# Patient Record
Sex: FEMALE | Race: WHITE | NOT HISPANIC OR LATINO | Employment: FULL TIME | URBAN - NONMETROPOLITAN AREA
[De-identification: names, ages, dates, MRNs, and addresses within clinical notes are randomized per-mention and may not be internally consistent; named-entity substitution may affect disease eponyms.]

---

## 2017-03-01 ENCOUNTER — AMBULATORY - GICH (OUTPATIENT)
Dept: SCHEDULING | Facility: OTHER | Age: 37
End: 2017-03-01

## 2017-03-29 ENCOUNTER — HISTORY (OUTPATIENT)
Dept: FAMILY MEDICINE | Facility: OTHER | Age: 37
End: 2017-03-29

## 2017-03-29 ENCOUNTER — OFFICE VISIT - GICH (OUTPATIENT)
Dept: FAMILY MEDICINE | Facility: OTHER | Age: 37
End: 2017-03-29

## 2017-03-29 DIAGNOSIS — R53.83 OTHER FATIGUE: ICD-10-CM

## 2017-03-29 DIAGNOSIS — M54.50 LOW BACK PAIN: ICD-10-CM

## 2017-03-29 DIAGNOSIS — K59.09 OTHER CONSTIPATION: ICD-10-CM

## 2017-03-29 DIAGNOSIS — G89.29 OTHER CHRONIC PAIN: ICD-10-CM

## 2017-03-29 DIAGNOSIS — R00.2 PALPITATIONS: ICD-10-CM

## 2017-04-06 ENCOUNTER — AMBULATORY - GICH (OUTPATIENT)
Dept: SCHEDULING | Facility: OTHER | Age: 37
End: 2017-04-06

## 2017-04-17 ENCOUNTER — HISTORY (OUTPATIENT)
Dept: INTERNAL MEDICINE | Facility: OTHER | Age: 37
End: 2017-04-17

## 2017-04-17 ENCOUNTER — OFFICE VISIT - GICH (OUTPATIENT)
Dept: INTERNAL MEDICINE | Facility: OTHER | Age: 37
End: 2017-04-17

## 2017-04-17 DIAGNOSIS — G89.29 OTHER CHRONIC PAIN: ICD-10-CM

## 2017-04-17 DIAGNOSIS — D50.9 IRON DEFICIENCY ANEMIA: ICD-10-CM

## 2017-04-17 DIAGNOSIS — H57.89 OTHER SPECIFIED DISORDERS OF EYE AND ADNEXA: ICD-10-CM

## 2017-04-17 DIAGNOSIS — K59.09 OTHER CONSTIPATION: ICD-10-CM

## 2017-04-17 DIAGNOSIS — M54.50 LOW BACK PAIN: ICD-10-CM

## 2017-04-17 DIAGNOSIS — R10.13 EPIGASTRIC PAIN: ICD-10-CM

## 2017-04-17 DIAGNOSIS — R00.2 PALPITATIONS: ICD-10-CM

## 2017-04-17 DIAGNOSIS — R53.83 OTHER FATIGUE: ICD-10-CM

## 2017-06-13 ENCOUNTER — AMBULATORY - GICH (OUTPATIENT)
Dept: RADIOLOGY | Facility: OTHER | Age: 37
End: 2017-06-13

## 2017-06-13 DIAGNOSIS — R52 PAIN: ICD-10-CM

## 2017-06-20 ENCOUNTER — HOSPITAL ENCOUNTER (OUTPATIENT)
Dept: RADIOLOGY | Facility: OTHER | Age: 37
End: 2017-06-20

## 2017-06-20 ENCOUNTER — AMBULATORY - GICH (OUTPATIENT)
Dept: RADIOLOGY | Facility: OTHER | Age: 37
End: 2017-06-20

## 2017-06-20 DIAGNOSIS — R52 PAIN: ICD-10-CM

## 2017-10-17 ENCOUNTER — OFFICE VISIT - GICH (OUTPATIENT)
Dept: FAMILY MEDICINE | Facility: OTHER | Age: 37
End: 2017-10-17

## 2017-10-17 ENCOUNTER — HISTORY (OUTPATIENT)
Dept: FAMILY MEDICINE | Facility: OTHER | Age: 37
End: 2017-10-17

## 2017-10-17 DIAGNOSIS — J30.81 ALLERGIC RHINITIS DUE TO ANIMAL HAIR AND DANDER: ICD-10-CM

## 2017-10-17 DIAGNOSIS — M54.50 LOW BACK PAIN: ICD-10-CM

## 2017-10-17 DIAGNOSIS — E03.9 HYPOTHYROIDISM: ICD-10-CM

## 2017-10-17 LAB
T3,FREE - HISTORICAL: 3.49 PG/ML (ref 2.5–3.9)
T4 FREE SERPL-MCNC: 0.8 NG/DL (ref 0.58–1.64)
TSH - HISTORICAL: 1.42 UIU/ML (ref 0.34–5.6)

## 2017-10-18 ENCOUNTER — AMBULATORY - GICH (OUTPATIENT)
Dept: SCHEDULING | Facility: OTHER | Age: 37
End: 2017-10-18

## 2017-10-20 LAB — T3,REVERSE - HISTORICAL: 9.9 NG/DL

## 2017-12-19 ENCOUNTER — COMMUNICATION - GICH (OUTPATIENT)
Dept: FAMILY MEDICINE | Facility: OTHER | Age: 37
End: 2017-12-19

## 2017-12-19 DIAGNOSIS — E03.9 HYPOTHYROIDISM: ICD-10-CM

## 2017-12-27 NOTE — PROGRESS NOTES
Patient Information     Patient Name MRN Sex Gisela Balderrama [id number] Female 1980      Progress Notes by Ana Tierney at 2017 10:47 AM     Author:  Ana Tierney Service:  (none) Author Type:  (none)     Filed:  2017 10:47 AM Date of Service:  2017 10:47 AM Status:  Signed     :  Ana Tierney            Falls Risk Criteria:    Age 65 and older or under age 4        Sensory deficits    Poor vision    Use of ambulatory aides    Impaired judgment    Unable to walk independently    Meets High Risk criteria for falls:  no

## 2017-12-27 NOTE — PROGRESS NOTES
"Patient Information     Patient Name MRN Sex Gisela Balderrama [id number] Female 1980      Progress Notes by Ronda Paniagua DO at 10/17/2017  8:30 AM     Author:  Ronda Paniagua DO Service:  (none) Author Type:  PHYS- Osteopathic     Filed:  10/19/2017  4:19 AM Encounter Date:  10/17/2017 Status:  Signed     :  Ronda Paniagua DO (PHYS- Osteopathic)            SUBJECTIVE:  Gisela Richards is a 36 y.o. female who presents for multiple concerns.    HPI  Gisela was here seeing different providers since our last visit with concerns ranging from weight, thyroid problem, back pain, etc.  She has also seen Kaylyn Gray NP from [facility] (who has now moved to [facility]).  They discussed, and eventually started on Nature-thyroid compound.  Her TSH levels have been fairly normal, however symptomatically she has benefited from the addition.  She has not seen Endocrinology in all of this.  Last thyroid level was in July - and would like it rechecked at this time.  She does bring in a ~3 page document with her symptoms and progression of things over the last few months to years.  It will be scanned into her chart.    The symptoms she has received the most relief from have been: the mind fog/blanking out, random weakness, heart palpitations and anxiety/panic attacks.  She continues to deal with constipation, decreased sex drive and infertility, joint pains, digestive issues, temperature sensitivity (cold when all others are hot or overheats easily), weight issues, severe menstrual pain, puffy eyes.    She also continues to deal with daily allergy symptoms, but has known allergy to dog dander and proceeded to get a dog.  Now he is part of the family.  Thought he was \"hypoallergenic\" but learned there was no such thing when she met with an allergist.      Allergies      Allergen   Reactions     Cats (Fur, Dander, Saliva)  Shortness Of Breath     Latex  Rash     Tramadol  Other - Describe In " Comment Field     High doses caused yellow eyes, tremors in her mouth, couldn't feel some body parts.    No issues with low doses      Vicodin [Hydrocodone-Acetaminophen]  Rash   ,   Current Outpatient Prescriptions on File Prior to Visit       Medication  Sig Dispense Refill     Magnesium Citrate powd by Does not apply route. Nagnesium Calm by Natural Vitality takes 2 to 3 tsp per day       No current facility-administered medications on file prior to visit.     and   Past Medical History:     Diagnosis  Date     Constipation     Since young.      History of hemangioma excision 2014    Slight recurrence within scar - removed in 9th grade and came back right away      History of pregnancy      2, para 2.          REVIEW OF SYSTEMS:  ROS - see scanned paper and HPI.  All other systems reviewed and negative.    OBJECTIVE:  /82  Pulse 64  Wt 76.1 kg (167 lb 12.8 oz)  BMI 25.51 kg/m2    EXAM:   Physical Exam   Constitutional: She is well-developed, well-nourished, and in no distress.   HENT:   Head: Normocephalic and atraumatic.   Right Ear: External ear normal.   Left Ear: External ear normal.   Nose: Nose normal.   Mouth/Throat: Oropharynx is clear and moist. No oropharyngeal exudate.   Eyes: EOM are normal. Pupils are equal, round, and reactive to light.   Neck: Normal range of motion. Neck supple.   Cardiovascular: Normal rate and normal heart sounds.    Pulmonary/Chest: Effort normal and breath sounds normal. No respiratory distress. She has no wheezes. She has no rales. She exhibits no tenderness.   Abdominal: Soft. Bowel sounds are normal. She exhibits no distension. There is no tenderness. There is no rebound and no guarding.   Skin: Skin is warm and dry. No rash noted. No pallor.   Psychiatric: Mood and affect normal.   Nursing note and vitals reviewed.    Results for orders placed or performed in visit on 10/17/17      TSH      Result  Value Ref Range    TSH 1.42 0.34 - 5.60 uIU/mL  "  T4,FREE      Result  Value Ref Range    T4,FREE 0.80 0.58 - 1.64 ng/dL   T3,FREE      Result  Value Ref Range    T3,FREE 3.49 2.50 - 3.90 pg/mL         ASSESSMENT/PLAN:    ICD-10-CM    1. Hypothyroidism, unspecified type E03.9 TSH      T4,FREE      T3,FREE      T3,REVERSE      TSH      T4,FREE      T3,FREE      T3,REVERSE   2. Allergic rhinitis due to dogs J30.81 mometasone (NASONEX) (50 mcg each actuation) nasal spray   3. Lumbar facet joint pain M54.5         Plan:  1. Hypothyroidism, unspecified and being treated with nature-thyroid.  She will have her TSH, free T4 and free T3 levels checked today.  I will compare these levels to what she has had last done at [facility] in July and make further recommendations.  Advised Gisela that there are naturopathic providers that do this more commonly than I do, and because Nature-thyroid is not an FDA approved treatment my recommendations are not based on sound research-guided findings.  She is aware of this.  I also related that many of her symptoms are vague and could have a multifactorial reasoning behind them - including environmental exposures, diet, aging, weight gain, etc.  2. Allergic rhinitis, chronic: with constant trigger of dog in the home.  Recommended a daily steroid nasal spray to help remove allergens directly from mucosal surfaces BID.  Continue with zyrtec/claritin daily.  3. Back pain, chronic, waxing and waning: likely overuse from [employer], and now [employer] days.  Discussed taking a 2- week break from her current activity levels - but instead walk or use water-therapy, lower impact classes to help with movement/stretching.  Also recommended scheduled NSAID during that time, but Gisela tells me she \"hates taking medicine.\"  We also discussed trial of glucosamine-chondroitin and increased vitamin D3 supplements to help improve her current symptoms.    Follow up in 1-2 months; sooner prn.          "

## 2017-12-28 NOTE — PROGRESS NOTES
Patient Information     Patient Name MRN Gisela Anne [id number] Female 1980      Progress Notes by Ana Tierney at 2017 10:47 AM     Author:  Ana Tierney Service:  (none) Author Type:  (none)     Filed:  2017 10:47 AM Date of Service:  2017 10:47 AM Status:  Signed     :  Ana Tierney            RECOVERY TIME  You may experience numbness and/or relief of your pain for up to 4-6 hours after the injection.  Your usual symptoms may return the night of the procedure and may possible be more severe than usual a day or two following.  Please keep track of your pain over the next several days and report how long the relief lasts to the doctor who referred you for this procedure.    The beneficial effects of the steroids usually require 2 to 3 days to take effect, buy may take as long as 5 to 7 days.  If there is no change in the pain, then investigation can be focused on other possible sources of your pain.  In either case, the information is useful to the doctor who referred you for this procedure.    POSSIBLE SIDE EFFECTS  Facial flushing (redness), occasional low grade fevers of 99.5F or less, hiccups, insomnia, headaches, increased heart rate, abdominal cramping, and/or a bloating feeling are side effects of the steroid medications and will go away 3 to 4 days after the injection.    Diabetic Patients  The steroids you have received may significantly increase your blood sugar levels.  Monitor your blood sugar level closely (4-6 times per day) for a period of 4 days or until your blood sugar level normalizes.  If your blood sugar level elevates significantly or you experience confusion, dizziness, sweating, please notify our primary physician and make him/her aware that you have received steroids.

## 2017-12-28 NOTE — PATIENT INSTRUCTIONS
Patient Information     Patient Name MRN Gisela Anne [id number] Female 1980      Patient Instructions by Ronda Paniagua DO at 10/17/2017  8:30 AM     Author:  Ronda Paniagua DO Service:  (none) Author Type:  PHYS- Osteopathic     Filed:  10/17/2017  8:58 AM Encounter Date:  10/17/2017 Status:  Signed     :  Ronda Paniagua DO (PHYS- Osteopathic)            Supplements to possibly improve back pain:  Glucosamine-Chondroitin   Vitamin D3 (up to 5000 IU daily)

## 2017-12-28 NOTE — PROGRESS NOTES
Patient Information     Patient Name MRN Sex Gisela Balderrama [id number] Female 1980      Progress Notes by Ana Tierney at 2017 10:47 AM     Author:  Ana Tierney Service:  (none) Author Type:  (none)     Filed:  2017 10:47 AM Date of Service:  2017 10:47 AM Status:  Signed     :  Ana Tierney            [facility] Protocol    A. Pre-procedure verification complete yes  1-relevant information / documentation available, reviewed and properly matched to the patient; 2-consent accurate and complete, 3-equipment and supplies available    B. Site marking complete Yes  Site marked if not in continuous attendance with patient    C. TIME OUT completed yes  Time Out was conducted just prior to starting procedure to verify the eight required elements: 1-patient identity, 2-consent accurate and complete, 3-position, 4-correct side/site marked (if applicable), 5-procedure, 6-relevant images / results properly labeled and displayed (if applicable), 7-antibiotics / irrigation fluids (if applicable), 8-safety precautions.

## 2018-01-02 ENCOUNTER — AMBULATORY - GICH (OUTPATIENT)
Dept: FAMILY MEDICINE | Facility: OTHER | Age: 38
End: 2018-01-02

## 2018-01-04 NOTE — PROGRESS NOTES
Patient Information     Patient Name MRN Gisela Anne [id number] Female 1980      Progress Notes by Kelsi Brown PA-C at 3/29/2017 10:45 AM     Author:  Kelsi Brown PA-C Service:  (none) Author Type:  PHYS- Physician Assistant     Filed:  3/29/2017 12:32 PM Encounter Date:  3/29/2017 Status:  Signed     :  Kelsi Brown PA-C (PHYS- Physician Assistant)            Nursing Notes:   Le Neves  3/29/2017 11:03 AM  Signed  Gisela Richards is a 36 y.o. female presenting for follow up on her back pain.  Le Neves LPN 3/29/2017 10:52 AM     HPI:    Gisela Richards is a 36 y.o. female who presents for follow up of her back pain.   Hx chronic constipation. Heart has been pounding the last 3 weeks.  No chest pain.  Little pressure.  Lightheaded. Mild dizzy. More gas and cramps lately.  No HAs, fevers, chills, vomiting, diarrhea, blood in stool or urine.     Patient presents today with a list showing a variety of concerns. CT scan in this.  Patient states she deals with fatigue even though she gets a good amount of sleep.  Patient has dealt with chronic constipation in the past. She states she currently uses magnesium when she has to have a bowel movement. She states that diet, fiber and water does not seem to help her. Patient is frustrated with her chronic constipation because she does not want to be on laxatives for the rest of her life. She has had several studies completed by gastroenterology in the past.  Patient has been seen a chiropractor and physical therapy for her low back pain and joint pain. Patient has had lumbar x-rays and lumbar MRI completed in the past which showed mild arthritis in the lumbar spine. Physical therapy has instructed her that she needs to back off on her rigorous exercising activities in order to alleviate some of her low back pain. She states she may be at the point where she is agreeable to this. She was not agreeable to this in the  past.    Patient has had more gas recently. She states she always feels cold. She states she has developed Raynauds in the last few years.    Patient states she has had heart palpitations in the last week. This feels anywhere from a flutter to a pounding in the chest. Mild chest pressure. Sometimes makes her dizzy. Worse when she is tired or has caffeine. Patient states she had previous cardiac testing several years ago however she is unsure of the exact results. At times she states that she has noticed a low heart rate.  Patient states that she has been infertile in the past. She has history of low iron in the past however with her chronic constipation she states she has been unable to take iron. She recently had autoimmune disorder testing completed which was unremarkable. She is wondering about further testing.  Patient is curious about a hypermobility syndrome disorder. She would like to discuss her symptoms further with the specialist.    Past Medical History      Diagnosis   Date     Constipation       History of pregnancy        2, para 2.          Past Surgical History       Procedure   Laterality Date     Colonoscopy        was normal.  Next due at age 51.       Hemangioma excision  Left ~     recurring within scar         Social History     Substance Use Topics       Smoking status: Never Smoker     Smokeless tobacco: Never Used     Alcohol use No       Current Outpatient Prescriptions       Medication  Sig Dispense Refill     Magnesium Citrate powd by Does not apply route. Nagnesium Calm by Natural Vitality takes 2 to 3 tsp per day       No current facility-administered medications for this visit.      Medications have been reviewed by me and are current to the best of my knowledge and ability.      Allergies      Allergen   Reactions     Cats (Fur, Dander, Saliva)  Shortness Of Breath     Gluten  Other - Describe In Comment Field     Celiac      Latex  Rash     Other  [Unlisted Allergen  (Include Detail In Comments)]  Shortness Of Breath     Tramadol  Other - Describe In Comment Field     High doses caused yellow eyes, tremors in her mouth, couldn't feel some body parts.    No issues with low doses      Vicodin [Hydrocodone-Acetaminophen]  Rash       REVIEW OF SYSTEMS:  Refer to HPI.    EXAM:   Vitals:    /80  Pulse 68  Wt 71.7 kg (158 lb)  Breastfeeding? No  BMI 24.02 kg/m2    General Appearance: Pleasant, alert, appropriate appearance for age. No acute distress  Skin: no rash or abnormalities  Neurologic Exam: Nonfocal, normal gross motor, tone coordination and no tremor.  Psychiatric Exam: Alert and oriented - appropriate affect.      ASSESSMENT AND PLAN:      ICD-10-CM    1. CONSTIPATION, CHRONIC K59.09 AMB CONSULT TO INTERNAL MEDICINE   2. Heart palpitations R00.2 AMB CONSULT TO INTERNAL MEDICINE   3. Fatigue, unspecified type R53.83 AMB CONSULT TO INTERNAL MEDICINE   4. Chronic bilateral low back pain without sciatica M54.5 AMB CONSULT TO INTERNAL MEDICINE     G89.29        Referred to internal medicine for a consult due to history of multiple concerns and symptoms.     Referred to orthopedics for a consult.   Back pain:   Encouraged to take ibuprofen for relief up to 4 times per day.  Encouraged rest and elevation.  Encouraged to use ice or heat 15 minutes at a time several times per day to decrease pain. Return to clinic with any change or worsening of symptoms.    Patient Instructions   Referred to internal medicine for a consult.     Referred to orthopedics for a consult.   Back pain:   Encouraged to take ibuprofen for relief up to 4 times per day.  Encouraged rest and elevation.  Encouraged to use ice or heat 15 minutes at a time several times per day to decrease pain. Return to clinic in 1-2 weeks as necessary for persistent pain. Return to clinic with any change or worsening of symptoms.        Kelsi Brown PA-C..................3/29/2017 11:02 AM

## 2018-01-04 NOTE — NURSING NOTE
Patient Information     Patient Name MRN Gisela Anne [id number] Female 1980      Nursing Note by Le Neves at 3/29/2017 10:45 AM     Author:  Le Neves Service:  (none) Author Type:  (none)     Filed:  3/29/2017 11:03 AM Encounter Date:  3/29/2017 Status:  Signed     :  Le Neves ИВАН Richards is a 36 y.o. female presenting for follow up on her back pain.  Le Neves LPN 3/29/2017 10:52 AM

## 2018-01-04 NOTE — H&P
Patient Information     Patient Name MRGisela Tong [id number] Female 1980      H&P by Trina Talavera DO at 2017  4:10 PM     Author:  Trina Talavera DO Service:  (none) Author Type:  PHYS- Osteopathic     Filed:  2017  7:35 AM Encounter Date:  2017 Status:  Signed     :  Trina Talavera DO (PHYS- Osteopathic)            ----------------- IM Consultation ------------------  2017    Chief Complaint     Patient presents with       Consult      Internal Medicine heart palpitations, fatigue, constipation       HPI: Ms. Richards is a 36 y.o. female who presents today for consultation requested by Kelsi Brown for several issues.    Patient presents today with several concerns that have been going on for years.  She does bring a 2 page list of her problems to discuss today including 15 bullet points.  We discussed as many as we could in the duration of her appointment.    She does report that she has been having issues with constipation.  She has changed her diet multiple times with minimal effect.  She has a history of colonoscopy 7 years ago that showed some swelling of the villa however she does not have these records and they are not available.  She was told in the past that she does not have celiac disease however she does feel that overall she does better when she stays off of gluten.  She has not really been using anything for constipation other than over-the-counter magnesium when needed.  She does feel that she has increased the water intake and this has not helped.  She did go and have multiple testing done with gastroenterology that was reviewed through [employer] system including defecogram, anorectal manometry and celiac testing which was all negative.  She does not want to be on a laxative although we did discuss today that some people may just need laxatives to help with bowel movements.  She currently is having 2-4 bowel movements weekly.  They are painful  and cause abdominal cramping along with pain around the anus when she defecates.  They can vary from hard to loose.    She has had some heart palpitations in the last several weeks.  She does report that they are worse when she is fatigued or with caffeine.  She did have a prior episode several years ago.  Records for this are not available.  She does have a low resting heart rate which is between 40 and 50.  She does occasionally get some dizziness with her palpitations.  She does have some generalized fatigue.    She has a history of iron deficiency anemia.  Most recently this was checked in November 2016.  She did try going on iron however causes constipation and so stopped this.    She does report having some itchy eyes at this time.  She has been trying some drops and is unsure if these have been helping at all.  She does seem to have a lot of allergy sensitivities including to foods.  She saw kidneys etiology, felt she had allergies to corn, gluten and Stevia.  She does try to avoid these for the most part.  She is concerned about autoimmune disorders or connective tissue disease including Erler Danlos syndrome.  She does have a history of difficulty getting pregnant after she had 2 children ages 23 and 24.  Did have a D&C in between her 2 pregnancies.    She has been having some epigastric pain.  She does feel slightly nauseous after eating.  She has not changed much of her diet however is having increased gas and pain.  Sometimes she gets dizzy from her pain.  She has not lost any weight despite watching her calories and training 5 days a week.  She has been increasing her calorie counts to help with energy levels and overall health.    She also has concern about chronic low back pain.  She has seen a chiropractor for this.  She has been told that she has hypermobile joints along with a weak core.  She does have some degenerative changes seen on MRI of her lumbar spine.  She has done physical therapy in the  "past to help with the pain.  She did have a lot of problems with joint pain until she went gluten-free at which time she had improvement.    She  reports that she has never smoked. She has never used smokeless tobacco.    History is discussed with patient and reviewed in previous available records by myself.  It is current to the best of my knowledge as below.    Past Medical History:     Diagnosis  Date     Constipation     Since young.      History of hemangioma excision 2014    Slight recurrence within scar - removed in 9th grade and came back right away      History of pregnancy      2, para 2.           Past Surgical History:      Procedure  Laterality Date     COLONOSCOPY      \"swelling of the lining\" and flattening of villa.       DILATION AND CURETTAGE  2004    retained placenta       hemangioma excision Left ~    recurring within scar       KNEE ARTHROSCOPY W/ MENISCAL REPAIR  2016         Current Outpatient Prescriptions     Medication  Sig     Magnesium Citrate powd by Does not apply route. Nagnesium Calm by Natural Vitality takes 2 to 3 tsp per day     No current facility-administered medications for this visit.      Medications have been reviewed by me and are current to the best of my knowledge and ability.          Allergies      Allergen   Reactions     Cats (Fur, Dander, Saliva)  Shortness Of Breath     Gluten  Other - Describe In Comment Field     Latex  Rash     Tramadol  Other - Describe In Comment Field     High doses caused yellow eyes, tremors in her mouth, couldn't feel some body parts.    No issues with low doses      Vicodin [Hydrocodone-Acetaminophen]  Rash        Family History       Problem   Relation Age of Onset     Good Health  Father      does not see doctor regularly       Thyroid Disease  Mother      Hypothyroid       Other  Mother      Smoker, prediabetes       Thyroid Disease  Sister      Hypothyroid       Arthritis  Sister      Scoliosis  Half-Sister  " "    No Known Problems  Half-Sister        Family Status     Relation  Status     Father Alive     Mother Alive     Sister Alive     Half-Sister Alive     Half-Sister Alive        Social History     Social History        Marital status:       Spouse name: N/A     Number of children:  N/A     Years of education:  N/A     Social History Main Topics          Smoking status:   Never Smoker      Smokeless tobacco:   Never Used      Alcohol use   0.0 oz/week     0 Standard drinks or equivalent per week        Comment: once monthly       Drug use:   No      Sexual activity:   Yes      Partners:  Male      Birth control/ protection:  None       Comment: monogamous       Other Topics  Concern     Not on file      Social History Narrative     She is , [place].  She is doing photography, owns her own business. She has two children, Melanie (2004) and Antonio. (2005)       ROS  GEN: -fevers/-chills/-night sweats/-wt change  NEURO: -headaches/-vision changes  EARS: -hearing/-tinnitus  NOSE: -drainage/-congestion  MOUTH/THROAT: - sore throat/-dysphagia/-sores  LUNGS: -sob/-cough  CARDIOVASCULAR: -cp/+palpitations  GI: +pain/-diarrhea/+constipation/-bloody stools  : -dysuria/-hematuria  HEMATOLOGIC/LYMPHATIC: -swollen nodes/-easy bruising  SKIN: -rashes/+lesions - left medial foot.    MSK/RHEUM: + Occasional joint pain/-swelling  NEURO: -weakness/-parasthesias  PSYCH: -anhedonia/-depression/+anxiety       EXAM:   /68  Pulse 60  Temp 97.6  F (36.4  C) (Tympanic)  Resp 16  Ht 1.727 m (5' 8\")  Wt 73.3 kg (161 lb 8 oz)  Breastfeeding? No  BMI 24.56 kg/m2  Estimated body mass index is 24.56 kg/(m^2) as calculated from the following:    Height as of this encounter: 1.727 m (5' 8\").    Weight as of this encounter: 73.3 kg (161 lb 8 oz).    GEN: Vitals reviewed.  Healthy appearing. Patient is in no acute distress. Cooperative with exam.  HEENT: Normocephalic atraumatic. Pupils equally round. No scleral icterus, no " conjunctival erythema. Oropharynx with no erythema or exudates. Dentition adequate.     NECK: Supple; no thyromegaly.  No neck, cervical or supraclavicular LAD  CV: Heart regular in rate and rhythm with no murmur.  Radial and posterior tibial pulses palpable.  LUNGS: Lungs clear to auscultation bilaterally.  Chest rise equal bilaterally.  No accessory muscle use.  ABD:  Soft, mildly tender in the epigastrium, and nondistended.  No rebound. Bowel sounds positive.  SKIN: Warm and dry to touch.  No rash on face, arms and legs.  EXT: No clubbing or cyanosis.  No peripheral edema.  NEURO: Alert and oriented to person, place, and time.  Cranial nerves II-XII grossly intact with no focal or lateralizing deficits.  Muscle tone normal. Gait normal. Sensation intact to light touch.  MSK: ROM of upper and lower ext symmetric and full.  PSYCH: Affect appropriate. Speech fluent. Answers questions appropriately and thought process normal.    LABS: 4/17/2017 - Personally reviewed  Results for orders placed or performed in visit on 11/21/16      TSH      Result  Value Ref Range    TSH 1.96 0.34 - 5.60 uIU/mL   SEDIMENTATION RATE      Result  Value Ref Range    SEDIMENTATION RATE        7 <21 mm/hr   VITAMIN D 25 (DEFICIENCY)      Result  Value Ref Range    VITAMIN D TOTAL AFL 35.2   ng/mL   HEMOGLOBIN      Result  Value Ref Range    HEMOGLOBIN                14.1 12.0 - 16.0 g/dL    MCV                       89 80 - 100 fL   FERRITIN      Result  Value Ref Range    FERRITIN 9.9 (L) 23.9 - 336.2 ng/mL   T4,FREE      Result  Value Ref Range    T4,FREE 0.82 0.58 - 1.64 ng/dL   VITAMIN B12      Result  Value Ref Range    VITAMIN B12 639 180 - 914 pg/mL   ALLA TEST      Result  Value Ref Range    ANTINUCLEAR ANTIBODY (ALLA),SCREEN Negative Negative   Urine pregnancy test (HCG), qualitative      Result  Value Ref Range    PREGNANCY,URINE           Negative Negative           ASSESSMENT AND PLAN:    1. CONSTIPATION, CHRONIC  - we did  discuss various options for her constipation.  She was encouraged to be sure she is getting enough fiber and try taking fiber supplement daily.  We also discussed use of water especially given her increased activity.  We did discuss that she may benefit from having a laxative and that most laxatives are very safe.  She was given the names of several different ones.  She is to call if she has continued problems.  - AMB CONSULT TO INTERNAL MEDICINE    2. Heart palpitations  - she was encouraged to try to reduce anxiety in her life.  Additionally she was encouraged to cut down on caffeine and any exposure to alcohol or nicotine.  If these do continue we may need to consider a Holter monitor to evaluate.  - AMB CONSULT TO INTERNAL MEDICINE    3. Fatigue, unspecified type  - likely multifactorial.  Difficult to know what specifically this is from.  May be from iron deficiency.  If she does continue to have issues we would consider additional labs.  - AMB CONSULT TO INTERNAL MEDICINE    4. Iron deficiency anemia, unspecified iron deficiency anemia type  - etiology may be secondary to menstrual periods versus other.  We did discuss that underlining celiac can lead to iron deficiency.  May need to consider additional testing for this.  In the meantime she was encouraged to try an iron supplement if her constipation improves.  We may need to consider IV iron if it remains low and she is unable to tolerate oral supplementation.    5. Itchy eyes  - continue with eyedrops.  She may benefit from being seen by an allergist for further medications.  She could try a antihistamine in the form of Claritin or Allegra on a daily basis.  She is to call if she has further issues.    6. Epigastric pain  - etiology is unknown however I suspect this is related to anxiety.  She may benefit from an antacid on an ongoing basis.   - Encouraged to avoid caffeine, NSAIDS, chocolate, alcohol, spicy food, red sauce; consider raising the head of bed  10-15 degrees; do not eat within 2-3 hours of bedtime  - Return/call as needed for follow-up should any new symptoms develop, for worsening of current symptoms or if symptoms do not resolve with above plan.    7. Chronic bilateral low back pain without sciatica  - we had limited time to review this today.  I will touch base with her and offer additional therapies.  She may benefit from a physical medicine and rehabilitation evaluation given her concerns for underlining hypermobility syndrome.  - AMB CONSULT TO INTERNAL MEDICINE      Return in about 1 month (around 5/17/2017) for Recheck/Follow Up.    A total of 45 minutes spent with in face-to-face consultation of this patient with greater than 50% spent in counseling and care coordination of above listed medical problems including diagnosis, treatment options with emphasis on risks and benefits of each, prognosis and importance of compliance for each.     Patient Instructions   For your seasonal allergies, please try one of the following:  - Loratadine (Claritin) 10mg daily for 3-4 weeks and then as needed (least sedating, least effective)  - Fexofenadine (Allegra) 12 hour 60mg twice daily for 3-4 weeks and then as needed   - Cetirizine (Zyrtec) 10mg daily for 3-4 weeks and then as needed (most sedating,most effective)    Please note that these can take up to 3-4 weeks to see a difference.    --------------------------------------------------------------------------------------------------------------------------------    Try Benefiber daily and increase water.      Docusate 100mg over the counter 1-2 pills once or twice daily - softens stool    Senna 8.6mg over the counter 1-2 pills once or twice daily - for motility    **Above can be in combination    or    Polyethylene glycol 17gm mixed with 8+ ounces of water or juice, once or twice daily - softens and moves stools; Can take a couple days to start working    Choose one of the above and start daily and increase  or decrease as needed.    Call if constipation does not resolve and a prescription for suppositories can be sent to pharmacy.             Index [employer] languages   Constipation   ________________________________________________________________________  KEY POINTS    Constipation means that you have bowel movements less often than normal for you, or that are very hard to pass.    Constipation can often be treated at home by drinking enough liquidto keep your urine light yellow, eating fiber and exercising regularly. Your provider may recommend a stool softener or laxative to help.    If constipation lasts a long time, happens often or you also have other symptoms, you should contact your healthcare provider.  ________________________________________________________________________  What is constipation?  Constipation is a very common condition that happens to almost everyone. It means that you have bowel movements less often than normal for you, such as fewer than 3 times a week. Bowel movements can be very hard and sometimes like small gio.  Normal bowel movements vary from person to person. For some people, having a bowel movement 3 times a day is normal. For others, 3 times a week may be normal.   Constipation that bothers you for 12 weeks or more out of the year, even if it s off and on, is called chronic constipation.     What is the cause?  You may have constipation because:    You wait too long to go to the bathroom after you feel the need to go.    You don t eat enough fiber.    You don t drink enough liquids.    You don't get enough exercise.    You overuse some types of laxatives.    You are taking a medicine that has a side effect of constipation, such as iron pills, antidepressants, or narcotic pain medicine.  Other possible causes are:    Pregnancy    Depression or stress    Some medical conditions and diseases that can cause a partial blockage in your bowels    What are the symptoms?  Symptoms  may include:    Small, hard, or dry bowel movements    Uncomfortable or painful bowel movements that are hard to pass    A longer time than usual between bowel movements    Feeling full and heavy, like you have a full belly    How is it treated?  Most of the time you don t need to see your healthcare provider for treatment. Here are some things you can do to relieve constipation:    Add more fiber to your diet by eating whole-grain bread and cereal, beans, bran muffins, brown rice, and fresh fruit and vegetables.    Exercise regularly. For example, if you are able, walk for at least 30 minutes every day. Remember to start slow (5 to 10 minutes at first, then increase your time each day or two) and check with your healthcare provider before you add any new exercise.    Drink enough liquids each day to keep your urine light yellow in color.    Go to the bathroom whenever you feel like you need to go. Don t wait.  If taking care of yourself at home does not relieve your constipation, your healthcare provider may be able to help. Your provider may recommend a stool softener or laxative to help you have more bowel movements.    Stool softeners are medicines that make your bowel movements easier to pass.    Bulk laxatives, such as fiber supplements, pull water into the bowels. Extra water in the bowel makes the stool larger, softer, and easier to pass.    Lubricant laxatives, such as mineral oil, keep water in the bowels, which makes the stool softer and easier to pass.    Stimulant laxatives, such as milk of magnesia and some other laxatives, help the bowel muscles push the stool through the bowel.  Laxatives may be used for a short time. Try not to use them for more than 1 week. Many people find fiber supplements, like Metamucil, Citrucel, or other psyllium products, to be helpful, but sometimes these products can make constipation worse.  Enemas are another way to help you have a bowel movement. Your healthcare  provider will tell you how to give yourself an enema if he or she recommends it.  Tell your healthcare provider if:    You have both bouts of constipation and bouts of diarrhea.    You have pain during bowel movements or for some time afterward.    Your bowel movements are dark or tar-colored or have blood in them.    You are losing weight without trying.  Tell your healthcare provider about all of the medicines and supplements that you take. Ask if any of the products you are using may be causing constipation.  If you have developed constipation recently and it s lasted 3 or more weeks, see your health care provider to make sure you don t have a medical problem causing the constipation.    How can I prevent constipation?  You may be able to prevent constipation by drinking plenty of water; eating fresh fruits, vegetables and whole grains; and exercising regularly.     Developed by [employer].  Adult Advisor 2016.3 published by [employer].  Last modified: 2015-02-12  Last reviewed: 2014-12-05  This content is reviewed periodically and is subject to change as new health information becomes available. The information is intended to inform and educate and is not a replacement for medical evaluation, advice, diagnosis or treatment by a healthcare professional.  References   Adult Advisor 2016.3 Index    Copyright   2016 [employer], a division of [employer] All rights reserved.               SATINDER RUTLEDGE DO   4/17/2017 6:03 PM    This document was prepared using voice generated softwear. While every attempt was made for accuracy, grammatical errors may exist.

## 2018-01-04 NOTE — NURSING NOTE
Patient Information     Patient Name MRN Gisela Anne [id number] Female 1980      Nursing Note by Negra Gallegos at 2017  4:10 PM     Author:  Negra Gallegos Service:  (none) Author Type:  (none)     Filed:  2017  4:21 PM Encounter Date:  2017 Status:  Signed     :  Nerga Gallegos            Patient presents to clinic for Internal Medicine Consult after experiencing heart palpitations, fatigue and constipation.    Negra Gallegos LPN..................2017  4:18 PM

## 2018-01-04 NOTE — PATIENT INSTRUCTIONS
Patient Information     Patient Name MRGisela Tong [id number] Female 1980      Patient Instructions by Trina Talavera DO at 2017  4:10 PM     Author:  Trina Talavera DO Service:  (none) Author Type:  PHYS- Osteopathic     Filed:  2017  5:14 PM Encounter Date:  2017 Status:  Signed     :  Trina Talavera DO (PHYS- Osteopathic)            For your seasonal allergies, please try one of the following:  - Loratadine (Claritin) 10mg daily for 3-4 weeks and then as needed (least sedating, least effective)  - Fexofenadine (Allegra) 12 hour 60mg twice daily for 3-4 weeks and then as needed   - Cetirizine (Zyrtec) 10mg daily for 3-4 weeks and then as needed (most sedating,most effective)    Please note that these can take up to 3-4 weeks to see a difference.    --------------------------------------------------------------------------------------------------------------------------------    Try Benefiber daily and increase water.      Docusate 100mg over the counter 1-2 pills once or twice daily - softens stool    Senna 8.6mg over the counter 1-2 pills once or twice daily - for motility    **Above can be in combination    or    Polyethylene glycol 17gm mixed with 8+ ounces of water or juice, once or twice daily - softens and moves stools; Can take a couple days to start working    Choose one of the above and start daily and increase or decrease as needed.    Call if constipation does not resolve and a prescription for suppositories can be sent to pharmacy.             [employer] All languages   Constipation   ________________________________________________________________________  KEY POINTS    Constipation means that you have bowel movements less often than normal for you, or that are very hard to pass.    Constipation can often be treated at home by drinking enough liquid to keep your urine light yellow, eating fiber and exercising regularly. Your provider may recommend a  stool softener or laxative to help.    If constipation lasts a long time, happens often or you also have other symptoms, you should contact your healthcare provider.  ________________________________________________________________________  What is constipation?  Constipation is a very common condition that happens to almost everyone. It means that you have bowel movements less often than normal for you, such as fewer than 3 times a week. Bowel movements can be very hard and sometimes like small gio.  Normal bowel movements vary from person to person. For some people, having a bowel movement 3 times a day is normal. For others, 3 times a week may be normal.   Constipation that bothers you for 12 weeks or more out of the year, even if it s off and on, is called chronic constipation.     What is the cause?  You may have constipation because:    You wait too long to go to the bathroom after you feel the need to go.    You don t eat enough fiber.    You don t drink enough liquids.    You don't get enough exercise.    You overuse some types of laxatives.    You are taking a medicine that has a side effect of constipation, such as iron pills, antidepressants, or narcotic pain medicine.  Other possible causes are:    Pregnancy    Depression or stress    Some medical conditions and diseases that can cause a partial blockage in your bowels    What are the symptoms?  Symptoms may include:    Small, hard, or dry bowel movements    Uncomfortable or painful bowel movements that are hard to pass    A longer time than usual between bowel movements    Feeling full and heavy, like you have a full belly    How is it treated?  Most of the time you don t need to see your healthcare provider for treatment. Here are some things you can do to relieve constipation:    Add more fiber to your diet by eating whole-grain bread and cereal, beans, bran muffins, brown rice, and fresh fruit and vegetables.    Exercise regularly. For example,  if you are able, walk for at least 30 minutes every day. Remember to start slow (5 to 10 minutes at first, then increase your time each day or two) and check with your healthcare provider before you add any new exercise.    Drink enough liquids each day to keep your urine light yellow in color.    Go to the bathroom whenever you feel like you need to go. Don t wait.  If taking care of yourself at home does not relieve your constipation, your healthcare provider may be able to help. Your provider may recommend a stool softener or laxative to help you have more bowel movements.    Stool softeners are medicines that make your bowel movements easier to pass.    Bulk laxatives, such as fiber supplements, pull water into the bowels. Extra water in the bowel makes the stool larger, softer, and easier to pass.    Lubricant laxatives, such as mineral oil, keep water in the bowels, which makes the stool softer and easier to pass.    Stimulant laxatives, such as milk of magnesia and some other laxatives, help the bowel muscles push the stool through the bowel.  Laxatives may be used for a short time. Try not to use them for more than 1 week. Many people find fiber supplements, like Metamucil, Citrucel, or other psyllium products, to be helpful, but sometimes these products can make constipation worse.  Enemas are another way to help you have a bowel movement. Your healthcare provider will tell you how to give yourself an enema if he or she recommends it.  Tell your healthcare provider if:    You have both bouts of constipation and bouts of diarrhea.    You have pain during bowel movements or for some time afterward.    Your bowel movements are dark or tar-colored or have blood in them.    You are losing weight without trying.  Tell your healthcare provider about all of the medicines and supplements that you take. Ask if any of the products you are using may be causing constipation.  If you have developed constipation recently  and it s lasted 3 or more weeks, see your health care provider to make sure you don t have a medical problem causing the constipation.    How can I prevent constipation?  You may be able to prevent constipation by drinking plenty of water; eating fresh fruits, vegetables and whole grains; and exercising regularly.     Developed by [employer].  Adult Advisor 2016.3 published by [employer].  Last modified: 2015-02-12  Last reviewed: 2014-12-05  This content is reviewed periodically and is subject to change as new health information becomes available. The information is intended to inform and educate and is not a replacement for medical evaluation, advice, diagnosis or treatment by a healthcare professional.  References   Adult Advisor 2016.3 Index    Copyright   2016 [employer], a division of [employer] All rights reserved.

## 2018-01-04 NOTE — PROGRESS NOTES
Patient Information     Patient Name MRN Gisela Anne [id number] Female 1980      Progress Notes by Trina Talavera DO at 2017  4:10 PM     Author:  Trina Talavera DO Service:  (none) Author Type:  PHYS- Osteopathic     Filed:  2017  7:35 AM Encounter Date:  2017 Status:  Signed     :  Trina Talavera DO (PHYS- Osteopathic)            Please see H&P from same date.

## 2018-01-11 ENCOUNTER — AMBULATORY - GICH (OUTPATIENT)
Dept: FAMILY MEDICINE | Facility: OTHER | Age: 38
End: 2018-01-11

## 2018-01-11 DIAGNOSIS — E03.9 HYPOTHYROIDISM: ICD-10-CM

## 2018-01-27 VITALS
SYSTOLIC BLOOD PRESSURE: 112 MMHG | HEART RATE: 68 BPM | WEIGHT: 158 LBS | DIASTOLIC BLOOD PRESSURE: 80 MMHG | BODY MASS INDEX: 24.02 KG/M2

## 2018-01-27 VITALS
WEIGHT: 161.5 LBS | TEMPERATURE: 97.6 F | RESPIRATION RATE: 16 BRPM | SYSTOLIC BLOOD PRESSURE: 116 MMHG | BODY MASS INDEX: 24.48 KG/M2 | HEIGHT: 68 IN | DIASTOLIC BLOOD PRESSURE: 68 MMHG | HEART RATE: 60 BPM

## 2018-01-27 VITALS
BODY MASS INDEX: 25.52 KG/M2 | SYSTOLIC BLOOD PRESSURE: 108 MMHG | HEART RATE: 64 BPM | WEIGHT: 167.8 LBS | DIASTOLIC BLOOD PRESSURE: 82 MMHG

## 2018-02-02 PROBLEM — J30.81 ALLERGIC RHINITIS DUE TO DOGS: Status: ACTIVE | Noted: 2017-10-17

## 2018-02-02 PROBLEM — E03.9 HYPOTHYROID: Status: ACTIVE | Noted: 2017-10-17

## 2018-02-02 PROBLEM — M54.59 LUMBAR FACET JOINT PAIN: Status: ACTIVE | Noted: 2017-10-17

## 2018-02-12 NOTE — TELEPHONE ENCOUNTER
Patient Information     Patient Name MRN Sex Gisela Balderrama [id number] Female 1980      Telephone Encounter by Ronda Ching DO at 2017  5:08 PM     Author:  Ronda Ching DO Service:  (none) Author Type:  PHYS- Osteopathic     Filed:  2017  5:11 PM Encounter Date:  2017 Status:  Signed     :  Ronda Ching DO (PHYS- Osteopathic)            Start: levothyroxine 25mcg daily, and may need to increase.  Will plan to check labs in ~6 weeks and discuss change needed.  Please assist in scheduling lab appointment and appointment with me.    RONDA CHING DO

## 2018-02-12 NOTE — TELEPHONE ENCOUNTER
Patient Information     Patient Name MRN Gisela Anne [id number] Female 1980      Telephone Encounter by Ronda Ching DO at 2017  2:46 PM     Author:  Ronda Ching DO Service:  (none) Author Type:  PHYS- Osteopathic     Filed:  2017  2:47 PM Encounter Date:  2017 Status:  Signed     :  Ronda Ching DO (PHYS- Osteopathic)            Would check with other pharmacies; other option would be the levothyroxine.  RONDA CHING DO

## 2018-02-12 NOTE — TELEPHONE ENCOUNTER
Patient Information     Patient Name MRN Gisela Anne [id number] Female 1980      Telephone Encounter by Sharonda Hoang at 2017  4:39 PM     Author:  Sharonda Hoang Service:  (none) Author Type:  (none)     Filed:  2017  4:40 PM Encounter Date:  2017 Status:  Signed     :  Sharonda Hoang, DO prescribe levothyroxine? It is a nation wide shortage.  Sharonda Hoang LPN............................... 2017 4:40 PM

## 2018-02-12 NOTE — TELEPHONE ENCOUNTER
"Patient Information     Patient Name MRN Gisela Anne [id number] Female 1980      Telephone Encounter by Sil Perez at 2017  1:29 PM     Author:  Sil Perez Service:  (none) Author Type:  (none)     Filed:  2017  1:33 PM Encounter Date:  2017 Status:  Signed     :  Sil Perez            Patient is unable to get her nature-throid medication. Patient stated,\" pharmacy told her there was a shortage\". What would be another option? She has 2 pills left. Please advise.    Sil Perez LPN..............2017 1:33 PM          "

## 2018-02-12 NOTE — TELEPHONE ENCOUNTER
Patient Information     Patient Name Gisela Rodriguez [id number] Female 1980      Telephone Encounter by Sharonda Hoang at 2017  9:12 AM     Author:  Sharonda Hoang Service:  (none) Author Type:  (none)     Filed:  2017  9:12 AM Encounter Date:  2017 Status:  Signed     :  Sharonda Hoang            Informed Gisela of response. She will makes appointments.  Sharonda Hoang LPN............................... 2017 9:12 AM

## 2018-02-19 DIAGNOSIS — E03.9 HYPOTHYROIDISM: Primary | ICD-10-CM

## 2018-02-27 DIAGNOSIS — E03.9 HYPOTHYROIDISM: ICD-10-CM

## 2018-02-27 LAB
T4 FREE SERPL-MCNC: 0.88 NG/DL (ref 0.6–1.6)
TSH SERPL DL<=0.05 MIU/L-ACNC: 1.36 IU/ML (ref 0.34–5.6)

## 2018-02-27 PROCEDURE — 84439 ASSAY OF FREE THYROXINE: CPT | Performed by: FAMILY MEDICINE

## 2018-02-27 PROCEDURE — 36415 COLL VENOUS BLD VENIPUNCTURE: CPT | Performed by: FAMILY MEDICINE

## 2018-02-27 PROCEDURE — 84443 ASSAY THYROID STIM HORMONE: CPT | Performed by: FAMILY MEDICINE

## 2018-03-02 ENCOUNTER — TELEPHONE (OUTPATIENT)
Dept: FAMILY MEDICINE | Facility: OTHER | Age: 38
End: 2018-03-02

## 2018-03-02 NOTE — TELEPHONE ENCOUNTER
TSH: 1.36 (last 10/17/2017: 1.42)  T4: 0.88 (last 10/17/2017: 0.80)    These are minimally changed from previous in October, but all in the normal ranges.  Ronda Paniagua

## 2018-03-02 NOTE — TELEPHONE ENCOUNTER
Spoke with patient after verifying last name and birth date, informed her of results below. Also gave patient the code for her mychart.  Allyn Scott LPN 3/2/2018 3:25 PM

## 2018-03-02 NOTE — TELEPHONE ENCOUNTER
Spoke with patient she is wanting her lab results from 2/27. Please advise. Kalpana Mesa LPN......................3/2/2018 2:07 PM

## 2018-03-05 ENCOUNTER — HEALTH MAINTENANCE LETTER (OUTPATIENT)
Age: 38
End: 2018-03-05

## 2018-04-04 ENCOUNTER — MYC MEDICAL ADVICE (OUTPATIENT)
Dept: FAMILY MEDICINE | Facility: OTHER | Age: 38
End: 2018-04-04

## 2018-04-04 DIAGNOSIS — E06.3 HYPOTHYROIDISM DUE TO HASHIMOTO'S THYROIDITIS: Primary | ICD-10-CM

## 2018-04-04 RX ORDER — LEVOTHYROXINE SODIUM 75 UG/1
37.5 TABLET ORAL
Qty: 45 TABLET | Refills: 4 | Status: SHIPPED | OUTPATIENT
Start: 2018-04-04 | End: 2018-05-24

## 2018-04-04 NOTE — TELEPHONE ENCOUNTER
Rx printed; as no pharmacy was listed, and couldn't easily see one on the med list.  Will need to call and clarify.  Labs ordered for 4-6 weeks - please make lab only appointment.  Ronda Paniagua

## 2018-04-05 NOTE — TELEPHONE ENCOUNTER
After patients name and date of birth verified, message below given.  Will make a lab only apt  Kaley Weston LPN.......4/5/2018 9:55 AM

## 2018-05-04 ENCOUNTER — TRANSFERRED RECORDS (OUTPATIENT)
Dept: HEALTH INFORMATION MANAGEMENT | Facility: OTHER | Age: 38
End: 2018-05-04

## 2018-05-24 ENCOUNTER — TELEPHONE (OUTPATIENT)
Dept: FAMILY MEDICINE | Facility: OTHER | Age: 38
End: 2018-05-24

## 2018-05-24 DIAGNOSIS — E06.3 HYPOTHYROIDISM DUE TO HASHIMOTO'S THYROIDITIS: ICD-10-CM

## 2018-05-24 RX ORDER — LEVOTHYROXINE SODIUM 75 UG/1
75 TABLET ORAL
Qty: 90 TABLET | Refills: 4 | Status: SHIPPED | OUTPATIENT
Start: 2018-05-24

## 2018-05-24 NOTE — TELEPHONE ENCOUNTER
Received a fax from [facility] stating patient has already been taking 1 tab of 75 mcg and not 1/2 tab like bottle label says. I called patient and she states she is taking 1 full tab of levothyroxine and her symptoms seem to be pretty much gone. She needs a refill of levothyroxine 75 mcg.   Sharonda Azevedo............................... 5/24/2018 4:03 PM

## 2018-05-25 RX ORDER — LEVOTHYROXINE SODIUM 75 UG/1
75 TABLET ORAL
Qty: 90 TABLET | Refills: 4 | OUTPATIENT
Start: 2018-05-25

## 2018-06-25 ENCOUNTER — TRANSFERRED RECORDS (OUTPATIENT)
Dept: HEALTH INFORMATION MANAGEMENT | Facility: OTHER | Age: 38
End: 2018-06-25

## 2018-06-25 DIAGNOSIS — M25.462 EFFUSION, LEFT KNEE: Primary | ICD-10-CM

## 2018-06-25 PROCEDURE — 36415 COLL VENOUS BLD VENIPUNCTURE: CPT

## 2018-06-25 PROCEDURE — 86618 LYME DISEASE ANTIBODY: CPT

## 2018-06-27 LAB — B BURGDOR IGG+IGM SER QL: 0.02 (ref 0–0.89)

## 2018-07-02 ENCOUNTER — HOSPITAL ENCOUNTER (OUTPATIENT)
Dept: MRI IMAGING | Facility: OTHER | Age: 38
Discharge: HOME | End: 2018-07-02
Attending: NURSE PRACTITIONER | Admitting: NURSE PRACTITIONER
Payer: COMMERCIAL

## 2018-07-02 DIAGNOSIS — M25.469 EFFUSION OF KNEE: ICD-10-CM

## 2018-07-02 DIAGNOSIS — M25.562 LEFT KNEE PAIN: ICD-10-CM

## 2018-07-02 PROCEDURE — 73721 MRI JNT OF LWR EXTRE W/O DYE: CPT | Mod: LT

## 2018-11-08 ENCOUNTER — NURSE TRIAGE (OUTPATIENT)
Dept: FAMILY MEDICINE | Facility: OTHER | Age: 38
End: 2018-11-08

## 2018-11-08 ENCOUNTER — TELEPHONE (OUTPATIENT)
Dept: FAMILY MEDICINE | Facility: OTHER | Age: 38
End: 2018-11-08

## 2018-11-08 NOTE — TELEPHONE ENCOUNTER
"Protocol indicates patient should be seen within 24 hours. Per patient scheduling staff state no openings in clinic with anyone until Wednesday 11/14/2018. Recommended patient go to the [facility] today for an evaluation. Patient verbalized understanding and intent to comply.   Sharona Garcia RN on 11/8/2018 at 8:43 AM    Reason for Disposition    [1] MODERATE dizziness (e.g., interferes with normal activities) AND [2] has NOT been evaluated by physician for this  (Exception: dizziness caused by heat exposure, sudden standing, or poor fluid intake)    Additional Information    Negative: Severe difficulty breathing (e.g., struggling for each breath, speaks in single words)    Negative: [1] Difficulty breathing or swallowing AND [2] started suddenly after medicine, an allergic food or bee sting    Negative: Shock suspected (e.g., cold/pale/clammy skin, too weak to stand, low BP, rapid pulse)    Negative: Difficult to awaken or acting confused  (e.g., disoriented, slurred speech)    Negative: [1] Weakness (i.e., paralysis, loss of muscle strength) of the face, arm or leg on one side of the body AND [2] sudden onset AND [3] present now    Negative: [1] Numbness (i.e., loss of sensation) of the face, arm or leg on one side of the body AND [2] sudden onset AND [3] present now    Negative: [1] Loss of speech or garbled speech AND [2] sudden onset AND [3] present now    Negative: Overdose (accidental or intentional) of medications    Negative: [1] Fainted > 15 minutes ago AND [2] still feels too weak or dizzy to stand    Negative: Heart beating < 50 beats per minute OR > 140 beats per minute    Negative: Sounds like a life-threatening emergency to the triager    Negative: Chest pain    Negative: Rectal bleeding, bloody stool, or tarry-black stool    Negative: [1] Vomiting AND [2] contains red blood or black (\"coffee ground\") material    Negative: Vomiting is main symptom    Negative: Diarrhea is main symptom    Negative: " "Headache is main symptom    Negative: Patient states that he/she is having an anxiety/panic attack    Negative: Dizziness from low blood sugar (i.e., < 60 mg/dl or 3.5 mmol/l)    Negative: Dizziness is described as a spinning sensation (i.e., vertigo)    Negative: Heat exhaustion suspected    Negative: Difficulty breathing    Negative: SEVERE dizziness (e.g., unable to stand, requires support to walk, feels like passing out now)    Negative: Extra heart beats OR irregular heart beating  (i.e., \"palpitations\")    Negative: [1] Drinking very little AND [2] dehydration suspected (e.g., no urine > 12 hours, very dry mouth, very lightheaded)    Negative: Patient sounds very sick or weak to the triager    Negative: [1] Dizziness caused by heat exposure, sudden standing, or poor fluid intake AND [2] no improvement after 2 hours of rest and fluids    Negative: [1] Fever > 103 F (39.4 C) AND [2] not able to get the fever down using Fever Care Advice    Negative: [1] Fever > 101 F (38.3 C) AND [2] age > 60    Negative: [1] Fever > 101 F (38.3 C) AND [2] bedridden (e.g., nursing home patient, CVA, chronic illness, recovering from surgery)    Negative: [1] Fever > 100.5 F (38.1 C) AND [2] diabetes mellitus or weak immune system (e.g., HIV positive, cancer chemo, splenectomy, organ transplant, chronic steroids)    Answer Assessment - Initial Assessment Questions  1. DESCRIPTION: \"Describe your dizziness.\"     Dizzy   2. LIGHTHEADED: \"Do you feel lightheaded?\" (e.g., somewhat faint, woozy, weak upon standing)      It is really weird. It started about 2 weeks ago. Brain fog. Hard time concentrating. Switched time of taking synthroid. Went back to taking in the morning. I was watching people do jumping jacks and the room started spinning.   3. VERTIGO: \"Do you feel like either you or the room is spinning or tilting?\" (i.e. vertigo)      No   4. SEVERITY: \"How bad is it?\"  \"Do you feel like you are going to faint?\" \"Can you stand " "and walk?\"    - MILD - walking normally    - MODERATE - interferes with normal activities (e.g., work, school)     - SEVERE - unable to stand, requires support to walk, feels like passing out now.       Sometimes I feel like I might feel faint. Not now   5. ONSET:  \"When did the dizziness begin?\"      2 weeks ago on and off.   6. AGGRAVATING FACTORS: \"Does anything make it worse?\" (e.g., standing, change in head position)      If things are moving. Like driving down the road and watching the cars go by. Watching people do jumping jacks. Sometimes when I stand  7. HEART RATE: \"Can you tell me your heart rate?\" \"How many beats in 15 seconds?\"  (Note: not all patients can do this)        Usually 48-58 bpm  8. CAUSE: \"What do you think is causing the dizziness?\"      Medications? sinus infection?   9. RECURRENT SYMPTOM: \"Have you had dizziness before?\" If so, ask: \"When was the last time?\" \"What happened that time?\"      Probably like a year and half ago. Before I start taking thyroid   10. OTHER SYMPTOMS: \"Do you have any other symptoms?\" (e.g., fever, chest pain, vomiting, diarrhea, bleeding)        I don't know if I have a fever. Chest tightness. I should be checked for asthma. I don't feel short of breath. I have checked my O2 stats and they range from 86-96%.   11. PREGNANCY: \"Is there any chance you are pregnant?\" \"When was your last menstrual period?\"        Maybe. I am due in like 2 days.    Protocols used: DIZZINESS - LIGHTHEADEDNESS-ADULT-AH      "

## 2019-07-29 ENCOUNTER — MYC MEDICAL ADVICE (OUTPATIENT)
Dept: FAMILY MEDICINE | Facility: OTHER | Age: 39
End: 2019-07-29

## 2019-07-29 NOTE — TELEPHONE ENCOUNTER
Last OV 10/17/17. In message 12/19/17 patient was to have lab appt and appt with SMS.   Olya Angeles LPN .............7/29/2019     8:44 AM

## 2019-12-12 ENCOUNTER — TRANSCRIBE ORDERS (OUTPATIENT)
Dept: OTHER | Age: 39
End: 2019-12-12

## 2019-12-12 DIAGNOSIS — L50.3 DERMOGRAPHISM: ICD-10-CM

## 2019-12-12 DIAGNOSIS — J30.81 ALLERGIC RHINITIS DUE TO ANIMALS: Primary | ICD-10-CM

## 2019-12-16 NOTE — TELEPHONE ENCOUNTER
FUTURE VISIT INFORMATION      FUTURE VISIT INFORMATION:    Date: 12.27.19    Time: 9:00    Location:  Allergy  REFERRAL INFORMATION:    Referring provider:  Dr. Ulysses Mtz    Referring providers clinic:  [facility]    Reason for visit/diagnosis  Allergic Rhinitis-referred by Dr. Mtz at [facility]- appt per pt    RECORDS REQUESTED FROM:       Clinic name Comments Records Status Imaging Status   [facility] 11.18.19 Dr. Mtz Epic/CE N/A    Multiple visits with multiple providers in the [facility] Epic/CE N/A

## 2019-12-18 ENCOUNTER — TRANSFERRED RECORDS (OUTPATIENT)
Dept: HEALTH INFORMATION MANAGEMENT | Facility: OTHER | Age: 39
End: 2019-12-18

## 2019-12-20 ENCOUNTER — HOSPITAL ENCOUNTER (OUTPATIENT)
Dept: MRI IMAGING | Facility: OTHER | Age: 39
Discharge: HOME | End: 2019-12-20
Attending: NURSE PRACTITIONER | Admitting: FAMILY MEDICINE
Payer: COMMERCIAL

## 2019-12-20 DIAGNOSIS — S46.811A: ICD-10-CM

## 2019-12-20 PROCEDURE — 73221 MRI JOINT UPR EXTREM W/O DYE: CPT | Mod: RT

## 2019-12-27 ENCOUNTER — OFFICE VISIT (OUTPATIENT)
Dept: ALLERGY | Facility: CLINIC | Age: 39
End: 2019-12-27
Payer: COMMERCIAL

## 2019-12-27 ENCOUNTER — PRE VISIT (OUTPATIENT)
Dept: ALLERGY | Facility: CLINIC | Age: 39
End: 2019-12-27

## 2019-12-27 DIAGNOSIS — L23.89 ALLERGIC CONTACT DERMATITIS DUE TO OTHER AGENTS: Primary | ICD-10-CM

## 2019-12-27 ASSESSMENT — PAIN SCALES - GENERAL: PAINLEVEL: NO PAIN (0)

## 2019-12-27 NOTE — PATIENT INSTRUCTIONS
Patch Testing Information  What are allergen patch tests?    The test is done to look for skin allergies that may be causing rashes and irritation.    A patch test is a way of identifying whether a substance has caused a delayed reaction with skin inflammation, such as contact eczema or delayed (after days) reactions to drugs.     We will use various types of test compounds, which may include common allergens you may come in contact with in daily life such as preservatives, fragrances or even drugs.     Most of the time we will use standardized, prefabricated test solutions. The choice of the substances and series tested will depend on your history of reactions. Sometimes we will test your own products as well.     In order to avoid severe toxic reactions we need detailed information or safety sheets for each of the test compounds.    The test panels are set up with a small amount of common substances that cause skin allergies. They are taped to your skin with a clear hypoallergenic bandage and reinforced hypoallergenic tape.    The substances are numbered, so it is easy to tell what is causing a skin reaction.  What do I need to do in preparation for the test?    Stop all systemic steroids 1 month prior to the testing.     Stop applying topical steroids to the test area one week prior to the test. It is to use them elsewhere throughout the testing process. If this is not possible then discuss options with the Allergy specialist.    Do not go sunbathing or tanning for one week prior to testing    It is okay to take antihistamines as normal.     Please wear dark colors the week of the test since we will write on you with a dark marker that may transfer and stain clothing or bedding.     Some medications can affect the reactions to allergens during the tests. Therefore reveal all the medications you take to the allergy team. The doctor will discuss the medications with you before the tests.     Eat how you normally  would.    Avoid the following:    You cannot get the test area wet during the entire test period. This means no bathing or swimming the entire test period.    No strenuous exercise that may cause sweating.    Do not scratch the test area, this can cause the allergen to spread and give us false positives.     Avoid exposure to UV irradiation. This means no tanning or UV treatment during the testing period.   What can I expect?    Patch testing is done over three appointments.   o The usual schedule is: Monday (Allergen patches are placed), Wednesday (Patches are removed and skin is examined by the MD), and Friday (Skin is examined by the MD)      If you are allergic, there will be an area of irritation where the test was placed.    Itching or burning at the test site might happen if you are allergic to the allergen.  Do not rub or scratch the test site since this may spread the allergen and possibly cause false positives. If itching or burning is not tolerable please contact the clinic.    The marker we draw on your back with ma take up to 5 weeks to wash off completely. Rubbing alcohol can help speed up this process.     Reactions can occur 1 to 2 weeks after the tests are applied. If this happens please take photos of the area and contact the clinic.  What should I do after the tests are placed?    Keep the area dry. No showering or getting the test area wet from the time we see you at your first visit until after your third appointment. If you get the test area wet you are washing off the test and we could get false negative reactions.    If you notice any of the test patches coming loose put on more tape to re-secure the test.    If the marker that is applied fades you can use a dark pen to irene around the panel sites.    Cover the test area when you are outside to avoid any sun exposure while the test is in place.     You can remove the tests only if there is a severe reaction (itch, pain, blistering). Please  report this to your doctor immediately. If you have to remove the tests please irene the locations of each test field with a grid so we can identify the allergen.  WHAT ARE THE POSSIBLE RISKS OF PATCH TESTS     If you are allergic to a compound tested you will develop a localized skin reaction similar to your previous reaction, this may take days to develop. These reactions include a formation of red, itchy skin lesions that could be about a centimeter with small vesicles or possibly even blisters. The lesions will fade over time, this may take weeks. The test might leave some skin pigmentation for a few months.     In rare cases a localized reaction to patch testing can become generalized.     The tests with your own products might have some risks because they are not standardized and unanticipated reactions could occur. We need as much information as possible to evaluate if your own products are safe to test and under what conditions. This has to be evaluated for each individual product.   Useful Contact Information    To contact your doctor you can either send a [employer] message or call [phone]    Address  o [address] [place] [address], [address]    If you develop any serious or adverse allergic reaction after office hours please seek immediate medical assistance at the nearest clinic, urgent care, or emergency room.

## 2019-12-27 NOTE — PROGRESS NOTES
[facility] Allergy Note    Allergy Clinic  [facility]  [address] [place] [id number]    Encounter Date: Dec 27, 2019    CC:  Chief Complaint   Patient presents with     Allergy Consult     Gisela is here today for a consult regarding to an Allergy Shot causing a reaction.       History of Present Illness:  Ms. Gisela Richards is a 39 year old female who presents for consultation. She was referred by Dr. Ulysses Mtz of [facility]. She saw Dr. Mtz 11/18/19 for an allergy evaluation after large local reaction with immunotherapy.     Today she reports that all skin manifestation has resolved. Patient has come from [place], [place]. Patient's spouse is present during evaluation. She reports her first allergy shots took place on November 9th, 2 injections on posterior aspect of right arm and 1 to the posterior aspect of left arm.   - Per chart review immunotherapy: trees/grass/weeds, mites/molds and animals  Within the day of the injection patient noted a diffuse pruritic rash on the posterior aspect of her right arm. She denies using any topicals or oral medications beside Zyrtec to aid with on going rash. She additionally iced the area and skin manifestation had resolved.     The following week she completed her 2nd round of immunotherapy at the same dosage. Again the rash occurred on the posterior aspect of her right arm along with outstanding edema of her right arm and shoulder. This rash was also pruritic, but again denied any pain. Her provider was concerned about reaction to the preservative. This rash took 1.5 week to self resolve. The edema persisted for 2-3 weeks and when resolved, a notable dent was appreciated on the right distal aspect of the deltoid. Imaging completed showed atrophy of the deltoid. She denied any recent injections to that area including routine vaccines or steroid injections. Also now having to  "areas of hypopigmentation overlying the deltoid.  Reports dry skin after resolution of rashes. Denies history of asthma. Extensive history of food sensitives and autoimmune workup that has been unremarkable.       Past Medical History:   Patient Active Problem List   Diagnosis     Allergic rhinitis due to dogs     Hypothyroid     Lumbar facet joint pain     Past Medical History:   Diagnosis Date     Constipation     Since young.     Other specified postprocedural states     2014,Slight recurrence within scar - removed in 9th grade and came back right away     Personal history of other medical treatment (CODE)      2, para 2.     Past Surgical History:   Procedure Laterality Date     COLONOSCOPY      ,\"swelling of the lining\" and flattening of villa.     OTHER SURGICAL HISTORY      ~,[id number],OTHER,Left,recurring within scar     OTHER SURGICAL HISTORY      2016,[id number],KNEE ARTHROSCOPY W/ MENISCAL REPAIR     OTHER SURGICAL HISTORY      2004,[id number],DILATION AND CURETTAGE,retained placenta       Social History:  Patient reports that she has never smoked. She has never used smokeless tobacco. She reports current alcohol use.    Family History:  Family History   Problem Relation Age of Onset     Family History Negative Father         Good Health,does not see doctor regularly     Thyroid Disease Mother         Thyroid Disease,Hypothyroid     Other - See Comments Mother         Smoker, prediabetes     Thyroid Disease Sister         Thyroid Disease,Hypothyroid     Arthritis Sister         Arthritis     Scoliosis Maternal Half-Sister         Scoliosis     Other - See Comments Maternal Half-Sister         No Known Problems       Medications:  Current Outpatient Medications   Medication Sig Dispense Refill     fluorometholone (FML FORTE) 0.25 % ophthalmic susp        levothyroxine (SYNTHROID/LEVOTHROID) 75 MCG tablet Take 1 tablet (75 mcg) by mouth every morning (before breakfast) 90 tablet 4     " mometasone (NASONEX) 50 MCG/ACT spray 1 spray 2 times daily       Thyroid (NATURE-THROID) 48.75 MG TABS        Magnesium Citrate POWD by Does not apply route. Nagnesium Calm by Natural Vitality takes 2 to 3 tsp per day         Allergies   Allergen Reactions     Cats Shortness Of Breath     Tramadol      Other reaction(s): Other - Describe In Comment Field  High doses caused yellow eyes, tremors in her mouth, couldn't feel some body parts.    No issues with low doses     Hydrocodone-Acetaminophen Rash     Latex Rash       Review of Systems:  -As per HPI  -Constitutional: Otherwise feeling well today, in usual state of health.  -Skin: As above in HPI. No additional skin concerns.    Physical exam:  Vitals: There were no vitals taken for this visit.  GEN: This is a well developed, well-nourished female in no acute distress, in a pleasant mood.   SKIN: Focused examination of the bilateral upper extremities, face and upper chest was performed.   - There was atrophy of distal aspect of R deltoid with 2 overlying hypopigment macules overlying atrophy. No urtica or eczema appreciated.   -No other lesions of concern on areas examined.     Order for PATCH TESTS    [x] Outpatient  [] Inpatient: Head..../ Bed ....      Skin Atopy (atopic dermatitis) [x] Yes   [] No  Rhinitis/Sinusitis:   [x] Yes   [] No  Allergic Asthma:   [] Yes   [x] No  Food Allergy:   [x] Yes   [] No  Leg ulcers:   [] Yes   [x] No  Hand eczema:   [] Yes   [x] No   Leading hand:   [x] R   [] L       [] Ambidextrous                      Reason for tests (suspected allergy): Assessment for potential reaction to disinfections before IT (less likely preservatives in IT, because the reaction not directly over injection site)  Known previous allergies: Trees/grass, mites, dogs/cats  Standardized panels  [x] Standard panel (40 tests)  [x] Preservatives & Antimicrobials (31 tests)  [x] Emulsifiers & Additives (25 tests)   [] Perfumes/Flavours & Plants (25 tests)  []  Hairdresser panel (12 tests)  [x] Rubber Chemicals (22 tests)  [x] Plastics (26 tests)  [] Colorants/Dyes/Food additives (20 tests)  [] Metals (implants/dental) (24 tests)  [] Local anaesthetics/NSAIDs (13 tests)  [x] Antibiotics & Antimycotics (14 tests)   [] Corticosteroids (15 tests)   [] Photopatch test (62 tests)   [] others: ...      [] Patient's own products: ...    DO NOT test if chemical or biological identity is unknown!     always ask from patient the product information and safety sheets (MSDS)   [] Atopy screen prick test (Atopic predisposition): ...    [] Patient needs consultation with Allergy team (changes of tests may apply)  [] Tests discussed with Allergy team (can have direct appointment for patch tests)      Impression/Plan:  >>  Localized Eczematous dermatitis on arm DDx preservatives in disinfection solutions    Plan for patch testing     >> localized lipatrophy and pigmentary changes on upper arm    DDx reactivation of an allergic reaction to preservative of TT vaccination about 2y ago??    CC Ulysses Mtz MD  [facility]  [address] [place] [address] on close of this encounter.    Follow-up for patch testing.       Staff Involved:  Scribe/Staff    Yudi Flores MD  [facility] Resident PGY-3  [phone]    Staff Physician Comments:  I saw and evaluated the patient with the resident and I agree with the assessment and plan as documented in the resident's note.    Reginaldo Horne MD  Professor  Head of Dermato-Allergy Division  Department of Dermatology  [facility]    I spent a total of 22 minutes face to face with Gisela Richards during today s office visit. Over 50% of this time was spent counseling the patient and/or coordinating care. Please see Assessment and Plan for details.

## 2019-12-27 NOTE — NURSING NOTE
Chief Complaint   Patient presents with     Allergy Consult     Gisela is here today for a consult regarding to an Allergy Shot causing dermographism.     JOHAN RAMOS on 12/27/2019 at 9:03 AM

## 2019-12-27 NOTE — LETTER
12/27/2019         RE: Gisela Richards  [address]  [facility] [address]        Dear Colleague,    Thank you for referring your patient, Gisela Richards, to the [facility] ALLERGY. Please see a copy of my visit note below.    [facility] Allergy Note    Allergy Clinic  [facility]  [address] [place] [id number]    Encounter Date: Dec 27, 2019    CC:  Chief Complaint   Patient presents with     Allergy Consult     Gisela is here today for a consult regarding to an Allergy Shot causing a reaction.       History of Present Illness:  Ms. Gisela Richards is a 39 year old female who presents for consultation. She was referred by Dr. Ulysses Mtz of [facility]. She saw Dr. Mtz 11/18/19 for an allergy evaluation after large local reaction with immunotherapy.     Today she reports that all skin manifestation has resolved. Patient has come from [place], [place]. Patient's spouse is present during evaluation. She reports her first allergy shots took place on November 9th, 2 injections on posterior aspect of right arm and 1 to the posterior aspect of left arm.   - Per chart review immunotherapy: trees/grass/weeds, mites/molds and animals  Within the day of the injection patient noted a diffuse pruritic rash on the posterior aspect of her right arm. She denies using any topicals or oral medications beside Zyrtec to aid with on going rash. She additionally iced the area and skin manifestation had resolved.     The following week she completed her 2nd round of immunotherapy at the same dosage. Again the rash occurred on the posterior aspect of her right arm along with outstanding edema of her right arm and shoulder. This rash was also pruritic, but again denied any pain. Her provider was concerned about reaction to the preservative. This rash took 1.5 week to self resolve. The edema persisted for 2-3 weeks and when resolved,  "a notable dent was appreciated on the right distal aspect of the deltoid. Imaging completed showed atrophy of the deltoid. She denied any recent injections to that area including routine vaccines or steroid injections. Also now having to areas of hypopigmentation overlying the deltoid.  Reports dry skin after resolution of rashes. Denies history of asthma. Extensive history of food sensitives and autoimmune workup that has been unremarkable.       Past Medical History:   Patient Active Problem List   Diagnosis     Allergic rhinitis due to dogs     Hypothyroid     Lumbar facet joint pain     Past Medical History:   Diagnosis Date     Constipation     Since young.     Other specified postprocedural states     2014,Slight recurrence within scar - removed in 9th grade and came back right away     Personal history of other medical treatment (CODE)      2, para 2.     Past Surgical History:   Procedure Laterality Date     COLONOSCOPY      ,\"swelling of the lining\" and flattening of villa.     OTHER SURGICAL HISTORY      ~,[id number],OTHER,Left,recurring within scar     OTHER SURGICAL HISTORY      2016,[id number],KNEE ARTHROSCOPY W/ MENISCAL REPAIR     OTHER SURGICAL HISTORY      2004,[id number],DILATION AND CURETTAGE,retained placenta       Social History:  Patient reports that she has never smoked. She has never used smokeless tobacco. She reports current alcohol use.    Family History:  Family History   Problem Relation Age of Onset     Family History Negative Father         Good Health,does not see doctor regularly     Thyroid Disease Mother         Thyroid Disease,Hypothyroid     Other - See Comments Mother         Smoker, prediabetes     Thyroid Disease Sister         Thyroid Disease,Hypothyroid     Arthritis Sister         Arthritis     Scoliosis Maternal Half-Sister         Scoliosis     Other - See Comments Maternal Half-Sister         No Known Problems       Medications:  Current Outpatient " Medications   Medication Sig Dispense Refill     fluorometholone (FML FORTE) 0.25 % ophthalmic susp        levothyroxine (SYNTHROID/LEVOTHROID) 75 MCG tablet Take 1 tablet (75 mcg) by mouth every morning (before breakfast) 90 tablet 4     mometasone (NASONEX) 50 MCG/ACT spray 1 spray 2 times daily       Thyroid (NATURE-THROID) 48.75 MG TABS        Magnesium Citrate POWD by Does not apply route. Nagnesium Calm by Natural Vitality takes 2 to 3 tsp per day         Allergies   Allergen Reactions     Cats Shortness Of Breath     Tramadol      Other reaction(s): Other - Describe In Comment Field  High doses caused yellow eyes, tremors in her mouth, couldn't feel some body parts.    No issues with low doses     Hydrocodone-Acetaminophen Rash     Latex Rash       Review of Systems:  -As per HPI  -Constitutional: Otherwise feeling well today, in usual state of health.  -Skin: As above in HPI. No additional skin concerns.    Physical exam:  Vitals: There were no vitals taken for this visit.  GEN: This is a well developed, well-nourished female in no acute distress, in a pleasant mood.   SKIN: Focused examination of the bilateral upper extremities, face and upper chest was performed.   - There was atrophy of distal aspect of R deltoid with 2 overlying hypopigment macules overlying atrophy. No urtica or eczema appreciated.   -No other lesions of concern on areas examined.     Order for PATCH TESTS    [x] Outpatient  [] Inpatient: Head..../ Bed ....      Skin Atopy (atopic dermatitis) [x] Yes   [] No  Rhinitis/Sinusitis:   [x] Yes   [] No  Allergic Asthma:   [] Yes   [x] No  Food Allergy:   [x] Yes   [] No  Leg ulcers:   [] Yes   [x] No  Hand eczema:   [] Yes   [x] No   Leading hand:   [x] R   [] L       [] Ambidextrous                      Reason for tests (suspected allergy): Assessment for potential reaction to disinfections before IT (less likely preservatives in IT, because the reaction not directly over injection  site)  Known previous allergies: Trees/grass, mites, dogs/cats  Standardized panels  [x] Standard panel (40 tests)  [x] Preservatives & Antimicrobials (31 tests)  [x] Emulsifiers & Additives (25 tests)   [] Perfumes/Flavours & Plants (25 tests)  [] Hairdresser panel (12 tests)  [x] Rubber Chemicals (22 tests)  [x] Plastics (26 tests)  [] Colorants/Dyes/Food additives (20 tests)  [] Metals (implants/dental) (24 tests)  [] Local anaesthetics/NSAIDs (13 tests)  [x] Antibiotics & Antimycotics (14 tests)   [] Corticosteroids (15 tests)   [] Photopatch test (62 tests)   [] others: ...      [] Patient's own products: ...    DO NOT test if chemical or biological identity is unknown!     always ask from patient the product information and safety sheets (MSDS)   [] Atopy screen prick test (Atopic predisposition): ...    [] Patient needs consultation with Allergy team (changes of tests may apply)  [] Tests discussed with Allergy team (can have direct appointment for patch tests)      Impression/Plan:  >>  Localized Eczematous dermatitis on arm DDx preservatives in disinfection solutions    Plan for patch testing     >> localized lipatrophy and pigmentary changes on upper arm    DDx reactivation of an allergic reaction to preservative of TT vaccination about 2y ago??    CC Ulysses Mtz MD  [facility]  [address] [place] [phone] on close of this encounter.    Follow-up for patch testing.       Staff Involved:  Scribe/Staff    Yudi Flores MD  [facility] Resident PGY-3  [phone]    Staff Physician Comments:  I saw and evaluated the patient with the resident and I agree with the assessment and plan as documented in the resident's note.    Reginaldo Horne MD  Professor  Head of Dermato-Allergy Division  Department of Dermatology  [facility] [place]    I spent a total of 22 minutes face to face with Gisela Richards during today s office visit. Over 50% of this time was spent counseling the patient  and/or coordinating care. Please see Assessment and Plan for details.      Again, thank you for allowing me to participate in the care of your patient.        Sincerely,        Reginaldo Horne MD

## 2020-01-08 ENCOUNTER — PRE VISIT (OUTPATIENT)
Dept: ALLERGY | Facility: CLINIC | Age: 40
End: 2020-01-08

## 2020-01-08 NOTE — LETTER
Dear Insurance Carrier,      One of your enrolled members, Gisela Richards , has been referred by Dr.Paul Horne, who is a dermatoallergist, for Comprehensive Patch Testing. Comprehensive patch testing is medically   necessary for this patient.    COMPREHENSIVE PATCH TESTING IS INDICATED FOR PATIENTS WHO HAVE  CHRONIC DERMATITIS THAT HAS NOT IMPROVED AFTER RECEIVING  AGGRESSIVE TREATMENT BY DERMATOLOGISTS AND/OR ALLERGISTS  AND WHICH SEVERELY IMPACTS THE INDIVIDUAL S HEALTH AND  QUALITY OF LIFE.    This patient meets the above criteria and thus requires comprehensive patch testing. This  is likely to entail more than 80 units of CPT code 21289. In addition, Dr. Horne will spend  a substantial amount of time working to determine the cause of the dermatitis and then is  able to more precisely develop a personalized treatment plan.    This Comprehensive Patch Testing cannot be performed by most allergists or  dermatologists, who are only able to perform limited patch testing, which is inadequate or  inappropriate for the diagnosis of possible contact allergy.    Please see the following document for additional information and many peer reviewed  references on the medical necessity of Comprehensive Patch Testing.

## 2020-01-08 NOTE — TELEPHONE ENCOUNTER
Order documented by: IVAN HANCOCK LPN  Order reviewed by: Preeti Copeland LPN   Physician:   Date/time of application:1-  Localization of application: back     STANDARD Series         No Substance 2 days 4 days remarks   1 Francesco Mix [C] - -    2 Colophony - -    3  2-Mercaptobenzothiazole  NA NA     4 Methylisothiazolinone - -    5 Carba Mix - -    6 Thiuram Mix [A] NA NA    7 Bisphenol A Epoxy Resin - -    8 P-Ffbp-Tuvzavwzqri-Formaldehyde Resin - -    9 Mercapto Mix [A] - -    10 Black Rubber Mix- PPD [B] NA NA    11 Potassium Dichromate  -  -    12 Balsam of [place] (Myroxylon Pereirae Resin) - -    13 Nickel Sulphate Hexahydrate - -    14 Mixed Dialkyl Thiourea - -    15 Paraben Mix [B] - -    16 Methyldibromo Glutaronitrile NA NA    17 Fragrance Mix - -    18 2-Bromo-2-Nitropropane-1,3-Diol (Bronopol) - -    19 Lyral - -    20 Tixocortol-21- Pivalate NA NA    21 Diazolidiyl Urea (Germall II) - -    22 Methyl Methacrylate NA NA    23 Cobalt (II) Chloride Hexahydrate - -    24 Fragrance Mix II  - -    25 Compositae Mix - -    26 Benzoyl Peroxide NA NA    27 Bacitracin - -    28 Formaldehyde - -    29 Methylchloroisothiazolinone / Methylisothiazolinone - -    30 Corticosteroid Mix - -    31 Sodium Lauryl Sulfate - -    32 Lanolin Alcohol - -    33 Turpentine - -    34 Cetylstearylalcohol - -    35 Chlorhexidine Dicluconate - -    36 Budenoside - -    37 Imidazolidinyl Urea  - -    38 Ethyl-2 Cyanoacrylate NA NA    39 Quaternium 15 (Dowicil 200) - -    40 Decyl Glucoside - -      EMULSIFIERS & ADDITIVES        No Substance 2 days 4 days remarks   41 Polyethylene Glycol-400 NA NA    42 Cocamidopropyl Betaine - -    43 Amerchol L101 NA NA    44 Propylene Glycol - -    45 Triethanolamine - -    46 Sorbitane Sesquiolate - -    47 Isopropylmyristate - -    48 Polysorbate 80  - -    49 Amidoamine   (Stearamidopropyl Dimethylamine) - -    50 Oleamidopropyl Dimethylamine - -    51 Lauryl Glucoside NA NA     52 Coconut Diethanolamide  - -    53 2-Hydroxy-4-Methoxy Benzophenone (Oxybenzone) - -    54 Benzophenone-4 (Sulisobenzon) - -    55 Propolis - -    56 Dexpanthenol - -    57 Carboxymethyl Cellulose Sodium - -    58 Abitol - -    59 Tert-Butylhydroquinone - -    60 Benzyl Salicylate - -     Antioxidant      61 Dodecyl Gallate - -    62 Butylhydroxyanisole (BHA) - -    63 Butylhydroxytoluene (BHT) - -    64 Di-Alpha-Tocopherol (Vit E) - -    65 Propyl Gallate - -      RUBBER CHEMICALS         No Substance 2 days 4 days remarks    Carbamate      66 Zinc Bis ( Diethyldithio carbamate ) (ZDEC) - -    67 Zinc Bis (Dibutyldithiocarbamate) - -    68 1,3-Diphenylguanidine (DPG) - -     Thiourea      69 Dibutylthiourea - -    70 Diphenyltiourea - -    71 Thiourea - -     Mercapto chemicals      72 Morpholinyl Mercaptobenzothiazole - -    73 F-Gmwefejtgb-7-Benzothiazyl-Sulfenamide - -    74 Dibenzothiazyl Disulfide - -     Thiuram chemicals      75 Dipentamethylenethiuram Disulfide - -    76 Tetraethylthiuram Disulfide (Disulfiram) - -    77 Tetramethylthiuram Disulfide - -    78 Tetramethyl Thiuram Monosulfide (TMTM) - -     4-Phenylenediamine derivatives      79 N-Isopropyl-N'-Phenyl-P-Phenylenediamine (IPPD) - -    80 Kmmewutk-L-Hlplqxsqiyqeprfp (DPPD) - -     Various Rubber Additives      81 Hydroquinone Monobenzylether  - -    82 Hexamethylenetetramine - -    83 4,4'-Dihydroxybiphenyl - -    84 Cyclohexylthiophtalimide - -    85 Ethylenediamine Dihydrochloride - -    86 N-Phenyl-B-Naphthylamine - -    87 Dodecyl Mercaptan - -        PLASTICS         No Substance 2 days 4 days remarks    Acrylates - -    88 2-Hydroxyethyl Methacrylate (HEMA) - -    89 1,4-Butandioldimethacrylate (BUDMA) - -    90  2-Ethylhexyl Acrylate - -    91 Bisphenol-A-Dimethacrylate  - -    92 Diurethane-Dimethacrylate - -    93 Ethyleneglycoldimethacrylate (EGDMA) - -    94 Pentaerythritoltriacrylate (CARLOS) - -    95 Triethylene Glycol  Dimethacrylate (TEGDMA) - -     Synthetic material/additives       96 F-Ujvv-Vegfocftucv - -    97 Tricresyl Phosphate - -    98 0-Ytiu-Ihmjismgrjdgf - -    99 Bis (2-Ethylhexyl) Phthalate - -    100 Dibutylphthalate - -    101 Dimethylphthalate - -    102 Toluene-2,4-Diisocyanate - -    103 Diphenylmethane-4,4''-Diisocyanate - -     EPOXY RESIN SYSTEMS       Reactive Solvents - -    104 Cresyl Glycidyl Ether - -    105 Butyl Glycidyl Ether - -    106 Phenyl Glycidyl Ether - -    107 1,4-Butanediol Diglycidyl Ether - -    108 1,6-Hexanediole Diglycidyl Ether - -     Hardener / Accelerator - -    109 Ethylenediamine Dihydrochloride - -    110 Triethylenetetramine - -    111 Diethylenetriamine - -    112 Isophorone Diamine (IPD) - -    113 N,N-Dimethyl-P-Toluidine - -        PRESERVATIVES & ANTIMICROBIALS         No Substance 2 days 4 days remarks   114  1,2-Benzisothiazoline-3-One, Sodium Salt - -    115  1,3,5-Anival (2-Hydroxyethyl) - Hexahydrotriazine (Grotan BK) NA NA    116 3-Lqvdjmmlconxg-6-Nitro-1, 3-Propanediol - -    117  3, 4, 4' - Triclocarban - -    118 4 - Chloro - 3 - Cresol - -    119 4 - Chloro - 4 - Xylenol (PCMX) NA NA    120 7-Ethylbicyclooxazolidine (Bioban HW1161) - -    121 Benzalkonium Chloride - -    122 Benzyl Alcohol - -    123 Cetalkonium Chloride NA NA    124 Cetylpyrimidine Chloride  - -    125 Chloroacetamide - -    126 DMDM Hydantoin NA NA    127 Glutaraldehyde NA NA    128 Triclosan - -    129 Glyoxal Trimeric Dihydrate - -    130 Iodopropynyl Butylcarbamate - -    131 Octylisothiazoline NA NA    132 Iodoform - -    133 (Nitrobutyl) Morpholine/(Ethylnitro-Trimethylene) Dimorpholine (Bioban P 1487) - -    134 Phenoxyethanol NA NA    135 Phenyl Salicylate - -    136 Povidone Iodine - -    137 Sodium Benzoate - -    138 Sodium Disulfite NA NA    139 Sorbic Acid - -    140 Thimerosal - -     Parabens      141 Butyl-P-Hydroxybenzoate - -    142 Ethyl-P-Hydroxybenzoate - -    143  Methyl-P-Hydroxybenzoate NA NA    144 Propyl-P-Hydroxybenzoate - -         ANTIBIOTICS & ANTIMYCOTICS         No Substance 2 days 4 days remarks   145 Erythromycine - -    146 Framycetine Sulphate - -    147 Fusidic Acid Sodium Salt - -    148 Gentamicin Sulphate - -    149 Neomycine Sulphate - -    150 Oxytetracycline  - -    151 Polymyxin B Sulphate - -    152 Tetracycline-HCL - -    153 Sulfanilamide - -    154 Metronidazole - -    155 Oxyquinoline Mix - -    156 Nitrofurazone - -    157 Nystatin - -    158 Clotrimazole - -        Results of patch tests:                         Interpretation:    - Negative                    A    = Allergic      (+) Erythema    TI   = Toxic/irritant   + E + Infiltration    RaP = Relevance at Present     ++ E/I + Papulovesicle   Rpr  = Relevance Previously     +++ E/I/P + Blister     nR   = No Relevance

## 2020-01-13 ENCOUNTER — OFFICE VISIT (OUTPATIENT)
Dept: ALLERGY | Facility: CLINIC | Age: 40
End: 2020-01-13
Payer: COMMERCIAL

## 2020-01-13 DIAGNOSIS — Z88.9 ATOPY: ICD-10-CM

## 2020-01-13 DIAGNOSIS — L23.89 ALLERGIC CONTACT DERMATITIS DUE TO OTHER AGENTS: Primary | ICD-10-CM

## 2020-01-13 ASSESSMENT — PAIN SCALES - GENERAL: PAINLEVEL: NO PAIN (0)

## 2020-01-13 NOTE — NURSING NOTE
Chief Complaint   Patient presents with     Allergy Testing Followup     Patch testing day #1      Calista Stephenson LPN

## 2020-01-13 NOTE — PROGRESS NOTES
[facility] Allergy Note    Allergy Clinic  [facility]  [address] [place] [id number]    Encounter Date: Jan 13, 2020    CC:  Chief Complaint   Patient presents with     Allergy Testing Followup     Patch testing day #1        History of Present Illness:  Ms. Gisela Richards is a 39 year old female who presents for consultation. She was referred by Dr. Ulysses Mtz of [facility]. She saw Dr. Mtz 11/18/19 for an allergy evaluation after large local reaction with immunotherapy.     Today she reports that all skin manifestation has resolved. Patient has come from [place], [place]. Patient's spouse is present during evaluation. She reports her first allergy shots took place on November 9th, 2 injections on posterior aspect of right arm and 1 to the posterior aspect of left arm.   - Per chart review immunotherapy: trees/grass/weeds, mites/molds and animals  Within the day of the injection patient noted a diffuse pruritic rash on the posterior aspect of her right arm. She denies using any topicals or oral medications beside Zyrtec to aid with on going rash. She additionally iced the area and skin manifestation had resolved.     The following week she completed her 2nd round of immunotherapy at the same dosage. Again the rash occurred on the posterior aspect of her right arm along with outstanding edema of her right arm and shoulder. This rash was also pruritic, but again denied any pain. Her provider was concerned about reaction to the preservative. This rash took 1.5 week to self resolve. The edema persisted for 2-3 weeks and when resolved, a notable dent was appreciated on the right distal aspect of the deltoid. Imaging completed showed atrophy of the deltoid. She denied any recent injections to that area including routine vaccines or steroid injections. Also now having to areas of hypopigmentation overlying the deltoid.  Reports  "dry skin after resolution of rashes. Denies history of asthma. Extensive history of food sensitives and autoimmune workup that has been unremarkable.     Hx since 19:  The patient comes in today for patch testing day 1. She reports that there is a bruise on the R shoulder that has worsened in the interim. She is unsure if it may be necrosis or sudden muscle atrophy. She reports it appeared on , the day after allergy shot. Also states that in the same area she developed a new \"bump\" that appeared about 2 days ago.   Reports that the IT on the R upper extremity was against trees, weeds, molds, mites, and dog. On the L upper extremity, she had IT injections for cats and dogs.   She is otherwise feeling well overall today. No other skin or allergy concerns at this time.       Past Medical History:   Patient Active Problem List   Diagnosis     Allergic rhinitis due to dogs     Hypothyroid     Lumbar facet joint pain     Past Medical History:   Diagnosis Date     Constipation     Since young.     Other specified postprocedural states     2014,Slight recurrence within scar - removed in 9th grade and came back right away     Personal history of other medical treatment (CODE)      2, para 2.     Past Surgical History:   Procedure Laterality Date     COLONOSCOPY      ,\"swelling of the lining\" and flattening of villa.     OTHER SURGICAL HISTORY      ~,[id number],OTHER,Left,recurring within scar     OTHER SURGICAL HISTORY      2016,[id number],KNEE ARTHROSCOPY W/ MENISCAL REPAIR     OTHER SURGICAL HISTORY      2004,[id number],DILATION AND CURETTAGE,retained placenta       Social History:  Patient reports that she has never smoked. She has never used smokeless tobacco. She reports current alcohol use.    Family History:  Family History   Problem Relation Age of Onset     Family History Negative Father         Good Health,does not see doctor regularly     Thyroid Disease Mother         Thyroid " Disease,Hypothyroid     Other - See Comments Mother         Smoker, prediabetes     Thyroid Disease Sister         Thyroid Disease,Hypothyroid     Arthritis Sister         Arthritis     Scoliosis Maternal Half-Sister         Scoliosis     Other - See Comments Maternal Half-Sister         No Known Problems       Medications:  Current Outpatient Medications   Medication Sig Dispense Refill     fluorometholone (FML FORTE) 0.25 % ophthalmic susp        levothyroxine (SYNTHROID/LEVOTHROID) 75 MCG tablet Take 1 tablet (75 mcg) by mouth every morning (before breakfast) 90 tablet 4     Magnesium Citrate POWD by Does not apply route. Nagnesium Calm by Natural Vitality takes 2 to 3 tsp per day       mometasone (NASONEX) 50 MCG/ACT spray 1 spray 2 times daily       Thyroid (NATURE-THROID) 48.75 MG TABS          Allergies   Allergen Reactions     Cats Shortness Of Breath     Tramadol      Other reaction(s): Other - Describe In Comment Field  High doses caused yellow eyes, tremors in her mouth, couldn't feel some body parts.    No issues with low doses     Hydrocodone-Acetaminophen Rash     Latex Rash       Review of Systems:  -As per HPI  -Constitutional: Otherwise feeling well today, in usual state of health.  -Skin: As above in HPI. No additional skin concerns.    Physical exam:  Vitals: There were no vitals taken for this visit.  GEN: This is a well developed, well-nourished female in no acute distress, in a pleasant mood.   SKIN: Focused examination of the bilateral upper extremities, face and upper chest was performed.   - There was atrophy of distal aspect of R deltoid with 2 overlying hypopigment macules overlying atrophy. No urtica or eczema appreciated.   -No other lesions of concern on areas examined.     Order for PATCH TESTS    [x] Outpatient  [] Inpatient: Head..../ Bed ....      Skin Atopy (atopic dermatitis) [x] Yes   [] No  Rhinitis/Sinusitis:   [x] Yes   [] No  Allergic Asthma:   [] Yes   [x] No  Food  Allergy:   [x] Yes   [] No  Leg ulcers:   [] Yes   [x] No  Hand eczema:   [] Yes   [x] No   Leading hand:   [x] R   [] L       [] Ambidextrous                      Reason for tests (suspected allergy): Assessment for potential reaction to disinfections before IT (less likely preservatives in IT, because the reaction not directly over injection site)  Known previous allergies: Trees/grass, mites, dogs/cats  Standardized panels  [x] Standard panel (40 tests)  [x] Preservatives & Antimicrobials (31 tests)  [x] Emulsifiers & Additives (25 tests)   [] Perfumes/Flavours & Plants (25 tests)  [] Hairdresser panel (12 tests)  [x] Rubber Chemicals (22 tests)  [x] Plastics (26 tests)  [] Colorants/Dyes/Food additives (20 tests)  [] Metals (implants/dental) (24 tests)  [] Local anaesthetics/NSAIDs (13 tests)  [x] Antibiotics & Antimycotics (14 tests)   [] Corticosteroids (15 tests)   [] Photopatch test (62 tests)   [] others: ...      [] Patient's own products: ...    DO NOT test if chemical or biological identity is unknown!     always ask from patient the product information and safety sheets (MSDS)   [] Atopy screen prick test (Atopic predisposition): ...    [] Patient needs consultation with Allergy team (changes of tests may apply)  [] Tests discussed with Allergy team (can have direct appointment for patch tests)    Atopy Screen (Placed 01/13/20 )    No Substance Readings (15 min) Evaluation   POS Histamine 1mg/ml ++    NEG NaCl 0.9% - Slight dermographism     No Substance Readings (15 min) Evaluation   1 Alternaria alternata (tenuis)  -    2 Cladosporium herbarum -    3 Aspergillus fumigatus -    4 Penicillium notatum -    5 Dermatophagoides pteronyssinus ++    6 Dermatophagoides farinae ++      Conclusion: Observe if any delayed reaction on the prick test on Wednesday.    RESULTS & EVALUATION of PATCH TESTS    Patch test readings after     [x] 2 days, [] 3 days [x] 4 days, [] 5 days,    Applied patch tests with results  (import here the list of patch tests):  Order documented by: IVAN HANCOCK LPN  Order reviewed by: Preeti Copeland LPN   Physician:   Date/time of application:1-  Localization of application: back >> Clear    STANDARD Series         No Substance 2 days 4 days remarks   1 Francesco Mix [C] - -    2 Colophony - -    3  2-Mercaptobenzothiazole  NA NA     4 Methylisothiazolinone - -    5 Carba Mix - -    6 Thiuram Mix [A] NA NA    7 Bisphenol A Epoxy Resin - -    8 T-Srlq-Tedlnnastoc-Formaldehyde Resin - -    9 Mercapto Mix [A] - -    10 Black Rubber Mix- PPD [B] NA NA    11 Potassium Dichromate  -  -    12 Balsam of [place] (Myroxylon Pereirae Resin) - -    13 Nickel Sulphate Hexahydrate - -    14 Mixed Dialkyl Thiourea - -    15 Paraben Mix [B] - -    16 Methyldibromo Glutaronitrile NA NA    17 Fragrance Mix - -    18 2-Bromo-2-Nitropropane-1,3-Diol (Bronopol) - -    19 Lyral - -    20 Tixocortol-21- Pivalate NA NA    21 Diazolidiyl Urea (Germall II) - -    22 Methyl Methacrylate NA NA    23 Cobalt (II) Chloride Hexahydrate - -    24 Fragrance Mix II  - -    25 Compositae Mix - -    26 Benzoyl Peroxide NA NA    27 Bacitracin - -    28 Formaldehyde - -    29 Methylchloroisothiazolinone / Methylisothiazolinone - -    30 Corticosteroid Mix - -    31 Sodium Lauryl Sulfate - -    32 Lanolin Alcohol - -    33 Turpentine - -    34 Cetylstearylalcohol - -    35 Chlorhexidine Dicluconate - -    36 Budenoside - -    37 Imidazolidinyl Urea  - -    38 Ethyl-2 Cyanoacrylate NA NA    39 Quaternium 15 (Dowicil 200) - -    40 Decyl Glucoside - -      EMULSIFIERS & ADDITIVES        No Substance 2 days 4 days remarks   41 Polyethylene Glycol-400 NA NA    42 Cocamidopropyl Betaine - -    43 Amerchol L101 NA NA    44 Propylene Glycol - -    45 Triethanolamine - -    46 Sorbitane Sesquiolate - -    47 Isopropylmyristate - -    48 Polysorbate 80  - -    49 Amidoamine   (Stearamidopropyl Dimethylamine) - -    50 Oleamidopropyl  Dimethylamine - -    51 Lauryl Glucoside NA NA    52 Coconut Diethanolamide  - -    53 2-Hydroxy-4-Methoxy Benzophenone (Oxybenzone) - -    54 Benzophenone-4 (Sulisobenzon) - -    55 Propolis - -    56 Dexpanthenol - -    57 Carboxymethyl Cellulose Sodium - -    58 Abitol - -    59 Tert-Butylhydroquinone - -    60 Benzyl Salicylate - -     Antioxidant      61 Dodecyl Gallate - -    62 Butylhydroxyanisole (BHA) - -    63 Butylhydroxytoluene (BHT) - -    64 Di-Alpha-Tocopherol (Vit E) - -    65 Propyl Gallate - -      RUBBER CHEMICALS         No Substance 2 days 4 days remarks    Carbamate      66 Zinc Bis ( Diethyldithio carbamate ) (ZDEC) - -    67 Zinc Bis (Dibutyldithiocarbamate) - -    68 1,3-Diphenylguanidine (DPG) - -     Thiourea      69 Dibutylthiourea - -    70 Diphenyltiourea - -    71 Thiourea - -     Mercapto chemicals      72 Morpholinyl Mercaptobenzothiazole - -    73 G-Zzyjnbiciy-7-Benzothiazyl-Sulfenamide - -    74 Dibenzothiazyl Disulfide - -     Thiuram chemicals      75 Dipentamethylenethiuram Disulfide - -    76 Tetraethylthiuram Disulfide (Disulfiram) - -    77 Tetramethylthiuram Disulfide - -    78 Tetramethyl Thiuram Monosulfide (TMTM) - -     4-Phenylenediamine derivatives      79 N-Isopropyl-N'-Phenyl-P-Phenylenediamine (IPPD) - -    80 Npezasgr-L-Qfpaxqydcektzskc (DPPD) - -     Various Rubber Additives      81 Hydroquinone Monobenzylether  - -    82 Hexamethylenetetramine - -    83 4,4'-Dihydroxybiphenyl - -    84 Cyclohexylthiophtalimide - -    85 Ethylenediamine Dihydrochloride - -    86 N-Phenyl-B-Naphthylamine - -    87 Dodecyl Mercaptan - -        PLASTICS         No Substance 2 days 4 days remarks    Acrylates - -    88 2-Hydroxyethyl Methacrylate (HEMA) - -    89 1,4-Butandioldimethacrylate (BUDMA) - -    90  2-Ethylhexyl Acrylate - -    91 Bisphenol-A-Dimethacrylate  - -    92 Diurethane-Dimethacrylate - -    93 Ethyleneglycoldimethacrylate (EGDMA) - -    94  Pentaerythritoltriacrylate (CARLOS) - -    95 Triethylene Glycol Dimethacrylate (TEGDMA) - -     Synthetic material/additives       96 P-Ujip-Cnjtfbyjdvg - -    97 Tricresyl Phosphate - -    98 0-Vkug-Gmosdufcqwjyl - -    99 Bis (2-Ethylhexyl) Phthalate - -    100 Dibutylphthalate - -    101 Dimethylphthalate - -    102 Toluene-2,4-Diisocyanate - -    103 Diphenylmethane-4,4''-Diisocyanate - -     EPOXY RESIN SYSTEMS       Reactive Solvents - -    104 Cresyl Glycidyl Ether - -    105 Butyl Glycidyl Ether - -    106 Phenyl Glycidyl Ether - -    107 1,4-Butanediol Diglycidyl Ether - -    108 1,6-Hexanediole Diglycidyl Ether - -     Hardener / Accelerator - -    109 Ethylenediamine Dihydrochloride - -    110 Triethylenetetramine - -    111 Diethylenetriamine - -    112 Isophorone Diamine (IPD) - -    113 N,N-Dimethyl-P-Toluidine - -        PRESERVATIVES & ANTIMICROBIALS         No Substance 2 days 4 days remarks   114  1,2-Benzisothiazoline-3-One, Sodium Salt - -    115  1,3,5-Anival (2-Hydroxyethyl) - Hexahydrotriazine (Grotan BK) NA NA    116 2-Rsdgkfcggcgtd-3-Nitro-1, 3-Propanediol - -    117  3, 4, 4' - Triclocarban - -    118 4 - Chloro - 3 - Cresol - -    119 4 - Chloro - 4 - Xylenol (PCMX) NA NA    120 7-Ethylbicyclooxazolidine (Vayyaran [id number]) - -    121 Benzalkonium Chloride - -    122 Benzyl Alcohol - -    123 Cetalkonium Chloride NA NA    124 Cetylpyrimidine Chloride  - -    125 Chloroacetamide - -    126 DMDM Hydantoin NA NA    127 Glutaraldehyde NA NA    128 Triclosan - -    129 Glyoxal Trimeric Dihydrate - -    130 Iodopropynyl Butylcarbamate - -    131 Octylisothiazoline NA NA    132 Iodoform - -    133 (Nitrobutyl) Morpholine/(Ethylnitro-Trimethylene) Dimorpholine (Bioban P 1487) - -    134 Phenoxyethanol NA NA    135 Phenyl Salicylate - -    136 Povidone Iodine - -    137 Sodium Benzoate - -    138 Sodium Disulfite NA NA    139 Sorbic Acid - -    140 Thimerosal - -     Parabens      141  Butyl-P-Hydroxybenzoate - -    142 Ethyl-P-Hydroxybenzoate - -    143 Methyl-P-Hydroxybenzoate NA NA    144 Propyl-P-Hydroxybenzoate - -         ANTIBIOTICS & ANTIMYCOTICS         No Substance 2 days 4 days remarks   145 Erythromycine - -    146 Framycetine Sulphate - -    147 Fusidic Acid Sodium Salt - -    148 Gentamicin Sulphate - -    149 Neomycine Sulphate - -    150 Oxytetracycline  - -    151 Polymyxin B Sulphate - -    152 Tetracycline-HCL - -    153 Sulfanilamide - -    154 Metronidazole - -    155 Oxyquinoline Mix - -    156 Nitrofurazone - -    157 Nystatin - -    158 Clotrimazole - -      PATIENTS OWN PRODUCTS         No Substance Conc  % solv 2 days 4 days remarks   159 Aspergillus   - -    160 Penicillium   - -    161 D. Farinae   - -    162 D. pteronyssinus   - -    163 Dog   - -    164 Cat   - -        Results of patch tests:                         Interpretation:    - Negative                    A    = Allergic      (+) Erythema    TI   = Toxic/irritant   + E + Infiltration    RaP = Relevance at Present     ++ E/I + Papulovesicle   Rpr  = Relevance Previously     +++ E/I/P + Blister     nR   = No Relevance  [] No relevant allergic reaction observed    [] Allergic reaction diagnosed against: see later      Interpretation/ remarks:   See later    [] Patient information given   [] ACDS CAMP information's (# ....) to following compounds: .....   [] General information's to following compounds: ......    ==> final Diagnosis:    >>  Localized Eczematous dermatitis on arm DDx preservatives in disinfection solutions    >> localized lipatrophy and pigmentary changes on upper arm    DDx reactivation of an allergic reaction to preservative of TT vaccination about 2y ago??    ==> Treatment prescribed/Plan:  See later      These conclusions are made at the best of one's knowledge and belief based on the provided evidence such as patient's history and allergy test results and they can change over time or can be  incomplete because of missing information's.    CC Ulysses Mtz MD  [facility]  [address] [place] [address] on close of this encounter.    Follow-up for ongoing patch testing on Wednesday.      Staff Involved:    Scribe Disclosure  I, Yanira Pike, am serving as a scribe to document services personally performed by Dr. Reginaldo Horne, based on data collection and the provider's statements to me.     I spent a total of 15 minutes face to face with Gisela Richards during today s office visit. Over 50% of this time was spent discussing all the individual test results, correlating them to the clinical relevance, counseling the patient and/or coordinating care. Please see Assessment and Plan for details.  This excludes any time spent performing prick and patch tests

## 2020-01-13 NOTE — LETTER
1/13/2020         RE: Gisela Richards  [address]  [facility] [address]        Dear Colleague,    Thank you for referring your patient, Gisela Richards, to the [facility] ALLERGY. Please see a copy of my visit note below.    [facility] Allergy Note    Allergy Clinic  [facility]  [address] [place] [id number]    Encounter Date: Jan 13, 2020    CC:  Chief Complaint   Patient presents with     Allergy Testing Followup     Patch testing day #1        History of Present Illness:  Ms. Gisela Richards is a 39 year old female who presents for consultation. She was referred by Dr. Ulysses Mtz of [facility]. She saw Dr. Mtz 11/18/19 for an allergy evaluation after large local reaction with immunotherapy.     Today she reports that all skin manifestation has resolved. Patient has come from [place], [place]. Patient's spouse is present during evaluation. She reports her first allergy shots took place on November 9th, 2 injections on posterior aspect of right arm and 1 to the posterior aspect of left arm.   - Per chart review immunotherapy: trees/grass/weeds, mites/molds and animals  Within the day of the injection patient noted a diffuse pruritic rash on the posterior aspect of her right arm. She denies using any topicals or oral medications beside Zyrtec to aid with on going rash. She additionally iced the area and skin manifestation had resolved.     The following week she completed her 2nd round of immunotherapy at the same dosage. Again the rash occurred on the posterior aspect of her right arm along with outstanding edema of her right arm and shoulder. This rash was also pruritic, but again denied any pain. Her provider was concerned about reaction to the preservative. This rash took 1.5 week to self resolve. The edema persisted for 2-3 weeks and when resolved, a notable dent was appreciated on the right distal  "aspect of the deltoid. Imaging completed showed atrophy of the deltoid. She denied any recent injections to that area including routine vaccines or steroid injections. Also now having to areas of hypopigmentation overlying the deltoid.  Reports dry skin after resolution of rashes. Denies history of asthma. Extensive history of food sensitives and autoimmune workup that has been unremarkable.     Hx since 19:  The patient comes in today for patch testing day 1. She reports that there is a bruise on the R shoulder that has worsened in the interim. She is unsure if it may be necrosis or sudden muscle atrophy. She reports it appeared on , the day after allergy shot. Also states that in the same area she developed a new \"bump\" that appeared about 2 days ago.   Reports that the IT on the R upper extremity was against trees, weeds, molds, mites, and dog. On the L upper extremity, she had IT injections for cats and dogs.   She is otherwise feeling well overall today. No other skin or allergy concerns at this time.       Past Medical History:   Patient Active Problem List   Diagnosis     Allergic rhinitis due to dogs     Hypothyroid     Lumbar facet joint pain     Past Medical History:   Diagnosis Date     Constipation     Since young.     Other specified postprocedural states     2014,Slight recurrence within scar - removed in 9th grade and came back right away     Personal history of other medical treatment (CODE)      2, para 2.     Past Surgical History:   Procedure Laterality Date     COLONOSCOPY      ,\"swelling of the lining\" and flattening of villa.     OTHER SURGICAL HISTORY      ~,[id number],OTHER,Left,recurring within scar     OTHER SURGICAL HISTORY      2016,[id number],KNEE ARTHROSCOPY W/ MENISCAL REPAIR     OTHER SURGICAL HISTORY      2004,120054,DILATION AND CURETTAGE,retained placenta       Social History:  Patient reports that she has never smoked. She has never used smokeless " tobacco. She reports current alcohol use.    Family History:  Family History   Problem Relation Age of Onset     Family History Negative Father         Good Health,does not see doctor regularly     Thyroid Disease Mother         Thyroid Disease,Hypothyroid     Other - See Comments Mother         Smoker, prediabetes     Thyroid Disease Sister         Thyroid Disease,Hypothyroid     Arthritis Sister         Arthritis     Scoliosis Maternal Half-Sister         Scoliosis     Other - See Comments Maternal Half-Sister         No Known Problems       Medications:  Current Outpatient Medications   Medication Sig Dispense Refill     fluorometholone (FML FORTE) 0.25 % ophthalmic susp        levothyroxine (SYNTHROID/LEVOTHROID) 75 MCG tablet Take 1 tablet (75 mcg) by mouth every morning (before breakfast) 90 tablet 4     Magnesium Citrate POWD by Does not apply route. Nagnesium Calm by Natural Vitality takes 2 to 3 tsp per day       mometasone (NASONEX) 50 MCG/ACT spray 1 spray 2 times daily       Thyroid (NATURE-THROID) 48.75 MG TABS          Allergies   Allergen Reactions     Cats Shortness Of Breath     Tramadol      Other reaction(s): Other - Describe In Comment Field  High doses caused yellow eyes, tremors in her mouth, couldn't feel some body parts.    No issues with low doses     Hydrocodone-Acetaminophen Rash     Latex Rash       Review of Systems:  -As per HPI  -Constitutional: Otherwise feeling well today, in usual state of health.  -Skin: As above in HPI. No additional skin concerns.    Physical exam:  Vitals: There were no vitals taken for this visit.  GEN: This is a well developed, well-nourished female in no acute distress, in a pleasant mood.   SKIN: Focused examination of the bilateral upper extremities, face and upper chest was performed.   - There was atrophy of distal aspect of R deltoid with 2 overlying hypopigment macules overlying atrophy. No urtica or eczema appreciated.   -No other lesions of concern on  areas examined.     Order for PATCH TESTS    [x] Outpatient  [] Inpatient: Head..../ Bed ....      Skin Atopy (atopic dermatitis) [x] Yes   [] No  Rhinitis/Sinusitis:   [x] Yes   [] No  Allergic Asthma:   [] Yes   [x] No  Food Allergy:   [x] Yes   [] No  Leg ulcers:   [] Yes   [x] No  Hand eczema:   [] Yes   [x] No   Leading hand:   [x] R   [] L       [] Ambidextrous                      Reason for tests (suspected allergy): Assessment for potential reaction to disinfections before IT (less likely preservatives in IT, because the reaction not directly over injection site)  Known previous allergies: Trees/grass, mites, dogs/cats  Standardized panels  [x] Standard panel (40 tests)  [x] Preservatives & Antimicrobials (31 tests)  [x] Emulsifiers & Additives (25 tests)   [] Perfumes/Flavours & Plants (25 tests)  [] Hairdresser panel (12 tests)  [x] Rubber Chemicals (22 tests)  [x] Plastics (26 tests)  [] Colorants/Dyes/Food additives (20 tests)  [] Metals (implants/dental) (24 tests)  [] Local anaesthetics/NSAIDs (13 tests)  [x] Antibiotics & Antimycotics (14 tests)   [] Corticosteroids (15 tests)   [] Photopatch test (62 tests)   [] others: ...      [] Patient's own products: ...  DO NOT test if chemical or biological identity is unknown!   always ask from patient the product information and safety sheets (MSDS)   [] Atopy screen prick test (Atopic predisposition): ...    [] Patient needs consultation with Allergy team (changes of tests may apply)  [] Tests discussed with Allergy team (can have direct appointment for patch tests)    Atopy Screen (Placed 01/13/20 )    No Substance Readings (15 min) Evaluation   POS Histamine 1mg/ml ++    NEG NaCl 0.9% - Slight dermographism     No Substance Readings (15 min) Evaluation   1 Alternaria alternata (tenuis)  -    2 Cladosporium herbarum -    3 Aspergillus fumigatus -    4 Penicillium notatum -    5 Dermatophagoides pteronyssinus ++    6 Dermatophagoides farinae ++       Conclusion: Observe if any delayed reaction on the prick test on Wednesday.    RESULTS & EVALUATION of PATCH TESTS    Patch test readings after     [x] 2 days, [] 3 days [x] 4 days, [] 5 days,    Applied patch tests with results (import here the list of patch tests):  Order documented by: IVAN HANCOCK LPN  Order reviewed by: Preeti Copeland LPN   Physician:   Date/time of application:1-  Localization of application: back >> Clear    STANDARD Series         No Substance 2 days 4 days remarks   1 Francesco Mix [C] - -    2 Colophony - -    3  2-Mercaptobenzothiazole  NA NA     4 Methylisothiazolinone - -    5 Carba Mix - -    6 Thiuram Mix [A] NA NA    7 Bisphenol A Epoxy Resin - -    8 O-Aoed-Ldfvtegtbhw-Formaldehyde Resin - -    9 Mercapto Mix [A] - -    10 Black Rubber Mix- PPD [B] NA NA    11 Potassium Dichromate  -  -    12 Balsam of [place] (Myroxylon Pereirae Resin) - -    13 Nickel Sulphate Hexahydrate - -    14 Mixed Dialkyl Thiourea - -    15 Paraben Mix [B] - -    16 Methyldibromo Glutaronitrile NA NA    17 Fragrance Mix - -    18 2-Bromo-2-Nitropropane-1,3-Diol (Bronopol) - -    19 Lyral - -    20 Tixocortol-21- Pivalate NA NA    21 Diazolidiyl Urea (Germall II) - -    22 Methyl Methacrylate NA NA    23 Cobalt (II) Chloride Hexahydrate - -    24 Fragrance Mix II  - -    25 Compositae Mix - -    26 Benzoyl Peroxide NA NA    27 Bacitracin - -    28 Formaldehyde - -    29 Methylchloroisothiazolinone / Methylisothiazolinone - -    30 Corticosteroid Mix - -    31 Sodium Lauryl Sulfate - -    32 Lanolin Alcohol - -    33 Turpentine - -    34 Cetylstearylalcohol - -    35 Chlorhexidine Dicluconate - -    36 Budenoside - -    37 Imidazolidinyl Urea  - -    38 Ethyl-2 Cyanoacrylate NA NA    39 Quaternium 15 (Dowicil 200) - -    40 Decyl Glucoside - -      EMULSIFIERS & ADDITIVES        No Substance 2 days 4 days remarks   41 Polyethylene Glycol-400 NA NA    42 Cocamidopropyl Betaine - -    43  Amerchol L101 NA NA    44 Propylene Glycol - -    45 Triethanolamine - -    46 Sorbitane Sesquiolate - -    47 Isopropylmyristate - -    48 Polysorbate 80  - -    49 Amidoamine   (Stearamidopropyl Dimethylamine) - -    50 Oleamidopropyl Dimethylamine - -    51 Lauryl Glucoside NA NA    52 Coconut Diethanolamide  - -    53 2-Hydroxy-4-Methoxy Benzophenone (Oxybenzone) - -    54 Benzophenone-4 (Sulisobenzon) - -    55 Propolis - -    56 Dexpanthenol - -    57 Carboxymethyl Cellulose Sodium - -    58 Abitol - -    59 Tert-Butylhydroquinone - -    60 Benzyl Salicylate - -     Antioxidant      61 Dodecyl Gallate - -    62 Butylhydroxyanisole (BHA) - -    63 Butylhydroxytoluene (BHT) - -    64 Di-Alpha-Tocopherol (Vit E) - -    65 Propyl Gallate - -      RUBBER CHEMICALS         No Substance 2 days 4 days remarks    Carbamate      66 Zinc Bis ( Diethyldithio carbamate ) (ZDEC) - -    67 Zinc Bis (Dibutyldithiocarbamate) - -    68 1,3-Diphenylguanidine (DPG) - -     Thiourea      69 Dibutylthiourea - -    70 Diphenyltiourea - -    71 Thiourea - -     Mercapto chemicals      72 Morpholinyl Mercaptobenzothiazole - -    73 A-Fagswddxaw-1-Benzothiazyl-Sulfenamide - -    74 Dibenzothiazyl Disulfide - -     Thiuram chemicals      75 Dipentamethylenethiuram Disulfide - -    76 Tetraethylthiuram Disulfide (Disulfiram) - -    77 Tetramethylthiuram Disulfide - -    78 Tetramethyl Thiuram Monosulfide (TMTM) - -     4-Phenylenediamine derivatives      79 N-Isopropyl-N'-Phenyl-P-Phenylenediamine (IPPD) - -    80 Axuzhsrt-Q-Ctvkgqpxmjjqfbid (DPPD) - -     Various Rubber Additives      81 Hydroquinone Monobenzylether  - -    82 Hexamethylenetetramine - -    83 4,4'-Dihydroxybiphenyl - -    84 Cyclohexylthiophtalimide - -    85 Ethylenediamine Dihydrochloride - -    86 N-Phenyl-B-Naphthylamine - -    87 Dodecyl Mercaptan - -        PLASTICS         No Substance 2 days 4 days remarks    Acrylates - -    88 2-Hydroxyethyl Methacrylate  (HEMA) - -    89 1,4-Butandioldimethacrylate (BUDMA) - -    90  2-Ethylhexyl Acrylate - -    91 Bisphenol-A-Dimethacrylate  - -    92 Diurethane-Dimethacrylate - -    93 Ethyleneglycoldimethacrylate (EGDMA) - -    94 Pentaerythritoltriacrylate (CARLOS) - -    95 Triethylene Glycol Dimethacrylate (TEGDMA) - -     Synthetic material/additives       96 G-Bmwn-Bfxqgquafll - -    97 Tricresyl Phosphate - -    98 1-Jdps-Hxauzltguhjbl - -    99 Bis (2-Ethylhexyl) Phthalate - -    100 Dibutylphthalate - -    101 Dimethylphthalate - -    102 Toluene-2,4-Diisocyanate - -    103 Diphenylmethane-4,4''-Diisocyanate - -     EPOXY RESIN SYSTEMS       Reactive Solvents - -    104 Cresyl Glycidyl Ether - -    105 Butyl Glycidyl Ether - -    106 Phenyl Glycidyl Ether - -    107 1,4-Butanediol Diglycidyl Ether - -    108 1,6-Hexanediole Diglycidyl Ether - -     Hardener / Accelerator - -    109 Ethylenediamine Dihydrochloride - -    110 Triethylenetetramine - -    111 Diethylenetriamine - -    112 Isophorone Diamine (IPD) - -    113 N,N-Dimethyl-P-Toluidine - -        PRESERVATIVES & ANTIMICROBIALS         No Substance 2 days 4 days remarks   114  1,2-Benzisothiazoline-3-One, Sodium Salt - -    115  1,3,5-Anival (2-Hydroxyethyl) - Hexahydrotriazine (Grotan BK) NA NA    116 0-Ooewxytppkqct-5-Nitro-1, 3-Propanediol - -    117  3, 4, 4' - Triclocarban - -    118 4 - Chloro - 3 - Cresol - -    119 4 - Chloro - 4 - Xylenol (PCMX) NA NA    120 7-Ethylbicyclooxazolidine (Bioban VH1320) - -    121 Benzalkonium Chloride - -    122 Benzyl Alcohol - -    123 Cetalkonium Chloride NA NA    124 Cetylpyrimidine Chloride  - -    125 Chloroacetamide - -    126 DMDM Hydantoin NA NA    127 Glutaraldehyde NA NA    128 Triclosan - -    129 Glyoxal Trimeric Dihydrate - -    130 Iodopropynyl Butylcarbamate - -    131 Octylisothiazoline NA NA    132 Iodoform - -    133 (Nitrobutyl) Morpholine/(Ethylnitro-Trimethylene) Dimorpholine ([facility]87) - -    134  Phenoxyethanol NA NA    135 Phenyl Salicylate - -    136 Povidone Iodine - -    137 Sodium Benzoate - -    138 Sodium Disulfite NA NA    139 Sorbic Acid - -    140 Thimerosal - -     Parabens      141 Butyl-P-Hydroxybenzoate - -    142 Ethyl-P-Hydroxybenzoate - -    143 Methyl-P-Hydroxybenzoate NA NA    144 Propyl-P-Hydroxybenzoate - -         ANTIBIOTICS & ANTIMYCOTICS         No Substance 2 days 4 days remarks   145 Erythromycine - -    146 Framycetine Sulphate - -    147 Fusidic Acid Sodium Salt - -    148 Gentamicin Sulphate - -    149 Neomycine Sulphate - -    150 Oxytetracycline  - -    151 Polymyxin B Sulphate - -    152 Tetracycline-HCL - -    153 Sulfanilamide - -    154 Metronidazole - -    155 Oxyquinoline Mix - -    156 Nitrofurazone - -    157 Nystatin - -    158 Clotrimazole - -      PATIENTS OWN PRODUCTS         No Substance Conc  % solv 2 days 4 days remarks   159 Aspergillus   - -    160 Penicillium   - -    161 D. Farinae   - -    162 D. pteronyssinus   - -    163 Dog   - -    164 Cat   - -        Results of patch tests:                         Interpretation:    - Negative                    A    = Allergic      (+) Erythema    TI   = Toxic/irritant   + E + Infiltration    RaP = Relevance at Present     ++ E/I + Papulovesicle   Rpr  = Relevance Previously     +++ E/I/P + Blister     nR   = No Relevance  [] No relevant allergic reaction observed    [] Allergic reaction diagnosed against: see later      Interpretation/ remarks:   See later    [] Patient information given   [] ACDS CAMP information's (# ....) to following compounds: .....   [] General information's to following compounds: ......    ==> final Diagnosis:    >>  Localized Eczematous dermatitis on arm DDx preservatives in disinfection solutions    >> localized lipatrophy and pigmentary changes on upper arm  DDx reactivation of an allergic reaction to preservative of TT vaccination about 2y ago??    ==> Treatment prescribed/Plan:  See  later      These conclusions are made at the best of one's knowledge and belief based on the provided evidence such as patient's history and allergy test results and they can change over time or can be incomplete because of missing information's.    CC Ulysses Mtz MD  [place] [address] [address] [place] [phone] on close of this encounter.    Follow-up for ongoing patch testing on Wednesday.      Staff Involved:    Scribe Disclosure  I, Yanira Shahzad, am serving as a scribe to document services personally performed by Dr. Reginaldo Horne, based on data collection and the provider's statements to me.     I spent a total of 15 minutes face to face with Gisela Richards during today s office visit. Over 50% of this time was spent discussing all the individual test results, correlating them to the clinical relevance, counseling the patient and/or coordinating care. Please see Assessment and Plan for details.  This excludes any time spent performing prick and patch tests                  Patient had 122 patch tests placed this visit.  Standard panel (32 tests)  Preservatives & Antimicrobials (22 tests)  Emulsifiers & Additives (22 tests)   Rubber Chemicals (22 tests)  Plastics (26 tests)  Antibiotics & Antimycotics (14 tests)   Other (6 tests)      Patient had 8 prick tests placed this visit.  Control Tests (2 tests)  Standard Atopic Panel (6 tests)    Again, thank you for allowing me to participate in the care of your patient.        Sincerely,        Reginaldo Horne MD

## 2020-01-14 NOTE — PROGRESS NOTES
Patient had 122 patch tests placed this visit.    Standard panel (32 tests)    Preservatives & Antimicrobials (22 tests)    Emulsifiers & Additives (22 tests)     Rubber Chemicals (22 tests)    Plastics (26 tests)    Antibiotics & Antimycotics (14 tests)     Other (6 tests)      Patient had 8 prick tests placed this visit.    Control Tests (2 tests)    Standard Atopic Panel (6 tests)

## 2020-01-15 ENCOUNTER — OFFICE VISIT (OUTPATIENT)
Dept: ALLERGY | Facility: CLINIC | Age: 40
End: 2020-01-15
Payer: COMMERCIAL

## 2020-01-15 DIAGNOSIS — L23.89 ALLERGIC CONTACT DERMATITIS DUE TO OTHER AGENTS: Primary | ICD-10-CM

## 2020-01-15 DIAGNOSIS — Z88.9 ATOPY: ICD-10-CM

## 2020-01-15 ASSESSMENT — PAIN SCALES - GENERAL: PAINLEVEL: NO PAIN (0)

## 2020-01-15 NOTE — PROGRESS NOTES
[facility] Allergy Note    Allergy Clinic  [facility]  [address] [place] [id number]    Encounter Date: Isiah 15, 2020    CC:  Chief Complaint   Patient presents with     Allergy Testing Followup     Gisela is here for allergy testing day 3       History of Present Illness:  Ms. Gisela Richards is a 39 year old female who presents for consultation. She was referred by Dr. Ulysses Mtz of [facility]. She saw Dr. Mtz 11/18/19 for an allergy evaluation after large local reaction with immunotherapy.     Today she reports that all skin manifestation has resolved. Patient has come from [place], [place]. Patient's spouse is present during evaluation. She reports her first allergy shots took place on November 9th, 2 injections on posterior aspect of right arm and 1 to the posterior aspect of left arm.   - Per chart review immunotherapy: trees/grass/weeds, mites/molds and animals  Within the day of the injection patient noted a diffuse pruritic rash on the posterior aspect of her right arm. She denies using any topicals or oral medications beside Zyrtec to aid with on going rash. She additionally iced the area and skin manifestation had resolved.     The following week she completed her 2nd round of immunotherapy at the same dosage. Again the rash occurred on the posterior aspect of her right arm along with outstanding edema of her right arm and shoulder. This rash was also pruritic, but again denied any pain. Her provider was concerned about reaction to the preservative. This rash took 1.5 week to self resolve. The edema persisted for 2-3 weeks and when resolved, a notable dent was appreciated on the right distal aspect of the deltoid. Imaging completed showed atrophy of the deltoid. She denied any recent injections to that area including routine vaccines or steroid injections. Also now having to areas of hypopigmentation overlying  "the deltoid.  Reports dry skin after resolution of rashes. Denies history of asthma. Extensive history of food sensitives and autoimmune workup that has been unremarkable.     Hx since 19:  The patient comes in today for patch testing day 1. She reports that there is a bruise on the R shoulder that has worsened in the interim. She is unsure if it may be necrosis or sudden muscle atrophy. She reports it appeared on , the day after allergy shot. Also states that in the same area she developed a new \"bump\" that appeared about 2 days ago.   Reports that the IT on the R upper extremity was against trees, weeds, molds, mites, and dog. On the L upper extremity, she had IT injections for cats and dogs.   She is otherwise feeling well overall today. No other skin or allergy concerns at this time.       Past Medical History:   Patient Active Problem List   Diagnosis     Allergic rhinitis due to dogs     Hypothyroid     Lumbar facet joint pain     Past Medical History:   Diagnosis Date     Constipation     Since young.     Other specified postprocedural states     2014,Slight recurrence within scar - removed in 9th grade and came back right away     Personal history of other medical treatment (CODE)      2, para 2.     Past Surgical History:   Procedure Laterality Date     COLONOSCOPY      ,\"swelling of the lining\" and flattening of villa.     OTHER SURGICAL HISTORY      ~,[id number],OTHER,Left,recurring within scar     OTHER SURGICAL HISTORY      2016,[id number],KNEE ARTHROSCOPY W/ MENISCAL REPAIR     OTHER SURGICAL HISTORY      2004,[id number],DILATION AND CURETTAGE,retained placenta       Social History:  Patient reports that she has never smoked. She has never used smokeless tobacco. She reports current alcohol use.    Family History:  Family History   Problem Relation Age of Onset     Family History Negative Father         Good Health,does not see doctor regularly     Thyroid Disease Mother     "     Thyroid Disease,Hypothyroid     Other - See Comments Mother         Smoker, prediabetes     Thyroid Disease Sister         Thyroid Disease,Hypothyroid     Arthritis Sister         Arthritis     Scoliosis Maternal Half-Sister         Scoliosis     Other - See Comments Maternal Half-Sister         No Known Problems       Medications:  Current Outpatient Medications   Medication Sig Dispense Refill     fluorometholone (FML FORTE) 0.25 % ophthalmic susp        levothyroxine (SYNTHROID/LEVOTHROID) 75 MCG tablet Take 1 tablet (75 mcg) by mouth every morning (before breakfast) 90 tablet 4     Magnesium Citrate POWD by Does not apply route. Nagnesium Calm by Natural Vitality takes 2 to 3 tsp per day       mometasone (NASONEX) 50 MCG/ACT spray 1 spray 2 times daily       Thyroid (NATURE-THROID) 48.75 MG TABS          Allergies   Allergen Reactions     Cats Shortness Of Breath     Tramadol      Other reaction(s): Other - Describe In Comment Field  High doses caused yellow eyes, tremors in her mouth, couldn't feel some body parts.    No issues with low doses     Hydrocodone-Acetaminophen Rash     Latex Rash       Review of Systems:  -As per HPI  -Constitutional: Otherwise feeling well today, in usual state of health.  -Skin: As above in HPI. No additional skin concerns.    Physical exam:  Vitals: There were no vitals taken for this visit.  GEN: This is a well developed, well-nourished female in no acute distress, in a pleasant mood.   SKIN: Focused examination of the bilateral upper extremities, face and upper chest was performed.   - There was atrophy of distal aspect of R deltoid with 2 overlying hypopigment macules overlying atrophy. No urtica or eczema appreciated.   -No other lesions of concern on areas examined.     Order for PATCH TESTS    [x] Outpatient  [] Inpatient: Head..../ Bed ....      Skin Atopy (atopic dermatitis) [x] Yes   [] No  Rhinitis/Sinusitis:   [x] Yes   [] No  Allergic Asthma:   [] Yes   [x]  No  Food Allergy:   [x] Yes   [] No  Leg ulcers:   [] Yes   [x] No  Hand eczema:   [] Yes   [x] No   Leading hand:   [x] R   [] L       [] Ambidextrous                      Reason for tests (suspected allergy): Assessment for potential reaction to disinfections before IT (less likely preservatives in IT, because the reaction not directly over injection site)  Known previous allergies: Trees/grass, mites, dogs/cats  Standardized panels  [x] Standard panel (40 tests)  [x] Preservatives & Antimicrobials (31 tests)  [x] Emulsifiers & Additives (25 tests)   [] Perfumes/Flavours & Plants (25 tests)  [] Hairdresser panel (12 tests)  [x] Rubber Chemicals (22 tests)  [x] Plastics (26 tests)  [] Colorants/Dyes/Food additives (20 tests)  [] Metals (implants/dental) (24 tests)  [] Local anaesthetics/NSAIDs (13 tests)  [x] Antibiotics & Antimycotics (14 tests)   [] Corticosteroids (15 tests)   [] Photopatch test (62 tests)   [] others: ...      [] Patient's own products: ...    DO NOT test if chemical or biological identity is unknown!     always ask from patient the product information and safety sheets (MSDS)   [] Atopy screen prick test (Atopic predisposition): ...    [] Patient needs consultation with Allergy team (changes of tests may apply)  [] Tests discussed with Allergy team (can have direct appointment for patch tests)    Atopy Screen (Placed 01/13/20 )    No Substance Readings (15 min) Evaluation   POS Histamine 1mg/ml ++    NEG NaCl 0.9% - Slight dermographism     No Substance Readings (15 min) Evaluation   1 Alternaria alternata (tenuis)  -    2 Cladosporium herbarum -    3 Aspergillus fumigatus -    4 Penicillium notatum -    5 Dermatophagoides pteronyssinus ++    6 Dermatophagoides farinae ++      Conclusion: immediate type reaction to house dust mites, but no delayed reaction to moulds or HDM    RESULTS & EVALUATION of PATCH TESTS    Patch test readings after     [x] 2 days, [] 3 days [x] 4 days, [] 5  days,    Applied patch tests with results (import here the list of patch tests):  Order documented by: IVAN HANCOCK LPN  Order reviewed by: Preeti Copeland LPN   Physician:   Date/time of application:1-  Localization of application: back >> Clear    STANDARD Series         No Substance 2 days 4 days remarks   1 Francesco Mix [C] - -    2 Colophony - -    3  2-Mercaptobenzothiazole  NA NA     4 Methylisothiazolinone - -    5 Carba Mix - -    6 Thiuram Mix [A] NA NA    7 Bisphenol A Epoxy Resin - -    8 W-Stms-Tafgcluupqs-Formaldehyde Resin - -    9 Mercapto Mix [A] - -    10 Black Rubber Mix- PPD [B] NA NA    11 Potassium Dichromate  -  -    12 Balsam of [place] (Myroxylon Pereirae Resin) - -    13 Nickel Sulphate Hexahydrate - -    14 Mixed Dialkyl Thiourea - -    15 Paraben Mix [B] - -    16 Methyldibromo Glutaronitrile NA NA    17 Fragrance Mix - -    18 2-Bromo-2-Nitropropane-1,3-Diol (Bronopol) - -    19 Lyral - -    20 Tixocortol-21- Pivalate NA NA    21 Diazolidiyl Urea (Germall II) - -    22 Methyl Methacrylate NA NA    23 Cobalt (II) Chloride Hexahydrate - -    24 Fragrance Mix II  - -    25 Compositae Mix - -    26 Benzoyl Peroxide NA NA    27 Bacitracin - -    28 Formaldehyde - -    29 Methylchloroisothiazolinone / Methylisothiazolinone - -    30 Corticosteroid Mix - -    31 Sodium Lauryl Sulfate - -    32 Lanolin Alcohol - -    33 Turpentine - -    34 Cetylstearylalcohol - -    35 Chlorhexidine Dicluconate - -    36 Budenoside - -    37 Imidazolidinyl Urea  - -    38 Ethyl-2 Cyanoacrylate NA NA    39 Quaternium 15 (Dowicil 200) - -    40 Decyl Glucoside - -      EMULSIFIERS & ADDITIVES        No Substance 2 days 4 days remarks   41 Polyethylene Glycol-400 NA NA    42 Cocamidopropyl Betaine - -    43 Amerchol L101 NA NA    44 Propylene Glycol - -    45 Triethanolamine - -    46 Sorbitane Sesquiolate - -    47 Isopropylmyristate - -    48 Polysorbate 80  - -    49 Amidoamine    (Stearamidopropyl Dimethylamine) - -    50 Oleamidopropyl Dimethylamine - -    51 Lauryl Glucoside NA NA    52 Coconut Diethanolamide  - -    53 2-Hydroxy-4-Methoxy Benzophenone (Oxybenzone) - -    54 Benzophenone-4 (Sulisobenzon) - -    55 Propolis - -    56 Dexpanthenol - -    57 Carboxymethyl Cellulose Sodium - -    58 Abitol - -    59 Tert-Butylhydroquinone - -    60 Benzyl Salicylate - -     Antioxidant      61 Dodecyl Gallate - -    62 Butylhydroxyanisole (BHA) - -    63 Butylhydroxytoluene (BHT) - -    64 Di-Alpha-Tocopherol (Vit E) - -    65 Propyl Gallate - -      RUBBER CHEMICALS         No Substance 2 days 4 days remarks    Carbamate      66 Zinc Bis ( Diethyldithio carbamate ) (ZDEC) - -    67 Zinc Bis (Dibutyldithiocarbamate) - -    68 1,3-Diphenylguanidine (DPG) - -     Thiourea      69 Dibutylthiourea - -    70 Diphenyltiourea - -    71 Thiourea - -     Mercapto chemicals      72 Morpholinyl Mercaptobenzothiazole - -    73 K-Koxivglijm-2-Benzothiazyl-Sulfenamide - -    74 Dibenzothiazyl Disulfide - -     Thiuram chemicals      75 Dipentamethylenethiuram Disulfide - -    76 Tetraethylthiuram Disulfide (Disulfiram) - -    77 Tetramethylthiuram Disulfide - -    78 Tetramethyl Thiuram Monosulfide (TMTM) - -     4-Phenylenediamine derivatives      79 N-Isopropyl-N'-Phenyl-P-Phenylenediamine (IPPD) - -    80 Ijntjuyt-S-Ubqcxmmipkrxwyae (DPPD) - -     Various Rubber Additives      81 Hydroquinone Monobenzylether  - -    82 Hexamethylenetetramine - -    83 4,4'-Dihydroxybiphenyl - -    84 Cyclohexylthiophtalimide - -    85 Ethylenediamine Dihydrochloride - -    86 N-Phenyl-B-Naphthylamine - -    87 Dodecyl Mercaptan - -        PLASTICS         No Substance 2 days 4 days remarks    Acrylates - -    88 2-Hydroxyethyl Methacrylate (HEMA) - -    89 1,4-Butandioldimethacrylate (BUDMA) - -    90  2-Ethylhexyl Acrylate - -    91 Bisphenol-A-Dimethacrylate  - -    92 Diurethane-Dimethacrylate - -    93  Ethyleneglycoldimethacrylate (EGDMA) - -    94 Pentaerythritoltriacrylate (CARLOS) - -    95 Triethylene Glycol Dimethacrylate (TEGDMA) - -     Synthetic material/additives       96 X-Rsdj-Vcshqlfereb - -    97 Tricresyl Phosphate - -    98 0-Kzwv-Xbckjgrndzedr - -    99 Bis (2-Ethylhexyl) Phthalate - -    100 Dibutylphthalate - -    101 Dimethylphthalate - -    102 Toluene-2,4-Diisocyanate - -    103 Diphenylmethane-4,4''-Diisocyanate - -     EPOXY RESIN SYSTEMS       Reactive Solvents - -    104 Cresyl Glycidyl Ether - -    105 Butyl Glycidyl Ether - -    106 Phenyl Glycidyl Ether - -    107 1,4-Butanediol Diglycidyl Ether - -    108 1,6-Hexanediole Diglycidyl Ether - -     Hardener / Accelerator - -    109 Ethylenediamine Dihydrochloride - -    110 Triethylenetetramine - -    111 Diethylenetriamine - -    112 Isophorone Diamine (IPD) - -    113 N,N-Dimethyl-P-Toluidine - -        PRESERVATIVES & ANTIMICROBIALS         No Substance 2 days 4 days remarks   114  1,2-Benzisothiazoline-3-One, Sodium Salt - -    115  1,3,5-Anival (2-Hydroxyethyl) - Hexahydrotriazine (Grotan BK) NA NA    116 3-Ebwsovlmpmjwo-5-Nitro-1, 3-Propanediol - -    117  3, 4, 4' - Triclocarban - -    118 4 - Chloro - 3 - Cresol - -    119 4 - Chloro - 4 - Xylenol (PCMX) NA NA    120 7-Ethylbicyclooxazolidine (menuvoxan HT7094) - -    121 Benzalkonium Chloride - -    122 Benzyl Alcohol - -    123 Cetalkonium Chloride NA NA    124 Cetylpyrimidine Chloride  - -    125 Chloroacetamide - -    126 DMDM Hydantoin NA NA    127 Glutaraldehyde NA NA    128 Triclosan - -    129 Glyoxal Trimeric Dihydrate - -    130 Iodopropynyl Butylcarbamate - -    131 Octylisothiazoline NA NA    132 Iodoform - -    133 (Nitrobutyl) Morpholine/(Ethylnitro-Trimethylene) Dimorpholine (Bioban P 1487) - -    134 Phenoxyethanol NA NA    135 Phenyl Salicylate - -    136 Povidone Iodine - -    137 Sodium Benzoate - -    138 Sodium Disulfite NA NA    139 Sorbic Acid - -    140 Thimerosal  - -     Parabens      141 Butyl-P-Hydroxybenzoate - -    142 Ethyl-P-Hydroxybenzoate - -    143 Methyl-P-Hydroxybenzoate NA NA    144 Propyl-P-Hydroxybenzoate - -         ANTIBIOTICS & ANTIMYCOTICS         No Substance 2 days 4 days remarks   145 Erythromycine - -    146 Framycetine Sulphate (+) -    147 Fusidic Acid Sodium Salt - -    148 Gentamicin Sulphate - -    149 Neomycine Sulphate (+) -    150 Oxytetracycline  - -    151 Polymyxin B Sulphate (+) -    152 Tetracycline-HCL - -    153 Sulfanilamide - -    154 Metronidazole - -    155 Oxyquinoline Mix - -    156 Nitrofurazone - -    157 Nystatin - -    158 Clotrimazole - -      PATIENTS OWN PRODUCTS         No Substance Conc  % solv 2 days 4 days remarks   159 Aspergillus   - -    160 Penicillium   - -    161 D. Farinae   - -    162 D. pteronyssinus   - -    163 Dog   - -    164 Cat   - -        Results of patch tests:                         Interpretation:    - Negative                    A    = Allergic      (+) Erythema    TI   = Toxic/irritant   + E + Infiltration    RaP = Relevance at Present     ++ E/I + Papulovesicle   Rpr  = Relevance Previously     +++ E/I/P + Blister     nR   = No Relevance  [] No relevant allergic reaction observed    [x] Allergic reaction diagnosed against: developing for Framycetine, Neomycine and Polymyxin =      Interpretation/ remarks:   See later    [] Patient information given   [] ACDS CAMP information's (# ....) to following compounds: .....   [] General information's to following compounds: ......    ==> final Diagnosis:    >>  Localized Eczematous dermatitis on arm DDx preservatives in disinfection solutions    >> localized lipatrophy and pigmentary changes on upper arm    DDx reactivation of an allergic reaction to preservative of TT vaccination about 2y ago??    ==> Treatment prescribed/Plan:  See later      These conclusions are made at the best of one's knowledge and belief based on the provided evidence such as  patient's history and allergy test results and they can change over time or can be incomplete because of missing information's.    CC Ulysses Mtz MD  [place] [address] [address] [place] [id number] on close of this encounter.    Follow-up for ongoing patch testing on Wednesday.    I spent a total of 12 minutes face to face with Gisela Richards during today s office visit. Over 50% of this time was spent counseling the patient and/or coordinating care. Please see Assessment and Plan for details. Discussed where Bacitracin or Neomycine used.

## 2020-01-15 NOTE — NURSING NOTE
Allergy Rooming Note    Gisela Richards's goals for this visit include:   Chief Complaint   Patient presents with     Allergy Testing Followup     Gisela is here for allergy testing day 3     Preeti Copeland LPN

## 2020-01-15 NOTE — LETTER
1/15/2020         RE: Gisela Richards  [address]  formerly [facility] [address]        Dear Colleague,    Thank you for referring your patient, Gisela Richards, to the [facility] ALLERGY. Please see a copy of my visit note below.    [facility] Allergy Note    Allergy Clinic  [facility]  [address] [place] [id number]    Encounter Date: Isiah 15, 2020    CC:  Chief Complaint   Patient presents with     Allergy Testing Followup     Gisela is here for allergy testing day 3       History of Present Illness:  Ms. Gisela Richards is a 39 year old female who presents for consultation. She was referred by Dr. Ulysses Mtz of [facility]. She saw Dr. Mtz 11/18/19 for an allergy evaluation after large local reaction with immunotherapy.     Today she reports that all skin manifestation has resolved. Patient has come from [place], [place]. Patient's spouse is present during evaluation. She reports her first allergy shots took place on November 9th, 2 injections on posterior aspect of right arm and 1 to the posterior aspect of left arm.   - Per chart review immunotherapy: trees/grass/weeds, mites/molds and animals  Within the day of the injection patient noted a diffuse pruritic rash on the posterior aspect of her right arm. She denies using any topicals or oral medications beside Zyrtec to aid with on going rash. She additionally iced the area and skin manifestation had resolved.     The following week she completed her 2nd round of immunotherapy at the same dosage. Again the rash occurred on the posterior aspect of her right arm along with outstanding edema of her right arm and shoulder. This rash was also pruritic, but again denied any pain. Her provider was concerned about reaction to the preservative. This rash took 1.5 week to self resolve. The edema persisted for 2-3 weeks and when resolved, a notable dent was appreciated on  "the right distal aspect of the deltoid. Imaging completed showed atrophy of the deltoid. She denied any recent injections to that area including routine vaccines or steroid injections. Also now having to areas of hypopigmentation overlying the deltoid.  Reports dry skin after resolution of rashes. Denies history of asthma. Extensive history of food sensitives and autoimmune workup that has been unremarkable.     Hx since 19:  The patient comes in today for patch testing day 1. She reports that there is a bruise on the R shoulder that has worsened in the interim. She is unsure if it may be necrosis or sudden muscle atrophy. She reports it appeared on , the day after allergy shot. Also states that in the same area she developed a new \"bump\" that appeared about 2 days ago.   Reports that the IT on the R upper extremity was against trees, weeds, molds, mites, and dog. On the L upper extremity, she had IT injections for cats and dogs.   She is otherwise feeling well overall today. No other skin or allergy concerns at this time.       Past Medical History:   Patient Active Problem List   Diagnosis     Allergic rhinitis due to dogs     Hypothyroid     Lumbar facet joint pain     Past Medical History:   Diagnosis Date     Constipation     Since young.     Other specified postprocedural states     2014,Slight recurrence within scar - removed in 9th grade and came back right away     Personal history of other medical treatment (CODE)      2, para 2.     Past Surgical History:   Procedure Laterality Date     COLONOSCOPY      ,\"swelling of the lining\" and flattening of villa.     OTHER SURGICAL HISTORY      ~,[id number],OTHER,Left,recurring within scar     OTHER SURGICAL HISTORY      2016,[id number],KNEE ARTHROSCOPY W/ MENISCAL REPAIR     OTHER SURGICAL HISTORY      2004,[id number],DILATION AND CURETTAGE,retained placenta       Social History:  Patient reports that she has never smoked. She has never " used smokeless tobacco. She reports current alcohol use.    Family History:  Family History   Problem Relation Age of Onset     Family History Negative Father         Good Health,does not see doctor regularly     Thyroid Disease Mother         Thyroid Disease,Hypothyroid     Other - See Comments Mother         Smoker, prediabetes     Thyroid Disease Sister         Thyroid Disease,Hypothyroid     Arthritis Sister         Arthritis     Scoliosis Maternal Half-Sister         Scoliosis     Other - See Comments Maternal Half-Sister         No Known Problems       Medications:  Current Outpatient Medications   Medication Sig Dispense Refill     fluorometholone (FML FORTE) 0.25 % ophthalmic susp        levothyroxine (SYNTHROID/LEVOTHROID) 75 MCG tablet Take 1 tablet (75 mcg) by mouth every morning (before breakfast) 90 tablet 4     Magnesium Citrate POWD by Does not apply route. Nagnesium Calm by Natural Vitality takes 2 to 3 tsp per day       mometasone (NASONEX) 50 MCG/ACT spray 1 spray 2 times daily       Thyroid (NATURE-THROID) 48.75 MG TABS          Allergies   Allergen Reactions     Cats Shortness Of Breath     Tramadol      Other reaction(s): Other - Describe In Comment Field  High doses caused yellow eyes, tremors in her mouth, couldn't feel some body parts.    No issues with low doses     Hydrocodone-Acetaminophen Rash     Latex Rash       Review of Systems:  -As per HPI  -Constitutional: Otherwise feeling well today, in usual state of health.  -Skin: As above in HPI. No additional skin concerns.    Physical exam:  Vitals: There were no vitals taken for this visit.  GEN: This is a well developed, well-nourished female in no acute distress, in a pleasant mood.   SKIN: Focused examination of the bilateral upper extremities, face and upper chest was performed.   - There was atrophy of distal aspect of R deltoid with 2 overlying hypopigment macules overlying atrophy. No urtica or eczema appreciated.   -No other  lesions of concern on areas examined.     Order for PATCH TESTS    [x] Outpatient  [] Inpatient: Head..../ Bed ....      Skin Atopy (atopic dermatitis) [x] Yes   [] No  Rhinitis/Sinusitis:   [x] Yes   [] No  Allergic Asthma:   [] Yes   [x] No  Food Allergy:   [x] Yes   [] No  Leg ulcers:   [] Yes   [x] No  Hand eczema:   [] Yes   [x] No   Leading hand:   [x] R   [] L       [] Ambidextrous                      Reason for tests (suspected allergy): Assessment for potential reaction to disinfections before IT (less likely preservatives in IT, because the reaction not directly over injection site)  Known previous allergies: Trees/grass, mites, dogs/cats  Standardized panels  [x] Standard panel (40 tests)  [x] Preservatives & Antimicrobials (31 tests)  [x] Emulsifiers & Additives (25 tests)   [] Perfumes/Flavours & Plants (25 tests)  [] Hairdresser panel (12 tests)  [x] Rubber Chemicals (22 tests)  [x] Plastics (26 tests)  [] Colorants/Dyes/Food additives (20 tests)  [] Metals (implants/dental) (24 tests)  [] Local anaesthetics/NSAIDs (13 tests)  [x] Antibiotics & Antimycotics (14 tests)   [] Corticosteroids (15 tests)   [] Photopatch test (62 tests)   [] others: ...      [] Patient's own products: ...    DO NOT test if chemical or biological identity is unknown!     always ask from patient the product information and safety sheets (MSDS)   [] Atopy screen prick test (Atopic predisposition): ...    [] Patient needs consultation with Allergy team (changes of tests may apply)  [] Tests discussed with Allergy team (can have direct appointment for patch tests)    Atopy Screen (Placed 01/13/20 )    No Substance Readings (15 min) Evaluation   POS Histamine 1mg/ml ++    NEG NaCl 0.9% - Slight dermographism     No Substance Readings (15 min) Evaluation   1 Alternaria alternata (tenuis)  -    2 Cladosporium herbarum -    3 Aspergillus fumigatus -    4 Penicillium notatum -    5 Dermatophagoides pteronyssinus ++    6  Dermatophagoides farinae ++      Conclusion: immediate type reaction to house dust mites, but no delayed reaction to moulds or HDM    RESULTS & EVALUATION of PATCH TESTS    Patch test readings after     [x] 2 days, [] 3 days [x] 4 days, [] 5 days,    Applied patch tests with results (import here the list of patch tests):  Order documented by: IVAN HANCOCK LPN  Order reviewed by: Preeti Copeland LPN   Physician:   Date/time of application:1-  Localization of application: back >> Clear    STANDARD Series         No Substance 2 days 4 days remarks   1 Francesco Mix [C] - -    2 Colophony - -    3  2-Mercaptobenzothiazole  NA NA     4 Methylisothiazolinone - -    5 Carba Mix - -    6 Thiuram Mix [A] NA NA    7 Bisphenol A Epoxy Resin - -    8 Y-Hwzl-Kiljrrtnpio-Formaldehyde Resin - -    9 Mercapto Mix [A] - -    10 Black Rubber Mix- PPD [B] NA NA    11 Potassium Dichromate  -  -    12 Balsam of [place] (Myroxylon Pereirae Resin) - -    13 Nickel Sulphate Hexahydrate - -    14 Mixed Dialkyl Thiourea - -    15 Paraben Mix [B] - -    16 Methyldibromo Glutaronitrile NA NA    17 Fragrance Mix - -    18 2-Bromo-2-Nitropropane-1,3-Diol (Bronopol) - -    19 Lyral - -    20 Tixocortol-21- Pivalate NA NA    21 Diazolidiyl Urea (Germall II) - -    22 Methyl Methacrylate NA NA    23 Cobalt (II) Chloride Hexahydrate - -    24 Fragrance Mix II  - -    25 Compositae Mix - -    26 Benzoyl Peroxide NA NA    27 Bacitracin - -    28 Formaldehyde - -    29 Methylchloroisothiazolinone / Methylisothiazolinone - -    30 Corticosteroid Mix - -    31 Sodium Lauryl Sulfate - -    32 Lanolin Alcohol - -    33 Turpentine - -    34 Cetylstearylalcohol - -    35 Chlorhexidine Dicluconate - -    36 Budenoside - -    37 Imidazolidinyl Urea  - -    38 Ethyl-2 Cyanoacrylate NA NA    39 Quaternium 15 (Dowicil 200) - -    40 Decyl Glucoside - -      EMULSIFIERS & ADDITIVES        No Substance 2 days 4 days remarks   41 Polyethylene  Glycol-400 NA NA    42 Cocamidopropyl Betaine - -    43 Amerchol L101 NA NA    44 Propylene Glycol - -    45 Triethanolamine - -    46 Sorbitane Sesquiolate - -    47 Isopropylmyristate - -    48 Polysorbate 80  - -    49 Amidoamine   (Stearamidopropyl Dimethylamine) - -    50 Oleamidopropyl Dimethylamine - -    51 Lauryl Glucoside NA NA    52 Coconut Diethanolamide  - -    53 2-Hydroxy-4-Methoxy Benzophenone (Oxybenzone) - -    54 Benzophenone-4 (Sulisobenzon) - -    55 Propolis - -    56 Dexpanthenol - -    57 Carboxymethyl Cellulose Sodium - -    58 Abitol - -    59 Tert-Butylhydroquinone - -    60 Benzyl Salicylate - -     Antioxidant      61 Dodecyl Gallate - -    62 Butylhydroxyanisole (BHA) - -    63 Butylhydroxytoluene (BHT) - -    64 Di-Alpha-Tocopherol (Vit E) - -    65 Propyl Gallate - -      RUBBER CHEMICALS         No Substance 2 days 4 days remarks    Carbamate      66 Zinc Bis ( Diethyldithio carbamate ) (ZDEC) - -    67 Zinc Bis (Dibutyldithiocarbamate) - -    68 1,3-Diphenylguanidine (DPG) - -     Thiourea      69 Dibutylthiourea - -    70 Diphenyltiourea - -    71 Thiourea - -     Mercapto chemicals      72 Morpholinyl Mercaptobenzothiazole - -    73 Q-Wcjwgsyggi-4-Benzothiazyl-Sulfenamide - -    74 Dibenzothiazyl Disulfide - -     Thiuram chemicals      75 Dipentamethylenethiuram Disulfide - -    76 Tetraethylthiuram Disulfide (Disulfiram) - -    77 Tetramethylthiuram Disulfide - -    78 Tetramethyl Thiuram Monosulfide (TMTM) - -     4-Phenylenediamine derivatives      79 N-Isopropyl-N'-Phenyl-P-Phenylenediamine (IPPD) - -    80 Omhfcmvv-B-Dmtbtddlstwfkjdv (DPPD) - -     Various Rubber Additives      81 Hydroquinone Monobenzylether  - -    82 Hexamethylenetetramine - -    83 4,4'-Dihydroxybiphenyl - -    84 Cyclohexylthiophtalimide - -    85 Ethylenediamine Dihydrochloride - -    86 N-Phenyl-B-Naphthylamine - -    87 Dodecyl Mercaptan - -        PLASTICS         No Substance 2 days 4 days remarks     Acrylates - -    88 2-Hydroxyethyl Methacrylate (HEMA) - -    89 1,4-Butandioldimethacrylate (BUDMA) - -    90  2-Ethylhexyl Acrylate - -    91 Bisphenol-A-Dimethacrylate  - -    92 Diurethane-Dimethacrylate - -    93 Ethyleneglycoldimethacrylate (EGDMA) - -    94 Pentaerythritoltriacrylate (CAROLS) - -    95 Triethylene Glycol Dimethacrylate (TEGDMA) - -     Synthetic material/additives       96 G-Orly-Sduzcvzggcu - -    97 Tricresyl Phosphate - -    98 8-Kcxu-Mfdxnlrsinlyl - -    99 Bis (2-Ethylhexyl) Phthalate - -    100 Dibutylphthalate - -    101 Dimethylphthalate - -    102 Toluene-2,4-Diisocyanate - -    103 Diphenylmethane-4,4''-Diisocyanate - -     EPOXY RESIN SYSTEMS       Reactive Solvents - -    104 Cresyl Glycidyl Ether - -    105 Butyl Glycidyl Ether - -    106 Phenyl Glycidyl Ether - -    107 1,4-Butanediol Diglycidyl Ether - -    108 1,6-Hexanediole Diglycidyl Ether - -     Hardener / Accelerator - -    109 Ethylenediamine Dihydrochloride - -    110 Triethylenetetramine - -    111 Diethylenetriamine - -    112 Isophorone Diamine (IPD) - -    113 N,N-Dimethyl-P-Toluidine - -        PRESERVATIVES & ANTIMICROBIALS         No Substance 2 days 4 days remarks   114  1,2-Benzisothiazoline-3-One, Sodium Salt - -    115  1,3,5-Anival (2-Hydroxyethyl) - Hexahydrotriazine (Grotan BK) NA NA    116 5-Kthitkygvxgqj-5-Nitro-1, 3-Propanediol - -    117  3, 4, 4' - Triclocarban - -    118 4 - Chloro - 3 - Cresol - -    119 4 - Chloro - 4 - Xylenol (PCMX) NA NA    120 7-Ethylbicyclooxazolidine (Bioban [id number]) - -    121 Benzalkonium Chloride - -    122 Benzyl Alcohol - -    123 Cetalkonium Chloride NA NA    124 Cetylpyrimidine Chloride  - -    125 Chloroacetamide - -    126 DMDM Hydantoin NA NA    127 Glutaraldehyde NA NA    128 Triclosan - -    129 Glyoxal Trimeric Dihydrate - -    130 Iodopropynyl Butylcarbamate - -    131 Octylisothiazoline NA NA    132 Iodoform - -    133 (Nitrobutyl)  Morpholine/(Ethylnitro-Trimethylene) Dimorpholine (Bioban P 1487) - -    134 Phenoxyethanol NA NA    135 Phenyl Salicylate - -    136 Povidone Iodine - -    137 Sodium Benzoate - -    138 Sodium Disulfite NA NA    139 Sorbic Acid - -    140 Thimerosal - -     Parabens      141 Butyl-P-Hydroxybenzoate - -    142 Ethyl-P-Hydroxybenzoate - -    143 Methyl-P-Hydroxybenzoate NA NA    144 Propyl-P-Hydroxybenzoate - -         ANTIBIOTICS & ANTIMYCOTICS         No Substance 2 days 4 days remarks   145 Erythromycine - -    146 Framycetine Sulphate (+) -    147 Fusidic Acid Sodium Salt - -    148 Gentamicin Sulphate - -    149 Neomycine Sulphate (+) -    150 Oxytetracycline  - -    151 Polymyxin B Sulphate (+) -    152 Tetracycline-HCL - -    153 Sulfanilamide - -    154 Metronidazole - -    155 Oxyquinoline Mix - -    156 Nitrofurazone - -    157 Nystatin - -    158 Clotrimazole - -      PATIENTS OWN PRODUCTS         No Substance Conc  % solv 2 days 4 days remarks   159 Aspergillus   - -    160 Penicillium   - -    161 D. Farinae   - -    162 D. pteronyssinus   - -    163 Dog   - -    164 Cat   - -        Results of patch tests:                         Interpretation:    - Negative                    A    = Allergic      (+) Erythema    TI   = Toxic/irritant   + E + Infiltration    RaP = Relevance at Present     ++ E/I + Papulovesicle   Rpr  = Relevance Previously     +++ E/I/P + Blister     nR   = No Relevance  [] No relevant allergic reaction observed    [x] Allergic reaction diagnosed against: developing for Framycetine, Neomycine and Polymyxin =      Interpretation/ remarks:   See later    [] Patient information given   [] ACDS CAMP information's (# ....) to following compounds: .....   [] General information's to following compounds: ......    ==> final Diagnosis:    >>  Localized Eczematous dermatitis on arm DDx preservatives in disinfection solutions    >> localized lipatrophy and pigmentary changes on upper  arm    DDx reactivation of an allergic reaction to preservative of TT vaccination about 2y ago??    ==> Treatment prescribed/Plan:  See later      These conclusions are made at the best of one's knowledge and belief based on the provided evidence such as patient's history and allergy test results and they can change over time or can be incomplete because of missing information's.    CC Ulysses Mtz MD  [place] [address] [address] [place] [phone] on close of this encounter.    Follow-up for ongoing patch testing on Wednesday.    I spent a total of 12 minutes face to face with Gisela Richards during today s office visit. Over 50% of this time was spent counseling the patient and/or coordinating care. Please see Assessment and Plan for details. Discussed where Bacitracin or Neomycine used.          Again, thank you for allowing me to participate in the care of your patient.        Sincerely,        Reginaldo Horne MD

## 2020-01-17 ENCOUNTER — OFFICE VISIT (OUTPATIENT)
Dept: ALLERGY | Facility: CLINIC | Age: 40
End: 2020-01-17
Payer: COMMERCIAL

## 2020-01-17 DIAGNOSIS — L23.89 ALLERGIC CONTACT DERMATITIS DUE TO OTHER AGENTS: Primary | ICD-10-CM

## 2020-01-17 DIAGNOSIS — Z88.9 DRUG ALLERGY: ICD-10-CM

## 2020-01-17 DIAGNOSIS — L90.9 ATROPHIC DISORDER OF SKIN, UNSPECIFIED: ICD-10-CM

## 2020-01-17 RX ORDER — TRIAMCINOLONE ACETONIDE 1 MG/G
CREAM TOPICAL 2 TIMES DAILY
Qty: 30 G | Refills: 1 | Status: SHIPPED | OUTPATIENT
Start: 2020-01-17 | End: 2020-01-22

## 2020-01-17 ASSESSMENT — PAIN SCALES - GENERAL: PAINLEVEL: NO PAIN (0)

## 2020-01-17 NOTE — PROGRESS NOTES
[facility] Allergy Note    Allergy Clinic  [facility]  [address] [place] [id number]    Encounter Date: Jan 17, 2020    CC:  Chief Complaint   Patient presents with     Allergy Testing Followup     Gisela is here today for patch testing day 5        History of Present Illness:  Ms. Gisela Richards is a 39 year old female who presents for consultation. She was referred by Dr. Ulysses Mtz of [facility]. She saw Dr. Mtz 11/18/19 for an allergy evaluation after large local reaction with immunotherapy.     Today she reports that all skin manifestation has resolved. Patient has come from [place], [place]. Patient's spouse is present during evaluation. She reports her first allergy shots took place on November 9th, 2 injections on posterior aspect of right arm and 1 to the posterior aspect of left arm.   - Per chart review immunotherapy: trees/grass/weeds, mites/molds and animals  Within the day of the injection patient noted a diffuse pruritic rash on the posterior aspect of her right arm. She denies using any topicals or oral medications beside Zyrtec to aid with on going rash. She additionally iced the area and skin manifestation had resolved.     The following week she completed her 2nd round of immunotherapy at the same dosage. Again the rash occurred on the posterior aspect of her right arm along with outstanding edema of her right arm and shoulder. This rash was also pruritic, but again denied any pain. Her provider was concerned about reaction to the preservative. This rash took 1.5 week to self resolve. The edema persisted for 2-3 weeks and when resolved, a notable dent was appreciated on the right distal aspect of the deltoid. Imaging completed showed atrophy of the deltoid. She denied any recent injections to that area including routine vaccines or steroid injections. Also now having to areas of hypopigmentation  "overlying the deltoid.  Reports dry skin after resolution of rashes. Denies history of asthma. Extensive history of food sensitives and autoimmune workup that has been unremarkable.     Hx since 19:  The patient comes in today for patch testing day 1. She reports that there is a bruise on the R shoulder that has worsened in the interim. She is unsure if it may be necrosis or sudden muscle atrophy. She reports it appeared on , the day after allergy shot. Also states that in the same area she developed a new \"bump\" that appeared about 2 days ago.   Reports that the IT on the R upper extremity was against trees, weeds, molds, mites, and dog. On the L upper extremity, she had IT injections for cats and dogs.   She is otherwise feeling well overall today. No other skin or allergy concerns at this time.       Past Medical History:   Patient Active Problem List   Diagnosis     Allergic rhinitis due to dogs     Hypothyroid     Lumbar facet joint pain     Past Medical History:   Diagnosis Date     Constipation     Since young.     Other specified postprocedural states     2014,Slight recurrence within scar - removed in 9th grade and came back right away     Personal history of other medical treatment (CODE)      2, para 2.     Past Surgical History:   Procedure Laterality Date     COLONOSCOPY      ,\"swelling of the lining\" and flattening of villa.     OTHER SURGICAL HISTORY      ~,[id number],OTHER,Left,recurring within scar     OTHER SURGICAL HISTORY      2016,[id number],KNEE ARTHROSCOPY W/ MENISCAL REPAIR     OTHER SURGICAL HISTORY      2004,[id number],DILATION AND CURETTAGE,retained placenta       Social History:  Patient reports that she has never smoked. She has never used smokeless tobacco. She reports current alcohol use.    Family History:  Family History   Problem Relation Age of Onset     Family History Negative Father         Good Health,does not see doctor regularly     Thyroid Disease " Mother         Thyroid Disease,Hypothyroid     Other - See Comments Mother         Smoker, prediabetes     Thyroid Disease Sister         Thyroid Disease,Hypothyroid     Arthritis Sister         Arthritis     Scoliosis Maternal Half-Sister         Scoliosis     Other - See Comments Maternal Half-Sister         No Known Problems       Medications:  Current Outpatient Medications   Medication Sig Dispense Refill     fluorometholone (FML FORTE) 0.25 % ophthalmic susp        levothyroxine (SYNTHROID/LEVOTHROID) 75 MCG tablet Take 1 tablet (75 mcg) by mouth every morning (before breakfast) 90 tablet 4     Magnesium Citrate POWD by Does not apply route. Nagnesium Calm by Natural Vitality takes 2 to 3 tsp per day       mometasone (NASONEX) 50 MCG/ACT spray 1 spray 2 times daily       Thyroid (NATURE-THROID) 48.75 MG TABS          Allergies   Allergen Reactions     Cats Shortness Of Breath     Tramadol      Other reaction(s): Other - Describe In Comment Field  High doses caused yellow eyes, tremors in her mouth, couldn't feel some body parts.    No issues with low doses     Hydrocodone-Acetaminophen Rash     Latex Rash       Review of Systems:  -As per HPI  -Constitutional: Otherwise feeling well today, in usual state of health.  -Skin: As above in HPI. No additional skin concerns.    Physical exam:  Vitals: There were no vitals taken for this visit.  GEN: This is a well developed, well-nourished female in no acute distress, in a pleasant mood.   SKIN: Focused examination of the bilateral upper extremities, face and upper chest was performed.   - There was atrophy of distal aspect of R deltoid with 2 overlying hypopigment macules overlying atrophy. No urtica or eczema appreciated.   -No other lesions of concern on areas examined.     Order for PATCH TESTS    [x] Outpatient  [] Inpatient: Head..../ Bed ....      Skin Atopy (atopic dermatitis) [x] Yes   [] No  Rhinitis/Sinusitis:   [x] Yes   [] No  Allergic Asthma:   [] Yes    [x] No  Food Allergy:   [x] Yes   [] No  Leg ulcers:   [] Yes   [x] No  Hand eczema:   [] Yes   [x] No   Leading hand:   [x] R   [] L       [] Ambidextrous                      Reason for tests (suspected allergy): Assessment for potential reaction to disinfections before IT (less likely preservatives in IT, because the reaction not directly over injection site)  Known previous allergies: Trees/grass, mites, dogs/cats  Standardized panels  [x] Standard panel (40 tests)  [x] Preservatives & Antimicrobials (31 tests)  [x] Emulsifiers & Additives (25 tests)   [] Perfumes/Flavours & Plants (25 tests)  [] Hairdresser panel (12 tests)  [x] Rubber Chemicals (22 tests)  [x] Plastics (26 tests)  [] Colorants/Dyes/Food additives (20 tests)  [] Metals (implants/dental) (24 tests)  [] Local anaesthetics/NSAIDs (13 tests)  [x] Antibiotics & Antimycotics (14 tests)   [] Corticosteroids (15 tests)   [] Photopatch test (62 tests)   [] others: ...      [] Patient's own products: ...    DO NOT test if chemical or biological identity is unknown!     always ask from patient the product information and safety sheets (MSDS)   [] Atopy screen prick test (Atopic predisposition): ...    [] Patient needs consultation with Allergy team (changes of tests may apply)  [] Tests discussed with Allergy team (can have direct appointment for patch tests)    Atopy Screen (Placed 01/13/20 )    No Substance Readings (15 min) Evaluation   POS Histamine 1mg/ml ++    NEG NaCl 0.9% - Slight dermographism     No Substance Readings (15 min) Evaluation   1 Alternaria alternata (tenuis)  -    2 Cladosporium herbarum -    3 Aspergillus fumigatus -    4 Penicillium notatum -    5 Dermatophagoides pteronyssinus ++    6 Dermatophagoides farinae ++      Conclusion: immediate type reaction to house dust mites, but no delayed reaction to moulds or HDM --> The DP and DF reactions remained into the next day.     RESULTS & EVALUATION of PATCH TESTS    Patch test readings  after     [x] 2 days, [] 3 days [x] 4 days, [] 5 days,    Applied patch tests with results (import here the list of patch tests):  Order documented by: IVAN HANCOCK LPN  Order reviewed by: Preeti Copeland LPN   Physician:   Date/time of application:1-  Localization of application: back >> Clear    STANDARD Series         No Substance 2 days 4 days remarks   1 Francesco Mix [C] - -    2 Colophony - -    3  2-Mercaptobenzothiazole  NA NA     4 Methylisothiazolinone - -    5 Carba Mix - -    6 Thiuram Mix [A] NA NA    7 Bisphenol A Epoxy Resin - -    8 K-Zfif-Jkqizqbmqzp-Formaldehyde Resin - -    9 Mercapto Mix [A] - -    10 Black Rubber Mix- PPD [B] NA NA    11 Potassium Dichromate  -  -    12 Balsam of [place] (Myroxylon Pereirae Resin) - -    13 Nickel Sulphate Hexahydrate - -    14 Mixed Dialkyl Thiourea - -    15 Paraben Mix [B] - -    16 Methyldibromo Glutaronitrile NA NA    17 Fragrance Mix - -    18 2-Bromo-2-Nitropropane-1,3-Diol (Bronopol) - -    19 Lyral - -    20 Tixocortol-21- Pivalate NA NA    21 Diazolidiyl Urea (Germall II) - -    22 Methyl Methacrylate NA NA    23 Cobalt (II) Chloride Hexahydrate - -    24 Fragrance Mix II  - -    25 Compositae Mix - -    26 Benzoyl Peroxide NA NA    27 Bacitracin - ++/+++    28 Formaldehyde - -    29 Methylchloroisothiazolinone / Methylisothiazolinone - -    30 Corticosteroid Mix - -    31 Sodium Lauryl Sulfate - -    32 Lanolin Alcohol - -    33 Turpentine - -    34 Cetylstearylalcohol - -    35 Chlorhexidine Dicluconate - -    36 Budenoside - -    37 Imidazolidinyl Urea  - -    38 Ethyl-2 Cyanoacrylate NA NA    39 Quaternium 15 (Dowicil 200) - -    40 Decyl Glucoside - -      EMULSIFIERS & ADDITIVES        No Substance 2 days 4 days remarks   41 Polyethylene Glycol-400 NA NA    42 Cocamidopropyl Betaine - -    43 Amerchol L101 NA NA    44 Propylene Glycol - -    45 Triethanolamine - -    46 Sorbitane Sesquiolate - -    47 Isopropylmyristate - -    48  Polysorbate 80  - -    49 Amidoamine   (Stearamidopropyl Dimethylamine) - -    50 Oleamidopropyl Dimethylamine - -    51 Lauryl Glucoside NA NA    52 Coconut Diethanolamide  - -    53 2-Hydroxy-4-Methoxy Benzophenone (Oxybenzone) - -    54 Benzophenone-4 (Sulisobenzon) - -    55 Propolis - -    56 Dexpanthenol - -    57 Carboxymethyl Cellulose Sodium - -    58 Abitol - -    59 Tert-Butylhydroquinone - -    60 Benzyl Salicylate - -     Antioxidant      61 Dodecyl Gallate - -    62 Butylhydroxyanisole (BHA) - -    63 Butylhydroxytoluene (BHT) - -    64 Di-Alpha-Tocopherol (Vit E) - -    65 Propyl Gallate - -      RUBBER CHEMICALS         No Substance 2 days 4 days remarks    Carbamate      66 Zinc Bis ( Diethyldithio carbamate ) (ZDEC) - -    67 Zinc Bis (Dibutyldithiocarbamate) - -    68 1,3-Diphenylguanidine (DPG) - -     Thiourea      69 Dibutylthiourea - -    70 Diphenyltiourea - -    71 Thiourea - -     Mercapto chemicals      72 Morpholinyl Mercaptobenzothiazole - -    73 W-Jdngckkwdg-0-Benzothiazyl-Sulfenamide - -    74 Dibenzothiazyl Disulfide - -     Thiuram chemicals      75 Dipentamethylenethiuram Disulfide - -    76 Tetraethylthiuram Disulfide (Disulfiram) - -    77 Tetramethylthiuram Disulfide - -    78 Tetramethyl Thiuram Monosulfide (TMTM) - -     4-Phenylenediamine derivatives      79 N-Isopropyl-N'-Phenyl-P-Phenylenediamine (IPPD) - -    80 Fdhvpspw-J-Peafzzfjmwwtorqc (DPPD) - -     Various Rubber Additives      81 Hydroquinone Monobenzylether  - -    82 Hexamethylenetetramine - -    83 4,4'-Dihydroxybiphenyl - -    84 Cyclohexylthiophtalimide - -    85 Ethylenediamine Dihydrochloride - -    86 N-Phenyl-B-Naphthylamine - -    87 Dodecyl Mercaptan - -        PLASTICS         No Substance 2 days 4 days remarks    Acrylates - -    88 2-Hydroxyethyl Methacrylate (HEMA) - -    89 1,4-Butandioldimethacrylate (BUDMA) - -    90  2-Ethylhexyl Acrylate - -    91 Bisphenol-A-Dimethacrylate  - -    92  Diurethane-Dimethacrylate - -    93 Ethyleneglycoldimethacrylate (EGDMA) - -    94 Pentaerythritoltriacrylate (CARLOS) - -    95 Triethylene Glycol Dimethacrylate (TEGDMA) - -     Synthetic material/additives       96 W-Ftab-Btomsbkeqwa - -    97 Tricresyl Phosphate - -    98 0-Kcku-Cowvmmcziwtgz - -    99 Bis (2-Ethylhexyl) Phthalate - -    100 Dibutylphthalate - -    101 Dimethylphthalate - -    102 Toluene-2,4-Diisocyanate - -    103 Diphenylmethane-4,4''-Diisocyanate - -     EPOXY RESIN SYSTEMS       Reactive Solvents - -    104 Cresyl Glycidyl Ether - -    105 Butyl Glycidyl Ether - -    106 Phenyl Glycidyl Ether - -    107 1,4-Butanediol Diglycidyl Ether - -    108 1,6-Hexanediole Diglycidyl Ether - -     Hardener / Accelerator - -    109 Ethylenediamine Dihydrochloride - -    110 Triethylenetetramine - -    111 Diethylenetriamine - -    112 Isophorone Diamine (IPD) - -    113 N,N-Dimethyl-P-Toluidine - -        PRESERVATIVES & ANTIMICROBIALS         No Substance 2 days 4 days remarks   114  1,2-Benzisothiazoline-3-One, Sodium Salt - -    115  1,3,5-Anival (2-Hydroxyethyl) - Hexahydrotriazine (Grotan BK) NA NA    116 7-Edbhhahwszfsk-0-Nitro-1, 3-Propanediol - -    117  3, 4, 4' - Triclocarban - -    118 4 - Chloro - 3 - Cresol - -    119 4 - Chloro - 4 - Xylenol (PCMX) NA NA    120 7-Ethylbicyclooxazolidine (Bioban TB5896) - -    121 Benzalkonium Chloride - -    122 Benzyl Alcohol - -    123 Cetalkonium Chloride NA NA    124 Cetylpyrimidine Chloride  - -    125 Chloroacetamide - -    126 DMDM Hydantoin NA NA    127 Glutaraldehyde NA NA    128 Triclosan - -    129 Glyoxal Trimeric Dihydrate - -    130 Iodopropynyl Butylcarbamate - -    131 Octylisothiazoline NA NA    132 Iodoform - -    133 (Nitrobutyl) Morpholine/(Ethylnitro-Trimethylene) Dimorpholine (Bioban P 1487) - -    134 Phenoxyethanol NA NA    135 Phenyl Salicylate - -    136 Povidone Iodine - -    137 Sodium Benzoate - -    138 Sodium Disulfite NA NA     139 Sorbic Acid - -    140 Thimerosal - -     Parabens      141 Butyl-P-Hydroxybenzoate - -    142 Ethyl-P-Hydroxybenzoate - -    143 Methyl-P-Hydroxybenzoate NA NA    144 Propyl-P-Hydroxybenzoate - -         ANTIBIOTICS & ANTIMYCOTICS         No Substance 2 days 4 days remarks   145 Erythromycine - -    146 Framycetine Sulphate (+) +++    147 Fusidic Acid Sodium Salt - -    148 Gentamicin Sulphate - +    149 Neomycine Sulphate (+) ++/+++    150 Oxytetracycline  - -    151 Polymyxin B Sulphate (+) ++    152 Tetracycline-HCL - -    153 Sulfanilamide - -    154 Metronidazole - -    155 Oxyquinoline Mix - -    156 Nitrofurazone - -    157 Nystatin - -    158 Clotrimazole - -      PATIENTS OWN PRODUCTS         No Substance Conc  % solv 2 days 4 days remarks   159 Aspergillus   - -    160 Penicillium   - -    161 D. Farinae   - -    162 D. pteronyssinus   - -    163 Dog   - -    164 Cat   - -        Results of patch tests:                         Interpretation:    - Negative                    A    = Allergic      (+) Erythema    TI   = Toxic/irritant   + E + Infiltration    RaP = Relevance at Present     ++ E/I + Papulovesicle   Rpr  = Relevance Previously     +++ E/I/P + Blister     nR   = No Relevance    [x] Allergic reaction diagnosed against:     Antibiotics and antimycotics:   Framycetine Sulphate +++  Gentamicin Sulphate +  Neomycine Sulphate ++/+++  Polymyxin B Sulphate ++  Bacitracin ++/+++    Interpretation/ remarks:   Patient has a severe delayed type contact sensitization to various antibiotics that are in many OTC antiseptics and antibiotics. No signs for allergies to rubber chemicals or adhesives. Therefore, we think that the reaction to the band-aids were maybe to topical antibiotics that were added onto the band-aid.       ==> final Diagnosis:    >>  Localized Eczematous and delayed-type reaction after 2x initial doses of immunotherapies  = reaction to itch-X cream    No signs for allergies to  preservative in these allergy extracts     No signs in prick test or patch test for delayed type reaction to some of these allergens (dust mites, molds)    Severe contact sensitizations to topical antibiotics  ==> in open application test clearly positive to the itch-x cream that patient used to reduce the effects of the IT    ==> Comment: This was NOT a reaction to the immunotherpapy, but rather to the topical product used afterwards to treat the topical side effects of the IT. Not sure to what ingredient in itch-x was really reacting, but not to Parabens and Propylene Glycol.    It is imperative that the patient avoids all antibiotics, topically and systemically, that we have tested and that she was positive. Because gentamycin and neomycin were included I would avoid, for safety reasons, cross reacting aminoglycosides including vancomycin.       >> localized lipatrophy and pigmentary changes on upper arm    No signs for specific reaction to preservatives. However, it could be that at that time some topical antibiotic has been used that she reacted to and/or lipatrophy after Kenalog injections.     ==> Comment: It is difficult to directly correlate the atrophy on the upper arm to the contact sensitization even though the outline of the reaction looks like the outline of a band-aid. It might be that this atrophy has nothing to do with an allergic reaction. It might be important to really verify if it is a muscle or lipatrophy. Clinically it looks more like lipatrophy which can sometimes be idiopathic. Moreover, a local injection of corticosteroids in this area might as well explain the atrophy (and there was such an injection months ago; not sure if exactly same location?)      Final conclusions:    With the tendency of the patient to develop delayed-type reactions, I would in the moment not continue with the immunotherapy or we reduce it to only relevant allergens like house dust mite, but in that case we would  maybe perform first prick, intradermal, and patch test with house dust mite immunotherapy to be sure that there is no delayed reaction to these extracts. We recommend just one single major allergen for immunotherapy, such as 50% DF/DP standardized from [facility].We could not see any delayed reaction to house dust mite test and IT solutions.      In patch tests severe reactions against Neomycine, Framycetine, Gentamicin, Bacitracine and Polymyxin. For reasons of crossreactions I would as well avoid other aminoglycosides such as Vancomycin. This was placed into the patient's allergy chart.     ==> Treatment prescribed/Plan:    In the moment, patient to stop with immunotherapy or we will reduce it to only relevant allergens. We recommend just one single major allergen for immunotherapy, such as 50/50 mixture of DF/DP standardized from [facility].     Prescribed triamcinolone 0.1% cream. Apply topically 2 times daily for 5 days.     Go for antibiotic free foods if possible.  Maybe, in future, if we could plan additional allergy tests for delayed-type reactions with immunotherapy extracts and/or maybe tetanus toxoid vaccine. These tests should include as well possible cross-reacting antibiotics such aminoglycosides and particularly vancomycin.  Crossreactions between Framycetine/Neomycine and these other antibiotics possible.     These conclusions are made at the best of one's knowledge and belief based on the provided evidence such as patient's history and allergy test results and they can change over time or can be incomplete because of missing information's.    CC Ulysses Mtz MD  [place] [address] [place] [id number] on close of this encounter.    Follow-up with the referring provider Dr. Mtz to discuss IT. We will see her again if she would like to undergo more allergy testing.    See Email from 01/20/2020: I woke up Sunday morning to the results of my MRI in my MyChart. They state that there was NO muscle  atrophy in my deltoid. I called my orthopedic doctor, the one who had said it WAS the muscle, and asked for clarification. He said after further review, it is NOT the muscle, but rather the fat layer.     I then called Kenny in [place] as my  remembered I had a steriod shot in [place]... I knew I'd be around dogs/cats and was being proactive to have a shot to help reduce potential allergies on the trip. That shot was on October 16, one month prior to noticing the dent in my deltoid. I confirmed with Kenny this morning that the kenalog steriod injection was in my upper right arm deltoid. I think we've now solved the case of the atrophy! ==> most probably lip-atrophy after Kenalog injection!!    I also asked what was used to clean my arm before immunotheraphy and if anything was applied after to see if we could connect my reaction to the allergy shots to our new knowledge of my delayed reactions. They used an alcohol swab to prep and used Itch-X both times after the injection. These are the ingredients listed online for that cream: Active Ingredients: Benzyl alcohol 10% and pramoxine HCl 1%. Inactive ingredients: aloe barbadensis leaf juice (aloe vera gel), blue 1, butylene glycol, carbomer, diazolidinyl urea, methylparaben, propylene glycol, propylparaben, SD alcohol 40, styrene/acrylates copolymer, tetrahydroxypropyl ethylenediamine, and water. Could be one of the reasons. Maybe use the itch-x and perform open application test into elbow flexural side 2xdaily for 4 days and if any delayed reaction then this could be the explanation.    Would ANY of those ingredients possibly explain my delayed allergic reaction? Or are we back to theorizing that I had delayed reactions to multiple allergens that were given in the allergy shots?      Staff Involved:    Scribe Disclosure  I, Yanira Pike, am serving as a scribe to document services personally performed by Dr. Reginaldo Horne,  based on data collection and the provider's statements to me.     I spent a total of 42 minutes face to face with Gisela Richards during today s office visit. Over 50% of this time was spent discussing all the individual test results, correlating them to the clinical relevance, counseling the patient and/or coordinating care. Please see Assessment and Plan for details. Much time was spent as well to discuss the reactions of the IT and the possible causes of the atrophy on the upper arm and possible relationships to the delayed type allergies.

## 2020-01-17 NOTE — NURSING NOTE
Allergy Rooming Note    Gisela Richards's goals for this visit include:   Chief Complaint   Patient presents with     Allergy Testing Followup     Gisela is here today for patch testing day 5      PIAGE Heller

## 2020-01-17 NOTE — LETTER
1/17/2020         RE: Gisela Richards  [address]  [facility] [address]        Dear Colleague,    Thank you for referring your patient, Gisela Richards, to the [facility] ALLERGY. Please see a copy of my visit note below.    [facility] Allergy Note    Allergy Clinic  [facility]  [address] [place] [id number]    Encounter Date: Jan 17, 2020    CC:  Chief Complaint   Patient presents with     Allergy Testing Followup     Gisela is here today for patch testing day 5        History of Present Illness:  Ms. Gisela Richards is a 39 year old female who presents for consultation. She was referred by Dr. Ulysses Mtz of . She saw Dr. Mtz 11/18/19 for an allergy evaluation after large local reaction with immunotherapy.     Today she reports that all skin manifestation has resolved. Patient has come from [place], [place]. Patient's spouse is present during evaluation. She reports her first allergy shots took place on November 9th, 2 injections on posterior aspect of right arm and 1 to the posterior aspect of left arm.   - Per chart review immunotherapy: trees/grass/weeds, mites/molds and animals  Within the day of the injection patient noted a diffuse pruritic rash on the posterior aspect of her right arm. She denies using any topicals or oral medications beside Zyrtec to aid with on going rash. She additionally iced the area and skin manifestation had resolved.     The following week she completed her 2nd round of immunotherapy at the same dosage. Again the rash occurred on the posterior aspect of her right arm along with outstanding edema of her right arm and shoulder. This rash was also pruritic, but again denied any pain. Her provider was concerned about reaction to the preservative. This rash took 1.5 week to self resolve. The edema persisted for 2-3 weeks and when resolved, a notable dent was appreciated  "on the right distal aspect of the deltoid. Imaging completed showed atrophy of the deltoid. She denied any recent injections to that area including routine vaccines or steroid injections. Also now having to areas of hypopigmentation overlying the deltoid.  Reports dry skin after resolution of rashes. Denies history of asthma. Extensive history of food sensitives and autoimmune workup that has been unremarkable.     Hx since 19:  The patient comes in today for patch testing day 1. She reports that there is a bruise on the R shoulder that has worsened in the interim. She is unsure if it may be necrosis or sudden muscle atrophy. She reports it appeared on , the day after allergy shot. Also states that in the same area she developed a new \"bump\" that appeared about 2 days ago.   Reports that the IT on the R upper extremity was against trees, weeds, molds, mites, and dog. On the L upper extremity, she had IT injections for cats and dogs.   She is otherwise feeling well overall today. No other skin or allergy concerns at this time.       Past Medical History:   Patient Active Problem List   Diagnosis     Allergic rhinitis due to dogs     Hypothyroid     Lumbar facet joint pain     Past Medical History:   Diagnosis Date     Constipation     Since young.     Other specified postprocedural states     2014,Slight recurrence within scar - removed in 9th grade and came back right away     Personal history of other medical treatment (CODE)      2, para 2.     Past Surgical History:   Procedure Laterality Date     COLONOSCOPY      ,\"swelling of the lining\" and flattening of villa.     OTHER SURGICAL HISTORY      ~,[id number],OTHER,Left,recurring within scar     OTHER SURGICAL HISTORY      2016,[id number],KNEE ARTHROSCOPY W/ MENISCAL REPAIR     OTHER SURGICAL HISTORY      2004,[id number],DILATION AND CURETTAGE,retained placenta       Social History:  Patient reports that she has never smoked. She has " never used smokeless tobacco. She reports current alcohol use.    Family History:  Family History   Problem Relation Age of Onset     Family History Negative Father         Good Health,does not see doctor regularly     Thyroid Disease Mother         Thyroid Disease,Hypothyroid     Other - See Comments Mother         Smoker, prediabetes     Thyroid Disease Sister         Thyroid Disease,Hypothyroid     Arthritis Sister         Arthritis     Scoliosis Maternal Half-Sister         Scoliosis     Other - See Comments Maternal Half-Sister         No Known Problems       Medications:  Current Outpatient Medications   Medication Sig Dispense Refill     fluorometholone (FML FORTE) 0.25 % ophthalmic susp        levothyroxine (SYNTHROID/LEVOTHROID) 75 MCG tablet Take 1 tablet (75 mcg) by mouth every morning (before breakfast) 90 tablet 4     Magnesium Citrate POWD by Does not apply route. Nagnesium Calm by Natural Vitality takes 2 to 3 tsp per day       mometasone (NASONEX) 50 MCG/ACT spray 1 spray 2 times daily       Thyroid (NATURE-THROID) 48.75 MG TABS          Allergies   Allergen Reactions     Cats Shortness Of Breath     Tramadol      Other reaction(s): Other - Describe In Comment Field  High doses caused yellow eyes, tremors in her mouth, couldn't feel some body parts.    No issues with low doses     Hydrocodone-Acetaminophen Rash     Latex Rash       Review of Systems:  -As per HPI  -Constitutional: Otherwise feeling well today, in usual state of health.  -Skin: As above in HPI. No additional skin concerns.    Physical exam:  Vitals: There were no vitals taken for this visit.  GEN: This is a well developed, well-nourished female in no acute distress, in a pleasant mood.   SKIN: Focused examination of the bilateral upper extremities, face and upper chest was performed.   - There was atrophy of distal aspect of R deltoid with 2 overlying hypopigment macules overlying atrophy. No urtica or eczema appreciated.   -No other  lesions of concern on areas examined.     Order for PATCH TESTS    [x] Outpatient  [] Inpatient: Head..../ Bed ....      Skin Atopy (atopic dermatitis) [x] Yes   [] No  Rhinitis/Sinusitis:   [x] Yes   [] No  Allergic Asthma:   [] Yes   [x] No  Food Allergy:   [x] Yes   [] No  Leg ulcers:   [] Yes   [x] No  Hand eczema:   [] Yes   [x] No   Leading hand:   [x] R   [] L       [] Ambidextrous                      Reason for tests (suspected allergy): Assessment for potential reaction to disinfections before IT (less likely preservatives in IT, because the reaction not directly over injection site)  Known previous allergies: Trees/grass, mites, dogs/cats  Standardized panels  [x] Standard panel (40 tests)  [x] Preservatives & Antimicrobials (31 tests)  [x] Emulsifiers & Additives (25 tests)   [] Perfumes/Flavours & Plants (25 tests)  [] Hairdresser panel (12 tests)  [x] Rubber Chemicals (22 tests)  [x] Plastics (26 tests)  [] Colorants/Dyes/Food additives (20 tests)  [] Metals (implants/dental) (24 tests)  [] Local anaesthetics/NSAIDs (13 tests)  [x] Antibiotics & Antimycotics (14 tests)   [] Corticosteroids (15 tests)   [] Photopatch test (62 tests)   [] others: ...      [] Patient's own products: ...    DO NOT test if chemical or biological identity is unknown!     always ask from patient the product information and safety sheets (MSDS)   [] Atopy screen prick test (Atopic predisposition): ...    [] Patient needs consultation with Allergy team (changes of tests may apply)  [] Tests discussed with Allergy team (can have direct appointment for patch tests)    Atopy Screen (Placed 01/13/20 )    No Substance Readings (15 min) Evaluation   POS Histamine 1mg/ml ++    NEG NaCl 0.9% - Slight dermographism     No Substance Readings (15 min) Evaluation   1 Alternaria alternata (tenuis)  -    2 Cladosporium herbarum -    3 Aspergillus fumigatus -    4 Penicillium notatum -    5 Dermatophagoides pteronyssinus ++    6  Dermatophagoides farinae ++      Conclusion: immediate type reaction to house dust mites, but no delayed reaction to moulds or HDM --> The DP and DF reactions remained into the next day.     RESULTS & EVALUATION of PATCH TESTS    Patch test readings after     [x] 2 days, [] 3 days [x] 4 days, [] 5 days,    Applied patch tests with results (import here the list of patch tests):  Order documented by: IVAN HANCOCK LPN  Order reviewed by: Preeti Copeland LPN   Physician:   Date/time of application:1-  Localization of application: back >> Clear    STANDARD Series         No Substance 2 days 4 days remarks   1 Francesco Mix [C] - -    2 Colophony - -    3  2-Mercaptobenzothiazole  NA NA     4 Methylisothiazolinone - -    5 Carba Mix - -    6 Thiuram Mix [A] NA NA    7 Bisphenol A Epoxy Resin - -    8 W-Fyvq-Rsiyygkkutc-Formaldehyde Resin - -    9 Mercapto Mix [A] - -    10 Black Rubber Mix- PPD [B] NA NA    11 Potassium Dichromate  -  -    12 Balsam of [place] (Myroxylon Pereirae Resin) - -    13 Nickel Sulphate Hexahydrate - -    14 Mixed Dialkyl Thiourea - -    15 Paraben Mix [B] - -    16 Methyldibromo Glutaronitrile NA NA    17 Fragrance Mix - -    18 2-Bromo-2-Nitropropane-1,3-Diol (Bronopol) - -    19 Lyral - -    20 Tixocortol-21- Pivalate NA NA    21 Diazolidiyl Urea (Germall II) - -    22 Methyl Methacrylate NA NA    23 Cobalt (II) Chloride Hexahydrate - -    24 Fragrance Mix II  - -    25 Compositae Mix - -    26 Benzoyl Peroxide NA NA    27 Bacitracin - ++/+++    28 Formaldehyde - -    29 Methylchloroisothiazolinone / Methylisothiazolinone - -    30 Corticosteroid Mix - -    31 Sodium Lauryl Sulfate - -    32 Lanolin Alcohol - -    33 Turpentine - -    34 Cetylstearylalcohol - -    35 Chlorhexidine Dicluconate - -    36 Budenoside - -    37 Imidazolidinyl Urea  - -    38 Ethyl-2 Cyanoacrylate NA NA    39 Quaternium 15 (Dowicil 200) - -    40 Decyl Glucoside - -      EMULSIFIERS & ADDITIVES         No Substance 2 days 4 days remarks   41 Polyethylene Glycol-400 NA NA    42 Cocamidopropyl Betaine - -    43 Amerchol L101 NA NA    44 Propylene Glycol - -    45 Triethanolamine - -    46 Sorbitane Sesquiolate - -    47 Isopropylmyristate - -    48 Polysorbate 80  - -    49 Amidoamine   (Stearamidopropyl Dimethylamine) - -    50 Oleamidopropyl Dimethylamine - -    51 Lauryl Glucoside NA NA    52 Coconut Diethanolamide  - -    53 2-Hydroxy-4-Methoxy Benzophenone (Oxybenzone) - -    54 Benzophenone-4 (Sulisobenzon) - -    55 Propolis - -    56 Dexpanthenol - -    57 Carboxymethyl Cellulose Sodium - -    58 Abitol - -    59 Tert-Butylhydroquinone - -    60 Benzyl Salicylate - -     Antioxidant      61 Dodecyl Gallate - -    62 Butylhydroxyanisole (BHA) - -    63 Butylhydroxytoluene (BHT) - -    64 Di-Alpha-Tocopherol (Vit E) - -    65 Propyl Gallate - -      RUBBER CHEMICALS         No Substance 2 days 4 days remarks    Carbamate      66 Zinc Bis ( Diethyldithio carbamate ) (ZDEC) - -    67 Zinc Bis (Dibutyldithiocarbamate) - -    68 1,3-Diphenylguanidine (DPG) - -     Thiourea      69 Dibutylthiourea - -    70 Diphenyltiourea - -    71 Thiourea - -     Mercapto chemicals      72 Morpholinyl Mercaptobenzothiazole - -    73 S-Fqvgomaxuq-2-Benzothiazyl-Sulfenamide - -    74 Dibenzothiazyl Disulfide - -     Thiuram chemicals      75 Dipentamethylenethiuram Disulfide - -    76 Tetraethylthiuram Disulfide (Disulfiram) - -    77 Tetramethylthiuram Disulfide - -    78 Tetramethyl Thiuram Monosulfide (TMTM) - -     4-Phenylenediamine derivatives      79 N-Isopropyl-N'-Phenyl-P-Phenylenediamine (IPPD) - -    80 Xojjdoxl-H-Ihgkawcmoppdlhou (DPPD) - -     Various Rubber Additives      81 Hydroquinone Monobenzylether  - -    82 Hexamethylenetetramine - -    83 4,4'-Dihydroxybiphenyl - -    84 Cyclohexylthiophtalimide - -    85 Ethylenediamine Dihydrochloride - -    86 N-Phenyl-B-Naphthylamine - -    87 Dodecyl Mercaptan - -         PLASTICS         No Substance 2 days 4 days remarks    Acrylates - -    88 2-Hydroxyethyl Methacrylate (HEMA) - -    89 1,4-Butandioldimethacrylate (BUDMA) - -    90  2-Ethylhexyl Acrylate - -    91 Bisphenol-A-Dimethacrylate  - -    92 Diurethane-Dimethacrylate - -    93 Ethyleneglycoldimethacrylate (EGDMA) - -    94 Pentaerythritoltriacrylate (CARLOS) - -    95 Triethylene Glycol Dimethacrylate (TEGDMA) - -     Synthetic material/additives       96 S-Bump-Czbhzziupvh - -    97 Tricresyl Phosphate - -    98 3-Kwiw-Cjqfnghmwnmhl - -    99 Bis (2-Ethylhexyl) Phthalate - -    100 Dibutylphthalate - -    101 Dimethylphthalate - -    102 Toluene-2,4-Diisocyanate - -    103 Diphenylmethane-4,4''-Diisocyanate - -     EPOXY RESIN SYSTEMS       Reactive Solvents - -    104 Cresyl Glycidyl Ether - -    105 Butyl Glycidyl Ether - -    106 Phenyl Glycidyl Ether - -    107 1,4-Butanediol Diglycidyl Ether - -    108 1,6-Hexanediole Diglycidyl Ether - -     Hardener / Accelerator - -    109 Ethylenediamine Dihydrochloride - -    110 Triethylenetetramine - -    111 Diethylenetriamine - -    112 Isophorone Diamine (IPD) - -    113 N,N-Dimethyl-P-Toluidine - -        PRESERVATIVES & ANTIMICROBIALS         No Substance 2 days 4 days remarks   114  1,2-Benzisothiazoline-3-One, Sodium Salt - -    115  1,3,5-Anival (2-Hydroxyethyl) - Hexahydrotriazine (Grotan BK) NA NA    116 3-Otezwqeleqtub-8-Nitro-1, 3-Propanediol - -    117  3, 4, 4' - Triclocarban - -    118 4 - Chloro - 3 - Cresol - -    119 4 - Chloro - 4 - Xylenol (PCMX) NA NA    120 7-Ethylbicyclooxazolidine (Bioban HI3970) - -    121 Benzalkonium Chloride - -    122 Benzyl Alcohol - -    123 Cetalkonium Chloride NA NA    124 Cetylpyrimidine Chloride  - -    125 Chloroacetamide - -    126 DMDM Hydantoin NA NA    127 Glutaraldehyde NA NA    128 Triclosan - -    129 Glyoxal Trimeric Dihydrate - -    130 Iodopropynyl Butylcarbamate - -    131 Octylisothiazoline NA NA    132  Iodoform - -    133 (Nitrobutyl) Morpholine/(Ethylnitro-Trimethylene) Dimorpholine (Bioban P 1487) - -    134 Phenoxyethanol NA NA    135 Phenyl Salicylate - -    136 Povidone Iodine - -    137 Sodium Benzoate - -    138 Sodium Disulfite NA NA    139 Sorbic Acid - -    140 Thimerosal - -     Parabens      141 Butyl-P-Hydroxybenzoate - -    142 Ethyl-P-Hydroxybenzoate - -    143 Methyl-P-Hydroxybenzoate NA NA    144 Propyl-P-Hydroxybenzoate - -         ANTIBIOTICS & ANTIMYCOTICS         No Substance 2 days 4 days remarks   145 Erythromycine - -    146 Framycetine Sulphate (+) +++    147 Fusidic Acid Sodium Salt - -    148 Gentamicin Sulphate - +    149 Neomycine Sulphate (+) ++/+++    150 Oxytetracycline  - -    151 Polymyxin B Sulphate (+) ++    152 Tetracycline-HCL - -    153 Sulfanilamide - -    154 Metronidazole - -    155 Oxyquinoline Mix - -    156 Nitrofurazone - -    157 Nystatin - -    158 Clotrimazole - -      PATIENTS OWN PRODUCTS         No Substance Conc  % solv 2 days 4 days remarks   159 Aspergillus   - -    160 Penicillium   - -    161 D. Farinae   - -    162 D. pteronyssinus   - -    163 Dog   - -    164 Cat   - -        Results of patch tests:                         Interpretation:    - Negative                    A    = Allergic      (+) Erythema    TI   = Toxic/irritant   + E + Infiltration    RaP = Relevance at Present     ++ E/I + Papulovesicle   Rpr  = Relevance Previously     +++ E/I/P + Blister     nR   = No Relevance    [x] Allergic reaction diagnosed against:     Antibiotics and antimycotics:   Framycetine Sulphate +++  Gentamicin Sulphate +  Neomycine Sulphate ++/+++  Polymyxin B Sulphate ++  Bacitracin ++/+++    Interpretation/ remarks:   Patient has a severe delayed type contact sensitization to various antibiotics that are in many OTC antiseptics and antibiotics. No signs for allergies to rubber chemicals or adhesives. Therefore, we think that the reaction to the band-aids were maybe  to topical antibiotics that were added onto the band-aid.       ==> final Diagnosis:    >>  Localized Eczematous and delayed-type reaction after 2x initial doses of immunotherapies      No signs for allergies to preservative in these allergy extracts     No signs in prick test or patch test for delayed type reaction to some of these allergens (dust mites, molds)    ==> Comment: the patient has a tendency to react to delayed-type reactions. Theoretically it could be that at least to one or two compounds in the desensitization extracts are related to a delayed type immunological response. In order to find that out we could imagine to perform,  In the future, with the original desensitization extracts patch and maybe intradermal tests and ee if there are any delayed reactions in the skin tests.     It is imperative that the patient avoids all antibiotics, topically and systemically, that we have tested and that she was positive. Because gentamycin and neomycin were included I would avoid, for safety reasons, cross reacting aminoglycosides including vancomycin.       >> localized lipatrophy and pigmentary changes on upper arm    No signs for specific reaction to preservatives. However, it could be that at that time some topical antibiotic has been used that she reacted to.     ==> Comment: It is difficult to directly correlate the atrophy on the upper arm to the contact sensitization even though the outline of the reaction looks like the outline of a band-aid. It might be that this atrophy has nothing to do with an allergic reaction. It might be important to really verify if it is a muscle or lipatrophy. Clinically it looks more like lipatrophy which can sometimes be idiopathic. Moreover, a local injection of corticosteroids in this area might as well explain the atrophy (and there was such an injection months ago; not sure if exactly same location?)      Final conclusions:    With the tendency of the patient to develop  delayed-type reactions, I would in the moment not continue with the immunotherapy or we reduce it to only relevant allergens like house dust mite, but in that case we would maybe perform first prick, intradermal, and patch test with house dust mite immunotherapy to be sure that there is no delayed reaction to these extracts. We recommend just one single major allergen for immunotherapy, such as 50% DF/DP standardized from [facility].We could not see any delayed reaction to house dust mite test and IT solutions.      In patch tests severe reactions against Neomycine, Framycetine, Gentamicin, Bacitracine and Polymyxin. For reasons of crossreactions I would as well avoid other aminoglycosides such as Vancomycin. This was placed into the patient's allergy chart.     ==> Treatment prescribed/Plan:    In the moment, patient to stop with immunotherapy or we will reduce it to only relevant allergens. We recommend just one single major allergen for immunotherapy, such as 50/50 mixture of DF/DP standardized from [facility].     Prescribed triamcinolone 0.1% cream. Apply topically 2 times daily for 5 days.     Go for antibiotic free foods if possible.  Maybe, in future, if we could plan additional allergy tests for delayed-type reactions with immunotherapy extracts and/or maybe tetanus toxoid vaccine. These tests should include as well possible cross-reacting antibiotics such aminoglycosides and particularly vancomycin.  Crossreactions between Framycetine/Neomycine and these other antibiotics possible.     These conclusions are made at the best of one's knowledge and belief based on the provided evidence such as patient's history and allergy test results and they can change over time or can be incomplete because of missing information's.    CC Ulysses Mtz MD  [place] [address] [address] [place] [phone] on close of this encounter.    Follow-up with the referring provider Dr. Mtz to discuss IT. We will see her again if she would  like to undergo more allergy testing.      Staff Involved:    Scribe Disclosure  I, Yanira Pike, am serving as a scribe to document services personally performed by Dr. Reginaldo Horne, based on data collection and the provider's statements to me.     I spent a total of 42 minutes face to face with Gisela Richards during today s office visit. Over 50% of this time was spent discussing all the individual test results, correlating them to the clinical relevance, counseling the patient and/or coordinating care. Please see Assessment and Plan for details. Much time was spent as well to discuss the reactions of the IT and the possible causes of the atrophy on the upper arm and possible relationships to the delayed type allergies.          Again, thank you for allowing me to participate in the care of your patient.        Sincerely,        Reginaldo Horne MD

## 2020-01-17 NOTE — PATIENT INSTRUCTIONS
Atopy Screen (Placed 01/13/20 )    No Substance Readings (15 min) Evaluation   POS Histamine 1mg/ml ++    NEG NaCl 0.9% - Slight dermographism     No Substance Readings (15 min) Evaluation   1 Alternaria alternata (tenuis)  -    2 Cladosporium herbarum -    3 Aspergillus fumigatus -    4 Penicillium notatum -    5 Dermatophagoides pteronyssinus ++    6 Dermatophagoides farinae ++      Conclusion: immediate type reaction to house dust mites, but no delayed reaction to moulds or HDM --> The DP and DF reactions remained into the next day.     RESULTS & EVALUATION of PATCH TESTS    Patch test readings after     [x] 2 days, [] 3 days [x] 4 days, [] 5 days,    Applied patch tests with results (import here the list of patch tests):  Order documented by: IVAN HANCOCK LPN  Order reviewed by: Preeti Copeland LPN   Physician:   Date/time of application:1-  Localization of application: back >> Clear    STANDARD Series         No Substance 2 days 4 days remarks   1 Francesco Mix [C] - -    2 Colophony - -    3  2-Mercaptobenzothiazole  NA NA     4 Methylisothiazolinone - -    5 Carba Mix - -    6 Thiuram Mix [A] NA NA    7 Bisphenol A Epoxy Resin - -    8 Q-Oaqo-Wawrwvejbrn-Formaldehyde Resin - -    9 Mercapto Mix [A] - -    10 Black Rubber Mix- PPD [B] NA NA    11 Potassium Dichromate  -  -    12 Balsam of [place] (Myroxylon Pereirae Resin) - -    13 Nickel Sulphate Hexahydrate - -    14 Mixed Dialkyl Thiourea - -    15 Paraben Mix [B] - -    16 Methyldibromo Glutaronitrile NA NA    17 Fragrance Mix - -    18 2-Bromo-2-Nitropropane-1,3-Diol (Bronopol) - -    19 Lyral - -    20 Tixocortol-21- Pivalate NA NA    21 Diazolidiyl Urea (Germall II) - -    22 Methyl Methacrylate NA NA    23 Cobalt (II) Chloride Hexahydrate - -    24 Fragrance Mix II  - -    25 Compositae Mix - -    26 Benzoyl Peroxide NA NA    27 Bacitracin - ++/+++    28 Formaldehyde - -    29 Methylchloroisothiazolinone / Methylisothiazolinone - -    30  Corticosteroid Mix - -    31 Sodium Lauryl Sulfate - -    32 Lanolin Alcohol - -    33 Turpentine - -    34 Cetylstearylalcohol - -    35 Chlorhexidine Dicluconate - -    36 Budenoside - -    37 Imidazolidinyl Urea  - -    38 Ethyl-2 Cyanoacrylate NA NA    39 Quaternium 15 (Dowicil 200) - -    40 Decyl Glucoside - -      EMULSIFIERS & ADDITIVES        No Substance 2 days 4 days remarks   41 Polyethylene Glycol-400 NA NA    42 Cocamidopropyl Betaine - -    43 Amerchol L101 NA NA    44 Propylene Glycol - -    45 Triethanolamine - -    46 Sorbitane Sesquiolate - -    47 Isopropylmyristate - -    48 Polysorbate 80  - -    49 Amidoamine   (Stearamidopropyl Dimethylamine) - -    50 Oleamidopropyl Dimethylamine - -    51 Lauryl Glucoside NA NA    52 Coconut Diethanolamide  - -    53 2-Hydroxy-4-Methoxy Benzophenone (Oxybenzone) - -    54 Benzophenone-4 (Sulisobenzon) - -    55 Propolis - -    56 Dexpanthenol - -    57 Carboxymethyl Cellulose Sodium - -    58 Abitol - -    59 Tert-Butylhydroquinone - -    60 Benzyl Salicylate - -     Antioxidant      61 Dodecyl Gallate - -    62 Butylhydroxyanisole (BHA) - -    63 Butylhydroxytoluene (BHT) - -    64 Di-Alpha-Tocopherol (Vit E) - -    65 Propyl Gallate - -      RUBBER CHEMICALS         No Substance 2 days 4 days remarks    Carbamate      66 Zinc Bis ( Diethyldithio carbamate ) (ZDEC) - -    67 Zinc Bis (Dibutyldithiocarbamate) - -    68 1,3-Diphenylguanidine (DPG) - -     Thiourea      69 Dibutylthiourea - -    70 Diphenyltiourea - -    71 Thiourea - -     Mercapto chemicals      72 Morpholinyl Mercaptobenzothiazole - -    73 U-Xoogqkeatv-9-Benzothiazyl-Sulfenamide - -    74 Dibenzothiazyl Disulfide - -     Thiuram chemicals      75 Dipentamethylenethiuram Disulfide - -    76 Tetraethylthiuram Disulfide (Disulfiram) - -    77 Tetramethylthiuram Disulfide - -    78 Tetramethyl Thiuram Monosulfide (TMTM) - -     4-Phenylenediamine derivatives      79  N-Isopropyl-N'-Phenyl-P-Phenylenediamine (IPPD) - -    80 Igdnchmp-S-Rtqqhsibsnufoamn (DPPD) - -     Various Rubber Additives      81 Hydroquinone Monobenzylether  - -    82 Hexamethylenetetramine - -    83 4,4'-Dihydroxybiphenyl - -    84 Cyclohexylthiophtalimide - -    85 Ethylenediamine Dihydrochloride - -    86 N-Phenyl-B-Naphthylamine - -    87 Dodecyl Mercaptan - -        PLASTICS         No Substance 2 days 4 days remarks    Acrylates - -    88 2-Hydroxyethyl Methacrylate (HEMA) - -    89 1,4-Butandioldimethacrylate (BUDMA) - -    90  2-Ethylhexyl Acrylate - -    91 Bisphenol-A-Dimethacrylate  - -    92 Diurethane-Dimethacrylate - -    93 Ethyleneglycoldimethacrylate (EGDMA) - -    94 Pentaerythritoltriacrylate (CARLOS) - -    95 Triethylene Glycol Dimethacrylate (TEGDMA) - -     Synthetic material/additives       96 N-Jobf-Iqjnenriwir - -    97 Tricresyl Phosphate - -    98 8-Dzoj-Mqlkhowveechz - -    99 Bis (2-Ethylhexyl) Phthalate - -    100 Dibutylphthalate - -    101 Dimethylphthalate - -    102 Toluene-2,4-Diisocyanate - -    103 Diphenylmethane-4,4''-Diisocyanate - -     EPOXY RESIN SYSTEMS       Reactive Solvents - -    104 Cresyl Glycidyl Ether - -    105 Butyl Glycidyl Ether - -    106 Phenyl Glycidyl Ether - -    107 1,4-Butanediol Diglycidyl Ether - -    108 1,6-Hexanediole Diglycidyl Ether - -     Hardener / Accelerator - -    109 Ethylenediamine Dihydrochloride - -    110 Triethylenetetramine - -    111 Diethylenetriamine - -    112 Isophorone Diamine (IPD) - -    113 N,N-Dimethyl-P-Toluidine - -        PRESERVATIVES & ANTIMICROBIALS         No Substance 2 days 4 days remarks   114  1,2-Benzisothiazoline-3-One, Sodium Salt - -    115  1,3,5-Anival (2-Hydroxyethyl) - Hexahydrotriazine (Grotan BK) NA NA    116 8-Ynmqgfotnexfw-3-Nitro-1, 3-Propanediol - -    117  3, 4, 4' - Triclocarban - -    118 4 - Chloro - 3 - Cresol - -    119 4 - Chloro - 4 - Xylenol (PCMX) NA NA    120 7-Ethylbicyclooxazolidine  (Veedaan [id number]) - -    121 Benzalkonium Chloride - -    122 Benzyl Alcohol - -    123 Cetalkonium Chloride NA NA    124 Cetylpyrimidine Chloride  - -    125 Chloroacetamide - -    126 DMDM Hydantoin NA NA    127 Glutaraldehyde NA NA    128 Triclosan - -    129 Glyoxal Trimeric Dihydrate - -    130 Iodopropynyl Butylcarbamate - -    131 Octylisothiazoline NA NA    132 Iodoform - -    133 (Nitrobutyl) Morpholine/(Ethylnitro-Trimethylene) Dimorpholine (Veedaan P 1487) - -    134 Phenoxyethanol NA NA    135 Phenyl Salicylate - -    136 Povidone Iodine - -    137 Sodium Benzoate - -    138 Sodium Disulfite NA NA    139 Sorbic Acid - -    140 Thimerosal - -     Parabens      141 Butyl-P-Hydroxybenzoate - -    142 Ethyl-P-Hydroxybenzoate - -    143 Methyl-P-Hydroxybenzoate NA NA    144 Propyl-P-Hydroxybenzoate - -         ANTIBIOTICS & ANTIMYCOTICS         No Substance 2 days 4 days remarks   145 Erythromycine - -    146 Framycetine Sulphate (+) +++    147 Fusidic Acid Sodium Salt - -    148 Gentamicin Sulphate - +    149 Neomycine Sulphate (+) ++/+++    150 Oxytetracycline  - -    151 Polymyxin B Sulphate (+) ++    152 Tetracycline-HCL - -    153 Sulfanilamide - -    154 Metronidazole - -    155 Oxyquinoline Mix - -    156 Nitrofurazone - -    157 Nystatin - -    158 Clotrimazole - -      PATIENTS OWN PRODUCTS         No Substance Conc  % solv 2 days 4 days remarks   159 Aspergillus   - -    160 Penicillium   - -    161 D. Farinae   - -    162 D. pteronyssinus   - -    163 Dog   - -    164 Cat   - -        Results of patch tests:                         Interpretation:    - Negative                    A    = Allergic      (+) Erythema    TI   = Toxic/irritant   + E + Infiltration    RaP = Relevance at Present     ++ E/I + Papulovesicle   Rpr  = Relevance Previously     +++ E/I/P + Blister     nR   = No Relevance    [x] Allergic reaction diagnosed against:     Antibiotics and antimycotics:   Framycetine Sulphate  +++  Gentamicin Sulphate +  Neomycine Sulphate ++/+++  Polymyxin B Sulphate ++  Bacitracin ++/+++    Interpretation/ remarks:   Patient has a severe delayed type contact sensitization to various antibiotics that are in many OTC antiseptics and antibiotics. No signs for allergies to rubber chemicals or adhesives. Therefore, we think that the reaction to the band-aids were maybe to topical antibiotics that were added onto the band-aid.       ==> final Diagnosis:    >>  Localized Eczematous and delayed-type reaction after 2x initial doses of immunotherapies      No signs for allergies to preservative in these allergy extracts     No signs in prick test or patch test for delayed type reaction to some of these allergens (dust mites, molds)    ==> Comment: the patient has a tendency to react to delayed-type reactions. Theoretically it could be that at least to one or two compounds in the desensitization extracts are related to a delayed type immunological response. In order to find that out we could imagine to perform,  In the future, with the original desensitization extracts patch and maybe intradermal tests and ee if there are any delayed reactions in the skin tests.     It is imperative that the patient avoids all antibiotics, topically and systemically, that we have tested and that she was positive. Because gentamycin and neomycin were included I would avoid, for safety reasons, cross reacting aminoglycosides including vancomycin.       >> localized lipatrophy and pigmentary changes on upper arm    No signs for specific reaction to preservatives. However, it could be that at that time some topical antibiotic has been used that she reacted to.     ==> Comment: It is difficult to directly correlate the atrophy on the upper arm to the contact sensitization even though the outline of the reaction looks like the outline of a band-aid. It might be taht this atrophy has nothing to do with an allergic reaction. It might be  important to really verify if it is a muscle or lip atrophy. Clinically it looks more like lip atrophy which can sometimes be idiopathic. If there was an injection at that location of corticosteroid, that might explain the atrophy.    ==> Final comment: With the tendency of the patient to develop delayed-type reaction, I would in the moment not continue with the immunotherapy or we reduce it to only relevant allergens like house dust mite, but in that case we would maybe perform first prick, intradermal, and patch test with house dust mite immunotherapy to be sure that there is no delayed reaction to this extract. We recommend just one single major allergen for immunotherapy, such as 50/50 mixture of DF/DP standardized from [facility].    ==> In patch tests severe reactions against Neomycine, Framycetine, Gentamicin, Bacitracine and Polymyxin. For reasons of crossreactions I would as well avoid other aminoglycosides such as Vancomycin. This was placed into the patient's allergy chart. Maybe, in future, if we perform further delayed-type reactions with immunotherapy extracts and/or maybe tetanus toxoid vaccine we could add vancomycin to be sure that there is no delayed-type reaction or cross reaction to vancomycin.     ==> Treatment prescribed/Plan:    In the moment, patient to stop with immunotherapy or we will reduce it to only relevant allergens. We recommend just one single major allergen for immunotherapy, such as 50/50 mixture of DF/DP standardized from [facility].     Prescribed triamcinolone 0.1% cream. Apply topically 2 times daily for 5 days.    Go for antibiotic free foods if possible.    These conclusions are made at the best of one's knowledge and belief based on the provided evidence such as patient's history and allergy test results and they can change over time or can be incomplete because of missing information's.    CC Ulysses Mtz MD  [facility]  [address] [place] [phone] on close of this  encounter.    Follow-up with the referring provider Dr. Mtz to discuss IT. We will see her again if she would like to undergo more allergy testing.

## 2020-03-11 ENCOUNTER — HEALTH MAINTENANCE LETTER (OUTPATIENT)
Age: 40
End: 2020-03-11

## 2020-04-23 ENCOUNTER — HOSPITAL ENCOUNTER (OUTPATIENT)
Dept: MRI IMAGING | Facility: OTHER | Age: 40
Discharge: HOME | End: 2020-04-23
Attending: PHYSICAL MEDICINE & REHABILITATION | Admitting: FAMILY MEDICINE
Payer: COMMERCIAL

## 2020-04-23 DIAGNOSIS — M54.16 LUMBAR RADICULOPATHY: ICD-10-CM

## 2020-04-23 PROCEDURE — 72148 MRI LUMBAR SPINE W/O DYE: CPT

## 2020-05-15 ENCOUNTER — ALLIED HEALTH/NURSE VISIT (OUTPATIENT)
Dept: FAMILY MEDICINE | Facility: OTHER | Age: 40
End: 2020-05-15
Attending: FAMILY MEDICINE
Payer: COMMERCIAL

## 2020-05-15 DIAGNOSIS — R05.9 COUGH: Primary | ICD-10-CM

## 2020-05-15 PROCEDURE — 99213 OFFICE O/P EST LOW 20 MIN: CPT | Mod: TEL | Performed by: FAMILY MEDICINE

## 2020-05-15 PROCEDURE — 87635 SARS-COV-2 COVID-19 AMP PRB: CPT | Mod: ZL | Performed by: FAMILY MEDICINE

## 2020-05-15 PROCEDURE — 99207 ZZC NO CHARGE NURSE ONLY: CPT

## 2020-05-15 ASSESSMENT — PAIN SCALES - GENERAL: PAINLEVEL: MILD PAIN (2)

## 2020-05-15 NOTE — PROGRESS NOTES
"Gisela Richards is a 39 year old female who is being evaluated via a billable telephone visit.      The patient has been notified of following:     \"This telephone visit will be conducted via a call between you and your physician/provider. We have found that certain health care needs can be provided without the need for a physical exam.  This service lets us provide the care you need with a short phone conversation.  If a prescription is necessary we can send it directly to your pharmacy.  If lab work is needed we can place an order for that and you can then stop by our lab to have the test done at a later time.    Telephone visits are billed at different rates depending on your insurance coverage. During this emergency period, for some insurers they may be billed the same as an in-person visit.  Please reach out to your insurance provider with any questions.    If during the course of the call the physician/provider feels a telephone visit is not appropriate, you will not be charged for this service.\"    Patient has given verbal consent for Telephone visit?  Yes    What phone number would you like to be contacted at? [phone]    How would you like to obtain your AVS? Mail a copy    Subjective     Gisela Richards is a 39 year old female who presents to clinic today for the following health issues:  Nursing Notes:   Robyn Diamond LPN  5/15/2020 12:58 PM  Signed  Patient complains of cough, shortness of breath, headache, and the chills.   Med rec complete.  Robyn Diamond LPN.................. 5/15/2020 12:56 PM       HPI     38 yo female evaluated via telephone visit for possible Covid symptoms.    She has a variety of symptoms over the past few weeks including intermittent dry cough, shortness of breath, headache and chills.  Denies fever, rash, nausea, vomiting or diarrhea.    No known sick contact.  No recent travel.    Has seasonal allergies, zyrtec daily and albuterol.    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     works from home.             Current Outpatient Medications   Medication Sig Dispense Refill     fluorometholone (FML FORTE) 0.25 % ophthalmic susp        levothyroxine (SYNTHROID/LEVOTHROID) 75 MCG tablet Take 1 tablet (75 mcg) by mouth every morning (before breakfast) 90 tablet 4     Magnesium Citrate POWD by Does not apply route. Nagnesium Calm by Natural Vitality takes 2 to 3 tsp per day       mometasone (NASONEX) 50 MCG/ACT spray 1 spray 2 times daily       Thyroid (NATURE-THROID) 48.75 MG TABS        Allergies   Allergen Reactions     Cats Shortness Of Breath     Gentamicin Dermatitis     In patch tests severe reactions against Neomycine, Framycetine, Gentamicin, Bacitracine and Polymyxin.   For reasons of crossreactions I would as well avoid other aminoglycosides such as Vancomycine     Tramadol      Other reaction(s): Other - Describe In Comment Field  High doses caused yellow eyes, tremors in her mouth, couldn't feel some body parts.    No issues with low doses     Hydrocodone-Acetaminophen Rash       Reviewed and updated as needed this visit by Provider         Review of Systems   Constitutional, HEENT, cardiovascular, pulmonary, gi and gu systems are negative, except as otherwise noted.       Objective   Reported vitals:  There were no vitals taken for this visit.   healthy, alert and no distress  PSYCH: Alert and oriented times 3; coherent speech, normal   rate and volume, able to articulate logical thoughts, able   to abstract reason, no tangential thoughts, no hallucinations   or delusions  Her affect is normal  RESP: No cough, no audible wheezing, able to talk in full sentences  Remainder of exam unable to be completed due to telephone visits    Diagnostic Test Results:  Labs reviewed in Epic        Assessment/Plan:  1. Cough      Suspect her symptoms are related to seasonal allergies.  She does have potential contact  with her real estate photography and going in and out of houses.  Household member works at group home. She has been practicing social distancing and encouraged to continue.    Offered curide COVID-19 testing.  Understand she should remain quarantine at home until the results are available.  In addition if she has any symptoms consistent with COVID she should remain home despite COVID-19 results.  - Symptomatic COVID-19 Virus (Coronavirus) by PCR Nasopharyngeal swab    No follow-ups on file.      Phone call duration:  11 minutes    Melissa Ochoa MD

## 2020-05-15 NOTE — NURSING NOTE
Patient complains of cough, shortness of breath, headache, and the chills.   Med rec complete.  Robyn Diamond LPN.................. 5/15/2020 12:56 PM

## 2020-05-17 LAB
SARS-COV-2 RNA SPEC QL NAA+PROBE: NOT DETECTED
SPECIMEN SOURCE: NORMAL

## 2020-05-18 ENCOUNTER — TELEPHONE (OUTPATIENT)
Dept: FAMILY MEDICINE | Facility: OTHER | Age: 40
End: 2020-05-18

## 2020-05-18 NOTE — TELEPHONE ENCOUNTER
Returned patient's call. Gave test results for Covid-19 test. Radha Alexandra RN  ....................  5/18/2020   9:10 AM

## 2020-10-12 ENCOUNTER — ALLIED HEALTH/NURSE VISIT (OUTPATIENT)
Dept: FAMILY MEDICINE | Facility: OTHER | Age: 40
End: 2020-10-12
Attending: FAMILY MEDICINE
Payer: COMMERCIAL

## 2020-10-12 DIAGNOSIS — R05.9 COUGH: Primary | ICD-10-CM

## 2020-10-12 PROCEDURE — U0003 INFECTIOUS AGENT DETECTION BY NUCLEIC ACID (DNA OR RNA); SEVERE ACUTE RESPIRATORY SYNDROME CORONAVIRUS 2 (SARS-COV-2) (CORONAVIRUS DISEASE [COVID-19]), AMPLIFIED PROBE TECHNIQUE, MAKING USE OF HIGH THROUGHPUT TECHNOLOGIES AS DESCRIBED BY CMS-2020-01-R: HCPCS | Mod: ZL | Performed by: FAMILY MEDICINE

## 2020-10-12 PROCEDURE — 99207 PR NO CHARGE NURSE ONLY: CPT

## 2020-10-12 PROCEDURE — C9803 HOPD COVID-19 SPEC COLLECT: HCPCS

## 2020-10-14 LAB
SARS-COV-2 RNA SPEC QL NAA+PROBE: NOT DETECTED
SPECIMEN SOURCE: NORMAL

## 2020-10-29 ENCOUNTER — HOSPITAL ENCOUNTER (OUTPATIENT)
Dept: MRI IMAGING | Facility: OTHER | Age: 40
Discharge: HOME | End: 2020-10-29
Attending: INTERNAL MEDICINE | Admitting: FAMILY MEDICINE
Payer: COMMERCIAL

## 2020-10-29 DIAGNOSIS — Q79.60 EHLERS-DANLOS SYNDROME: ICD-10-CM

## 2020-10-29 PROCEDURE — 255N000002 HC RX 255 OP 636: Performed by: RADIOLOGY

## 2020-10-29 PROCEDURE — 71555 MRI ANGIO CHEST W OR W/O DYE: CPT

## 2020-10-29 PROCEDURE — A9575 INJ GADOTERATE MEGLUMI 0.1ML: HCPCS | Performed by: RADIOLOGY

## 2020-10-29 RX ADMIN — GADOTERATE MEGLUMINE 15 ML: 376.9 INJECTION INTRAVENOUS at 09:29

## 2020-12-01 ENCOUNTER — THERAPY VISIT (OUTPATIENT)
Dept: CHIROPRACTIC MEDICINE | Facility: OTHER | Age: 40
End: 2020-12-01
Attending: CHIROPRACTOR
Payer: COMMERCIAL

## 2020-12-01 DIAGNOSIS — M99.05 SEGMENTAL AND SOMATIC DYSFUNCTION OF PELVIC REGION: ICD-10-CM

## 2020-12-01 DIAGNOSIS — M54.6 PAIN IN THORACIC SPINE: ICD-10-CM

## 2020-12-01 DIAGNOSIS — M99.02 SEGMENTAL AND SOMATIC DYSFUNCTION OF THORACIC REGION: ICD-10-CM

## 2020-12-01 DIAGNOSIS — M54.2 CERVICALGIA: ICD-10-CM

## 2020-12-01 DIAGNOSIS — M99.01 SEGMENTAL AND SOMATIC DYSFUNCTION OF CERVICAL REGION: ICD-10-CM

## 2020-12-01 DIAGNOSIS — M54.41 BILATERAL LOW BACK PAIN WITH RIGHT-SIDED SCIATICA, UNSPECIFIED CHRONICITY: Primary | ICD-10-CM

## 2020-12-01 DIAGNOSIS — M99.03 SEGMENTAL AND SOMATIC DYSFUNCTION OF LUMBAR REGION: ICD-10-CM

## 2020-12-01 PROCEDURE — 98941 CHIROPRACT MANJ 3-4 REGIONS: CPT | Mod: AT | Performed by: CHIROPRACTOR

## 2020-12-01 PROCEDURE — 99213 OFFICE O/P EST LOW 20 MIN: CPT | Mod: 25 | Performed by: CHIROPRACTOR

## 2020-12-01 NOTE — PROGRESS NOTES
PATIENT:  Gisela Richards is a 40 year old female presenting for low back pain, neck and upper back pain.    PROBLEM:   Date of Initial Visit for this Episode:  12/1/2020    Visit #1    SUBJECTIVE / HPI: Patient presents to our office with primary complaints of lower back pain with right-sided sciatica along with neck and upper back pain symptoms.  Patient has been dealing with the symptoms for quite some time noting majority of symptoms have been ongoing since approximately 2014/2015.  First noted when patient was performing high intensity interval training workout regimens and heavy lifting such as dead lifting.  Attempted course of physical therapy along with chiropractic care.  Did see some relief of symptoms but occasionally will still have ongoing pain.    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  Recently began spine X therapy program with [facility].  It was recommended that patient utilize additional chiropractic care for course of treatment as well.    Most recent flare began a couple of weeks ago.  Patient has been quite busy with home remodeling projects over the past month to month and a half.    Description and onset:  Duration and Frequency of Pain: couple of weeks and constant aching, dull, throbbing  Radiation of pain: right leg following L4 dermatome  Pain rated at it's worst: low back 7/10, mid back 4/10, neck 2/10  Pain rated currently:   low back 7/10, mid back 4/10, neck 2/10  Pain course: Gradually getting worse  Worse with:  extension and flexion  Improved by:  Nothing currently. Patient has been trying heat and medications without much relief  Additional Features: systemic symptoms  Other Health Care Providers seen for this: Dr. Jennifer Alberts D.C, Dr. Blu Hoang D.C, [facility], [facility], Dr. Woody  Previous treatment: physical therapy, chiropractic, medications  Previous injury:Nothing recent. Patient does  "have history of gymnastics during youth and noted an injury at that time.      See flowsheets in chart for details.  2020    Neck Disability Index (  [employer]. and [employer]. 1991. All rights reserved.; used with permission) 2020   SECTION 1 - PAIN INTENSITY 1   SECTION 2 - PERSONAL CARE 0   SECTION 3 - LIFTING 0   SECTION 4 - READING 2   SECTION 5 - HEADACHES 1   SECTION 6 - CONCENTRATION 1   SECTION 7 - WORK 0   SECTION 8 - DRIVING 1   SECTION 9 - SLEEPING 0   SECTION 10 - RECREATION 0   Count 10   Sum 6   Raw Score: /50 6   Neck Disability Index Score: (%) 12     Oswestry (JOAO) Questionnaire    OSWESTRY DISABILITY INDEX 2020   Count 9   Sum 16   Oswestry Score (%) 35.56   Some recent data might be hidden         KeeleSTART Back Sub score 3 Total score 6  Functional limitations:  Standing> 10 minutes, lifting,     Past D.C. Care: yes, helpful       Health History as reported by the patient: good      PAST MEDICAL HISTORY:  Past Medical History:   Diagnosis Date     Constipation     Since young.     Other specified postprocedural states     2014,Slight recurrence within scar - removed in 9th grade and came back right away     Personal history of other medical treatment (CODE)      2, para 2.       PAST SURGICAL HISTORY:  Past Surgical History:   Procedure Laterality Date     COLONOSCOPY      ,\"swelling of the lining\" and flattening of villa.     OTHER SURGICAL HISTORY      ~,[id number],OTHER,Left,recurring within scar     OTHER SURGICAL HISTORY      2016,[id number],KNEE ARTHROSCOPY W/ MENISCAL REPAIR     OTHER SURGICAL HISTORY      2004,[id number],DILATION AND CURETTAGE,retained placenta       ALLERGIES:  Allergies   Allergen Reactions     Cats Shortness Of Breath     Gentamicin Dermatitis     In patch tests severe reactions against Neomycine, Framycetine, Gentamicin, Bacitracine and Polymyxin.   For reasons of crossreactions I would as well avoid other aminoglycosides such as " Vancomycine     Tramadol      Other reaction(s): Other - Describe In Comment Field  High doses caused yellow eyes, tremors in her mouth, couldn't feel some body parts.    No issues with low doses     Hydrocodone-Acetaminophen Rash       CURRENT MEDICATIONS:  Current Outpatient Medications   Medication Sig Dispense Refill     fluorometholone (FML FORTE) 0.25 % ophthalmic susp        levothyroxine (SYNTHROID/LEVOTHROID) 75 MCG tablet Take 1 tablet (75 mcg) by mouth every morning (before breakfast) 90 tablet 4     Magnesium Citrate POWD by Does not apply route. Nagnesium Calm by Natural Vitality takes 2 to 3 tsp per day       mometasone (NASONEX) 50 MCG/ACT spray 1 spray 2 times daily       Thyroid (NATURE-THROID) 48.75 MG TABS          SOCIAL HISTORY:  Marital Status: .  Children: yes.  Occupation: Self Employed .  Alcohol use:rare, 1x month.  Tobacco use: Smoker: no.      FAMILY HISTORY:  Family History   Problem Relation Age of Onset     Family History Negative Father         Good Health,does not see doctor regularly     Thyroid Disease Mother         Thyroid Disease,Hypothyroid     Other - See Comments Mother         Smoker, prediabetes     Thyroid Disease Sister         Thyroid Disease,Hypothyroid     Arthritis Sister         Arthritis     Scoliosis Maternal Half-Sister         Scoliosis     Other - See Comments Maternal Half-Sister         No Known Problems       Patient Active Problem List   Diagnosis     Allergic rhinitis due to dogs     Hypothyroid     Lumbar facet joint pain         ROS:  The patient denies any fevers, chills, nausea, vomiting, diarrhea, constipation,dysuria, hematuria, or urinary hesitancy or incontinence.  No shortness of breath, chest pain, or rashes.    OBJECTIVE:    DIAGNOSTICS:  Study Result    PROCEDURE: MR LUMBAR SPINE W/O CONTRAST 4/23/2020 2:06 PM     HISTORY: Lumbar radiculopathy     COMPARISONS: 12/9/2016.     Meds/Dose Given: None.     TECHNIQUE: Sagittal T1,  T2 and STIR sequences and axial T2-weighted  images.     FINDINGS: There are reactive endplate changes at the L5-S1 level.  These were also seen previously. There is no acute compression  fracture or malalignment.     At the L5-S1 level the disc is dehydrated. There is a mild broad-based  disc bulge which contacts proximal S1 nerve roots. There is slight  elevation of the right S1 nerve root. No significant facet  degenerative changes seen and there is no significant foraminal  narrowing.     At the L4-5 level the disc is dehydrated. There is a high signal  intensity annular fissure with mild bulging of the disc which flattens  the ventral thecal sac. There is mild facet degenerative change with  small facet effusions. There is no significant foraminal narrowing.     No significant abnormality is seen at L3-4 disc level. There is facet  degenerative change with small facet effusions and a very small left  dorsal synovial cyst. There is no significant foraminal narrowing.     At the L2-3 level no disc abnormality is seen. There is mild facet  degenerative change. Similar changes are seen at L1-2.                                                                        IMPRESSION: Mild degenerative disc disease L4-5 and L5-S1 level with  multilevel mild facet degenerative change.     STEVEN BARBOZA MD         PHYSICAL EXAM:     GENERAL APPEARANCE: healthy, alert, active, no distress and cooperative   GAIT: NORMAL    MUSCULOSKELETAL:   Posture: Anterior head carriage, rounded shoulders.  Low right iliac crest,  Gait:  unremarkable.     Cervical performed actively, measured approximately  ROM:   smooth/halting arc of motion   45/50 flexion    40/45 extension    35/45 RLF  45/45 LLF    75/85 RR         80/85 LR       -Maximal Foraminal Compression: -focal neck pain, -radicular symptoms   -Distraction does not change neck symptoms.      Thoracic and Lumbar performed actively, measured approximately  ROM:  50/60 flexion  "40/60 extension pain   45/45 RLF    35/45 LLF   40/45 RR      40/45 LR    +Kemps: mid thoracic spine, lumbopelvic spine  - Straight leg raise  +Leg length inequality: R 1/4\"   Other:  Ely's: +right, + left  Limited left hip extension when prone    +Tenderness: Present along the suboccipital ridge bilaterally, T3 on left, T6 midline, T8-9 midline, right side L4, PSIS bilaterally  +Muscle spasm: Suboccipitals bilaterally, left levator scapula, taut tender fibers noted of the T-spine paraspinals bilaterally throughout thoracic spine, lumbar paraspinals right side predominant, quadratus lumborum bilaterally  +Joint asymmetry and restriction: C1 right lateral flexion left rotation restriction, C2 extension right rotation restriction, T3 extension left lateral flexion restriction, T6 extension restriction, T9 extension restriction, left ilium as/IN listing, right ilium PI/Ex listing L4 restriction with extension and right lateral flexion also noted    ASSESSMENT: Gisela Richards is a 40 year old female presenting with primary complaints of lower back pain with right-sided sciatica along with neck and mid/upper back pain.  Evidence is present of segmental and somatic dysfunction of the cervical, thoracic, and lumbopelvic spinal regions.  Patient's case is quite complex.  Patient is dealing with numerous food and sensitivities along with other inflammatory issues.  Comanagement by physical therapy and chiropractic care will likely provide patient with best possible outcome.  Due to patient's history and complexity we are attempting trial of alternative chiropractic adjusting techniques primarily with SOT blocks for the pelvis.  There is evidence of segmental and somatic dysfunction of the L4 vertebra however I believe that this may be secondary to restriction of SI joints bilaterally.  No contraindications present precluding patient from receiving care today and because of this chiropractic adjustment was provided.  Prior " to treatment questions were answered to patient satisfaction.     1. Bilateral low back pain with right-sided sciatica, unspecified chronicity    2. Segmental and somatic dysfunction of pelvic region    3. Segmental and somatic dysfunction of lumbar region    4. Pain in thoracic spine    5. Segmental and somatic dysfunction of thoracic region    6. Cervicalgia    7. Segmental and somatic dysfunction of cervical region        PLAN    Evaluation and Management:  [id number] Moderate exam established patient 20 min    Procedures:  Modalities:  None performed this visit    CMT:  [id number] Chiropractic manipulative treatment 3-4 regions performed   Cervical: Diversified, C1 , C2, Supine  Thoracic: Diversified, T3, T6, T9, Prone  Pelvis: Mobilization and SOT blocks, PSIS Left , PSIS Right , Prone    Therapeutic procedures:  None    Response to Treatment  Reduction in symptoms as reported by patient    Prognosis: Good    12/1/2020 Plan of Care:  6-8 visits of Chiropractic Care including Spinal Adjustments and/or physiotherapy and active rehabilitation, to include exercises in the office and/or at home to meet care plan goals.     Frequency: 2xweek for up to 4 weeks. A reevaluation would be clinically appropriate in 6-8 visits, to determine progress and further course of care.    POC discussed and patient agreeable to plan of care.      12/1/2020 Goals:      Patient will report improved pain.   Patient will report able to stand longer than 10 minutes.   Patient will demonstrate an improved ability to complete Activities of Daily Living  as shown by a reported 10-30% reduced score on neck and/or back index.    Patient will demonstrate improved ROM.        INSTRUCTIONS   monitor symptoms and perform activities as tolerated    Follow-up:  Return to care in 1 week.        Disclaimer: This note consists of symbols derived from keyboarding, dictation and/or voice recognition software. As a result, there may be errors in the script that  have gone undetected. Please consider this when interpreting information found in this chart.

## 2020-12-22 ENCOUNTER — VIRTUAL VISIT (OUTPATIENT)
Dept: ALLERGY | Facility: CLINIC | Age: 40
End: 2020-12-22
Payer: COMMERCIAL

## 2020-12-22 DIAGNOSIS — Z88.9 DRUG ALLERGY: ICD-10-CM

## 2020-12-22 DIAGNOSIS — T78.3XXA ANGIOEDEMA, INITIAL ENCOUNTER: Primary | ICD-10-CM

## 2020-12-22 DIAGNOSIS — L23.89 ALLERGIC CONTACT DERMATITIS DUE TO OTHER AGENTS: ICD-10-CM

## 2020-12-22 DIAGNOSIS — Z88.9 ATOPY: ICD-10-CM

## 2020-12-22 PROCEDURE — 99213 OFFICE O/P EST LOW 20 MIN: CPT | Mod: 95 | Performed by: DERMATOLOGY

## 2020-12-22 RX ORDER — PREDNISONE 50 MG/1
TABLET ORAL
Qty: 2 TABLET | Refills: 1 | Status: SHIPPED | OUTPATIENT
Start: 2020-12-22

## 2020-12-22 NOTE — LETTER
12/22/2020         RE: Gisela Richards  [address]  [facility] [address]        Dear Colleague,    Thank you for referring your patient, Gisela Richards, to the [facility] [place]. Please see a copy of my visit note below.    Note for return visit for Dermato-Allergy       Encounter Date: Dec 22, 2020    History of Present Illness:  I have reviewed the teledermatology  information and the nursing intake corresponding to this issue. Gisela Richards is a 40 year old female who presents as a teledermatology consult for the following information take directly from the prior notes and video call performed by myself:     Patient is continuing now the IT without any problems, since they are not using ItchEx anymore. Just using ice.  IT injections is done by an primary care PA in [facility]    > Patient had a reaction to Meloxicam about 3 h later with angioedema on face and then sun burn type of rash over face --> happened in November  > Ibuprofen does not induce any reaction (last taken last Saturday)    With tramadol tremors and swellings of face and with Percacet (Oxycodon and Acetaminophen) migraines  Vicodin induces erythema    Additional comments and observations from review of the patient s chart including the following:    See notes    ROS: Patient generally feeling well today   Physical Examination:  General: Well-appearing, appropriately-developed individual.  - Skin: Examination of the  within the teledermatology was performed.  In the moment no active lesions   As above in HPI. No additional skin concerns.  - upper respiratory tract: currently no obvious Rhinitis  - lungs: no signs for active and present Asthma/Wheezing or coughing  - eyes: currently no active conjunctivits  - GI system/digestion: currently no problems, no bloating or Diarrhoea      Previous history of present illness    Today she reports that all skin manifestation has resolved. Patient  "has come from [place], [place]. Patient's spouse is present during evaluation. She reports her first allergy shots took place on November 9th, 2 injections on posterior aspect of right arm and 1 to the posterior aspect of left arm.   - Per chart review immunotherapy: trees/grass/weeds, mites/molds and animals  Within the day of the injection patient noted a diffuse pruritic rash on the posterior aspect of her right arm. She denies using any topicals or oral medications beside Zyrtec to aid with on going rash. She additionally iced the area and skin manifestation had resolved.     The following week she completed her 2nd round of immunotherapy at the same dosage. Again the rash occurred on the posterior aspect of her right arm along with outstanding edema of her right arm and shoulder. This rash was also pruritic, but again denied any pain. Her provider was concerned about reaction to the preservative. This rash took 1.5 week to self resolve. The edema persisted for 2-3 weeks and when resolved, a notable dent was appreciated on the right distal aspect of the deltoid. Imaging completed showed atrophy of the deltoid. She denied any recent injections to that area including routine vaccines or steroid injections. Also now having to areas of hypopigmentation overlying the deltoid.  Reports dry skin after resolution of rashes. Denies history of asthma. Extensive history of food sensitives and autoimmune workup that has been unremarkable.     Hx since 12/27/19:  The patient comes in today for patch testing day 1. She reports that there is a bruise on the R shoulder that has worsened in the interim. She is unsure if it may be necrosis or sudden muscle atrophy. She reports it appeared on Nov 16, the day after allergy shot. Also states that in the same area she developed a new \"bump\" that appeared about 2 days ago.   Reports that the IT on the R upper extremity was against trees, weeds, molds, mites, and dog. On the L " "upper extremity, she had IT injections for cats and dogs.   She is otherwise feeling well overall today. No other skin or allergy concerns at this time.     Medications:  - takes daily Zyrtec and is on IT  - Levothyroxin      Past Medical History:   Patient Active Problem List   Diagnosis     Allergic rhinitis due to dogs     Hypothyroid     Lumbar facet joint pain     Past Medical History:   Diagnosis Date     Constipation     Since young.     Other specified postprocedural states     2014,Slight recurrence within scar - removed in 9th grade and came back right away     Personal history of other medical treatment (CODE)      2, para 2.     Past Surgical History:   Procedure Laterality Date     COLONOSCOPY      ,\"swelling of the lining\" and flattening of villa.     OTHER SURGICAL HISTORY      ~,[id number],OTHER,Left,recurring within scar     OTHER SURGICAL HISTORY      2016,[id number],KNEE ARTHROSCOPY W/ MENISCAL REPAIR     OTHER SURGICAL HISTORY      2004,[id number],DILATION AND CURETTAGE,retained placenta       Social History:  Patient reports that she has never smoked. She has never used smokeless tobacco. She reports current alcohol use.    Family History:  Family History   Problem Relation Age of Onset     Family History Negative Father         Good Health,does not see doctor regularly     Thyroid Disease Mother         Thyroid Disease,Hypothyroid     Other - See Comments Mother         Smoker, prediabetes     Thyroid Disease Sister         Thyroid Disease,Hypothyroid     Arthritis Sister         Arthritis     Scoliosis Maternal Half-Sister         Scoliosis     Other - See Comments Maternal Half-Sister         No Known Problems       Medications:  Current Outpatient Medications   Medication Sig Dispense Refill     fluorometholone (FML FORTE) 0.25 % ophthalmic susp        levothyroxine (SYNTHROID/LEVOTHROID) 75 MCG tablet Take 1 tablet (75 mcg) by mouth every morning (before breakfast) 90 tablet " 4     Magnesium Citrate POWD by Does not apply route. Nagnesium Calm by Natural Vitality takes 2 to 3 tsp per day       mometasone (NASONEX) 50 MCG/ACT spray 1 spray 2 times daily       Thyroid (NATURE-THROID) 48.75 MG TABS          Allergies   Allergen Reactions     Cats Shortness Of Breath     Gentamicin Dermatitis     In patch tests severe reactions against Neomycine, Framycetine, Gentamicin, Bacitracine and Polymyxin.   For reasons of crossreactions I would as well avoid other aminoglycosides such as Vancomycine     Tramadol      Other reaction(s): Other - Describe In Comment Field  High doses caused yellow eyes, tremors in her mouth, couldn't feel some body parts.    No issues with low doses     Hydrocodone-Acetaminophen Rash       Review of Systems:  -As per HPI  -Constitutional: Otherwise feeling well today, in usual state of health.  -Skin: As above in HPI. No additional skin concerns.    Physical exam:  Vitals: There were no vitals taken for this visit.  GEN: This is a well developed, well-nourished female in no acute distress, in a pleasant mood.   SKIN: Focused examination of the bilateral upper extremities, face and upper chest was performed.   - There was atrophy of distal aspect of R deltoid with 2 overlying hypopigment macules overlying atrophy. No urtica or eczema appreciated.   -No other lesions of concern on areas examined.     Order for PATCH TESTS    [x] Outpatient  [] Inpatient: Head..../ Bed ....      Skin Atopy (atopic dermatitis) [x] Yes   [] No  Rhinitis/Sinusitis:   [x] Yes   [] No  Allergic Asthma:   [] Yes   [x] No  Food Allergy:   [x] Yes   [] No  Leg ulcers:   [] Yes   [x] No  Hand eczema:   [] Yes   [x] No   Leading hand:   [x] R   [] L       [] Ambidextrous                      Reason for tests (suspected allergy): Assessment for potential reaction to disinfections before IT (less likely preservatives in IT, because the reaction not directly over injection site)  Known previous  allergies: Trees/grass, mites, dogs/cats  Standardized panels  [x] Standard panel (40 tests)  [x] Preservatives & Antimicrobials (31 tests)  [x] Emulsifiers & Additives (25 tests)   [] Perfumes/Flavours & Plants (25 tests)  [] Hairdresser panel (12 tests)  [x] Rubber Chemicals (22 tests)  [x] Plastics (26 tests)  [] Colorants/Dyes/Food additives (20 tests)  [] Metals (implants/dental) (24 tests)  [] Local anaesthetics/NSAIDs (13 tests)  [x] Antibiotics & Antimycotics (14 tests)   [] Corticosteroids (15 tests)   [] Photopatch test (62 tests)   [] others: ...      [] Patient's own products: ...    DO NOT test if chemical or biological identity is unknown!     always ask from patient the product information and safety sheets (MSDS)   [] Atopy screen prick test (Atopic predisposition): ...    [] Patient needs consultation with Allergy team (changes of tests may apply)  [] Tests discussed with Allergy team (can have direct appointment for patch tests)    Atopy Screen (Placed 01/13/20 )    No Substance Readings (15 min) Evaluation   POS Histamine 1mg/ml ++    NEG NaCl 0.9% - Slight dermographism     No Substance Readings (15 min) Evaluation   1 Alternaria alternata (tenuis)  -    2 Cladosporium herbarum -    3 Aspergillus fumigatus -    4 Penicillium notatum -    5 Dermatophagoides pteronyssinus ++    6 Dermatophagoides farinae ++      Conclusion: immediate type reaction to house dust mites, but no delayed reaction to moulds or HDM --> The DP and DF reactions remained into the next day.     RESULTS & EVALUATION of PATCH TESTS    Patch test readings after     [x] 2 days, [] 3 days [x] 4 days, [] 5 days,    Applied patch tests with results (import here the list of patch tests):  Order documented by: IVAN HANCOCK LPN  Order reviewed by: Preeti Copeland LPN   Physician:   Date/time of application:1-  Localization of application: back >> Clear    STANDARD Series         No Substance 2 days 4 days remarks    1 Francesco Mix [C] - -    2 Colophony - -    3  2-Mercaptobenzothiazole  NA NA     4 Methylisothiazolinone - -    5 Carba Mix - -    6 Thiuram Mix [A] NA NA    7 Bisphenol A Epoxy Resin - -    8 G-Uwqu-Flaghklndkj-Formaldehyde Resin - -    9 Mercapto Mix [A] - -    10 Black Rubber Mix- PPD [B] NA NA    11 Potassium Dichromate  -  -    12 Balsam of [place] (Myroxylon Pereirae Resin) - -    13 Nickel Sulphate Hexahydrate - -    14 Mixed Dialkyl Thiourea - -    15 Paraben Mix [B] - -    16 Methyldibromo Glutaronitrile NA NA    17 Fragrance Mix - -    18 2-Bromo-2-Nitropropane-1,3-Diol (Bronopol) - -    19 Lyral - -    20 Tixocortol-21- Pivalate NA NA    21 Diazolidiyl Urea (Germall II) - -    22 Methyl Methacrylate NA NA    23 Cobalt (II) Chloride Hexahydrate - -    24 Fragrance Mix II  - -    25 Compositae Mix - -    26 Benzoyl Peroxide NA NA    27 Bacitracin - ++/+++    28 Formaldehyde - -    29 Methylchloroisothiazolinone / Methylisothiazolinone - -    30 Corticosteroid Mix - -    31 Sodium Lauryl Sulfate - -    32 Lanolin Alcohol - -    33 Turpentine - -    34 Cetylstearylalcohol - -    35 Chlorhexidine Dicluconate - -    36 Budenoside - -    37 Imidazolidinyl Urea  - -    38 Ethyl-2 Cyanoacrylate NA NA    39 Quaternium 15 (Dowicil 200) - -    40 Decyl Glucoside - -      EMULSIFIERS & ADDITIVES        No Substance 2 days 4 days remarks   41 Polyethylene Glycol-400 NA NA    42 Cocamidopropyl Betaine - -    43 Amerchol L101 NA NA    44 Propylene Glycol - -    45 Triethanolamine - -    46 Sorbitane Sesquiolate - -    47 Isopropylmyristate - -    48 Polysorbate 80  - -    49 Amidoamine   (Stearamidopropyl Dimethylamine) - -    50 Oleamidopropyl Dimethylamine - -    51 Lauryl Glucoside NA NA    52 Coconut Diethanolamide  - -    53 2-Hydroxy-4-Methoxy Benzophenone (Oxybenzone) - -    54 Benzophenone-4 (Sulisobenzon) - -    55 Propolis - -    56 Dexpanthenol - -    57 Carboxymethyl Cellulose Sodium - -    58 Abitol - -    59  Tert-Butylhydroquinone - -    60 Benzyl Salicylate - -     Antioxidant      61 Dodecyl Gallate - -    62 Butylhydroxyanisole (BHA) - -    63 Butylhydroxytoluene (BHT) - -    64 Di-Alpha-Tocopherol (Vit E) - -    65 Propyl Gallate - -      RUBBER CHEMICALS         No Substance 2 days 4 days remarks    Carbamate      66 Zinc Bis ( Diethyldithio carbamate ) (ZDEC) - -    67 Zinc Bis (Dibutyldithiocarbamate) - -    68 1,3-Diphenylguanidine (DPG) - -     Thiourea      69 Dibutylthiourea - -    70 Diphenyltiourea - -    71 Thiourea - -     Mercapto chemicals      72 Morpholinyl Mercaptobenzothiazole - -    73 Y-Dxkwynuvnt-1-Benzothiazyl-Sulfenamide - -    74 Dibenzothiazyl Disulfide - -     Thiuram chemicals      75 Dipentamethylenethiuram Disulfide - -    76 Tetraethylthiuram Disulfide (Disulfiram) - -    77 Tetramethylthiuram Disulfide - -    78 Tetramethyl Thiuram Monosulfide (TMTM) - -     4-Phenylenediamine derivatives      79 N-Isopropyl-N'-Phenyl-P-Phenylenediamine (IPPD) - -    80 Vpbivntn-D-Hqnnhpolmemmgopo (DPPD) - -     Various Rubber Additives      81 Hydroquinone Monobenzylether  - -    82 Hexamethylenetetramine - -    83 4,4'-Dihydroxybiphenyl - -    84 Cyclohexylthiophtalimide - -    85 Ethylenediamine Dihydrochloride - -    86 N-Phenyl-B-Naphthylamine - -    87 Dodecyl Mercaptan - -        PLASTICS         No Substance 2 days 4 days remarks    Acrylates - -    88 2-Hydroxyethyl Methacrylate (HEMA) - -    89 1,4-Butandioldimethacrylate (BUDMA) - -    90  2-Ethylhexyl Acrylate - -    91 Bisphenol-A-Dimethacrylate  - -    92 Diurethane-Dimethacrylate - -    93 Ethyleneglycoldimethacrylate (EGDMA) - -    94 Pentaerythritoltriacrylate (CARLOS) - -    95 Triethylene Glycol Dimethacrylate (TEGDMA) - -     Synthetic material/additives       96 I-Gvym-Qtjigebgmse - -    97 Tricresyl Phosphate - -    98 3-Cvvv-Ucrpxslurgtms - -    99 Bis (2-Ethylhexyl) Phthalate - -    100 Dibutylphthalate - -    101 Dimethylphthalate  - -    102 Toluene-2,4-Diisocyanate - -    103 Diphenylmethane-4,4''-Diisocyanate - -     EPOXY RESIN SYSTEMS       Reactive Solvents - -    104 Cresyl Glycidyl Ether - -    105 Butyl Glycidyl Ether - -    106 Phenyl Glycidyl Ether - -    107 1,4-Butanediol Diglycidyl Ether - -    108 1,6-Hexanediole Diglycidyl Ether - -     Hardener / Accelerator - -    109 Ethylenediamine Dihydrochloride - -    110 Triethylenetetramine - -    111 Diethylenetriamine - -    112 Isophorone Diamine (IPD) - -    113 N,N-Dimethyl-P-Toluidine - -        PRESERVATIVES & ANTIMICROBIALS         No Substance 2 days 4 days remarks   114  1,2-Benzisothiazoline-3-One, Sodium Salt - -    115  1,3,5-Anival (2-Hydroxyethyl) - Hexahydrotriazine (Grotan BK) NA NA    116 0-Ojryjowtluoaa-5-Nitro-1, 3-Propanediol - -    117  3, 4, 4' - Triclocarban - -    118 4 - Chloro - 3 - Cresol - -    119 4 - Chloro - 4 - Xylenol (PCMX) NA NA    120 7-Ethylbicyclooxazolidine (Bioban JN1737) - -    121 Benzalkonium Chloride - -    122 Benzyl Alcohol - -    123 Cetalkonium Chloride NA NA    124 Cetylpyrimidine Chloride  - -    125 Chloroacetamide - -    126 DMDM Hydantoin NA NA    127 Glutaraldehyde NA NA    128 Triclosan - -    129 Glyoxal Trimeric Dihydrate - -    130 Iodopropynyl Butylcarbamate - -    131 Octylisothiazoline NA NA    132 Iodoform - -    133 (Nitrobutyl) Morpholine/(Ethylnitro-Trimethylene) Dimorpholine (Bioban P 1487) - -    134 Phenoxyethanol NA NA    135 Phenyl Salicylate - -    136 Povidone Iodine - -    137 Sodium Benzoate - -    138 Sodium Disulfite NA NA    139 Sorbic Acid - -    140 Thimerosal - -     Parabens      141 Butyl-P-Hydroxybenzoate - -    142 Ethyl-P-Hydroxybenzoate - -    143 Methyl-P-Hydroxybenzoate NA NA    144 Propyl-P-Hydroxybenzoate - -         ANTIBIOTICS & ANTIMYCOTICS         No Substance 2 days 4 days remarks   145 Erythromycine - -    146 Framycetine Sulphate (+) +++    147 Fusidic Acid Sodium Salt - -    148 Gentamicin  Sulphate - +    149 Neomycine Sulphate (+) ++/+++    150 Oxytetracycline  - -    151 Polymyxin B Sulphate (+) ++    152 Tetracycline-HCL - -    153 Sulfanilamide - -    154 Metronidazole - -    155 Oxyquinoline Mix - -    156 Nitrofurazone - -    157 Nystatin - -    158 Clotrimazole - -      PATIENTS OWN PRODUCTS         No Substance Conc  % solv 2 days 4 days remarks   159 Aspergillus   - -    160 Penicillium   - -    161 D. Farinae   - -    162 D. pteronyssinus   - -    163 Dog   - -    164 Cat   - -        Results of patch tests:                         Interpretation:    - Negative                    A    = Allergic      (+) Erythema    TI   = Toxic/irritant   + E + Infiltration    RaP = Relevance at Present     ++ E/I + Papulovesicle   Rpr  = Relevance Previously     +++ E/I/P + Blister     nR   = No Relevance    [x] Allergic reaction diagnosed against:     Antibiotics and antimycotics:   Framycetine Sulphate +++  Gentamicin Sulphate +  Neomycine Sulphate ++/+++  Polymyxin B Sulphate ++  Bacitracin ++/+++    Interpretation/ remarks:   Patient has a severe delayed type contact sensitization to various antibiotics that are in many OTC antiseptics and antibiotics. No signs for allergies to rubber chemicals or adhesives. Therefore, we think that the reaction to the band-aids were maybe to topical antibiotics that were added onto the band-aid.       ==> final Diagnosis:    >>  Localized Eczematous and delayed-type reaction after 2x initial doses of immunotherapies  = reaction to itch-X cream    No signs for allergies to preservative in these allergy extracts     No signs in prick test or patch test for delayed type reaction to some of these allergens (dust mites, molds)    Severe contact sensitizations to topical antibiotics  ==> in open application test clearly positive to the itch-x cream that patient used to reduce the effects of the IT    ==> Comment: This was NOT a reaction to the immunotherpapy, but rather to  the topical product used afterwards to treat the topical side effects of the IT. Not sure to what ingredient in itch-x was really reacting, but not to Parabens and Propylene Glycol.    It is imperative that the patient avoids all antibiotics, topically and systemically, that we have tested and that she was positive. Because gentamycin and neomycin were included I would avoid, for safety reasons, cross reacting aminoglycosides including vancomycin.     >> possible angioedema to NSAIDs (LEON-1 intolerance) to Meloxicam    In future use only Ibuprofen or Tylenol    If again similar reactions with facial swelling, then take immediately 2 Tabl Zyrtec 10mg and then write 12h retrospective diary and make photo    Prescribed emergency set and given handout emergency treatment (has Epipen)      >> localized lipatrophy and pigmentary changes on upper arm    No signs for specific reaction to preservatives. However, it could be that at that time some topical antibiotic has been used that she reacted to and/or lipatrophy after Kenalog injections.     ==> Comment: It is difficult to directly correlate the atrophy on the upper arm to the contact sensitization even though the outline of the reaction looks like the outline of a band-aid. It might be that this atrophy has nothing to do with an allergic reaction. It might be important to really verify if it is a muscle or lipatrophy. Clinically it looks more like lipatrophy which can sometimes be idiopathic. Moreover, a local injection of corticosteroids in this area might as well explain the atrophy (and there was such an injection months ago; not sure if exactly same location?)      Final conclusions:    With the tendency of the patient to develop delayed-type reactions, I would in the moment not continue with the immunotherapy or we reduce it to only relevant allergens like house dust mite, but in that case we would maybe perform first prick, intradermal, and patch test with house  dust mite immunotherapy to be sure that there is no delayed reaction to these extracts. We recommend just one single major allergen for immunotherapy, such as 50% DF/DP standardized from [facility].We could not see any delayed reaction to house dust mite test and IT solutions.      In patch tests severe reactions against Neomycine, Framycetine, Gentamicin, Bacitracine and Polymyxin. For reasons of crossreactions I would as well avoid other aminoglycosides such as Vancomycin. This was placed into the patient's allergy chart.     ==> Treatment prescribed/Plan:    In the moment, patient to stop with immunotherapy or we will reduce it to only relevant allergens. We recommend just one single major allergen for immunotherapy, such as 50/50 mixture of DF/DP standardized from [facility].     Prescribed triamcinolone 0.1% cream. Apply topically 2 times daily for 5 days.     Go for antibiotic free foods if possible.  Maybe, in future, if we could plan additional allergy tests for delayed-type reactions with immunotherapy extracts and/or maybe tetanus toxoid vaccine. These tests should include as well possible cross-reacting antibiotics such aminoglycosides and particularly vancomycin.  Crossreactions between Framycetine/Neomycine and these other antibiotics possible.     These conclusions are made at the best of one's knowledge and belief based on the provided evidence such as patient's history and allergy test results and they can change over time or can be incomplete because of missing information's.    CC Ulysses Mtz MD  [address] [address] on close of this encounter.    Follow-up with the referring provider Dr. Mtz to discuss IT. We will see her again if she would like to undergo more allergy testing.    See Email from 01/20/2020: I woke up Sunday morning to [facility] They state that there was NO muscle atrophy in my deltoid. I called my orthopedic doctor, the one who had  said it WAS the muscle, and asked for clarification. He said after further review, it is NOT the muscle, but rather the fat layer.     I then called Kenny in [place] as my  remembered I had a steriod shot in [place]... I knew I'd be around dogs/cats and was being proactive to have a shot to help reduce potential allergies on the trip. That shot was on October 16, one month prior to noticing the dent in my deltoid. I confirmed with Royal[facility] this morning that the kenalog steriod injection was in my upper right arm deltoid. I think we've now solved the case of the atrophy! ==> most probably lip-atrophy after Kenalog injection!!    I also asked what was used to clean my arm before immunotheraphy and if anything was applied after to see if we could connect my reaction to the allergy shots to our new knowledge of my delayed reactions. They used an alcohol swab to prep and used Itch-X both times after the injection. These are the ingredients listed online for that cream: Active Ingredients: Benzyl alcohol 10% and pramoxine HCl 1%. Inactive ingredients: aloe barbadensis leaf juice (aloe vera gel), blue 1, butylene glycol, carbomer, diazolidinyl urea, methylparaben, propylene glycol, propylparaben, SD alcohol 40, styrene/acrylates copolymer, tetrahydroxypropyl ethylenediamine, and water. Could be one of the reasons. Maybe use the itch-x and perform open application test into elbow flexural side 2xdaily for 4 days and if any delayed reaction then this could be the explanation.    Would ANY of those ingredients possibly explain my delayed allergic reaction? Or are we back to theorizing that I had delayed reactions to multiple allergens that were given in the allergy shots?        Patient counseling:  See above      Follow-up: in Derm-Allergy clinic if necessary      Thank you for the opportunity be involved in the care of this patient.     Staff: Elba Eddy and Katy Hobbs,  RN    _____________________________________________________________________________    Teledermatology information:  - Location of patient: Home  - Patient presented in referral from: self  - Location of teledermatologist:  [facility] [place] (, [facility], [place])  - Reason teledermatology is appropriate:  of National Emergency Regarding Coronavirus disease (COVID 19) Outbreak  - Method of transmission:  Store and Forward and Video ( Invitation sent by:  Kumar and send to e-mail at: [email or web address] )  - Date of images: during video  - Service start time: 10:53am  - Service end time: 11:15 am  - Date of report: 12/22/20      I spent a total of 22 minutes for telemedicine consult with Abner Richards during today s video meeting. Over 50% of this time was spent counseling the patient and/or coordinating care. Please see Assessment and Plan for details.       Again, thank you for allowing me to participate in the care of your patient.        Sincerely,        Reginaldo Horne MD

## 2020-12-22 NOTE — PATIENT INSTRUCTIONS
>> possible angioedema to NSAIDs (LEON-1 intolerance) to Meloxicam    In future use only Ibuprofen or Tylenol    If again similar reactions with facial swelling, then take immediately 2 Tabl Zyrtec 10mg and then write 12h retrospective diary and make photo      Emergency treatment for patients with severe immediate allergic reactions to drugs, food or insect bites:      The clinical appearance of severe immediate type allergic reactions can range from wheals and hives all over the body (urticaria), to swellings in the face (eyes, lips) to sometimes swellings in the tongue or airways with problems to swallow or breathe. These reactions can end up in cardiovascular reactions with collapse and shock.    1. Stay calm, avoid running around, and alert other people to help you    2. Immediately take your emergency medication.     For adults (over 16 years old): 2 tablet of antihistamines (e.g. cetirizine 10 mg or fexofenadine 180 mg) and 100 mg prednisone.     For children (less than 16 years old): 1 tablet of antihistamine (e.g. cetirizine 10 mg or fexofenadine 180 mg) and 50 mg prednisone    Swallow however you can, with or without water. This treatment is usually sufficient for treatment of uncomplicated hives and swellings.       Emergency set in key chain box containing 2 tablets prednisone 50 mg and 2 tablets cetirizine 10 mg.    3. In case of acute swelling of tongue, trouble swallowing, blocking of the airways, breathing problems, and/or signs of imminent collapse of shock (sweating, weakness, dizziness, paleness), use the adrenalin auto-injector (Epipen   for adults and Epipen   javier for children). Make an injection into the outer thighs, if necessary through clothing.    4. Seek immediate emergency medical treatment after use.        Reginaldo Horne, [facility], [place], [place]; 03-

## 2020-12-22 NOTE — PROGRESS NOTES
Note for return visit for Dermato-Allergy       Encounter Date: Dec 22, 2020    History of Present Illness:  I have reviewed the teledermatology  information and the nursing intake corresponding to this issue. Gisela Richards is a 40 year old female who presents as a teledermatology consult for the following information take directly from the prior notes and video call performed by myself:     Patient is continuing now the IT without any problems, since they are not using ItchEx anymore. Just using ice.  IT injections is done by an primary care PA in [facility]    > Patient had a reaction to Meloxicam about 3 h later with angioedema on face and then sun burn type of rash over face --> happened in November  > Ibuprofen does not induce any reaction (last taken last Saturday)    With tramadol tremors and swellings of face and with Percacet (Oxycodon and Acetaminophen) migraines  Vicodin induces erythema    Additional comments and observations from review of the patient s chart including the following:    See notes    ROS: Patient generally feeling well today   Physical Examination:  General: Well-appearing, appropriately-developed individual.  - Skin: Examination of the  within the teledermatology was performed.  In the moment no active lesions   As above in HPI. No additional skin concerns.  - upper respiratory tract: currently no obvious Rhinitis  - lungs: no signs for active and present Asthma/Wheezing or coughing  - eyes: currently no active conjunctivits  - GI system/digestion: currently no problems, no bloating or Diarrhoea      Previous history of present illness    Today she reports that all skin manifestation has resolved. Patient has come from [place], [place]. Patient's spouse is present during evaluation. She reports her first allergy shots took place on November 9th, 2 injections on posterior aspect of right arm and 1 to the posterior aspect of left arm.   - Per chart review  "immunotherapy: trees/grass/weeds, mites/molds and animals  Within the day of the injection patient noted a diffuse pruritic rash on the posterior aspect of her right arm. She denies using any topicals or oral medications beside Zyrtec to aid with on going rash. She additionally iced the area and skin manifestation had resolved.     The following week she completed her 2nd round of immunotherapy at the same dosage. Again the rash occurred on the posterior aspect of her right arm along with outstanding edema of her right arm and shoulder. This rash was also pruritic, but again denied any pain. Her provider was concerned about reaction to the preservative. This rash took 1.5 week to self resolve. The edema persisted for 2-3 weeks and when resolved, a notable dent was appreciated on the right distal aspect of the deltoid. Imaging completed showed atrophy of the deltoid. She denied any recent injections to that area including routine vaccines or steroid injections. Also now having to areas of hypopigmentation overlying the deltoid.  Reports dry skin after resolution of rashes. Denies history of asthma. Extensive history of food sensitives and autoimmune workup that has been unremarkable.     Hx since 12/27/19:  The patient comes in today for patch testing day 1. She reports that there is a bruise on the R shoulder that has worsened in the interim. She is unsure if it may be necrosis or sudden muscle atrophy. She reports it appeared on Nov 16, the day after allergy shot. Also states that in the same area she developed a new \"bump\" that appeared about 2 days ago.   Reports that the IT on the R upper extremity was against trees, weeds, molds, mites, and dog. On the L upper extremity, she had IT injections for cats and dogs.   She is otherwise feeling well overall today. No other skin or allergy concerns at this time.     Medications:  - takes daily Zyrtec and is on IT  - Levothyroxin      Past Medical History:   Patient " "Active Problem List   Diagnosis     Allergic rhinitis due to dogs     Hypothyroid     Lumbar facet joint pain     Past Medical History:   Diagnosis Date     Constipation     Since young.     Other specified postprocedural states     2014,Slight recurrence within scar - removed in 9th grade and came back right away     Personal history of other medical treatment (CODE)      2, para 2.     Past Surgical History:   Procedure Laterality Date     COLONOSCOPY      ,\"swelling of the lining\" and flattening of villa.     OTHER SURGICAL HISTORY      ~,[id number],OTHER,Left,recurring within scar     OTHER SURGICAL HISTORY      2016,[id number],KNEE ARTHROSCOPY W/ MENISCAL REPAIR     OTHER SURGICAL HISTORY      2004,[id number],DILATION AND CURETTAGE,retained placenta       Social History:  Patient reports that she has never smoked. She has never used smokeless tobacco. She reports current alcohol use.    Family History:  Family History   Problem Relation Age of Onset     Family History Negative Father         Good Health,does not see doctor regularly     Thyroid Disease Mother         Thyroid Disease,Hypothyroid     Other - See Comments Mother         Smoker, prediabetes     Thyroid Disease Sister         Thyroid Disease,Hypothyroid     Arthritis Sister         Arthritis     Scoliosis Maternal Half-Sister         Scoliosis     Other - See Comments Maternal Half-Sister         No Known Problems       Medications:  Current Outpatient Medications   Medication Sig Dispense Refill     fluorometholone (FML FORTE) 0.25 % ophthalmic susp        levothyroxine (SYNTHROID/LEVOTHROID) 75 MCG tablet Take 1 tablet (75 mcg) by mouth every morning (before breakfast) 90 tablet 4     Magnesium Citrate POWD by Does not apply route. Nagnesium Calm by Natural Vitality takes 2 to 3 tsp per day       mometasone (NASONEX) 50 MCG/ACT spray 1 spray 2 times daily       Thyroid (NATURE-THROID) 48.75 MG TABS          Allergies   Allergen " Reactions     Cats Shortness Of Breath     Gentamicin Dermatitis     In patch tests severe reactions against Neomycine, Framycetine, Gentamicin, Bacitracine and Polymyxin.   For reasons of crossreactions I would as well avoid other aminoglycosides such as Vancomycine     Tramadol      Other reaction(s): Other - Describe In Comment Field  High doses caused yellow eyes, tremors in her mouth, couldn't feel some body parts.    No issues with low doses     Hydrocodone-Acetaminophen Rash       Review of Systems:  -As per HPI  -Constitutional: Otherwise feeling well today, in usual state of health.  -Skin: As above in HPI. No additional skin concerns.    Physical exam:  Vitals: There were no vitals taken for this visit.  GEN: This is a well developed, well-nourished female in no acute distress, in a pleasant mood.   SKIN: Focused examination of the bilateral upper extremities, face and upper chest was performed.   - There was atrophy of distal aspect of R deltoid with 2 overlying hypopigment macules overlying atrophy. No urtica or eczema appreciated.   -No other lesions of concern on areas examined.     Order for PATCH TESTS    [x] Outpatient  [] Inpatient: Head..../ Bed ....      Skin Atopy (atopic dermatitis) [x] Yes   [] No  Rhinitis/Sinusitis:   [x] Yes   [] No  Allergic Asthma:   [] Yes   [x] No  Food Allergy:   [x] Yes   [] No  Leg ulcers:   [] Yes   [x] No  Hand eczema:   [] Yes   [x] No   Leading hand:   [x] R   [] L       [] Ambidextrous                      Reason for tests (suspected allergy): Assessment for potential reaction to disinfections before IT (less likely preservatives in IT, because the reaction not directly over injection site)  Known previous allergies: Trees/grass, mites, dogs/cats  Standardized panels  [x] Standard panel (40 tests)  [x] Preservatives & Antimicrobials (31 tests)  [x] Emulsifiers & Additives (25 tests)   [] Perfumes/Flavours & Plants (25 tests)  [] Hairdresser panel (12 tests)  [x]  Rubber Chemicals (22 tests)  [x] Plastics (26 tests)  [] Colorants/Dyes/Food additives (20 tests)  [] Metals (implants/dental) (24 tests)  [] Local anaesthetics/NSAIDs (13 tests)  [x] Antibiotics & Antimycotics (14 tests)   [] Corticosteroids (15 tests)   [] Photopatch test (62 tests)   [] others: ...      [] Patient's own products: ...    DO NOT test if chemical or biological identity is unknown!     always ask from patient the product information and safety sheets (MSDS)   [] Atopy screen prick test (Atopic predisposition): ...    [] Patient needs consultation with Allergy team (changes of tests may apply)  [] Tests discussed with Allergy team (can have direct appointment for patch tests)    Atopy Screen (Placed 01/13/20 )    No Substance Readings (15 min) Evaluation   POS Histamine 1mg/ml ++    NEG NaCl 0.9% - Slight dermographism     No Substance Readings (15 min) Evaluation   1 Alternaria alternata (tenuis)  -    2 Cladosporium herbarum -    3 Aspergillus fumigatus -    4 Penicillium notatum -    5 Dermatophagoides pteronyssinus ++    6 Dermatophagoides farinae ++      Conclusion: immediate type reaction to house dust mites, but no delayed reaction to moulds or HDM --> The DP and DF reactions remained into the next day.     RESULTS & EVALUATION of PATCH TESTS    Patch test readings after     [x] 2 days, [] 3 days [x] 4 days, [] 5 days,    Applied patch tests with results (import here the list of patch tests):  Order documented by: IVAN HANCOCK LPN  Order reviewed by: Preeti Copeland LPN   Physician:   Date/time of application:1-  Localization of application: back >> Clear    STANDARD Series         No Substance 2 days 4 days remarks   1 Francesco Mix [C] - -    2 Colophony - -    3  2-Mercaptobenzothiazole  NA NA     4 Methylisothiazolinone - -    5 Carba Mix - -    6 Thiuram Mix [A] NA NA    7 Bisphenol A Epoxy Resin - -    8 I-Vlhx-Wrfntglcnon-Formaldehyde Resin - -    9 Mercapto Mix [A] - -     10 Black Rubber Mix- PPD [B] NA NA    11 Potassium Dichromate  -  -    12 Balsam of [place] (Myroxylon Pereirae Resin) - -    13 Nickel Sulphate Hexahydrate - -    14 Mixed Dialkyl Thiourea - -    15 Paraben Mix [B] - -    16 Methyldibromo Glutaronitrile NA NA    17 Fragrance Mix - -    18 2-Bromo-2-Nitropropane-1,3-Diol (Bronopol) - -    19 Lyral - -    20 Tixocortol-21- Pivalate NA NA    21 Diazolidiyl Urea (Germall II) - -    22 Methyl Methacrylate NA NA    23 Cobalt (II) Chloride Hexahydrate - -    24 Fragrance Mix II  - -    25 Compositae Mix - -    26 Benzoyl Peroxide NA NA    27 Bacitracin - ++/+++    28 Formaldehyde - -    29 Methylchloroisothiazolinone / Methylisothiazolinone - -    30 Corticosteroid Mix - -    31 Sodium Lauryl Sulfate - -    32 Lanolin Alcohol - -    33 Turpentine - -    34 Cetylstearylalcohol - -    35 Chlorhexidine Dicluconate - -    36 Budenoside - -    37 Imidazolidinyl Urea  - -    38 Ethyl-2 Cyanoacrylate NA NA    39 Quaternium 15 (Dowicil 200) - -    40 Decyl Glucoside - -      EMULSIFIERS & ADDITIVES        No Substance 2 days 4 days remarks   41 Polyethylene Glycol-400 NA NA    42 Cocamidopropyl Betaine - -    43 Amerchol L101 NA NA    44 Propylene Glycol - -    45 Triethanolamine - -    46 Sorbitane Sesquiolate - -    47 Isopropylmyristate - -    48 Polysorbate 80  - -    49 Amidoamine   (Stearamidopropyl Dimethylamine) - -    50 Oleamidopropyl Dimethylamine - -    51 Lauryl Glucoside NA NA    52 Coconut Diethanolamide  - -    53 2-Hydroxy-4-Methoxy Benzophenone (Oxybenzone) - -    54 Benzophenone-4 (Sulisobenzon) - -    55 Propolis - -    56 Dexpanthenol - -    57 Carboxymethyl Cellulose Sodium - -    58 Abitol - -    59 Tert-Butylhydroquinone - -    60 Benzyl Salicylate - -     Antioxidant      61 Dodecyl Gallate - -    62 Butylhydroxyanisole (BHA) - -    63 Butylhydroxytoluene (BHT) - -    64 Di-Alpha-Tocopherol (Vit E) - -    65 Propyl Gallate - -      RUBBER CHEMICALS          No Substance 2 days 4 days remarks    Carbamate      66 Zinc Bis ( Diethyldithio carbamate ) (ZDEC) - -    67 Zinc Bis (Dibutyldithiocarbamate) - -    68 1,3-Diphenylguanidine (DPG) - -     Thiourea      69 Dibutylthiourea - -    70 Diphenyltiourea - -    71 Thiourea - -     Mercapto chemicals      72 Morpholinyl Mercaptobenzothiazole - -    73 Y-Surtanviou-7-Benzothiazyl-Sulfenamide - -    74 Dibenzothiazyl Disulfide - -     Thiuram chemicals      75 Dipentamethylenethiuram Disulfide - -    76 Tetraethylthiuram Disulfide (Disulfiram) - -    77 Tetramethylthiuram Disulfide - -    78 Tetramethyl Thiuram Monosulfide (TMTM) - -     4-Phenylenediamine derivatives      79 N-Isopropyl-N'-Phenyl-P-Phenylenediamine (IPPD) - -    80 Rcihzdms-A-Gypcroykudecpwtx (DPPD) - -     Various Rubber Additives      81 Hydroquinone Monobenzylether  - -    82 Hexamethylenetetramine - -    83 4,4'-Dihydroxybiphenyl - -    84 Cyclohexylthiophtalimide - -    85 Ethylenediamine Dihydrochloride - -    86 N-Phenyl-B-Naphthylamine - -    87 Dodecyl Mercaptan - -        PLASTICS         No Substance 2 days 4 days remarks    Acrylates - -    88 2-Hydroxyethyl Methacrylate (HEMA) - -    89 1,4-Butandioldimethacrylate (BUDMA) - -    90  2-Ethylhexyl Acrylate - -    91 Bisphenol-A-Dimethacrylate  - -    92 Diurethane-Dimethacrylate - -    93 Ethyleneglycoldimethacrylate (EGDMA) - -    94 Pentaerythritoltriacrylate (CARLOS) - -    95 Triethylene Glycol Dimethacrylate (TEGDMA) - -     Synthetic material/additives       96 L-Ztaa-Pypgyaedmsl - -    97 Tricresyl Phosphate - -    98 3-Pool-Nwclusdsfmlhp - -    99 Bis (2-Ethylhexyl) Phthalate - -    100 Dibutylphthalate - -    101 Dimethylphthalate - -    102 Toluene-2,4-Diisocyanate - -    103 Diphenylmethane-4,4''-Diisocyanate - -     EPOXY RESIN SYSTEMS       Reactive Solvents - -    104 Cresyl Glycidyl Ether - -    105 Butyl Glycidyl Ether - -    106 Phenyl Glycidyl Ether - -    107 1,4-Butanediol  Diglycidyl Ether - -    108 1,6-Hexanediole Diglycidyl Ether - -     Hardener / Accelerator - -    109 Ethylenediamine Dihydrochloride - -    110 Triethylenetetramine - -    111 Diethylenetriamine - -    112 Isophorone Diamine (IPD) - -    113 N,N-Dimethyl-P-Toluidine - -        PRESERVATIVES & ANTIMICROBIALS         No Substance 2 days 4 days remarks   114  1,2-Benzisothiazoline-3-One, Sodium Salt - -    115  1,3,5-Anival (2-Hydroxyethyl) - Hexahydrotriazine (Grotan BK) NA NA    116 5-Psnlstrdxiojn-1-Nitro-1, 3-Propanediol - -    117  3, 4, 4' - Triclocarban - -    118 4 - Chloro - 3 - Cresol - -    119 4 - Chloro - 4 - Xylenol (PCMX) NA NA    120 7-Ethylbicyclooxazolidine (Bioban KN8154) - -    121 Benzalkonium Chloride - -    122 Benzyl Alcohol - -    123 Cetalkonium Chloride NA NA    124 Cetylpyrimidine Chloride  - -    125 Chloroacetamide - -    126 DMDM Hydantoin NA NA    127 Glutaraldehyde NA NA    128 Triclosan - -    129 Glyoxal Trimeric Dihydrate - -    130 Iodopropynyl Butylcarbamate - -    131 Octylisothiazoline NA NA    132 Iodoform - -    133 (Nitrobutyl) Morpholine/(Ethylnitro-Trimethylene) Dimorpholine (Bioban P 1487) - -    134 Phenoxyethanol NA NA    135 Phenyl Salicylate - -    136 Povidone Iodine - -    137 Sodium Benzoate - -    138 Sodium Disulfite NA NA    139 Sorbic Acid - -    140 Thimerosal - -     Parabens      141 Butyl-P-Hydroxybenzoate - -    142 Ethyl-P-Hydroxybenzoate - -    143 Methyl-P-Hydroxybenzoate NA NA    144 Propyl-P-Hydroxybenzoate - -         ANTIBIOTICS & ANTIMYCOTICS         No Substance 2 days 4 days remarks   145 Erythromycine - -    146 Framycetine Sulphate (+) +++    147 Fusidic Acid Sodium Salt - -    148 Gentamicin Sulphate - +    149 Neomycine Sulphate (+) ++/+++    150 Oxytetracycline  - -    151 Polymyxin B Sulphate (+) ++    152 Tetracycline-HCL - -    153 Sulfanilamide - -    154 Metronidazole - -    155 Oxyquinoline Mix - -    156 Nitrofurazone - -    157  Nystatin - -    158 Clotrimazole - -      PATIENTS OWN PRODUCTS         No Substance Conc  % solv 2 days 4 days remarks   159 Aspergillus   - -    160 Penicillium   - -    161 D. Farinae   - -    162 D. pteronyssinus   - -    163 Dog   - -    164 Cat   - -        Results of patch tests:                         Interpretation:    - Negative                    A    = Allergic      (+) Erythema    TI   = Toxic/irritant   + E + Infiltration    RaP = Relevance at Present     ++ E/I + Papulovesicle   Rpr  = Relevance Previously     +++ E/I/P + Blister     nR   = No Relevance    [x] Allergic reaction diagnosed against:     Antibiotics and antimycotics:   Framycetine Sulphate +++  Gentamicin Sulphate +  Neomycine Sulphate ++/+++  Polymyxin B Sulphate ++  Bacitracin ++/+++    Interpretation/ remarks:   Patient has a severe delayed type contact sensitization to various antibiotics that are in many OTC antiseptics and antibiotics. No signs for allergies to rubber chemicals or adhesives. Therefore, we think that the reaction to the band-aids were maybe to topical antibiotics that were added onto the band-aid.       ==> final Diagnosis:    >>  Localized Eczematous and delayed-type reaction after 2x initial doses of immunotherapies  = reaction to itch-X cream    No signs for allergies to preservative in these allergy extracts     No signs in prick test or patch test for delayed type reaction to some of these allergens (dust mites, molds)    Severe contact sensitizations to topical antibiotics  ==> in open application test clearly positive to the itch-x cream that patient used to reduce the effects of the IT    ==> Comment: This was NOT a reaction to the immunotherpapy, but rather to the topical product used afterwards to treat the topical side effects of the IT. Not sure to what ingredient in itch-x was really reacting, but not to Parabens and Propylene Glycol.    It is imperative that the patient avoids all antibiotics,  topically and systemically, that we have tested and that she was positive. Because gentamycin and neomycin were included I would avoid, for safety reasons, cross reacting aminoglycosides including vancomycin.     >> possible angioedema to NSAIDs (LEON-1 intolerance) to Meloxicam    In future use only Ibuprofen or Tylenol    If again similar reactions with facial swelling, then take immediately 2 Tabl Zyrtec 10mg and then write 12h retrospective diary and make photo    Prescribed emergency set and given handout emergency treatment (has Epipen)      >> localized lipatrophy and pigmentary changes on upper arm    No signs for specific reaction to preservatives. However, it could be that at that time some topical antibiotic has been used that she reacted to and/or lipatrophy after Kenalog injections.     ==> Comment: It is difficult to directly correlate the atrophy on the upper arm to the contact sensitization even though the outline of the reaction looks like the outline of a band-aid. It might be that this atrophy has nothing to do with an allergic reaction. It might be important to really verify if it is a muscle or lipatrophy. Clinically it looks more like lipatrophy which can sometimes be idiopathic. Moreover, a local injection of corticosteroids in this area might as well explain the atrophy (and there was such an injection months ago; not sure if exactly same location?)      Final conclusions:    With the tendency of the patient to develop delayed-type reactions, I would in the moment not continue with the immunotherapy or we reduce it to only relevant allergens like house dust mite, but in that case we would maybe perform first prick, intradermal, and patch test with house dust mite immunotherapy to be sure that there is no delayed reaction to these extracts. We recommend just one single major allergen for immunotherapy, such as 50% DF/DP standardized from [facility].We could not see any delayed reaction to house dust  mite test and IT solutions.      In patch tests severe reactions against Neomycine, Framycetine, Gentamicin, Bacitracine and Polymyxin. For reasons of crossreactions I would as well avoid other aminoglycosides such as Vancomycin. This was placed into the patient's allergy chart.     ==> Treatment prescribed/Plan:    In the moment, patient to stop with immunotherapy or we will reduce it to only relevant allergens. We recommend just one single major allergen for immunotherapy, such as 50/50 mixture of DF/DP standardized from [facility].     Prescribed triamcinolone 0.1% cream. Apply topically 2 times daily for 5 days.     Go for antibiotic free foods if possible.  Maybe, in future, if we could plan additional allergy tests for delayed-type reactions with immunotherapy extracts and/or maybe tetanus toxoid vaccine. These tests should include as well possible cross-reacting antibiotics such aminoglycosides and particularly vancomycin.  Crossreactions between Framycetine/Neomycine and these other antibiotics possible.     These conclusions are made at the best of one's knowledge and belief based on the provided evidence such as patient's history and allergy test results and they can change over time or can be incomplete because of missing information's.    CC Ulysses Mtz MD  [place] [id number] [address] [place] [address] on close of this encounter.    Follow-up with the referring provider Dr. Mtz to discuss IT. We will see her again if she would like to undergo more allergy testing.    See Email from 01/20/2020: I woke up Sunday morning to the results of my MRI in my MyChart. They state that there was NO muscle atrophy in my deltoid. I called my orthopedic doctor, the one who had said it WAS the muscle, and asked for clarification. He said after further review, it is NOT the muscle, but rather the fat layer.     I then called [facility] in [place] as my  remembered I had a steriod shot in [place]... I knew I'd be around dogs/cats and was being proactive to have a shot to help reduce potential allergies on the trip. That shot was on October 16, one month prior to noticing the dent in my deltoid. I confirmed with Kenny this morning that the kenalog steriod injection was in my upper right arm deltoid. I think we've now solved the case of the atrophy! ==> most probably lip-atrophy after Kenalog injection!!    I also asked what was used to clean my arm before immunotheraphy and if anything was applied after to see if we could connect my reaction to the allergy shots to our new knowledge of my delayed reactions. They used an alcohol swab to prep and used Itch-X both times after the injection. These are the ingredients listed online for that cream: Active Ingredients: Benzyl alcohol 10% and pramoxine HCl 1%. Inactive ingredients: aloe barbadensis leaf juice (aloe vera gel), blue 1, butylene glycol, carbomer, diazolidinyl urea, methylparaben, propylene glycol, propylparaben, SD alcohol 40, styrene/acrylates copolymer, tetrahydroxypropyl ethylenediamine, and water. Could be one of the reasons. Maybe use the itch-x and perform open application test into elbow flexural side 2xdaily for 4 days and if any delayed reaction then this could be the explanation.    Would ANY of those ingredients possibly explain my delayed allergic reaction? Or are we back to theorizing that I had delayed reactions to multiple allergens that were given in the allergy shots?        Patient counseling:  See above      Follow-up: in Derm-Allergy clinic if necessary      Thank you for the opportunity be involved in the care of this patient.     Staff: Elba Eddy and Katy Hobbs RN    _____________________________________________________________________________    Teledermatology information:  - Location of patient: Home  - Patient presented in referral from: self  - Location of teledermatologist:  [facility]  [facility] [place] (, [place], [place])  - Reason teledermatology is appropriate:  of National Emergency Regarding Coronavirus disease (COVID 19) Outbreak  - Method of transmission:  Store and Forward and Video ( Invitation sent by:  Kumar and send to e-mail at: [email or web address] )  - Date of images: during video  - Service start time: 10:53am  - Service end time: 11:15 am  - Date of report: 12/22/20      I spent a total of 22 minutes for telemedicine consult with Abner Richards during today s video meeting. Over 50% of this time was spent counseling the patient and/or coordinating care. Please see Assessment and Plan for details.

## 2020-12-28 ENCOUNTER — ALLIED HEALTH/NURSE VISIT (OUTPATIENT)
Dept: FAMILY MEDICINE | Facility: OTHER | Age: 40
End: 2020-12-28
Attending: FAMILY MEDICINE
Payer: COMMERCIAL

## 2020-12-28 DIAGNOSIS — R68.83 CHILLS: ICD-10-CM

## 2020-12-28 DIAGNOSIS — R53.83 FATIGUE: Primary | ICD-10-CM

## 2020-12-28 PROCEDURE — U0003 INFECTIOUS AGENT DETECTION BY NUCLEIC ACID (DNA OR RNA); SEVERE ACUTE RESPIRATORY SYNDROME CORONAVIRUS 2 (SARS-COV-2) (CORONAVIRUS DISEASE [COVID-19]), AMPLIFIED PROBE TECHNIQUE, MAKING USE OF HIGH THROUGHPUT TECHNOLOGIES AS DESCRIBED BY CMS-2020-01-R: HCPCS | Mod: ZL | Performed by: FAMILY MEDICINE

## 2020-12-28 PROCEDURE — C9803 HOPD COVID-19 SPEC COLLECT: HCPCS

## 2020-12-28 NOTE — NURSING NOTE
Symptomatic  Patient swabbed for COVID-19 testing.  Gely Resendiz LPN on 12/28/2020 at 12:28 PM

## 2020-12-29 LAB
SARS-COV-2 RNA SPEC QL NAA+PROBE: NOT DETECTED
SPECIMEN SOURCE: NORMAL

## 2021-01-03 ENCOUNTER — HEALTH MAINTENANCE LETTER (OUTPATIENT)
Age: 41
End: 2021-01-03

## 2021-01-19 ENCOUNTER — OFFICE VISIT (OUTPATIENT)
Dept: ALLERGY | Facility: CLINIC | Age: 41
End: 2021-01-19
Payer: COMMERCIAL

## 2021-01-19 DIAGNOSIS — Z88.9 DRUG ALLERGY: ICD-10-CM

## 2021-01-19 DIAGNOSIS — T78.3XXA ANGIOEDEMA, INITIAL ENCOUNTER: Primary | ICD-10-CM

## 2021-01-19 PROCEDURE — 95044 PATCH/APPLICATION TESTS: CPT | Performed by: DERMATOLOGY

## 2021-01-19 PROCEDURE — 99214 OFFICE O/P EST MOD 30 MIN: CPT | Mod: 25 | Performed by: DERMATOLOGY

## 2021-01-19 PROCEDURE — 95018 ALL TSTG PERQ&IQ DRUGS/BIOL: CPT | Performed by: DERMATOLOGY

## 2021-01-19 NOTE — LETTER
1/19/2021         RE: Gisela Richards  [address] [facility] [address]        Dear Colleague,    Thank you for referring your patient, Gisela Richards, to the [facility] [place]. Please see a copy of my visit note below.    Note for return visit for Dermato-Allergy       Encounter Date: Jan 19, 2021    History of Present Illness:  Gisela Richards is a 40 year old female who presents in person to the consult following information has been take directly from the prior notes, patients informations, Epic and/or other sources and exam/history performed by myself:     Ibuprofen does not induce any reaction.    > Patient had a reaction to Meloxicam in November. Took one Tabl for the first time and about 3-6h later with swelling and redness around the lips with dryness and then a sun burn type of rash over upper chest, stomach and back. No itching. No sun exposure involved. Redness disappeared after 1-2 days. Never tongue swelling or breathing problems. Out of chin skin wheeping. Took almost one week to fully recover.  ==> delayed type reaction    Additional comments and observations from review of the patient s chart including the following:    See notes for more details    ROS: Patient generally feeling well today   Physical Examination:  General: Well-appearing, appropriately-developed individual.  - Skin: Focused examination of the skin on test sites on arms within the consultation was performed.   See test results below  As above in HPI. No additional skin concerns.  - upper respiratory tract: currently no obvious Rhinitis  - lungs: no signs for active and present Asthma/Wheezing or coughing  - eyes: currently no active conjunctivits  - GI system/digestion: currently no problems, no bloating or Diarrhoea        Earlier History and Allergy exams:    Patient is continuing now the IT without any problems, since they are not using ItchEx anymore. Just using ice.  IT injections  is done by an primary care PA in [facility]    > Patient had a reaction to Meloxicam about 3 h later with angioedema on face and then sun burn type of rash over face --> happened in November  > Ibuprofen does not induce any reaction (last taken last Saturday)    With tramadol tremors and swellings of face and with Percacet (Oxycodon and Acetaminophen) migraines  Vicodin induces erythema      Today she reports that all skin manifestation has resolved. Patient has come from [place], [place]. Patient's spouse is present during evaluation. She reports her first allergy shots took place on November 9th, 2 injections on posterior aspect of right arm and 1 to the posterior aspect of left arm.   - Per chart review immunotherapy: trees/grass/weeds, mites/molds and animals  Within the day of the injection patient noted a diffuse pruritic rash on the posterior aspect of her right arm. She denies using any topicals or oral medications beside Zyrtec to aid with on going rash. She additionally iced the area and skin manifestation had resolved.     The following week she completed her 2nd round of immunotherapy at the same dosage. Again the rash occurred on the posterior aspect of her right arm along with outstanding edema of her right arm and shoulder. This rash was also pruritic, but again denied any pain. Her provider was concerned about reaction to the preservative. This rash took 1.5 week to self resolve. The edema persisted for 2-3 weeks and when resolved, a notable dent was appreciated on the right distal aspect of the deltoid. Imaging completed showed atrophy of the deltoid. She denied any recent injections to that area including routine vaccines or steroid injections. Also now having to areas of hypopigmentation overlying the deltoid.  Reports dry skin after resolution of rashes. Denies history of asthma. Extensive history of food sensitives and autoimmune workup that has been unremarkable.     Hx since  "19:  The patient comes in today for patch testing day 1. She reports that there is a bruise on the R shoulder that has worsened in the interim. She is unsure if it may be necrosis or sudden muscle atrophy. She reports it appeared on , the day after allergy shot. Also states that in the same area she developed a new \"bump\" that appeared about 2 days ago.   Reports that the IT on the R upper extremity was against trees, weeds, molds, mites, and dog. On the L upper extremity, she had IT injections for cats and dogs.   She is otherwise feeling well overall today. No other skin or allergy concerns at this time.     Medications:  - takes daily Zyrtec and is on IT  - Levothyroxin      Past Medical History:   Patient Active Problem List   Diagnosis     Allergic rhinitis due to dogs     Hypothyroid     Lumbar facet joint pain     Past Medical History:   Diagnosis Date     Constipation     Since young.     Other specified postprocedural states     2014,Slight recurrence within scar - removed in 9th grade and came back right away     Personal history of other medical treatment (CODE)      2, para 2.     Past Surgical History:   Procedure Laterality Date     COLONOSCOPY      ,\"swelling of the lining\" and flattening of villa.     OTHER SURGICAL HISTORY      ~,[id number],OTHER,Left,recurring within scar     OTHER SURGICAL HISTORY      2016,[id number],KNEE ARTHROSCOPY W/ MENISCAL REPAIR     OTHER SURGICAL HISTORY      2004,[id number],DILATION AND CURETTAGE,retained placenta       Social History:  Patient reports that she has never smoked. She has never used smokeless tobacco. She reports current alcohol use.    Family History:  Family History   Problem Relation Age of Onset     Family History Negative Father         Good Health,does not see doctor regularly     Thyroid Disease Mother         Thyroid Disease,Hypothyroid     Other - See Comments Mother         Smoker, prediabetes     Thyroid Disease " Sister         Thyroid Disease,Hypothyroid     Arthritis Sister         Arthritis     Scoliosis Maternal Half-Sister         Scoliosis     Other - See Comments Maternal Half-Sister         No Known Problems       Medications:  Current Outpatient Medications   Medication Sig Dispense Refill     fluorometholone (FML FORTE) 0.25 % ophthalmic susp        levothyroxine (SYNTHROID/LEVOTHROID) 75 MCG tablet Take 1 tablet (75 mcg) by mouth every morning (before breakfast) 90 tablet 4     Magnesium Citrate POWD by Does not apply route. Nagnesium Calm by Natural Vitality takes 2 to 3 tsp per day       meloxicam (ANJESO) 30 MG/ML injection Inject 1 mL (30 mg) into the vein once for 1 dose 1 mL 0     mometasone (NASONEX) 50 MCG/ACT spray 1 spray 2 times daily       predniSONE (DELTASONE) 50 MG tablet Emergency set: if severe allergic reaction take immediately 100mg Prednisone (2x50mg) and 2 Tabl Cetirizine 10mg and then write precise 12h retrospective diary.If less severe reaction take only the 2 Tabl Cetirizine 2 tablet 1     Thyroid (NATURE-THROID) 48.75 MG TABS          Allergies   Allergen Reactions     Cats Shortness Of Breath     Gentamicin Dermatitis     In patch tests severe reactions against Neomycine, Framycetine, Gentamicin, Bacitracine and Polymyxin.   For reasons of crossreactions I would as well avoid other aminoglycosides such as Vancomycine     Tramadol      Other reaction(s): Other - Describe In Comment Field  High doses caused yellow eyes, tremors in her mouth, couldn't feel some body parts.    No issues with low doses     Hydrocodone-Acetaminophen Rash       Review of Systems:  -As per HPI  -Constitutional: Otherwise feeling well today, in usual state of health.  -Skin: As above in HPI. No additional skin concerns.    Physical exam:  Vitals: There were no vitals taken for this visit.  GEN: This is a well developed, well-nourished female in no acute distress, in a pleasant mood.   SKIN: Focused examination of  the bilateral upper extremities, face and upper chest was performed.   - There was atrophy of distal aspect of R deltoid with 2 overlying hypopigment macules overlying atrophy. No urtica or eczema appreciated.   -No other lesions of concern on areas examined.     Order for PATCH TESTS    [x] Outpatient  [] Inpatient: Head..../ Bed ....      Skin Atopy (atopic dermatitis) [x] Yes   [] No  Rhinitis/Sinusitis:   [x] Yes   [] No  Allergic Asthma:   [] Yes   [x] No  Food Allergy:   [x] Yes   [] No  Leg ulcers:   [] Yes   [x] No  Hand eczema:   [] Yes   [x] No   Leading hand:   [x] R   [] L       [] Ambidextrous                      Reason for tests (suspected allergy): Assessment for potential reaction to disinfections before IT (less likely preservatives in IT, because the reaction not directly over injection site)  Known previous allergies: Trees/grass, mites, dogs/cats  Standardized panels  [x] Standard panel (40 tests)  [x] Preservatives & Antimicrobials (31 tests)  [x] Emulsifiers & Additives (25 tests)   [] Perfumes/Flavours & Plants (25 tests)  [] Hairdresser panel (12 tests)  [x] Rubber Chemicals (22 tests)  [x] Plastics (26 tests)  [] Colorants/Dyes/Food additives (20 tests)  [] Metals (implants/dental) (24 tests)  [] Local anaesthetics/NSAIDs (13 tests)  [x] Antibiotics & Antimycotics (14 tests)   [] Corticosteroids (15 tests)   [] Photopatch test (62 tests)   [] others: ...      [] Patient's own products: ...    DO NOT test if chemical or biological identity is unknown!     always ask from patient the product information and safety sheets (MSDS)   [] Atopy screen prick test (Atopic predisposition): ...    [] Patient needs consultation with Allergy team (changes of tests may apply)  [] Tests discussed with Allergy team (can have direct appointment for patch tests)    Atopy Screen (Placed 01/13/20 )    No Substance Readings (15 min) Evaluation   POS Histamine 1mg/ml ++    NEG NaCl 0.9% - Slight dermographism      No Substance Readings (15 min) Evaluation   1 Alternaria alternata (tenuis)  -    2 Cladosporium herbarum -    3 Aspergillus fumigatus -    4 Penicillium notatum -    5 Dermatophagoides pteronyssinus ++    6 Dermatophagoides farinae ++      Conclusion: immediate type reaction to house dust mites, but no delayed reaction to moulds or HDM --> The DP and DF reactions remained into the next day.     RESULTS & EVALUATION of PATCH TESTS    Patch test readings after     [x] 2 days, [] 3 days [x] 4 days, [] 5 days,    Applied patch tests with results (import here the list of patch tests):  Order documented by: IVAN HANCOCK LPN  Order reviewed by: Preeti Copeland LPN   Physician:   Date/time of application:1-  Localization of application: back >> Clear    STANDARD Series         No Substance 2 days 4 days remarks   1 Francesco Mix [C] - -    2 Colophony - -    3  2-Mercaptobenzothiazole  NA NA     4 Methylisothiazolinone - -    5 Carba Mix - -    6 Thiuram Mix [A] NA NA    7 Bisphenol A Epoxy Resin - -    8 V-Psrc-Pbgcvjjdkts-Formaldehyde Resin - -    9 Mercapto Mix [A] - -    10 Black Rubber Mix- PPD [B] NA NA    11 Potassium Dichromate  -  -    12 Balsam of [place] (Myroxylon Pereirae Resin) - -    13 Nickel Sulphate Hexahydrate - -    14 Mixed Dialkyl Thiourea - -    15 Paraben Mix [B] - -    16 Methyldibromo Glutaronitrile NA NA    17 Fragrance Mix - -    18 2-Bromo-2-Nitropropane-1,3-Diol (Bronopol) - -    19 Lyral - -    20 Tixocortol-21- Pivalate NA NA    21 Diazolidiyl Urea (Germall II) - -    22 Methyl Methacrylate NA NA    23 Cobalt (II) Chloride Hexahydrate - -    24 Fragrance Mix II  - -    25 Compositae Mix - -    26 Benzoyl Peroxide NA NA    27 Bacitracin - ++/+++    28 Formaldehyde - -    29 Methylchloroisothiazolinone / Methylisothiazolinone - -    30 Corticosteroid Mix - -    31 Sodium Lauryl Sulfate - -    32 Lanolin Alcohol - -    33 Turpentine - -    34 Cetylstearylalcohol - -    35  Chlorhexidine Dicluconate - -    36 Budenoside - -    37 Imidazolidinyl Urea  - -    38 Ethyl-2 Cyanoacrylate NA NA    39 Quaternium 15 (Dowicil 200) - -    40 Decyl Glucoside - -      EMULSIFIERS & ADDITIVES        No Substance 2 days 4 days remarks   41 Polyethylene Glycol-400 NA NA    42 Cocamidopropyl Betaine - -    43 Amerchol L101 NA NA    44 Propylene Glycol - -    45 Triethanolamine - -    46 Sorbitane Sesquiolate - -    47 Isopropylmyristate - -    48 Polysorbate 80  - -    49 Amidoamine   (Stearamidopropyl Dimethylamine) - -    50 Oleamidopropyl Dimethylamine - -    51 Lauryl Glucoside NA NA    52 Coconut Diethanolamide  - -    53 2-Hydroxy-4-Methoxy Benzophenone (Oxybenzone) - -    54 Benzophenone-4 (Sulisobenzon) - -    55 Propolis - -    56 Dexpanthenol - -    57 Carboxymethyl Cellulose Sodium - -    58 Abitol - -    59 Tert-Butylhydroquinone - -    60 Benzyl Salicylate - -     Antioxidant      61 Dodecyl Gallate - -    62 Butylhydroxyanisole (BHA) - -    63 Butylhydroxytoluene (BHT) - -    64 Di-Alpha-Tocopherol (Vit E) - -    65 Propyl Gallate - -      RUBBER CHEMICALS         No Substance 2 days 4 days remarks    Carbamate      66 Zinc Bis ( Diethyldithio carbamate ) (ZDEC) - -    67 Zinc Bis (Dibutyldithiocarbamate) - -    68 1,3-Diphenylguanidine (DPG) - -     Thiourea      69 Dibutylthiourea - -    70 Diphenyltiourea - -    71 Thiourea - -     Mercapto chemicals      72 Morpholinyl Mercaptobenzothiazole - -    73 T-Oddhuetppg-8-Benzothiazyl-Sulfenamide - -    74 Dibenzothiazyl Disulfide - -     Thiuram chemicals      75 Dipentamethylenethiuram Disulfide - -    76 Tetraethylthiuram Disulfide (Disulfiram) - -    77 Tetramethylthiuram Disulfide - -    78 Tetramethyl Thiuram Monosulfide (TMTM) - -     4-Phenylenediamine derivatives      79 N-Isopropyl-N'-Phenyl-P-Phenylenediamine (IPPD) - -    80 Kpkghmad-T-Brleogkjqiajhpki (DPPD) - -     Various Rubber Additives      81 Hydroquinone Monobenzylether   - -    82 Hexamethylenetetramine - -    83 4,4'-Dihydroxybiphenyl - -    84 Cyclohexylthiophtalimide - -    85 Ethylenediamine Dihydrochloride - -    86 N-Phenyl-B-Naphthylamine - -    87 Dodecyl Mercaptan - -        PLASTICS         No Substance 2 days 4 days remarks    Acrylates - -    88 2-Hydroxyethyl Methacrylate (HEMA) - -    89 1,4-Butandioldimethacrylate (BUDMA) - -    90  2-Ethylhexyl Acrylate - -    91 Bisphenol-A-Dimethacrylate  - -    92 Diurethane-Dimethacrylate - -    93 Ethyleneglycoldimethacrylate (EGDMA) - -    94 Pentaerythritoltriacrylate (CARLOS) - -    95 Triethylene Glycol Dimethacrylate (TEGDMA) - -     Synthetic material/additives       96 X-Ujkh-Pddewobfmec - -    97 Tricresyl Phosphate - -    98 8-Vcyj-Jmerusummlfgh - -    99 Bis (2-Ethylhexyl) Phthalate - -    100 Dibutylphthalate - -    101 Dimethylphthalate - -    102 Toluene-2,4-Diisocyanate - -    103 Diphenylmethane-4,4''-Diisocyanate - -     EPOXY RESIN SYSTEMS       Reactive Solvents - -    104 Cresyl Glycidyl Ether - -    105 Butyl Glycidyl Ether - -    106 Phenyl Glycidyl Ether - -    107 1,4-Butanediol Diglycidyl Ether - -    108 1,6-Hexanediole Diglycidyl Ether - -     Hardener / Accelerator - -    109 Ethylenediamine Dihydrochloride - -    110 Triethylenetetramine - -    111 Diethylenetriamine - -    112 Isophorone Diamine (IPD) - -    113 N,N-Dimethyl-P-Toluidine - -        PRESERVATIVES & ANTIMICROBIALS         No Substance 2 days 4 days remarks   114  1,2-Benzisothiazoline-3-One, Sodium Salt - -    115  1,3,5-Anival (2-Hydroxyethyl) - Hexahydrotriazine (Grotan BK) NA NA    116 7-Ehqgkscazagpl-2-Nitro-1, 3-Propanediol - -    117  3, 4, 4' - Triclocarban - -    118 4 - Chloro - 3 - Cresol - -    119 4 - Chloro - 4 - Xylenol (PCMX) NA NA    120 7-Ethylbicyclooxazolidine (Bioban RO5347) - -    121 Benzalkonium Chloride - -    122 Benzyl Alcohol - -    123 Cetalkonium Chloride NA NA    124 Cetylpyrimidine Chloride  - -    125  Chloroacetamide - -    126 DMDM Hydantoin NA NA    127 Glutaraldehyde NA NA    128 Triclosan - -    129 Glyoxal Trimeric Dihydrate - -    130 Iodopropynyl Butylcarbamate - -    131 Octylisothiazoline NA NA    132 Iodoform - -    133 (Nitrobutyl) Morpholine/(Ethylnitro-Trimethylene) Dimorpholine (Bioban P 1487) - -    134 Phenoxyethanol NA NA    135 Phenyl Salicylate - -    136 Povidone Iodine - -    137 Sodium Benzoate - -    138 Sodium Disulfite NA NA    139 Sorbic Acid - -    140 Thimerosal - -     Parabens      141 Butyl-P-Hydroxybenzoate - -    142 Ethyl-P-Hydroxybenzoate - -    143 Methyl-P-Hydroxybenzoate NA NA    144 Propyl-P-Hydroxybenzoate - -         ANTIBIOTICS & ANTIMYCOTICS         No Substance 2 days 4 days remarks   145 Erythromycine - -    146 Framycetine Sulphate (+) +++    147 Fusidic Acid Sodium Salt - -    148 Gentamicin Sulphate - +    149 Neomycine Sulphate (+) ++/+++    150 Oxytetracycline  - -    151 Polymyxin B Sulphate (+) ++    152 Tetracycline-HCL - -    153 Sulfanilamide - -    154 Metronidazole - -    155 Oxyquinoline Mix - -    156 Nitrofurazone - -    157 Nystatin - -    158 Clotrimazole - -      PATIENTS OWN PRODUCTS         No Substance Conc  % solv 2 days 4 days remarks   159 Aspergillus   - -    160 Penicillium   - -    161 D. Farinae   - -    162 D. pteronyssinus   - -    163 Dog   - -    164 Cat   - -        Results of patch tests:                         Interpretation:    - Negative                    A    = Allergic      (+) Erythema    TI   = Toxic/irritant   + E + Infiltration    RaP = Relevance at Present     ++ E/I + Papulovesicle   Rpr  = Relevance Previously     +++ E/I/P + Blister     nR   = No Relevance       DRUGS/SUBSTANCES 1/19/2021    Control Tests (Placed 01/19/21 )    No Substance Readings (15min) Evaluation   POS Histamine 1mg/ml ++    NEG NaCl 0.9% - dermographism      Prick Tests        Substance/ Allergen Concentration Result (15min) Remarks   Meloxicam  30mg/ml as is neg     In vaseline neg Not bigger than neg ctr                       Intradermal Tests   immed immed delayed delayed     Substance Conc 1st dil 2nd dil   3 days  3 days remarks   Histamine Hydrochloride (ALK) 0.1 mg/ml        NaCl 0.9%        Meloxicam 1:200/ 1:20 neg neg   dermographism                                         Patch Tests   as is  as is 1:2 paraffin 1:2 paraffin     Substance Conc  days  days  days  days remarks                                                    [x] Allergic reaction diagnosed against:     Antibiotics and antimycotics:   Framycetine Sulphate +++  Gentamicin Sulphate +  Neomycine Sulphate ++/+++  Polymyxin B Sulphate ++  Bacitracin ++/+++    Interpretation/ remarks:   Patient has a severe delayed type contact sensitization to various antibiotics that are in many OTC antiseptics and antibiotics. No signs for allergies to rubber chemicals or adhesives. Therefore, we think that the reaction to the band-aids were maybe to topical antibiotics that were added onto the band-aid.       ==> final Diagnosis:    >>  Localized Eczematous and delayed-type reaction after 2x initial doses of immunotherapies  = reaction to itch-X cream    No signs for allergies to preservative in these allergy extracts     No signs in prick test or patch test for delayed type reaction to some of these allergens (dust mites, molds)    Severe contact sensitizations to topical antibiotics  ==> in open application test clearly positive to the itch-x cream that patient used to reduce the effects of the IT    ==> Comment: This was NOT a reaction to the immunotherpapy, but rather to the topical product used afterwards to treat the topical side effects of the IT. Not sure to what ingredient in itch-x was really reacting, but not to Parabens and Propylene Glycol.    It is imperative that the patient avoids all antibiotics, topically and systemically, that we have tested and that she was positive. Because  gentamycin and neomycin were included I would avoid, for safety reasons, cross reacting aminoglycosides including vancomycin.     >> possible angioedema to NSAIDs (LEON-1 intolerance) to Meloxicam    In future use only Ibuprofen or Tylenol    If again similar reactions with facial swelling, then take immediately 2 Tabl Zyrtec 10mg and then write 12h retrospective diary and make photo    Prescribed emergency set and given handout emergency treatment (has Epipen)      >> localized lipatrophy and pigmentary changes on upper arm    No signs for specific reaction to preservatives. However, it could be that at that time some topical antibiotic has been used that she reacted to and/or lipatrophy after Kenalog injections.     ==> Comment: It is difficult to directly correlate the atrophy on the upper arm to the contact sensitization even though the outline of the reaction looks like the outline of a band-aid. It might be that this atrophy has nothing to do with an allergic reaction. It might be important to really verify if it is a muscle or lipatrophy. Clinically it looks more like lipatrophy which can sometimes be idiopathic. Moreover, a local injection of corticosteroids in this area might as well explain the atrophy (and there was such an injection months ago; not sure if exactly same location?)      Final conclusions:    With the tendency of the patient to develop delayed-type reactions, I would in the moment not continue with the immunotherapy or we reduce it to only relevant allergens like house dust mite, but in that case we would maybe perform first prick, intradermal, and patch test with house dust mite immunotherapy to be sure that there is no delayed reaction to these extracts. We recommend just one single major allergen for immunotherapy, such as 50% DF/DP standardized from [facility].We could not see any delayed reaction to house dust mite test and IT solutions.      In patch tests severe reactions against Neomycine,  Framycetine, Gentamicin, Bacitracine and Polymyxin. For reasons of crossreactions I would as well avoid other aminoglycosides such as Vancomycin. This was placed into the patient's allergy chart.     ==> Treatment prescribed/Plan:    In the moment, patient to stop with immunotherapy or we will reduce it to only relevant allergens. We recommend just one single major allergen for immunotherapy, such as 50/50 mixture of DF/DP standardized from [facility].     Prescribed triamcinolone 0.1% cream. Apply topically 2 times daily for 5 days.     Go for antibiotic free foods if possible.  Maybe, in future, if we could plan additional allergy tests for delayed-type reactions with immunotherapy extracts and/or maybe tetanus toxoid vaccine. These tests should include as well possible cross-reacting antibiotics such aminoglycosides and particularly vancomycin.  Crossreactions between Framycetine/Neomycine and these other antibiotics possible.     These conclusions are made at the best of one's knowledge and belief based on the provided evidence such as patient's history and allergy test results and they can change over time or can be incomplete because of missing information's.    CC Ulysses Mtz MD  [place] [address] [address] [place] [id number] on close of this encounter.    Follow-up with the referring provider Dr. Mtz to discuss IT. We will see her again if she would like to undergo more allergy testing.    See Email from 01/20/2020: I woke up Sunday morning to the results of my MRI in my MyChart. They state that there was NO muscle atrophy in my deltoid. I called my orthopedic doctor, the one who had said it WAS the muscle, and asked for clarification. He said after further review, it is NOT the muscle, but rather the fat layer.     I then called [facility] in [place] as my  remembered I had a steriod shot in [place]... I knew I'd be around dogs/cats and was being proactive to have a  shot to help reduce potential allergies on the trip. That shot was on October 16, one month prior to noticing the dent in my deltoid. I confirmed with [facility] this morning that the kenalog steriod injection was in my upper right arm deltoid. I think we've now solved the case of the atrophy! ==> most probably lip-atrophy after Kenalog injection!!    I also asked what was used to clean my arm before immunotheraphy and if anything was applied after to see if we could connect my reaction to the allergy shots to our new knowledge of my delayed reactions. They used an alcohol swab to prep and used Itch-X both times after the injection. These are the ingredients listed online for that cream: Active Ingredients: Benzyl alcohol 10% and pramoxine HCl 1%. Inactive ingredients: aloe barbadensis leaf juice (aloe vera gel), blue 1, butylene glycol, carbomer, diazolidinyl urea, methylparaben, propylene glycol, propylparaben, SD alcohol 40, styrene/acrylates copolymer, tetrahydroxypropyl ethylenediamine, and water. Could be one of the reasons. Maybe use the itch-x and perform open application test into elbow flexural side 2xdaily for 4 days and if any delayed reaction then this could be the explanation.    Would ANY of those ingredients possibly explain my delayed allergic reaction? Or are we back to theorizing that I had delayed reactions to multiple allergens that were given in the allergy shots?        Follow-up: in [facility] clinic virtual on Friday to evaluate the delayed reactions      Thank you for the opportunity be involved in the care of this patient.     Staff: Elba Eddy and Katy Hobbs RN      I spent a total of 32 minutes face to face with Gisela Richards during today s office visit. Over 50% of this time was spent discussing all the individual test results, correlating them to the clinical relevance, counseling the patient and/or coordinating care. Please see Assessment and Plan for details.  This  excludes any time spent performing prick, intradermal and patch tests      Again, thank you for allowing me to participate in the care of your patient.        Sincerely,        Reginaldo Horne MD

## 2021-01-19 NOTE — NURSING NOTE
Chief Complaint   Patient presents with     Allergy Recheck     Drug testing for meloxicam      Katy Billings RN

## 2021-01-19 NOTE — PROGRESS NOTES
Note for return visit for Dermato-Allergy       Encounter Date: Jan 19, 2021    History of Present Illness:  Gisela Richards is a 40 year old female who presents in person to the consult following information has been take directly from the prior notes, patients informations, Epic and/or other sources and exam/history performed by myself:     Ibuprofen does not induce any reaction.    > Patient had a reaction to Meloxicam in November. Took one Tabl for the first time and about 3-6h later with swelling and redness around the lips with dryness and then a sun burn type of rash over upper chest, stomach and back. No itching. No sun exposure involved. Redness disappeared after 1-2 days. Never tongue swelling or breathing problems. Out of chin skin wheeping. Took almost one week to fully recover.  ==> delayed type reaction    Additional comments and observations from review of the patient s chart including the following:    See notes for more details    ROS: Patient generally feeling well today   Physical Examination:  General: Well-appearing, appropriately-developed individual.  - Skin: Focused examination of the skin on test sites on arms within the consultation was performed.   See test results below  As above in HPI. No additional skin concerns.  - upper respiratory tract: currently no obvious Rhinitis  - lungs: no signs for active and present Asthma/Wheezing or coughing  - eyes: currently no active conjunctivits  - GI system/digestion: currently no problems, no bloating or Diarrhoea        Earlier History and Allergy exams:    Patient is continuing now the IT without any problems, since they are not using ItchEx anymore. Just using ice.  IT injections is done by an primary care PA in [facility]    > Patient had a reaction to Meloxicam about 3 h later with angioedema on face and then sun burn type of rash over face --> happened in November  > Ibuprofen does not induce any reaction (last taken last  Saturday)    With tramadol tremors and swellings of face and with Percacet (Oxycodon and Acetaminophen) migraines  Vicodin induces erythema      Today she reports that all skin manifestation has resolved. Patient has come from [place], [place]. Patient's spouse is present during evaluation. She reports her first allergy shots took place on November 9th, 2 injections on posterior aspect of right arm and 1 to the posterior aspect of left arm.   - Per chart review immunotherapy: trees/grass/weeds, mites/molds and animals  Within the day of the injection patient noted a diffuse pruritic rash on the posterior aspect of her right arm. She denies using any topicals or oral medications beside Zyrtec to aid with on going rash. She additionally iced the area and skin manifestation had resolved.     The following week she completed her 2nd round of immunotherapy at the same dosage. Again the rash occurred on the posterior aspect of her right arm along with outstanding edema of her right arm and shoulder. This rash was also pruritic, but again denied any pain. Her provider was concerned about reaction to the preservative. This rash took 1.5 week to self resolve. The edema persisted for 2-3 weeks and when resolved, a notable dent was appreciated on the right distal aspect of the deltoid. Imaging completed showed atrophy of the deltoid. She denied any recent injections to that area including routine vaccines or steroid injections. Also now having to areas of hypopigmentation overlying the deltoid.  Reports dry skin after resolution of rashes. Denies history of asthma. Extensive history of food sensitives and autoimmune workup that has been unremarkable.     Hx since 12/27/19:  The patient comes in today for patch testing day 1. She reports that there is a bruise on the R shoulder that has worsened in the interim. She is unsure if it may be necrosis or sudden muscle atrophy. She reports it appeared on Nov 16, the day after  "allergy shot. Also states that in the same area she developed a new \"bump\" that appeared about 2 days ago.   Reports that the IT on the R upper extremity was against trees, weeds, molds, mites, and dog. On the L upper extremity, she had IT injections for cats and dogs.   She is otherwise feeling well overall today. No other skin or allergy concerns at this time.     Medications:  - takes daily Zyrtec and is on IT  - Levothyroxin      Past Medical History:   Patient Active Problem List   Diagnosis     Allergic rhinitis due to dogs     Hypothyroid     Lumbar facet joint pain     Past Medical History:   Diagnosis Date     Constipation     Since young.     Other specified postprocedural states     2014,Slight recurrence within scar - removed in 9th grade and came back right away     Personal history of other medical treatment (CODE)      2, para 2.     Past Surgical History:   Procedure Laterality Date     COLONOSCOPY      ,\"swelling of the lining\" and flattening of villa.     OTHER SURGICAL HISTORY      ~,[id number],OTHER,Left,recurring within scar     OTHER SURGICAL HISTORY      2016,[id number],KNEE ARTHROSCOPY W/ MENISCAL REPAIR     OTHER SURGICAL HISTORY      2004,[id number],DILATION AND CURETTAGE,retained placenta       Social History:  Patient reports that she has never smoked. She has never used smokeless tobacco. She reports current alcohol use.    Family History:  Family History   Problem Relation Age of Onset     Family History Negative Father         Good Health,does not see doctor regularly     Thyroid Disease Mother         Thyroid Disease,Hypothyroid     Other - See Comments Mother         Smoker, prediabetes     Thyroid Disease Sister         Thyroid Disease,Hypothyroid     Arthritis Sister         Arthritis     Scoliosis Maternal Half-Sister         Scoliosis     Other - See Comments Maternal Half-Sister         No Known Problems       Medications:  Current Outpatient Medications "   Medication Sig Dispense Refill     fluorometholone (FML FORTE) 0.25 % ophthalmic susp        levothyroxine (SYNTHROID/LEVOTHROID) 75 MCG tablet Take 1 tablet (75 mcg) by mouth every morning (before breakfast) 90 tablet 4     Magnesium Citrate POWD by Does not apply route. Nagnesium Calm by Natural Vitality takes 2 to 3 tsp per day       meloxicam (ANJESO) 30 MG/ML injection Inject 1 mL (30 mg) into the vein once for 1 dose 1 mL 0     mometasone (NASONEX) 50 MCG/ACT spray 1 spray 2 times daily       predniSONE (DELTASONE) 50 MG tablet Emergency set: if severe allergic reaction take immediately 100mg Prednisone (2x50mg) and 2 Tabl Cetirizine 10mg and then write precise 12h retrospective diary.If less severe reaction take only the 2 Tabl Cetirizine 2 tablet 1     Thyroid (NATURE-THROID) 48.75 MG TABS          Allergies   Allergen Reactions     Cats Shortness Of Breath     Gentamicin Dermatitis     In patch tests severe reactions against Neomycine, Framycetine, Gentamicin, Bacitracine and Polymyxin.   For reasons of crossreactions I would as well avoid other aminoglycosides such as Vancomycine     Tramadol      Other reaction(s): Other - Describe In Comment Field  High doses caused yellow eyes, tremors in her mouth, couldn't feel some body parts.    No issues with low doses     Hydrocodone-Acetaminophen Rash       Review of Systems:  -As per HPI  -Constitutional: Otherwise feeling well today, in usual state of health.  -Skin: As above in HPI. No additional skin concerns.    Physical exam:  Vitals: There were no vitals taken for this visit.  GEN: This is a well developed, well-nourished female in no acute distress, in a pleasant mood.   SKIN: Focused examination of the bilateral upper extremities, face and upper chest was performed.   - There was atrophy of distal aspect of R deltoid with 2 overlying hypopigment macules overlying atrophy. No urtica or eczema appreciated.   -No other lesions of concern on areas  examined.     Order for PATCH TESTS    [x] Outpatient  [] Inpatient: Head..../ Bed ....      Skin Atopy (atopic dermatitis) [x] Yes   [] No  Rhinitis/Sinusitis:   [x] Yes   [] No  Allergic Asthma:   [] Yes   [x] No  Food Allergy:   [x] Yes   [] No  Leg ulcers:   [] Yes   [x] No  Hand eczema:   [] Yes   [x] No   Leading hand:   [x] R   [] L       [] Ambidextrous                      Reason for tests (suspected allergy): Assessment for potential reaction to disinfections before IT (less likely preservatives in IT, because the reaction not directly over injection site)  Known previous allergies: Trees/grass, mites, dogs/cats  Standardized panels  [x] Standard panel (40 tests)  [x] Preservatives & Antimicrobials (31 tests)  [x] Emulsifiers & Additives (25 tests)   [] Perfumes/Flavours & Plants (25 tests)  [] Hairdresser panel (12 tests)  [x] Rubber Chemicals (22 tests)  [x] Plastics (26 tests)  [] Colorants/Dyes/Food additives (20 tests)  [] Metals (implants/dental) (24 tests)  [] Local anaesthetics/NSAIDs (13 tests)  [x] Antibiotics & Antimycotics (14 tests)   [] Corticosteroids (15 tests)   [] Photopatch test (62 tests)   [] others: ...      [] Patient's own products: ...    DO NOT test if chemical or biological identity is unknown!     always ask from patient the product information and safety sheets (MSDS)   [] Atopy screen prick test (Atopic predisposition): ...    [] Patient needs consultation with Allergy team (changes of tests may apply)  [] Tests discussed with Allergy team (can have direct appointment for patch tests)    Atopy Screen (Placed 01/13/20 )    No Substance Readings (15 min) Evaluation   POS Histamine 1mg/ml ++    NEG NaCl 0.9% - Slight dermographism     No Substance Readings (15 min) Evaluation   1 Alternaria alternata (tenuis)  -    2 Cladosporium herbarum -    3 Aspergillus fumigatus -    4 Penicillium notatum -    5 Dermatophagoides pteronyssinus ++    6 Dermatophagoides farinae ++       Conclusion: immediate type reaction to house dust mites, but no delayed reaction to moulds or HDM --> The DP and DF reactions remained into the next day.     RESULTS & EVALUATION of PATCH TESTS    Patch test readings after     [x] 2 days, [] 3 days [x] 4 days, [] 5 days,    Applied patch tests with results (import here the list of patch tests):  Order documented by: IVAN HANCOCK LPN  Order reviewed by: Preeti Copeland LPN   Physician:   Date/time of application:1-  Localization of application: back >> Clear    STANDARD Series         No Substance 2 days 4 days remarks   1 Francesco Mix [C] - -    2 Colophony - -    3  2-Mercaptobenzothiazole  NA NA     4 Methylisothiazolinone - -    5 Carba Mix - -    6 Thiuram Mix [A] NA NA    7 Bisphenol A Epoxy Resin - -    8 C-Vafy-Svgulrbebgm-Formaldehyde Resin - -    9 Mercapto Mix [A] - -    10 Black Rubber Mix- PPD [B] NA NA    11 Potassium Dichromate  -  -    12 Balsam of [place] (Myroxylon Pereirae Resin) - -    13 Nickel Sulphate Hexahydrate - -    14 Mixed Dialkyl Thiourea - -    15 Paraben Mix [B] - -    16 Methyldibromo Glutaronitrile NA NA    17 Fragrance Mix - -    18 2-Bromo-2-Nitropropane-1,3-Diol (Bronopol) - -    19 Lyral - -    20 Tixocortol-21- Pivalate NA NA    21 Diazolidiyl Urea (Germall II) - -    22 Methyl Methacrylate NA NA    23 Cobalt (II) Chloride Hexahydrate - -    24 Fragrance Mix II  - -    25 Compositae Mix - -    26 Benzoyl Peroxide NA NA    27 Bacitracin - ++/+++    28 Formaldehyde - -    29 Methylchloroisothiazolinone / Methylisothiazolinone - -    30 Corticosteroid Mix - -    31 Sodium Lauryl Sulfate - -    32 Lanolin Alcohol - -    33 Turpentine - -    34 Cetylstearylalcohol - -    35 Chlorhexidine Dicluconate - -    36 Budenoside - -    37 Imidazolidinyl Urea  - -    38 Ethyl-2 Cyanoacrylate NA NA    39 Quaternium 15 (Dowicil 200) - -    40 Decyl Glucoside - -      EMULSIFIERS & ADDITIVES        No Substance 2 days 4 days  remarks   41 Polyethylene Glycol-400 NA NA    42 Cocamidopropyl Betaine - -    43 Amerchol L101 NA NA    44 Propylene Glycol - -    45 Triethanolamine - -    46 Sorbitane Sesquiolate - -    47 Isopropylmyristate - -    48 Polysorbate 80  - -    49 Amidoamine   (Stearamidopropyl Dimethylamine) - -    50 Oleamidopropyl Dimethylamine - -    51 Lauryl Glucoside NA NA    52 Coconut Diethanolamide  - -    53 2-Hydroxy-4-Methoxy Benzophenone (Oxybenzone) - -    54 Benzophenone-4 (Sulisobenzon) - -    55 Propolis - -    56 Dexpanthenol - -    57 Carboxymethyl Cellulose Sodium - -    58 Abitol - -    59 Tert-Butylhydroquinone - -    60 Benzyl Salicylate - -     Antioxidant      61 Dodecyl Gallate - -    62 Butylhydroxyanisole (BHA) - -    63 Butylhydroxytoluene (BHT) - -    64 Di-Alpha-Tocopherol (Vit E) - -    65 Propyl Gallate - -      RUBBER CHEMICALS         No Substance 2 days 4 days remarks    Carbamate      66 Zinc Bis ( Diethyldithio carbamate ) (ZDEC) - -    67 Zinc Bis (Dibutyldithiocarbamate) - -    68 1,3-Diphenylguanidine (DPG) - -     Thiourea      69 Dibutylthiourea - -    70 Diphenyltiourea - -    71 Thiourea - -     Mercapto chemicals      72 Morpholinyl Mercaptobenzothiazole - -    73 O-Qvjnkxxutb-6-Benzothiazyl-Sulfenamide - -    74 Dibenzothiazyl Disulfide - -     Thiuram chemicals      75 Dipentamethylenethiuram Disulfide - -    76 Tetraethylthiuram Disulfide (Disulfiram) - -    77 Tetramethylthiuram Disulfide - -    78 Tetramethyl Thiuram Monosulfide (TMTM) - -     4-Phenylenediamine derivatives      79 N-Isopropyl-N'-Phenyl-P-Phenylenediamine (IPPD) - -    80 Ixsjmhfk-B-Rxgvtdctdvvhotst (DPPD) - -     Various Rubber Additives      81 Hydroquinone Monobenzylether  - -    82 Hexamethylenetetramine - -    83 4,4'-Dihydroxybiphenyl - -    84 Cyclohexylthiophtalimide - -    85 Ethylenediamine Dihydrochloride - -    86 N-Phenyl-B-Naphthylamine - -    87 Dodecyl Mercaptan - -        PLASTICS         No  Substance 2 days 4 days remarks    Acrylates - -    88 2-Hydroxyethyl Methacrylate (HEMA) - -    89 1,4-Butandioldimethacrylate (BUDMA) - -    90  2-Ethylhexyl Acrylate - -    91 Bisphenol-A-Dimethacrylate  - -    92 Diurethane-Dimethacrylate - -    93 Ethyleneglycoldimethacrylate (EGDMA) - -    94 Pentaerythritoltriacrylate (CARLOS) - -    95 Triethylene Glycol Dimethacrylate (TEGDMA) - -     Synthetic material/additives       96 A-Menl-Xsbjozkrgvl - -    97 Tricresyl Phosphate - -    98 6-Dmdw-Rrbjkcifllogl - -    99 Bis (2-Ethylhexyl) Phthalate - -    100 Dibutylphthalate - -    101 Dimethylphthalate - -    102 Toluene-2,4-Diisocyanate - -    103 Diphenylmethane-4,4''-Diisocyanate - -     EPOXY RESIN SYSTEMS       Reactive Solvents - -    104 Cresyl Glycidyl Ether - -    105 Butyl Glycidyl Ether - -    106 Phenyl Glycidyl Ether - -    107 1,4-Butanediol Diglycidyl Ether - -    108 1,6-Hexanediole Diglycidyl Ether - -     Hardener / Accelerator - -    109 Ethylenediamine Dihydrochloride - -    110 Triethylenetetramine - -    111 Diethylenetriamine - -    112 Isophorone Diamine (IPD) - -    113 N,N-Dimethyl-P-Toluidine - -        PRESERVATIVES & ANTIMICROBIALS         No Substance 2 days 4 days remarks   114  1,2-Benzisothiazoline-3-One, Sodium Salt - -    115  1,3,5-Anival (2-Hydroxyethyl) - Hexahydrotriazine (Grotan BK) NA NA    116 3-Dnhtqkwhmuuiq-3-Nitro-1, 3-Propanediol - -    117  3, 4, 4' - Triclocarban - -    118 4 - Chloro - 3 - Cresol - -    119 4 - Chloro - 4 - Xylenol (PCMX) NA NA    120 7-Ethylbicyclooxazolidine (Bioban [id number]) - -    121 Benzalkonium Chloride - -    122 Benzyl Alcohol - -    123 Cetalkonium Chloride NA NA    124 Cetylpyrimidine Chloride  - -    125 Chloroacetamide - -    126 DMDM Hydantoin NA NA    127 Glutaraldehyde NA NA    128 Triclosan - -    129 Glyoxal Trimeric Dihydrate - -    130 Iodopropynyl Butylcarbamate - -    131 Octylisothiazoline NA NA    132 Iodoform - -    133  (Nitrobutyl) Morpholine/(Ethylnitro-Trimethylene) Dimorpholine (Bioban P 1487) - -    134 Phenoxyethanol NA NA    135 Phenyl Salicylate - -    136 Povidone Iodine - -    137 Sodium Benzoate - -    138 Sodium Disulfite NA NA    139 Sorbic Acid - -    140 Thimerosal - -     Parabens      141 Butyl-P-Hydroxybenzoate - -    142 Ethyl-P-Hydroxybenzoate - -    143 Methyl-P-Hydroxybenzoate NA NA    144 Propyl-P-Hydroxybenzoate - -         ANTIBIOTICS & ANTIMYCOTICS         No Substance 2 days 4 days remarks   145 Erythromycine - -    146 Framycetine Sulphate (+) +++    147 Fusidic Acid Sodium Salt - -    148 Gentamicin Sulphate - +    149 Neomycine Sulphate (+) ++/+++    150 Oxytetracycline  - -    151 Polymyxin B Sulphate (+) ++    152 Tetracycline-HCL - -    153 Sulfanilamide - -    154 Metronidazole - -    155 Oxyquinoline Mix - -    156 Nitrofurazone - -    157 Nystatin - -    158 Clotrimazole - -      PATIENTS OWN PRODUCTS         No Substance Conc  % solv 2 days 4 days remarks   159 Aspergillus   - -    160 Penicillium   - -    161 D. Farinae   - -    162 D. pteronyssinus   - -    163 Dog   - -    164 Cat   - -        Results of patch tests:                         Interpretation:    - Negative                    A    = Allergic      (+) Erythema    TI   = Toxic/irritant   + E + Infiltration    RaP = Relevance at Present     ++ E/I + Papulovesicle   Rpr  = Relevance Previously     +++ E/I/P + Blister     nR   = No Relevance       DRUGS/SUBSTANCES 1/19/2021    Control Tests (Placed 01/19/21 )    No Substance Readings (15min) Evaluation   POS Histamine 1mg/ml ++    NEG NaCl 0.9% - dermographism      Prick Tests        Substance/ Allergen Concentration Result (15min) Remarks   Meloxicam 30mg/ml as is neg     In vaseline neg Not bigger than neg ctr                       Intradermal Tests   immed immed delayed delayed     Substance Conc 1st dil 2nd dil   3 days  3 days remarks   Histamine Hydrochloride (ALK) 0.1 mg/ml         NaCl 0.9%        Meloxicam 1:200/ 1:20 neg neg   dermographism                                         Patch Tests   as is  as is 1:2 paraffin 1:2 paraffin     Substance Conc  days  days  days  days remarks                                                    [x] Allergic reaction diagnosed against:     Antibiotics and antimycotics:   Framycetine Sulphate +++  Gentamicin Sulphate +  Neomycine Sulphate ++/+++  Polymyxin B Sulphate ++  Bacitracin ++/+++    Interpretation/ remarks:   Patient has a severe delayed type contact sensitization to various antibiotics that are in many OTC antiseptics and antibiotics. No signs for allergies to rubber chemicals or adhesives. Therefore, we think that the reaction to the band-aids were maybe to topical antibiotics that were added onto the band-aid.       ==> final Diagnosis:    >>  Localized Eczematous and delayed-type reaction after 2x initial doses of immunotherapies  = reaction to itch-X cream    No signs for allergies to preservative in these allergy extracts     No signs in prick test or patch test for delayed type reaction to some of these allergens (dust mites, molds)    Severe contact sensitizations to topical antibiotics  ==> in open application test clearly positive to the itch-x cream that patient used to reduce the effects of the IT    ==> Comment: This was NOT a reaction to the immunotherpapy, but rather to the topical product used afterwards to treat the topical side effects of the IT. Not sure to what ingredient in itch-x was really reacting, but not to Parabens and Propylene Glycol.    It is imperative that the patient avoids all antibiotics, topically and systemically, that we have tested and that she was positive. Because gentamycin and neomycin were included I would avoid, for safety reasons, cross reacting aminoglycosides including vancomycin.     >> possible angioedema to NSAIDs (LEON-1 intolerance) to Meloxicam    In future use only Ibuprofen or  Tylenol    If again similar reactions with facial swelling, then take immediately 2 Tabl Zyrtec 10mg and then write 12h retrospective diary and make photo    Prescribed emergency set and given handout emergency treatment (has Epipen)      >> localized lipatrophy and pigmentary changes on upper arm    No signs for specific reaction to preservatives. However, it could be that at that time some topical antibiotic has been used that she reacted to and/or lipatrophy after Kenalog injections.     ==> Comment: It is difficult to directly correlate the atrophy on the upper arm to the contact sensitization even though the outline of the reaction looks like the outline of a band-aid. It might be that this atrophy has nothing to do with an allergic reaction. It might be important to really verify if it is a muscle or lipatrophy. Clinically it looks more like lipatrophy which can sometimes be idiopathic. Moreover, a local injection of corticosteroids in this area might as well explain the atrophy (and there was such an injection months ago; not sure if exactly same location?)      Final conclusions:    With the tendency of the patient to develop delayed-type reactions, I would in the moment not continue with the immunotherapy or we reduce it to only relevant allergens like house dust mite, but in that case we would maybe perform first prick, intradermal, and patch test with house dust mite immunotherapy to be sure that there is no delayed reaction to these extracts. We recommend just one single major allergen for immunotherapy, such as 50% DF/DP standardized from [facility].We could not see any delayed reaction to house dust mite test and IT solutions.      In patch tests severe reactions against Neomycine, Framycetine, Gentamicin, Bacitracine and Polymyxin. For reasons of crossreactions I would as well avoid other aminoglycosides such as Vancomycin. This was placed into the patient's allergy chart.     ==> Treatment  prescribed/Plan:    In the moment, patient to stop with immunotherapy or we will reduce it to only relevant allergens. We recommend just one single major allergen for immunotherapy, such as 50/50 mixture of DF/DP standardized from [facility].     Prescribed triamcinolone 0.1% cream. Apply topically 2 times daily for 5 days.     Go for antibiotic free foods if possible.  Maybe, in future, if we could plan additional allergy tests for delayed-type reactions with immunotherapy extracts and/or maybe tetanus toxoid vaccine. These tests should include as well possible cross-reacting antibiotics such aminoglycosides and particularly vancomycin.  Crossreactions between Framycetine/Neomycine and these other antibiotics possible.     These conclusions are made at the best of one's knowledge and belief based on the provided evidence such as patient's history and allergy test results and they can change over time or can be incomplete because of missing information's.    CC Ulysses Mtz MD  [facility]  [address] [place] [phone] on close of this encounter.    Follow-up with the referring provider Dr. Mtz to discuss IT. We will see her again if she would like to undergo more allergy testing.    See Email from 01/20/2020: I woke up Sunday morning to the results of my MRI in my MyChart. They state that there was NO muscle atrophy in my deltoid. I called my orthopedic doctor, the one who had said it WAS the muscle, and asked for clarification. He said after further review, it is NOT the muscle, but rather the fat layer.     I then called [facility] in [place] as my  remembered I had a steriod shot in [place]... I knew I'd be around dogs/cats and was being proactive to have a shot to help reduce potential allergies on the trip. That shot was on October 16, one month prior to noticing the dent in my deltoid. I confirmed with [facility] this morning that the kenalog steriod injection was in  my upper right arm deltoid. I think we've now solved the case of the atrophy! ==> most probably lip-atrophy after Kenalog injection!!    I also asked what was used to clean my arm before immunotheraphy and if anything was applied after to see if we could connect my reaction to the allergy shots to our new knowledge of my delayed reactions. They used an alcohol swab to prep and used Itch-X both times after the injection. These are the ingredients listed online for that cream: Active Ingredients: Benzyl alcohol 10% and pramoxine HCl 1%. Inactive ingredients: aloe barbadensis leaf juice (aloe vera gel), blue 1, butylene glycol, carbomer, diazolidinyl urea, methylparaben, propylene glycol, propylparaben, SD alcohol 40, styrene/acrylates copolymer, tetrahydroxypropyl ethylenediamine, and water. Could be one of the reasons. Maybe use the itch-x and perform open application test into elbow flexural side 2xdaily for 4 days and if any delayed reaction then this could be the explanation.    Would ANY of those ingredients possibly explain my delayed allergic reaction? Or are we back to theorizing that I had delayed reactions to multiple allergens that were given in the allergy shots?        Follow-up: in [facility] clinic virtual on Friday to evaluate the delayed reactions      Thank you for the opportunity be involved in the care of this patient.     Staff: Elba Eddy and Katy Hobbs RN      I spent a total of 32 minutes face to face with Gisela Richards during today s office visit. Over 50% of this time was spent discussing all the individual test results, correlating them to the clinical relevance, counseling the patient and/or coordinating care. Please see Assessment and Plan for details.  This excludes any time spent performing prick, intradermal and patch tests

## 2021-01-20 ENCOUNTER — THERAPY VISIT (OUTPATIENT)
Dept: CHIROPRACTIC MEDICINE | Facility: OTHER | Age: 41
End: 2021-01-20
Attending: CHIROPRACTOR
Payer: COMMERCIAL

## 2021-01-20 DIAGNOSIS — M54.2 CERVICALGIA: ICD-10-CM

## 2021-01-20 DIAGNOSIS — M99.05 SEGMENTAL AND SOMATIC DYSFUNCTION OF PELVIC REGION: ICD-10-CM

## 2021-01-20 DIAGNOSIS — M99.02 SEGMENTAL AND SOMATIC DYSFUNCTION OF THORACIC REGION: ICD-10-CM

## 2021-01-20 DIAGNOSIS — M54.6 PAIN IN THORACIC SPINE: ICD-10-CM

## 2021-01-20 DIAGNOSIS — M99.01 SEGMENTAL AND SOMATIC DYSFUNCTION OF CERVICAL REGION: ICD-10-CM

## 2021-01-20 DIAGNOSIS — G89.29 CHRONIC LOW BACK PAIN WITHOUT SCIATICA, UNSPECIFIED BACK PAIN LATERALITY: Primary | ICD-10-CM

## 2021-01-20 DIAGNOSIS — M54.50 CHRONIC LOW BACK PAIN WITHOUT SCIATICA, UNSPECIFIED BACK PAIN LATERALITY: Primary | ICD-10-CM

## 2021-01-20 PROCEDURE — 98941 CHIROPRACT MANJ 3-4 REGIONS: CPT | Mod: AT | Performed by: CHIROPRACTOR

## 2021-01-20 NOTE — PROGRESS NOTES
Visit #:  2    Subjective:  Gisela Richards is a 40 year old female who is seen in f/u up for:        Chronic low back pain without sciatica, unspecified back pain laterality  Segmental and somatic dysfunction of pelvic region  Pain in thoracic spine  Segmental and somatic dysfunction of thoracic region  Cervicalgia  Segmental and somatic dysfunction of cervical region.     Since last visit on 12/1/2020,  Gisela Richards reports: Has been experiencing increasing levels of low back pain over the past couple of weeks.  States that she is unsure of why these symptoms have been aggravated as history reveals no traumatic events or other overuse type injuries around that time.  Stated that she did go skating on the ice a few weeks ago but by this point symptoms had already been aggravated.  No reported sciatica symptoms present.    Continuing to work with physical therapy.  Prior to report that she is able to perform dead lifting with 55 pounds.  Finds course of treatment to be very beneficial and helpful    Attempted to do yoga and physical therapy stretches last night but was unable to due to pain symptoms.    Area of chief complaint:  Lumbar :  Symptoms are graded at 2-8/10. The quality is described as achey, sharp, and pinching.   Thoracic :  Symptoms are graded at 2/10. The quality is described as aching.    Cervical :  Symptoms are graded at 1/10. The quality is described as tight.       Objective:  The following was observed:  Neck Disability Index (  [employer] 1991. All rights reserved.; used with permission) 1/20/2021   SECTION 1 - PAIN INTENSITY 1   SECTION 2 - PERSONAL CARE 0   SECTION 3 - LIFTING 0   SECTION 4 - READING 1   SECTION 5 - HEADACHES 0   SECTION 6 - CONCENTRATION 0   SECTION 7 - WORK 0   SECTION 8 - DRIVING 1   SECTION 9 - SLEEPING 0   SECTION 10 - RECREATION 0   Count 10   Sum 3   Raw Score: /50 3   Neck Disability Index Score: (%) 6     Score improved from 12%.    Oswestry (JOAO)  Questionnaire    OSWESTRY DISABILITY INDEX 1/20/2021   Count 9   Sum 16   Oswestry Score (%) 35.56   Some recent data might be hidden      Score unchanged.      P: palpatory tenderness Present left PSIS, left side T3, right side C1:    A: static palpation demonstrates intersegmental asymmetry , cervical, thoracic, pelvis  R: motion palpation notes restricted motion, C1 , T3  and PSIS Left   T: No apparent spasms.  Taut tender fibers noted of the quadratus lumborum left side, right lumbar paraspinals, left rhomboids, suboccipitals bilaterally    Segmental spinal dysfunction/restrictions found at:  :  C1 Left rotation restricted and Right lateral flexion restricted  T3 Left lateral flexion restricted and Extension restriction  PSIS Left PI listing.      Assessment: Acute exacerbation of chronic back pain.    Patient was informed on today's visit that due to my own medical leave of absence I will be unable to provide care in the following weeks.  Patient has worked with chiropractor Dr. Blu Hoang D.C with beneficial results in the past.    Diagnoses:      1. Chronic low back pain without sciatica, unspecified back pain laterality    2. Segmental and somatic dysfunction of pelvic region    3. Pain in thoracic spine    4. Segmental and somatic dysfunction of thoracic region    5. Cervicalgia    6. Segmental and somatic dysfunction of cervical region        Patient's condition:  Patient had restrictions pre-manipulation and Symptoms come and go    Treatment effectiveness:  Post manipulation there is better intersegmental movement      Procedures:  CMT:  11492 Chiropractic manipulative treatment 3-4 regions performed   Cervical: Diversified, C1 , Supine  Thoracic: Diversified, T3, Prone  Pelvis: SOT blocks, PSIS Left , Prone, Attempted SOT with single drop.  This did cause low back symptoms to aggravate.    Modalities:  None performed this visit    Therapeutic procedures:  None    Response to Treatment  Exacerbation of  symptoms of the low back    Prognosis: Good    Progress towards Goals: Patient will report improved pain.              Patient will report able to stand longer than 10 minutes.              Patient will demonstrate an improved ability to complete Activities of Daily Living   as shown by a reported 10-30% reduced score on neck and/or back index.               Patient will demonstrate improved ROM.      Recommendations:    Instructions:ice 20 minutes every other hour as needed, heat 15 minutes every other hour as needed and Continue to be diligent with home stretches from physical therapy    Follow-up:  Return to care if symptoms persist.

## 2021-01-20 NOTE — PATIENT INSTRUCTIONS
ice 20 minutes every other hour as needed, heat 15 minutes every other hour as needed and Continue to be diligent with home stretches from physical therapy

## 2021-01-22 ENCOUNTER — VIRTUAL VISIT (OUTPATIENT)
Dept: ALLERGY | Facility: CLINIC | Age: 41
End: 2021-01-22
Payer: COMMERCIAL

## 2021-01-22 DIAGNOSIS — T78.3XXD ANGIOEDEMA, SUBSEQUENT ENCOUNTER: Primary | ICD-10-CM

## 2021-01-22 DIAGNOSIS — L23.89 ALLERGIC CONTACT DERMATITIS DUE TO OTHER AGENTS: ICD-10-CM

## 2021-01-22 PROCEDURE — 99212 OFFICE O/P EST SF 10 MIN: CPT | Mod: 95 | Performed by: DERMATOLOGY

## 2021-01-22 NOTE — PROGRESS NOTES
Note for return visit for Dermato-Allergy       Encounter Date: Jan 22, 2021    History of Present Illness:  I have reviewed the teledermatology  information and the nursing intake corresponding to this issue. Gisela Richards is a 40 year old female who presents as a teledermatology consult for the following information take directly from the prior notes and phone/video call with images in Epic media performed by myself:     today's virtual consult in Derm-Allergy clinic to evaluate the delayed reactions and     Says she hasn't noticed any reaction to the patch testing or where we injected. Would like to try the meloxicam for her back pain.     Additional comments and observations from review of the patient s chart including the following:    See notes    ROS: Patient generally feeling well today   Physical Examination:  General: Well-appearing, appropriately-developed individual.  - Skin: Examination of the test sites within the teledermatology was performed.   See test results below  As above in HPI. No additional skin concerns.  - upper respiratory tract: currently no obvious Rhinitis  - lungs: no signs for active and present Asthma/Wheezing or coughing  - eyes: currently no active conjunctivits    Earlier History and Allergy exams:     Ibuprofen does not induce any reaction.    > Patient had a reaction to Meloxicam in November. Took one Tabl for the first time and about 3-6h later with swelling and redness around the lips with dryness and then a sun burn type of rash over upper chest, stomach and back. No itching. No sun exposure involved. Redness disappeared after 1-2 days. Never tongue swelling or breathing problems. Out of chin skin wheeping. Took almost one week to fully recover.  ==> delayed type reaction    Earlier History and Allergy exams:    Patient is continuing now the IT without any problems, since they are not using ItchEx anymore. Just using ice.  IT injections is done by an primary care PA in  [facility] [place]    > Patient had a reaction to Meloxicam about 3 h later with angioedema on face and then sun burn type of rash over face --> happened in November  > Ibuprofen does not induce any reaction (last taken last Saturday)    With tramadol tremors and swellings of face and with Percacet (Oxycodon and Acetaminophen) migraines  Vicodin induces erythema      Today she reports that all skin manifestation has resolved. Patient has come from [place], [place]. Patient's spouse is present during evaluation. She reports her first allergy shots took place on November 9th, 2 injections on posterior aspect of right arm and 1 to the posterior aspect of left arm.   - Per chart review immunotherapy: trees/grass/weeds, mites/molds and animals  Within the day of the injection patient noted a diffuse pruritic rash on the posterior aspect of her right arm. She denies using any topicals or oral medications beside Zyrtec to aid with on going rash. She additionally iced the area and skin manifestation had resolved.     The following week she completed her 2nd round of immunotherapy at the same dosage. Again the rash occurred on the posterior aspect of her right arm along with outstanding edema of her right arm and shoulder. This rash was also pruritic, but again denied any pain. Her provider was concerned about reaction to the preservative. This rash took 1.5 week to self resolve. The edema persisted for 2-3 weeks and when resolved, a notable dent was appreciated on the right distal aspect of the deltoid. Imaging completed showed atrophy of the deltoid. She denied any recent injections to that area including routine vaccines or steroid injections. Also now having to areas of hypopigmentation overlying the deltoid.  Reports dry skin after resolution of rashes. Denies history of asthma. Extensive history of food sensitives and autoimmune workup that has been unremarkable.     Hx since 12/27/19:  The patient comes in  "today for patch testing day 1. She reports that there is a bruise on the R shoulder that has worsened in the interim. She is unsure if it may be necrosis or sudden muscle atrophy. She reports it appeared on , the day after allergy shot. Also states that in the same area she developed a new \"bump\" that appeared about 2 days ago.   Reports that the IT on the R upper extremity was against trees, weeds, molds, mites, and dog. On the L upper extremity, she had IT injections for cats and dogs.   She is otherwise feeling well overall today. No other skin or allergy concerns at this time.     Medications:  - takes daily Zyrtec and is on IT  - Levothyroxin      Past Medical History:   Patient Active Problem List   Diagnosis     Allergic rhinitis due to dogs     Hypothyroid     Lumbar facet joint pain     Past Medical History:   Diagnosis Date     Constipation     Since young.     Other specified postprocedural states     2014,Slight recurrence within scar - removed in 9th grade and came back right away     Personal history of other medical treatment (CODE)      2, para 2.     Past Surgical History:   Procedure Laterality Date     COLONOSCOPY      ,\"swelling of the lining\" and flattening of villa.     OTHER SURGICAL HISTORY      ~,[id number],OTHER,Left,recurring within scar     OTHER SURGICAL HISTORY      2016,[id number],KNEE ARTHROSCOPY W/ MENISCAL REPAIR     OTHER SURGICAL HISTORY      2004,[id number],DILATION AND CURETTAGE,retained placenta       Social History:  Patient reports that she has never smoked. She has never used smokeless tobacco. She reports current alcohol use.    Family History:  Family History   Problem Relation Age of Onset     Family History Negative Father         Good Health,does not see doctor regularly     Thyroid Disease Mother         Thyroid Disease,Hypothyroid     Other - See Comments Mother         Smoker, prediabetes     Thyroid Disease Sister         Thyroid " Disease,Hypothyroid     Arthritis Sister         Arthritis     Scoliosis Maternal Half-Sister         Scoliosis     Other - See Comments Maternal Half-Sister         No Known Problems       Medications:  Current Outpatient Medications   Medication Sig Dispense Refill     fluorometholone (FML FORTE) 0.25 % ophthalmic susp        levothyroxine (SYNTHROID/LEVOTHROID) 75 MCG tablet Take 1 tablet (75 mcg) by mouth every morning (before breakfast) 90 tablet 4     Magnesium Citrate POWD by Does not apply route. Nagnesium Calm by Natural Vitality takes 2 to 3 tsp per day       meloxicam (ANJESO) 30 MG/ML injection Inject 1 mL (30 mg) into the vein once for 1 dose 1 mL 0     meloxicam (ANJESO) 30 MG/ML injection Inject 1 mL (30 mg) into the vein once for 1 dose 1 mL 0     mometasone (NASONEX) 50 MCG/ACT spray 1 spray 2 times daily       predniSONE (DELTASONE) 50 MG tablet Emergency set: if severe allergic reaction take immediately 100mg Prednisone (2x50mg) and 2 Tabl Cetirizine 10mg and then write precise 12h retrospective diary.If less severe reaction take only the 2 Tabl Cetirizine 2 tablet 1     Thyroid (NATURE-THROID) 48.75 MG TABS          Allergies   Allergen Reactions     Cats Shortness Of Breath     Gentamicin Dermatitis     In patch tests severe reactions against Neomycine, Framycetine, Gentamicin, Bacitracine and Polymyxin.   For reasons of crossreactions I would as well avoid other aminoglycosides such as Vancomycine     Tramadol      Other reaction(s): Other - Describe In Comment Field  High doses caused yellow eyes, tremors in her mouth, couldn't feel some body parts.    No issues with low doses     Hydrocodone-Acetaminophen Rash     Order for PATCH TESTS    [x] Outpatient  [] Inpatient: Head..../ Bed ....      Skin Atopy (atopic dermatitis) [x] Yes   [] No  Rhinitis/Sinusitis:   [x] Yes   [] No  Allergic Asthma:   [] Yes   [x] No  Food Allergy:   [x] Yes   [] No  Leg ulcers:   [] Yes   [x] No  Hand eczema:   []  Yes   [x] No   Leading hand:   [x] R   [] L       [] Ambidextrous                      Reason for tests (suspected allergy): Assessment for potential reaction to disinfections before IT (less likely preservatives in IT, because the reaction not directly over injection site)  Known previous allergies: Trees/grass, mites, dogs/cats  Standardized panels  [x] Standard panel (40 tests)  [x] Preservatives & Antimicrobials (31 tests)  [x] Emulsifiers & Additives (25 tests)   [] Perfumes/Flavours & Plants (25 tests)  [] Hairdresser panel (12 tests)  [x] Rubber Chemicals (22 tests)  [x] Plastics (26 tests)  [] Colorants/Dyes/Food additives (20 tests)  [] Metals (implants/dental) (24 tests)  [] Local anaesthetics/NSAIDs (13 tests)  [x] Antibiotics & Antimycotics (14 tests)   [] Corticosteroids (15 tests)   [] Photopatch test (62 tests)   [] others: ...      [] Patient's own products: ...    DO NOT test if chemical or biological identity is unknown!     always ask from patient the product information and safety sheets (MSDS)   [] Atopy screen prick test (Atopic predisposition): ...    [] Patient needs consultation with Allergy team (changes of tests may apply)  [] Tests discussed with Allergy team (can have direct appointment for patch tests)    Atopy Screen (Placed 01/13/20 )    No Substance Readings (15 min) Evaluation   POS Histamine 1mg/ml ++    NEG NaCl 0.9% - Slight dermographism     No Substance Readings (15 min) Evaluation   1 Alternaria alternata (tenuis)  -    2 Cladosporium herbarum -    3 Aspergillus fumigatus -    4 Penicillium notatum -    5 Dermatophagoides pteronyssinus ++    6 Dermatophagoides farinae ++      Conclusion: immediate type reaction to house dust mites, but no delayed reaction to moulds or HDM --> The DP and DF reactions remained into the next day.     RESULTS & EVALUATION of PATCH TESTS    Patch test readings after     [x] 2 days, [] 3 days [x] 4 days, [] 5 days,    Applied patch tests with  results (import here the list of patch tests):  Order documented by: IVAN HANCOCK LPN  Order reviewed by: Preeti Copeland LPN   Physician:   Date/time of application:1-  Localization of application: back >> Clear    STANDARD Series         No Substance 2 days 4 days remarks   1 Francesco Mix [C] - -    2 Colophony - -    3  2-Mercaptobenzothiazole  NA NA     4 Methylisothiazolinone - -    5 Carba Mix - -    6 Thiuram Mix [A] NA NA    7 Bisphenol A Epoxy Resin - -    8 I-Yrwf-Uuaczycgggy-Formaldehyde Resin - -    9 Mercapto Mix [A] - -    10 Black Rubber Mix- PPD [B] NA NA    11 Potassium Dichromate  -  -    12 Balsam of [place] (Myroxylon Pereirae Resin) - -    13 Nickel Sulphate Hexahydrate - -    14 Mixed Dialkyl Thiourea - -    15 Paraben Mix [B] - -    16 Methyldibromo Glutaronitrile NA NA    17 Fragrance Mix - -    18 2-Bromo-2-Nitropropane-1,3-Diol (Bronopol) - -    19 Lyral - -    20 Tixocortol-21- Pivalate NA NA    21 Diazolidiyl Urea (Germall II) - -    22 Methyl Methacrylate NA NA    23 Cobalt (II) Chloride Hexahydrate - -    24 Fragrance Mix II  - -    25 Compositae Mix - -    26 Benzoyl Peroxide NA NA    27 Bacitracin - ++/+++    28 Formaldehyde - -    29 Methylchloroisothiazolinone / Methylisothiazolinone - -    30 Corticosteroid Mix - -    31 Sodium Lauryl Sulfate - -    32 Lanolin Alcohol - -    33 Turpentine - -    34 Cetylstearylalcohol - -    35 Chlorhexidine Dicluconate - -    36 Budenoside - -    37 Imidazolidinyl Urea  - -    38 Ethyl-2 Cyanoacrylate NA NA    39 Quaternium 15 (Dowicil 200) - -    40 Decyl Glucoside - -      EMULSIFIERS & ADDITIVES        No Substance 2 days 4 days remarks   41 Polyethylene Glycol-400 NA NA    42 Cocamidopropyl Betaine - -    43 Amerchol L101 NA NA    44 Propylene Glycol - -    45 Triethanolamine - -    46 Sorbitane Sesquiolate - -    47 Isopropylmyristate - -    48 Polysorbate 80  - -    49 Amidoamine   (Stearamidopropyl Dimethylamine) - -    50  Oleamidopropyl Dimethylamine - -    51 Lauryl Glucoside NA NA    52 Coconut Diethanolamide  - -    53 2-Hydroxy-4-Methoxy Benzophenone (Oxybenzone) - -    54 Benzophenone-4 (Sulisobenzon) - -    55 Propolis - -    56 Dexpanthenol - -    57 Carboxymethyl Cellulose Sodium - -    58 Abitol - -    59 Tert-Butylhydroquinone - -    60 Benzyl Salicylate - -     Antioxidant      61 Dodecyl Gallate - -    62 Butylhydroxyanisole (BHA) - -    63 Butylhydroxytoluene (BHT) - -    64 Di-Alpha-Tocopherol (Vit E) - -    65 Propyl Gallate - -      RUBBER CHEMICALS         No Substance 2 days 4 days remarks    Carbamate      66 Zinc Bis ( Diethyldithio carbamate ) (ZDEC) - -    67 Zinc Bis (Dibutyldithiocarbamate) - -    68 1,3-Diphenylguanidine (DPG) - -     Thiourea      69 Dibutylthiourea - -    70 Diphenyltiourea - -    71 Thiourea - -     Mercapto chemicals      72 Morpholinyl Mercaptobenzothiazole - -    73 N-Kfnuiifutp-5-Benzothiazyl-Sulfenamide - -    74 Dibenzothiazyl Disulfide - -     Thiuram chemicals      75 Dipentamethylenethiuram Disulfide - -    76 Tetraethylthiuram Disulfide (Disulfiram) - -    77 Tetramethylthiuram Disulfide - -    78 Tetramethyl Thiuram Monosulfide (TMTM) - -     4-Phenylenediamine derivatives      79 N-Isopropyl-N'-Phenyl-P-Phenylenediamine (IPPD) - -    80 Idvyzoei-I-Nnfjxbotchsrpuba (DPPD) - -     Various Rubber Additives      81 Hydroquinone Monobenzylether  - -    82 Hexamethylenetetramine - -    83 4,4'-Dihydroxybiphenyl - -    84 Cyclohexylthiophtalimide - -    85 Ethylenediamine Dihydrochloride - -    86 N-Phenyl-B-Naphthylamine - -    87 Dodecyl Mercaptan - -        PLASTICS         No Substance 2 days 4 days remarks    Acrylates - -    88 2-Hydroxyethyl Methacrylate (HEMA) - -    89 1,4-Butandioldimethacrylate (BUDMA) - -    90  2-Ethylhexyl Acrylate - -    91 Bisphenol-A-Dimethacrylate  - -    92 Diurethane-Dimethacrylate - -    93 Ethyleneglycoldimethacrylate (EGDMA) - -    94  Pentaerythritoltriacrylate (CARLOS) - -    95 Triethylene Glycol Dimethacrylate (TEGDMA) - -     Synthetic material/additives       96 T-Cone-Slmvekhsspf - -    97 Tricresyl Phosphate - -    98 9-Pkkn-Heevkocypdhjp - -    99 Bis (2-Ethylhexyl) Phthalate - -    100 Dibutylphthalate - -    101 Dimethylphthalate - -    102 Toluene-2,4-Diisocyanate - -    103 Diphenylmethane-4,4''-Diisocyanate - -     EPOXY RESIN SYSTEMS       Reactive Solvents - -    104 Cresyl Glycidyl Ether - -    105 Butyl Glycidyl Ether - -    106 Phenyl Glycidyl Ether - -    107 1,4-Butanediol Diglycidyl Ether - -    108 1,6-Hexanediole Diglycidyl Ether - -     Hardener / Accelerator - -    109 Ethylenediamine Dihydrochloride - -    110 Triethylenetetramine - -    111 Diethylenetriamine - -    112 Isophorone Diamine (IPD) - -    113 N,N-Dimethyl-P-Toluidine - -        PRESERVATIVES & ANTIMICROBIALS         No Substance 2 days 4 days remarks   114  1,2-Benzisothiazoline-3-One, Sodium Salt - -    115  1,3,5-Anival (2-Hydroxyethyl) - Hexahydrotriazine (Grotan BK) NA NA    116 5-Fqgcikzyaqlqj-7-Nitro-1, 3-Propanediol - -    117  3, 4, 4' - Triclocarban - -    118 4 - Chloro - 3 - Cresol - -    119 4 - Chloro - 4 - Xylenol (PCMX) NA NA    120 7-Ethylbicyclooxazolidine ([employer]a [employer]rapeuticsan [id number]) - -    121 Benzalkonium Chloride - -    122 Benzyl Alcohol - -    123 Cetalkonium Chloride NA NA    124 Cetylpyrimidine Chloride  - -    125 Chloroacetamide - -    126 DMDM Hydantoin NA NA    127 Glutaraldehyde NA NA    128 Triclosan - -    129 Glyoxal Trimeric Dihydrate - -    130 Iodopropynyl Butylcarbamate - -    131 Octylisothiazoline NA NA    132 Iodoform - -    133 (Nitrobutyl) Morpholine/(Ethylnitro-Trimethylene) Dimorpholine (Bioban P 1487) - -    134 Phenoxyethanol NA NA    135 Phenyl Salicylate - -    136 Povidone Iodine - -    137 Sodium Benzoate - -    138 Sodium Disulfite NA NA    139 Sorbic Acid - -    140 Thimerosal - -     Parabens      141  Butyl-P-Hydroxybenzoate - -    142 Ethyl-P-Hydroxybenzoate - -    143 Methyl-P-Hydroxybenzoate NA NA    144 Propyl-P-Hydroxybenzoate - -         ANTIBIOTICS & ANTIMYCOTICS         No Substance 2 days 4 days remarks   145 Erythromycine - -    146 Framycetine Sulphate (+) +++    147 Fusidic Acid Sodium Salt - -    148 Gentamicin Sulphate - +    149 Neomycine Sulphate (+) ++/+++    150 Oxytetracycline  - -    151 Polymyxin B Sulphate (+) ++    152 Tetracycline-HCL - -    153 Sulfanilamide - -    154 Metronidazole - -    155 Oxyquinoline Mix - -    156 Nitrofurazone - -    157 Nystatin - -    158 Clotrimazole - -      PATIENTS OWN PRODUCTS         No Substance Conc  % solv 2 days 4 days remarks   159 Aspergillus   - -    160 Penicillium   - -    161 D. Farinae   - -    162 D. pteronyssinus   - -    163 Dog   - -    164 Cat   - -        Results of patch tests:                         Interpretation:    - Negative                    A    = Allergic      (+) Erythema    TI   = Toxic/irritant   + E + Infiltration    RaP = Relevance at Present     ++ E/I + Papulovesicle   Rpr  = Relevance Previously     +++ E/I/P + Blister     nR   = No Relevance       DRUGS/SUBSTANCES 1/19/2021    Control Tests (Placed 01/19/21 )    No Substance Readings (15min) Evaluation   POS Histamine 1mg/ml ++    NEG NaCl 0.9% - dermographism      Prick Tests        Substance/ Allergen Concentration Result (15min) Remarks   Meloxicam 30mg/ml as is neg     In vaseline neg Not bigger than neg ctr     Intradermal Tests   immed immed delayed delayed     Substance Conc 1st dil 2nd dil   3 days  3 days remarks   Histamine Hydrochloride (ALK) 0.1 mg/ml        NaCl 0.9%        Meloxicam 1:200/ 1:20 neg neg neg  dermographism     Patch Tests   as is  as is 1:2 paraffin 1:2 paraffin     Substance Conc  3 days  days  3 days  days remarks   Meloxicam  - - - -        [x] Allergic reaction diagnosed against:     Antibiotics and antimycotics:   Framycetine Sulphate  +++  Gentamicin Sulphate +  Neomycine Sulphate ++/+++  Polymyxin B Sulphate ++  Bacitracin ++/+++    Interpretation/ remarks:   Patient has a severe delayed type contact sensitization to various antibiotics that are in many OTC antiseptics and antibiotics. No signs for allergies to rubber chemicals or adhesives. Therefore, we think that the reaction to the band-aids were maybe to topical antibiotics that were added onto the band-aid.       ==> final Diagnosis:    >>  Localized Eczematous and delayed-type reaction after 2x initial doses of immunotherapies  = reaction to itch-X cream    No signs for allergies to preservative in these allergy extracts     No signs in prick test or patch test for delayed type reaction to some of these allergens (dust mites, molds)    Severe contact sensitizations to topical antibiotics  ==> in open application test clearly positive to the itch-x cream that patient used to reduce the effects of the IT    ==> Comment: This was NOT a reaction to the immunotherpapy, but rather to the topical product used afterwards to treat the topical side effects of the IT. Not sure to what ingredient in itch-x was really reacting, but not to Parabens and Propylene Glycol.    It is imperative that the patient avoids all antibiotics, topically and systemically, that we have tested and that she was positive. Because gentamycin and neomycin were included I would avoid, for safety reasons, cross reacting aminoglycosides including vancomycin.     >> Unlikely angioedema reaction to Meloxicam    In skin tests no signs for specific immediate type or delayed reaction to Meloxicam. Patient desires to restart Meloxicam for back pain.     No evidence for COX1 intolerance (Ibuprofen is well-tolerated)    Suspect that her prior reaction was unrelated to the Meloxicam    Can restart meloxicam 1/4 tablet, then 1-2 days later, 1/2 tablet, then 1-2 days later she can try a full tablet.     Has emergency set and given  handout emergency treatment (has Epipen)      >> localized lipatrophy and pigmentary changes on upper arm    No signs for specific reaction to preservatives. However, it could be that at that time some topical antibiotic has been used that she reacted to and/or lipatrophy after Kenalog injections.     ==> Comment: It is difficult to directly correlate the atrophy on the upper arm to the contact sensitization even though the outline of the reaction looks like the outline of a band-aid. It might be that this atrophy has nothing to do with an allergic reaction. It might be important to really verify if it is a muscle or lipatrophy. Clinically it looks more like lipatrophy which can sometimes be idiopathic. Moreover, a local injection of corticosteroids in this area might as well explain the atrophy (and there was such an injection months ago; not sure if exactly same location?)      Final conclusions:    With the tendency of the patient to develop delayed-type reactions, I would in the moment not continue with the immunotherapy or we reduce it to only relevant allergens like house dust mite, but in that case we would maybe perform first prick, intradermal, and patch test with house dust mite immunotherapy to be sure that there is no delayed reaction to these extracts. We recommend just one single major allergen for immunotherapy, such as 50% DF/DP standardized from [facility].We could not see any delayed reaction to house dust mite test and IT solutions.      In patch tests severe reactions against Neomycine, Framycetine, Gentamicin, Bacitracine and Polymyxin. For reasons of crossreactions I would as well avoid other aminoglycosides such as Vancomycin. This was placed into the patient's allergy chart.       Unlikely that she had an angioedema reaction to Meloxicam given negative prick, intradermal, and patch testing. Also do not suspect COX1 intolerance given that she tolerates ibuprofen. Discussed with patient that she can  try to restart the Meloxicam as instructed started with 1/4 tablet as long as she has emergency set on hand.   ==> Treatment prescribed/Plan:    In the moment, patient to stop with immunotherapy or we will reduce it to only relevant allergens. We recommend just one single major allergen for immunotherapy, such as 50/50 mixture of DF/DP standardized from [facility].     Prescribed triamcinolone 0.1% cream. Apply topically 2 times daily for 5 days.     Go for antibiotic free foods if possible.    Can try restarting Meloxicam as above as long as she has the emergency set on hand and is accompanied by another person   Maybe, in future, if we could plan additional allergy tests for delayed-type reactions with immunotherapy extracts and/or maybe tetanus toxoid vaccine. These tests should include as well possible cross-reacting antibiotics such aminoglycosides and particularly vancomycin.  Crossreactions between Framycetine/Neomycine and these other antibiotics possible.     These conclusions are made at the best of one's knowledge and belief based on the provided evidence such as patient's history and allergy test results and they can change over time or can be incomplete because of missing information's.    CC Ulysses Mtz MD  [facility]  [address] [place] [id number] on close of this encounter.    Follow-up with the referring provider Dr. Mtz to discuss IT. We will see her again if she would like to undergo more allergy testing.    See Email from 01/20/2020: I woke up Sunday morning to the results of my MRI in my MyChart. They state that there was NO muscle atrophy in my deltoid. I called my orthopedic doctor, the one who had said it WAS the muscle, and asked for clarification. He said after further review, it is NOT the muscle, but rather the fat layer.     I then called [facility] in [place] as my  remembered I had a steriod shot in [place]... I knew I'd be around dogs/cats  and was being proactive to have a shot to help reduce potential allergies on the trip. That shot was on October 16, one month prior to noticing the dent in my deltoid. I confirmed with Kenny this morning that the kenalog steriod injection was in my upper right arm deltoid. I think we've now solved the case of the atrophy! ==> most probably lip-atrophy after Kenalog injection!!    I also asked what was used to clean my arm before immunotheraphy and if anything was applied after to see if we could connect my reaction to the allergy shots to our new knowledge of my delayed reactions. They used an alcohol swab to prep and used Itch-X both times after the injection. These are the ingredients listed online for that cream: Active Ingredients: Benzyl alcohol 10% and pramoxine HCl 1%. Inactive ingredients: aloe barbadensis leaf juice (aloe vera gel), blue 1, butylene glycol, carbomer, diazolidinyl urea, methylparaben, propylene glycol, propylparaben, SD alcohol 40, styrene/acrylates copolymer, tetrahydroxypropyl ethylenediamine, and water. Could be one of the reasons. Maybe use the itch-x and perform open application test into elbow flexural side 2xdaily for 4 days and if any delayed reaction then this could be the explanation.    Would ANY of those ingredients possibly explain my delayed allergic reaction? Or are we back to theorizing that I had delayed reactions to multiple allergens that were given in the allergy shots?      Patient counseling:  Discussed with patient it is unlikely that she had an angioedema reaction to Meloxicam given negative prick, intradermal, and patch testing. Also do not suspect COX1 intolerance given that she tolerated ibuprofen. Discussed with patient that she can try to restart the Meloxicam as instructed started with 1/4 tablet as long as she has emergency set on hand.     Follow-up: in Derm-Allergy clinic as needed    Thank you for the opportunity be involved in the care of this patient.      Staff: Elba Eddy and Katy Hobbs, RN  Lelo Husain MD, Dermatology Resident, PGY-2    Staff Physician Comments:  I saw and evaluated the patient with the resident and I agree with the assessment and plan as documented in the resident's note.    Reginaldo Horne MD  Professor  Head of Dermato-Allergy Division  Department of Dermatology  [facility]    ___________________________________________________________________________    Teledermatology information:  - Location of patient: Home  - Patient presented in referral from: other  - Location of teledermatologist:  [facility] [place] (, [place], [place])  - Reason teledermatology is appropriate:  of National Emergency Regarding Coronavirus disease (COVID 19) Outbreak  - Method of transmission:  Store and Forward and Video ( Invitation sent by:  Clixt[employer] and text to cell phone )  - Date of images: 1/22/21  - Service start time: 10:35  - Service end time: 10:47  - Date of report: 01/21/21    I spent a total of 12 minutes for telemedicine consult with Gisela Richards during today s video meeting. Over 50% of this time was spent counseling the patient and/or coordinating care. Please see Assessment and Plan for details.

## 2021-01-22 NOTE — PATIENT INSTRUCTIONS
You can try to restart the meloxicam. Start by taking a quarter of one tablet. Wait 1-2 days. If you do not have any reaction, you can then try a half on one tablet. Wait 1-2 days. If you do not have any reaction, you can then try a full tablet.     Have your prednisone pills and epipen available in case you react.

## 2021-01-22 NOTE — LETTER
1/22/2021         RE: Gisela Richards  [address]  [facility] [address]        Dear Colleague,    Thank you for referring your patient, Gisela Richards, to the [facility] [place]. Please see a copy of my visit note below.    Note for return visit for Dermato-Allergy       Encounter Date: Jan 22, 2021    History of Present Illness:  I have reviewed the teledermatology  information and the nursing intake corresponding to this issue. Giseal Richards is a 40 year old female who presents as a teledermatology consult for the following information take directly from the prior notes and phone/video call with images in Epic media performed by myself:     today's virtual consult in Derm-Allergy clinic to evaluate the delayed reactions and     Says she hasn't noticed any reaction to the patch testing or where we injected. Would like to try the meloxicam for her back pain.     Additional comments and observations from review of the patient s chart including the following:    See notes    ROS: Patient generally feeling well today   Physical Examination:  General: Well-appearing, appropriately-developed individual.  - Skin: Examination of the test sites within the teledermatology was performed.   See test results below  As above in HPI. No additional skin concerns.  - upper respiratory tract: currently no obvious Rhinitis  - lungs: no signs for active and present Asthma/Wheezing or coughing  - eyes: currently no active conjunctivits    Earlier History and Allergy exams:     Ibuprofen does not induce any reaction.    > Patient had a reaction to Meloxicam in November. Took one Tabl for the first time and about 3-6h later with swelling and redness around the lips with dryness and then a sun burn type of rash over upper chest, stomach and back. No itching. No sun exposure involved. Redness disappeared after 1-2 days. Never tongue swelling or breathing problems. Out of chin skin wheeping.  Took almost one week to fully recover.  ==> delayed type reaction    Earlier History and Allergy exams:    Patient is continuing now the IT without any problems, since they are not using ItchEx anymore. Just using ice.  IT injections is done by an primary care PA in [facility]    > Patient had a reaction to Meloxicam about 3 h later with angioedema on face and then sun burn type of rash over face --> happened in November  > Ibuprofen does not induce any reaction (last taken last Saturday)    With tramadol tremors and swellings of face and with Percacet (Oxycodon and Acetaminophen) migraines  Vicodin induces erythema      Today she reports that all skin manifestation has resolved. Patient has come from [place], [place]. Patient's spouse is present during evaluation. She reports her first allergy shots took place on November 9th, 2 injections on posterior aspect of right arm and 1 to the posterior aspect of left arm.   - Per chart review immunotherapy: trees/grass/weeds, mites/molds and animals  Within the day of the injection patient noted a diffuse pruritic rash on the posterior aspect of her right arm. She denies using any topicals or oral medications beside Zyrtec to aid with on going rash. She additionally iced the area and skin manifestation had resolved.     The following week she completed her 2nd round of immunotherapy at the same dosage. Again the rash occurred on the posterior aspect of her right arm along with outstanding edema of her right arm and shoulder. This rash was also pruritic, but again denied any pain. Her provider was concerned about reaction to the preservative. This rash took 1.5 week to self resolve. The edema persisted for 2-3 weeks and when resolved, a notable dent was appreciated on the right distal aspect of the deltoid. Imaging completed showed atrophy of the deltoid. She denied any recent injections to that area including routine vaccines or steroid injections. Also  "now having to areas of hypopigmentation overlying the deltoid.  Reports dry skin after resolution of rashes. Denies history of asthma. Extensive history of food sensitives and autoimmune workup that has been unremarkable.     Hx since 19:  The patient comes in today for patch testing day 1. She reports that there is a bruise on the R shoulder that has worsened in the interim. She is unsure if it may be necrosis or sudden muscle atrophy. She reports it appeared on , the day after allergy shot. Also states that in the same area she developed a new \"bump\" that appeared about 2 days ago.   Reports that the IT on the R upper extremity was against trees, weeds, molds, mites, and dog. On the L upper extremity, she had IT injections for cats and dogs.   She is otherwise feeling well overall today. No other skin or allergy concerns at this time.     Medications:  - takes daily Zyrtec and is on IT  - Levothyroxin      Past Medical History:   Patient Active Problem List   Diagnosis     Allergic rhinitis due to dogs     Hypothyroid     Lumbar facet joint pain     Past Medical History:   Diagnosis Date     Constipation     Since young.     Other specified postprocedural states     2014,Slight recurrence within scar - removed in 9th grade and came back right away     Personal history of other medical treatment (CODE)      2, para 2.     Past Surgical History:   Procedure Laterality Date     COLONOSCOPY      ,\"swelling of the lining\" and flattening of villa.     OTHER SURGICAL HISTORY      ~,[id number],OTHER,Left,recurring within scar     OTHER SURGICAL HISTORY      2016,[id number],KNEE ARTHROSCOPY W/ MENISCAL REPAIR     OTHER SURGICAL HISTORY      2004,[id number],DILATION AND CURETTAGE,retained placenta       Social History:  Patient reports that she has never smoked. She has never used smokeless tobacco. She reports current alcohol use.    Family History:  Family History   Problem Relation Age of " Onset     Family History Negative Father         Good Health,does not see doctor regularly     Thyroid Disease Mother         Thyroid Disease,Hypothyroid     Other - See Comments Mother         Smoker, prediabetes     Thyroid Disease Sister         Thyroid Disease,Hypothyroid     Arthritis Sister         Arthritis     Scoliosis Maternal Half-Sister         Scoliosis     Other - See Comments Maternal Half-Sister         No Known Problems       Medications:  Current Outpatient Medications   Medication Sig Dispense Refill     fluorometholone (FML FORTE) 0.25 % ophthalmic susp        levothyroxine (SYNTHROID/LEVOTHROID) 75 MCG tablet Take 1 tablet (75 mcg) by mouth every morning (before breakfast) 90 tablet 4     Magnesium Citrate POWD by Does not apply route. Nagnesium Calm by Natural Vitality takes 2 to 3 tsp per day       meloxicam (ANJESO) 30 MG/ML injection Inject 1 mL (30 mg) into the vein once for 1 dose 1 mL 0     meloxicam (ANJESO) 30 MG/ML injection Inject 1 mL (30 mg) into the vein once for 1 dose 1 mL 0     mometasone (NASONEX) 50 MCG/ACT spray 1 spray 2 times daily       predniSONE (DELTASONE) 50 MG tablet Emergency set: if severe allergic reaction take immediately 100mg Prednisone (2x50mg) and 2 Tabl Cetirizine 10mg and then write precise 12h retrospective diary.If less severe reaction take only the 2 Tabl Cetirizine 2 tablet 1     Thyroid (NATURE-THROID) 48.75 MG TABS          Allergies   Allergen Reactions     Cats Shortness Of Breath     Gentamicin Dermatitis     In patch tests severe reactions against Neomycine, Framycetine, Gentamicin, Bacitracine and Polymyxin.   For reasons of crossreactions I would as well avoid other aminoglycosides such as Vancomycine     Tramadol      Other reaction(s): Other - Describe In Comment Field  High doses caused yellow eyes, tremors in her mouth, couldn't feel some body parts.    No issues with low doses     Hydrocodone-Acetaminophen Rash     Order for PATCH  TESTS    [x] Outpatient  [] Inpatient: Head..../ Bed ....      Skin Atopy (atopic dermatitis) [x] Yes   [] No  Rhinitis/Sinusitis:   [x] Yes   [] No  Allergic Asthma:   [] Yes   [x] No  Food Allergy:   [x] Yes   [] No  Leg ulcers:   [] Yes   [x] No  Hand eczema:   [] Yes   [x] No   Leading hand:   [x] R   [] L       [] Ambidextrous                      Reason for tests (suspected allergy): Assessment for potential reaction to disinfections before IT (less likely preservatives in IT, because the reaction not directly over injection site)  Known previous allergies: Trees/grass, mites, dogs/cats  Standardized panels  [x] Standard panel (40 tests)  [x] Preservatives & Antimicrobials (31 tests)  [x] Emulsifiers & Additives (25 tests)   [] Perfumes/Flavours & Plants (25 tests)  [] Hairdresser panel (12 tests)  [x] Rubber Chemicals (22 tests)  [x] Plastics (26 tests)  [] Colorants/Dyes/Food additives (20 tests)  [] Metals (implants/dental) (24 tests)  [] Local anaesthetics/NSAIDs (13 tests)  [x] Antibiotics & Antimycotics (14 tests)   [] Corticosteroids (15 tests)   [] Photopatch test (62 tests)   [] others: ...      [] Patient's own products: ...    DO NOT test if chemical or biological identity is unknown!     always ask from patient the product information and safety sheets (MSDS)   [] Atopy screen prick test (Atopic predisposition): ...    [] Patient needs consultation with Allergy team (changes of tests may apply)  [] Tests discussed with Allergy team (can have direct appointment for patch tests)    Atopy Screen (Placed 01/13/20 )    No Substance Readings (15 min) Evaluation   POS Histamine 1mg/ml ++    NEG NaCl 0.9% - Slight dermographism     No Substance Readings (15 min) Evaluation   1 Alternaria alternata (tenuis)  -    2 Cladosporium herbarum -    3 Aspergillus fumigatus -    4 Penicillium notatum -    5 Dermatophagoides pteronyssinus ++    6 Dermatophagoides farinae ++      Conclusion: immediate type reaction to  house dust mites, but no delayed reaction to moulds or HDM --> The DP and DF reactions remained into the next day.     RESULTS & EVALUATION of PATCH TESTS    Patch test readings after     [x] 2 days, [] 3 days [x] 4 days, [] 5 days,    Applied patch tests with results (import here the list of patch tests):  Order documented by: IVAN HANCOCK LPN  Order reviewed by: Preeti Copeland LPN   Physician:   Date/time of application:1-  Localization of application: back >> Clear    STANDARD Series         No Substance 2 days 4 days remarks   1 Francesco Mix [C] - -    2 Colophony - -    3  2-Mercaptobenzothiazole  NA NA     4 Methylisothiazolinone - -    5 Carba Mix - -    6 Thiuram Mix [A] NA NA    7 Bisphenol A Epoxy Resin - -    8 J-Nizg-Dwibejserdt-Formaldehyde Resin - -    9 Mercapto Mix [A] - -    10 Black Rubber Mix- PPD [B] NA NA    11 Potassium Dichromate  -  -    12 Balsam of [place] (Myroxylon Pereirae Resin) - -    13 Nickel Sulphate Hexahydrate - -    14 Mixed Dialkyl Thiourea - -    15 Paraben Mix [B] - -    16 Methyldibromo Glutaronitrile NA NA    17 Fragrance Mix - -    18 2-Bromo-2-Nitropropane-1,3-Diol (Bronopol) - -    19 Lyral - -    20 Tixocortol-21- Pivalate NA NA    21 Diazolidiyl Urea (Germall II) - -    22 Methyl Methacrylate NA NA    23 Cobalt (II) Chloride Hexahydrate - -    24 Fragrance Mix II  - -    25 Compositae Mix - -    26 Benzoyl Peroxide NA NA    27 Bacitracin - ++/+++    28 Formaldehyde - -    29 Methylchloroisothiazolinone / Methylisothiazolinone - -    30 Corticosteroid Mix - -    31 Sodium Lauryl Sulfate - -    32 Lanolin Alcohol - -    33 Turpentine - -    34 Cetylstearylalcohol - -    35 Chlorhexidine Dicluconate - -    36 Budenoside - -    37 Imidazolidinyl Urea  - -    38 Ethyl-2 Cyanoacrylate NA NA    39 Quaternium 15 (Dowicil 200) - -    40 Decyl Glucoside - -      EMULSIFIERS & ADDITIVES        No Substance 2 days 4 days remarks   41 Polyethylene Glycol-400 NA NA     42 Cocamidopropyl Betaine - -    43 Amerchol L101 NA NA    44 Propylene Glycol - -    45 Triethanolamine - -    46 Sorbitane Sesquiolate - -    47 Isopropylmyristate - -    48 Polysorbate 80  - -    49 Amidoamine   (Stearamidopropyl Dimethylamine) - -    50 Oleamidopropyl Dimethylamine - -    51 Lauryl Glucoside NA NA    52 Coconut Diethanolamide  - -    53 2-Hydroxy-4-Methoxy Benzophenone (Oxybenzone) - -    54 Benzophenone-4 (Sulisobenzon) - -    55 Propolis - -    56 Dexpanthenol - -    57 Carboxymethyl Cellulose Sodium - -    58 Abitol - -    59 Tert-Butylhydroquinone - -    60 Benzyl Salicylate - -     Antioxidant      61 Dodecyl Gallate - -    62 Butylhydroxyanisole (BHA) - -    63 Butylhydroxytoluene (BHT) - -    64 Di-Alpha-Tocopherol (Vit E) - -    65 Propyl Gallate - -      RUBBER CHEMICALS         No Substance 2 days 4 days remarks    Carbamate      66 Zinc Bis ( Diethyldithio carbamate ) (ZDEC) - -    67 Zinc Bis (Dibutyldithiocarbamate) - -    68 1,3-Diphenylguanidine (DPG) - -     Thiourea      69 Dibutylthiourea - -    70 Diphenyltiourea - -    71 Thiourea - -     Mercapto chemicals      72 Morpholinyl Mercaptobenzothiazole - -    73 E-Zddgseyfcy-8-Benzothiazyl-Sulfenamide - -    74 Dibenzothiazyl Disulfide - -     Thiuram chemicals      75 Dipentamethylenethiuram Disulfide - -    76 Tetraethylthiuram Disulfide (Disulfiram) - -    77 Tetramethylthiuram Disulfide - -    78 Tetramethyl Thiuram Monosulfide (TMTM) - -     4-Phenylenediamine derivatives      79 N-Isopropyl-N'-Phenyl-P-Phenylenediamine (IPPD) - -    80 Hyzatwdv-D-Mttmvkxjveuzeqmt (DPPD) - -     Various Rubber Additives      81 Hydroquinone Monobenzylether  - -    82 Hexamethylenetetramine - -    83 4,4'-Dihydroxybiphenyl - -    84 Cyclohexylthiophtalimide - -    85 Ethylenediamine Dihydrochloride - -    86 N-Phenyl-B-Naphthylamine - -    87 Dodecyl Mercaptan - -        PLASTICS         No Substance 2 days 4 days remarks    Acrylates - -     88 2-Hydroxyethyl Methacrylate (HEMA) - -    89 1,4-Butandioldimethacrylate (BUDMA) - -    90  2-Ethylhexyl Acrylate - -    91 Bisphenol-A-Dimethacrylate  - -    92 Diurethane-Dimethacrylate - -    93 Ethyleneglycoldimethacrylate (EGDMA) - -    94 Pentaerythritoltriacrylate (CARLOS) - -    95 Triethylene Glycol Dimethacrylate (TEGDMA) - -     Synthetic material/additives       96 Q-Lhll-Inndzwtzstz - -    97 Tricresyl Phosphate - -    98 5-Crba-Uzathnvmnknob - -    99 Bis (2-Ethylhexyl) Phthalate - -    100 Dibutylphthalate - -    101 Dimethylphthalate - -    102 Toluene-2,4-Diisocyanate - -    103 Diphenylmethane-4,4''-Diisocyanate - -     EPOXY RESIN SYSTEMS       Reactive Solvents - -    104 Cresyl Glycidyl Ether - -    105 Butyl Glycidyl Ether - -    106 Phenyl Glycidyl Ether - -    107 1,4-Butanediol Diglycidyl Ether - -    108 1,6-Hexanediole Diglycidyl Ether - -     Hardener / Accelerator - -    109 Ethylenediamine Dihydrochloride - -    110 Triethylenetetramine - -    111 Diethylenetriamine - -    112 Isophorone Diamine (IPD) - -    113 N,N-Dimethyl-P-Toluidine - -        PRESERVATIVES & ANTIMICROBIALS         No Substance 2 days 4 days remarks   114  1,2-Benzisothiazoline-3-One, Sodium Salt - -    115  1,3,5-Anival (2-Hydroxyethyl) - Hexahydrotriazine (Grotan BK) NA NA    116 0-Oyhrimabvcmli-3-Nitro-1, 3-Propanediol - -    117  3, 4, 4' - Triclocarban - -    118 4 - Chloro - 3 - Cresol - -    119 4 - Chloro - 4 - Xylenol (PCMX) NA NA    120 7-Ethylbicyclooxazolidine (Bioban PW4198) - -    121 Benzalkonium Chloride - -    122 Benzyl Alcohol - -    123 Cetalkonium Chloride NA NA    124 Cetylpyrimidine Chloride  - -    125 Chloroacetamide - -    126 DMDM Hydantoin NA NA    127 Glutaraldehyde NA NA    128 Triclosan - -    129 Glyoxal Trimeric Dihydrate - -    130 Iodopropynyl Butylcarbamate - -    131 Octylisothiazoline NA NA    132 Iodoform - -    133 (Nitrobutyl) Morpholine/(Ethylnitro-Trimethylene)  Dimorpholine (Bioban P 1487) - -    134 Phenoxyethanol NA NA    135 Phenyl Salicylate - -    136 Povidone Iodine - -    137 Sodium Benzoate - -    138 Sodium Disulfite NA NA    139 Sorbic Acid - -    140 Thimerosal - -     Parabens      141 Butyl-P-Hydroxybenzoate - -    142 Ethyl-P-Hydroxybenzoate - -    143 Methyl-P-Hydroxybenzoate NA NA    144 Propyl-P-Hydroxybenzoate - -         ANTIBIOTICS & ANTIMYCOTICS         No Substance 2 days 4 days remarks   145 Erythromycine - -    146 Framycetine Sulphate (+) +++    147 Fusidic Acid Sodium Salt - -    148 Gentamicin Sulphate - +    149 Neomycine Sulphate (+) ++/+++    150 Oxytetracycline  - -    151 Polymyxin B Sulphate (+) ++    152 Tetracycline-HCL - -    153 Sulfanilamide - -    154 Metronidazole - -    155 Oxyquinoline Mix - -    156 Nitrofurazone - -    157 Nystatin - -    158 Clotrimazole - -      PATIENTS OWN PRODUCTS         No Substance Conc  % solv 2 days 4 days remarks   159 Aspergillus   - -    160 Penicillium   - -    161 D. Farinae   - -    162 D. pteronyssinus   - -    163 Dog   - -    164 Cat   - -        Results of patch tests:                         Interpretation:    - Negative                    A    = Allergic      (+) Erythema    TI   = Toxic/irritant   + E + Infiltration    RaP = Relevance at Present     ++ E/I + Papulovesicle   Rpr  = Relevance Previously     +++ E/I/P + Blister     nR   = No Relevance       DRUGS/SUBSTANCES 1/19/2021    Control Tests (Placed 01/19/21 )    No Substance Readings (15min) Evaluation   POS Histamine 1mg/ml ++    NEG NaCl 0.9% - dermographism      Prick Tests        Substance/ Allergen Concentration Result (15min) Remarks   Meloxicam 30mg/ml as is neg     In vaseline neg Not bigger than neg ctr     Intradermal Tests   immed immed delayed delayed     Substance Conc 1st dil 2nd dil   3 days  3 days remarks   Histamine Hydrochloride (ALK) 0.1 mg/ml        NaCl 0.9%        Meloxicam 1:200/ 1:20 neg neg neg   dermographism     Patch Tests   as is  as is 1:2 paraffin 1:2 paraffin     Substance Conc  3 days  days  3 days  days remarks   Meloxicam  - - - -        [x] Allergic reaction diagnosed against:     Antibiotics and antimycotics:   Framycetine Sulphate +++  Gentamicin Sulphate +  Neomycine Sulphate ++/+++  Polymyxin B Sulphate ++  Bacitracin ++/+++    Interpretation/ remarks:   Patient has a severe delayed type contact sensitization to various antibiotics that are in many OTC antiseptics and antibiotics. No signs for allergies to rubber chemicals or adhesives. Therefore, we think that the reaction to the band-aids were maybe to topical antibiotics that were added onto the band-aid.       ==> final Diagnosis:    >>  Localized Eczematous and delayed-type reaction after 2x initial doses of immunotherapies  = reaction to itch-X cream    No signs for allergies to preservative in these allergy extracts     No signs in prick test or patch test for delayed type reaction to some of these allergens (dust mites, molds)    Severe contact sensitizations to topical antibiotics  ==> in open application test clearly positive to the itch-x cream that patient used to reduce the effects of the IT    ==> Comment: This was NOT a reaction to the immunotherpapy, but rather to the topical product used afterwards to treat the topical side effects of the IT. Not sure to what ingredient in itch-x was really reacting, but not to Parabens and Propylene Glycol.    It is imperative that the patient avoids all antibiotics, topically and systemically, that we have tested and that she was positive. Because gentamycin and neomycin were included I would avoid, for safety reasons, cross reacting aminoglycosides including vancomycin.     >> Unlikely angioedema reaction to Meloxicam    In skin tests no signs for specific immediate type or delayed reaction to Meloxicam. Patient desires to restart Meloxicam for back pain.     No evidence for COX1  intolerance (Ibuprofen is well-tolerated)    Suspect that her prior reaction was unrelated to the Meloxicam    Can restart meloxicam 1/4 tablet, then 1-2 days later, 1/2 tablet, then 1-2 days later she can try a full tablet.     Has emergency set and given handout emergency treatment (has Epipen)      >> localized lipatrophy and pigmentary changes on upper arm    No signs for specific reaction to preservatives. However, it could be that at that time some topical antibiotic has been used that she reacted to and/or lipatrophy after Kenalog injections.     ==> Comment: It is difficult to directly correlate the atrophy on the upper arm to the contact sensitization even though the outline of the reaction looks like the outline of a band-aid. It might be that this atrophy has nothing to do with an allergic reaction. It might be important to really verify if it is a muscle or lipatrophy. Clinically it looks more like lipatrophy which can sometimes be idiopathic. Moreover, a local injection of corticosteroids in this area might as well explain the atrophy (and there was such an injection months ago; not sure if exactly same location?)      Final conclusions:    With the tendency of the patient to develop delayed-type reactions, I would in the moment not continue with the immunotherapy or we reduce it to only relevant allergens like house dust mite, but in that case we would maybe perform first prick, intradermal, and patch test with house dust mite immunotherapy to be sure that there is no delayed reaction to these extracts. We recommend just one single major allergen for immunotherapy, such as 50% DF/DP standardized from [facility].We could not see any delayed reaction to house dust mite test and IT solutions.      In patch tests severe reactions against Neomycine, Framycetine, Gentamicin, Bacitracine and Polymyxin. For reasons of crossreactions I would as well avoid other aminoglycosides such as Vancomycin. This was placed into  the patient's allergy chart.       Unlikely that she had an angioedema reaction to Meloxicam given negative prick, intradermal, and patch testing. Also do not suspect COX1 intolerance given that she tolerates ibuprofen. Discussed with patient that she can try to restart the Meloxicam as instructed started with 1/4 tablet as long as she has emergency set on hand.   ==> Treatment prescribed/Plan:    In the moment, patient to stop with immunotherapy or we will reduce it to only relevant allergens. We recommend just one single major allergen for immunotherapy, such as 50/50 mixture of DF/DP standardized from [facility].     Prescribed triamcinolone 0.1% cream. Apply topically 2 times daily for 5 days.     Go for antibiotic free foods if possible.    Can try restarting Meloxicam as above as long as she has the emergency set on hand and is accompanied by another person   Maybe, in future, if we could plan additional allergy tests for delayed-type reactions with immunotherapy extracts and/or maybe tetanus toxoid vaccine. These tests should include as well possible cross-reacting antibiotics such aminoglycosides and particularly vancomycin.  Crossreactions between Framycetine/Neomycine and these other antibiotics possible.     These conclusions are made at the best of one's knowledge and belief based on the provided evidence such as patient's history and allergy test results and they can change over time or can be incomplete because of missing information's.    CC Ulysses Mtz MD  [address] [address] on close of this encounter.    Follow-up with the referring provider Dr. Mtz to discuss IT. We will see her again if she would like to undergo more allergy testing.    See Email from 01/20/2020: I woke up Sunday morning to [facility]. They state that there was NO muscle atrophy in my deltoid. I called my orthopedic doctor, the one who had said it WAS the muscle, and asked  for clarification. He said after further review, it is NOT the muscle, but rather the fat layer.     I then called [facility] in [place] as my  remembered I had a steriod shot in [place]... I knew I'd be around dogs/cats and was being proactive to have a shot to help reduce potential allergies on the trip. That shot was on October 16, one month prior to noticing the dent in my deltoid. I confirmed with [facility] this morning that the kenalog steriod injection was in my upper right arm deltoid. I think we've now solved the case of the atrophy! ==> most probably lip-atrophy after Kenalog injection!!    I also asked what was used to clean my arm before immunotheraphy and if anything was applied after to see if we could connect my reaction to the allergy shots to our new knowledge of my delayed reactions. They used an alcohol swab to prep and used Itch-X both times after the injection. These are the ingredients listed online for that cream: Active Ingredients: Benzyl alcohol 10% and pramoxine HCl 1%. Inactive ingredients: aloe barbadensis leaf juice (aloe vera gel), blue 1, butylene glycol, carbomer, diazolidinyl urea, methylparaben, propylene glycol, propylparaben, SD alcohol 40, styrene/acrylates copolymer, tetrahydroxypropyl ethylenediamine, and water. Could be one of the reasons. Maybe use the itch-x and perform open application test into elbow flexural side 2xdaily for 4 days and if any delayed reaction then this could be the explanation.    Would ANY of those ingredients possibly explain my delayed allergic reaction? Or are we back to theorizing that I had delayed reactions to multiple allergens that were given in the allergy shots?      Patient counseling:  Discussed with patient it is unlikely that she had an angioedema reaction to Meloxicam given negative prick, intradermal, and patch testing. Also do not suspect COX1 intolerance given that she tolerated ibuprofen. Discussed  with patient that she can try to restart the Meloxicam as instructed started with 1/4 tablet as long as she has emergency set on hand.     Follow-up: in Derm-Allergy clinic as needed    Thank you for the opportunity be involved in the care of this patient.     Staff: Elba Eddy and Katy Hobbs, RN  Lelo Husain MD, Dermatology Resident, PGY-2    Staff Physician Comments:  I saw and evaluated the patient with the resident and I agree with the assessment and plan as documented in the resident's note.    Reginaldo Horne MD  Professor  Head of Dermato-Allergy Division  Department of Dermatology  [facility]    ___________________________________________________________________________    Teledermatology information:  - Location of patient: Home  - Patient presented in referral from: other  - Location of teledermatologist:  [facility] [place] (, [place], [place])  - Reason teledermatology is appropriate:  of National Emergency Regarding Coronavirus disease (COVID 19) Outbreak  - Method of transmission:  Store and Forward and Video ( Invitation sent by:  KOTURA and text to cell phone )  - Date of images: 1/22/21  - Service start time: 10:35  - Service end time: 10:47  - Date of report: 01/21/21    I spent a total of 12 minutes for telemedicine consult with Gisela Richards during today s video meeting. Over 50% of this time was spent counseling the patient and/or coordinating care. Please see Assessment and Plan for details.       Again, thank you for allowing me to participate in the care of your patient.        Sincerely,        Reginaldo Horne MD

## 2021-04-25 ENCOUNTER — HEALTH MAINTENANCE LETTER (OUTPATIENT)
Age: 41
End: 2021-04-25

## 2021-05-26 ENCOUNTER — HOSPITAL ENCOUNTER (OUTPATIENT)
Dept: MRI IMAGING | Facility: OTHER | Age: 41
Discharge: HOME | End: 2021-05-26
Attending: NURSE PRACTITIONER | Admitting: FAMILY MEDICINE
Payer: COMMERCIAL

## 2021-05-26 DIAGNOSIS — M25.40 EFFUSION INTO JOINT: ICD-10-CM

## 2021-05-26 DIAGNOSIS — M25.869 SYMPTOM OF MECHANICAL DISORDER OF KNEE: ICD-10-CM

## 2021-05-26 PROCEDURE — 73721 MRI JNT OF LWR EXTRE W/O DYE: CPT | Mod: RT

## 2021-10-09 ENCOUNTER — HEALTH MAINTENANCE LETTER (OUTPATIENT)
Age: 41
End: 2021-10-09

## 2022-04-12 LAB
CHOLESTEROL (EXTERNAL): 187 MG/DL (ref 114–200)
HBA1C MFR BLD: 5.1 % (ref 4–5.6)
HDLC SERPL-MCNC: 56 MG/DL (ref 40–60)
HPV ABSTRACT: NORMAL
LDL CHOLESTEROL (EXTERNAL): 99 MG/DL
PAP-ABSTRACT: NORMAL
TRIGLYCERIDES (EXTERNAL): 158 MG/DL (ref 10–200)

## 2022-05-21 ENCOUNTER — HEALTH MAINTENANCE LETTER (OUTPATIENT)
Age: 42
End: 2022-05-21

## 2022-07-19 ENCOUNTER — MEDICAL CORRESPONDENCE (OUTPATIENT)
Dept: HEALTH INFORMATION MANAGEMENT | Facility: CLINIC | Age: 42
End: 2022-07-19

## 2022-07-20 ENCOUNTER — OFFICE VISIT (OUTPATIENT)
Dept: FAMILY MEDICINE | Facility: OTHER | Age: 42
End: 2022-07-20
Attending: NURSE PRACTITIONER
Payer: COMMERCIAL

## 2022-07-20 ENCOUNTER — TRANSCRIBE ORDERS (OUTPATIENT)
Dept: OTHER | Age: 42
End: 2022-07-20

## 2022-07-20 VITALS
BODY MASS INDEX: 24.69 KG/M2 | DIASTOLIC BLOOD PRESSURE: 76 MMHG | RESPIRATION RATE: 16 BRPM | OXYGEN SATURATION: 100 % | SYSTOLIC BLOOD PRESSURE: 136 MMHG | HEART RATE: 72 BPM | WEIGHT: 162.4 LBS | TEMPERATURE: 97.8 F

## 2022-07-20 DIAGNOSIS — R53.83 MALAISE AND FATIGUE: ICD-10-CM

## 2022-07-20 DIAGNOSIS — B34.9 VIRAL SYNDROME: ICD-10-CM

## 2022-07-20 DIAGNOSIS — L29.9 PRURITUS OF SKIN: Primary | ICD-10-CM

## 2022-07-20 DIAGNOSIS — Z20.822 ENCOUNTER FOR LABORATORY TESTING FOR COVID-19 VIRUS: ICD-10-CM

## 2022-07-20 DIAGNOSIS — R53.81 MALAISE AND FATIGUE: ICD-10-CM

## 2022-07-20 DIAGNOSIS — R21 RASH AND NONSPECIFIC SKIN ERUPTION: Primary | ICD-10-CM

## 2022-07-20 LAB
ANION GAP SERPL CALCULATED.3IONS-SCNC: 6 MMOL/L (ref 3–14)
BASOPHILS # BLD AUTO: 0.1 10E3/UL (ref 0–0.2)
BASOPHILS NFR BLD AUTO: 1 %
BUN SERPL-MCNC: 18 MG/DL (ref 7–25)
CALCIUM SERPL-MCNC: 9.1 MG/DL (ref 8.6–10.3)
CHLORIDE BLD-SCNC: 102 MMOL/L (ref 98–107)
CO2 SERPL-SCNC: 30 MMOL/L (ref 21–31)
CREAT SERPL-MCNC: 0.88 MG/DL (ref 0.6–1.2)
EOSINOPHIL # BLD AUTO: 0.4 10E3/UL (ref 0–0.7)
EOSINOPHIL NFR BLD AUTO: 4 %
ERYTHROCYTE [DISTWIDTH] IN BLOOD BY AUTOMATED COUNT: 12.5 % (ref 10–15)
GFR SERPL CREATININE-BSD FRML MDRD: 85 ML/MIN/1.73M2
GLUCOSE BLD-MCNC: 86 MG/DL (ref 70–105)
HCT VFR BLD AUTO: 36.3 % (ref 35–47)
HGB BLD-MCNC: 12.4 G/DL (ref 11.7–15.7)
IMM GRANULOCYTES # BLD: 0.1 10E3/UL
IMM GRANULOCYTES NFR BLD: 1 %
LYMPHOCYTES # BLD AUTO: 1.9 10E3/UL (ref 0.8–5.3)
LYMPHOCYTES NFR BLD AUTO: 19 %
MCH RBC QN AUTO: 30.1 PG (ref 26.5–33)
MCHC RBC AUTO-ENTMCNC: 34.2 G/DL (ref 31.5–36.5)
MCV RBC AUTO: 88 FL (ref 78–100)
MONOCYTES # BLD AUTO: 0.8 10E3/UL (ref 0–1.3)
MONOCYTES NFR BLD AUTO: 8 %
NEUTROPHILS # BLD AUTO: 6.9 10E3/UL (ref 1.6–8.3)
NEUTROPHILS NFR BLD AUTO: 67 %
NRBC # BLD AUTO: 0 10E3/UL
NRBC BLD AUTO-RTO: 0 /100
PLATELET # BLD AUTO: 261 10E3/UL (ref 150–450)
POTASSIUM BLD-SCNC: 3.8 MMOL/L (ref 3.5–5.1)
RBC # BLD AUTO: 4.12 10E6/UL (ref 3.8–5.2)
SODIUM SERPL-SCNC: 138 MMOL/L (ref 134–144)
WBC # BLD AUTO: 10.1 10E3/UL (ref 4–11)

## 2022-07-20 PROCEDURE — 85025 COMPLETE CBC W/AUTO DIFF WBC: CPT | Mod: ZL | Performed by: NURSE PRACTITIONER

## 2022-07-20 PROCEDURE — 82310 ASSAY OF CALCIUM: CPT | Mod: ZL | Performed by: NURSE PRACTITIONER

## 2022-07-20 PROCEDURE — 99214 OFFICE O/P EST MOD 30 MIN: CPT | Performed by: NURSE PRACTITIONER

## 2022-07-20 PROCEDURE — 87798 DETECT AGENT NOS DNA AMP: CPT | Mod: ZL | Performed by: NURSE PRACTITIONER

## 2022-07-20 PROCEDURE — 86618 LYME DISEASE ANTIBODY: CPT | Mod: ZL | Performed by: NURSE PRACTITIONER

## 2022-07-20 PROCEDURE — 36415 COLL VENOUS BLD VENIPUNCTURE: CPT | Mod: ZL | Performed by: NURSE PRACTITIONER

## 2022-07-20 PROCEDURE — C9803 HOPD COVID-19 SPEC COLLECT: HCPCS | Performed by: NURSE PRACTITIONER

## 2022-07-20 PROCEDURE — U0005 INFEC AGEN DETEC AMPLI PROBE: HCPCS | Mod: ZL | Performed by: NURSE PRACTITIONER

## 2022-07-20 RX ORDER — CETIRIZINE HYDROCHLORIDE 10 MG/1
10 TABLET ORAL
COMMUNITY
Start: 2021-03-25

## 2022-07-20 RX ORDER — FOLIC ACID 1 MG/1
1 TABLET ORAL DAILY
COMMUNITY
Start: 2022-03-08

## 2022-07-20 RX ORDER — FERROUS SULFATE 325(65) MG
1 TABLET ORAL DAILY
COMMUNITY
Start: 2022-01-26

## 2022-07-20 RX ORDER — DEXTROAMPHETAMINE SACCHARATE, AMPHETAMINE ASPARTATE MONOHYDRATE, DEXTROAMPHETAMINE SULFATE AND AMPHETAMINE SULFATE 6.25; 6.25; 6.25; 6.25 MG/1; MG/1; MG/1; MG/1
25 CAPSULE, EXTENDED RELEASE ORAL DAILY
COMMUNITY
Start: 2022-07-09

## 2022-07-20 RX ORDER — TRIAMCINOLONE ACETONIDE 1 MG/G
1 OINTMENT TOPICAL 2 TIMES DAILY
COMMUNITY
Start: 2022-07-18

## 2022-07-20 RX ORDER — DOXYCYCLINE 100 MG/1
1 CAPSULE ORAL 2 TIMES DAILY
COMMUNITY
Start: 2022-07-15 | End: 2024-05-16

## 2022-07-20 RX ORDER — LEVOTHYROXINE SODIUM 75 UG/1
75 TABLET ORAL DAILY
COMMUNITY
Start: 2021-09-02

## 2022-07-20 ASSESSMENT — PAIN SCALES - GENERAL: PAINLEVEL: NO PAIN (0)

## 2022-07-20 NOTE — NURSING NOTE
"Chief Complaint   Patient presents with     Nausea     Dizziness     Patient is here for a rash that started a week and a half ago. Patient states she is now having headaches, fatigue, bloating. Patient has been on prednisone and doxycycline with no relief. Patient did have a culture done on left ankle. Patient states she is allergic to petroleum based products.     Initial /76   Pulse 72   Temp 97.8  F (36.6  C) (Tympanic)   Resp 16   Wt 73.7 kg (162 lb 6.4 oz)   SpO2 100%   BMI 24.69 kg/m   Estimated body mass index is 24.69 kg/m  as calculated from the following:    Height as of 4/17/17: 1.727 m (5' 8\").    Weight as of this encounter: 73.7 kg (162 lb 6.4 oz).  Medication Reconciliation: complete    Carlyn Calderon LPN  "

## 2022-07-20 NOTE — PROGRESS NOTES
ASSESSMENT/PLAN:     I have reviewed the nursing notes.  I have reviewed the findings, diagnosis, plan and need for follow up with the patient.      1. Rash and nonspecific skin eruption    - CBC and Differential  - Basic Metabolic Panel  - Ehrlichia Anaplasma Sp by PCR  - Lyme Disease Total Abs Bld with Reflex to Confirm CLIA    Left ankle shave biopsy completed on 7/7/22 per dermatology, results showed: well demarcated epidermal necrosis and ulceration with underlying dermal abscess.  Negative for fungal organism.  The etiology is uncertain, but the differential diagnosis may include trauma, excoriation, or possibly medication reaction. Pyoderma gangrenosum or infection are also considerations.    Wound does not appear infected today surrounding skin appears irritated from use of topical applications that patient is reacting to with an allergic dermatitis presentation.  No clinical indications for antibiotics at this time.  Recommend continued use of coconut oil as this appears to be the only thing patient is tolerating due to her allergies to petroleum-based topical ointments.  Wash once to twice daily with mild soapy water.  Keep covered until wound is dry and healing.    Patient also has other nonspecific erythematous pruritic hive-like rash that has been evaluated by primary care.  Recommend patient follow-up with dermatology for further evaluation and ongoing management for both of her skin concerns.  Review of the chart shows patient has an appointment with dermatology scheduled for 7/27/2022.    2. Malaise and fatigue    - CBC and Differential  - Basic Metabolic Panel  - Ehrlichia Anaplasma Sp by PCR    3. Encounter for laboratory testing for COVID-19 virus    - Symptomatic; Unknown COVID-19 Virus (Coronavirus) by PCR Nose    4. Viral syndrome    - CBC and Differential  - Basic Metabolic Panel  - Ehrlichia Anaplasma Sp by PCR    CBC was normal today  BMP was normal today  Negative COVID PCR test  Lyme test  pending  Anaplasma test pending  Ehrlichia test pending    Discussed with patient that her symptoms of dizziness, fatigue, nausea and headache are most consistent with viral syndrome or reaction to recent medications of prednisone and doxycycline.  Patient has discontinued both of these medications.  Patient is well-appearing on exam with stable vital signs and labs.  Patient instructed to push fluids.      Discussed warning signs/symptoms indicative of need to f/u  Follow up if symptoms persist or worsen or concerns      I explained my diagnostic considerations and recommendations to the patient, who voiced understanding and agreement with the treatment plan. All questions were answered. We discussed potential side effects of any prescribed or recommended therapies, as well as expectations for response to treatments.    Altagracia Zavala NP  [facility]      SUBJECTIVE:   Gisela Richards is a 41 year old female who presents to clinic today for the following health issues:  Persisting rash, fatigue, dizziness, ill feeling    HPI  States she works a , noted a dark spot on left ankle she noted 7/5/22.    Left ankle shave biopsy completed on 7/7/22, results showed: well demarcated epidermal necrosis and ulceration with underlying dermal abscess.  Negative for fungal organism.  The etiology is uncertain, but the differential diagnosis may include trauma, excoriation, or possibly medication reaction. Pyoderma gangrenosum or infection are also considerations.  Instructed to perform basic wound care, using Vaseoline as she is allergic to antibiotic ointments.   She developed an allergic reaction to the Vaseline   Wound culture completed on 7/15/22 showed rare acinetobater Iwoffii, no bacteria  Prescribed Doxycycline, started Saturday night and stopped yesterday morning after receiving culture result.  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  Started Doxycycline this past Saturday.   feeling nausea, dizziness, and yellow spots in her vision.  Vinegar soak yesterday and now increased redness and irritation.  Developed red itchy spots all over at sits of bites, thinks related to vaseline reaction, bumps swell and ooze.  Two spots on right buttock that started raised and now flat, dry, flaking, and itchy.  Bilateral lower extremities feel weak today from knee down.  No numbness in legs.  Localized tingling in left ankle wound with stinging.  No back pain.    Icing helps.    Taking Benadryl and daily Zyrtec or Zyxol without relief.  Today having nausea, dizziness, headache and worsening.    Fatigue and foggy brain for the past few days.      Past Medical History:   Diagnosis Date     Constipation     Since young.     Other specified postprocedural states     2014,Slight recurrence within scar - removed in 9th grade and came back right away     Personal history of other medical treatment (CODE)      2, para 2.     Past Surgical History:   Procedure Laterality Date     COLONOSCOPY      ,\"swelling of the lining\" and flattening of villa.     OTHER SURGICAL HISTORY      ~,[id number],OTHER,Left,recurring within scar     OTHER SURGICAL HISTORY      2016,[id number],KNEE ARTHROSCOPY W/ MENISCAL REPAIR     OTHER SURGICAL HISTORY      2004,[id number],DILATION AND CURETTAGE,retained placenta     Social History     Tobacco Use     Smoking status: Never Smoker     Smokeless tobacco: Never Used   Substance Use Topics     Alcohol use: Yes     Alcohol/week: 0.0 standard drinks     Comment: Alcoholic Drinks/day: once monthly     Current Outpatient Medications   Medication Sig Dispense Refill     amphetamine-dextroamphetamine (ADDERALL XR) 25 MG 24 hr capsule Take 25 mg by mouth daily       cetirizine (ZYRTEC) 10 MG tablet Take 10 mg by mouth       cholecalciferol (VITAMIN D3) 25 mcg (1000 units) capsule Take 1 capsule by " mouth       ferrous sulfate (FEROSUL) 325 (65 Fe) MG tablet Take 1 tablet by mouth daily       folic acid (FOLVITE) 1 MG tablet Take 1 mg by mouth daily       levothyroxine (SYNTHROID/LEVOTHROID) 75 MCG tablet Take 1 tablet (75 mcg) by mouth every morning (before breakfast) 90 tablet 4     predniSONE (DELTASONE) 50 MG tablet Emergency set: if severe allergic reaction take immediately 100mg Prednisone (2x50mg) and 2 Tabl Cetirizine 10mg and then write precise 12h retrospective diary.If less severe reaction take only the 2 Tabl Cetirizine 2 tablet 1     vitamin B-12 (CYANOCOBALAMIN) 500 MCG tablet Take 1 tablet by mouth daily       doxycycline hyclate (VIBRAMYCIN) 100 MG capsule Take 1 capsule by mouth 2 times daily (Patient not taking: Reported on 7/20/2022)       fluorometholone (FML FORTE) 0.25 % ophthalmic suspension  (Patient not taking: Reported on 7/20/2022)       levothyroxine (SYNTHROID/LEVOTHROID) 75 MCG tablet Take 75 mcg by mouth daily (Patient not taking: Reported on 7/20/2022)       meloxicam (ANJESO) 30 MG/ML injection Inject 1 mL (30 mg) into the vein once for 1 dose 1 mL 0     mometasone (NASONEX) 50 MCG/ACT spray 1 spray 2 times daily (Patient not taking: Reported on 7/20/2022)       triamcinolone (KENALOG) 0.1 % external ointment Apply 1 Dose topically 2 times daily (Patient not taking: Reported on 7/20/2022)       Allergies   Allergen Reactions     Cats Shortness Of Breath     Dogs Fatigue, Palpitations, Shortness Of Breath and Tinnitus     Gentamicin Dermatitis     In patch tests severe reactions against Neomycine, Framycetine, Gentamicin, Bacitracine and Polymyxin.   For reasons of crossreactions I would as well avoid other aminoglycosides such as Vancomycine     Petrolatum Rash     Gluten Meal      Other reaction(s): Joint Pain     Itch-X Rash     Triamcinolone Rash     Fat atrophy after IM injection      Tramadol Other (See Comments)     Other reaction(s): Other - Describe In Comment  Field  High doses caused yellow eyes, tremors in her mouth, couldn't feel some body parts.    No issues with low doses  High doses caused yellow eyes, tremors in her mouth, couldn't feel some body parts.    No issues with low doses     Food GI Disturbance     Night shades, stevia, pork     Gel Base Other (See Comments)     Allergy to Itch X. Gets blister/pustular rash in area the itch X is placed.     Hydrocodone-Acetaminophen Rash         Past medical history, past surgical history, current medications and allergies reviewed and accurate to the best of my knowledge.        OBJECTIVE:     /76   Pulse 72   Temp 97.8  F (36.6  C) (Tympanic)   Resp 16   Wt 73.7 kg (162 lb 6.4 oz)   SpO2 100%   BMI 24.69 kg/m    Body mass index is 24.69 kg/m .     Physical Exam  General Appearance: Well appearing adult female, appropriate appearance for age. No acute distress  Respiratory: normal chest wall and respirations.  Normal effort.  Clear to auscultation bilaterally, no wheezing, crackles or rhonchi.  No increased work of breathing.  No cough appreciated.  Cardiac: RRR with no murmurs   Musculoskeletal:  Equal movement of bilateral upper extremities.  Equal movement of bilateral lower extremities.  Normal gait.    Dermatological: Left medial ankle with small open biopsy site with yellow eschar base and approximately 4 cm surrounding erythema in all directions, area is minimally warm to touch and and mildly tender to palpation, no active drainage or bleeding.  See attached photos of skin lesion prior to biopsy and post biopsy site.  Psychological: normal affect, alert, oriented, and pleasant.     Initial left medial ankle lesion prior to biopsy:        Left medial ankle lesion post biopsy             Left ankle shave biopsy completed on 7/7/22, results showed: well demarcated epidermal necrosis and ulceration with underlying dermal abscess.  Negative for fungal organism.  The etiology is uncertain, but the  differential diagnosis may include trauma, excoriation, or possibly medication reaction. Pyoderma gangrenosum or infection are also considerations.    Labs:  Results for orders placed or performed in visit on 07/20/22   Basic Metabolic Panel     Status: Normal   Result Value Ref Range    Sodium 138 134 - 144 mmol/L    Potassium 3.8 3.5 - 5.1 mmol/L    Chloride 102 98 - 107 mmol/L    Carbon Dioxide (CO2) 30 21 - 31 mmol/L    Anion Gap 6 3 - 14 mmol/L    Urea Nitrogen 18 7 - 25 mg/dL    Creatinine 0.88 0.60 - 1.20 mg/dL    Calcium 9.1 8.6 - 10.3 mg/dL    Glucose 86 70 - 105 mg/dL    GFR Estimate 85 >60 mL/min/1.73m2   CBC with platelets and differential     Status: None   Result Value Ref Range    WBC Count 10.1 4.0 - 11.0 10e3/uL    RBC Count 4.12 3.80 - 5.20 10e6/uL    Hemoglobin 12.4 11.7 - 15.7 g/dL    Hematocrit 36.3 35.0 - 47.0 %    MCV 88 78 - 100 fL    MCH 30.1 26.5 - 33.0 pg    MCHC 34.2 31.5 - 36.5 g/dL    RDW 12.5 10.0 - 15.0 %    Platelet Count 261 150 - 450 10e3/uL    % Neutrophils 67 %    % Lymphocytes 19 %    % Monocytes 8 %    % Eosinophils 4 %    % Basophils 1 %    % Immature Granulocytes 1 %    NRBCs per 100 WBC 0 <1 /100    Absolute Neutrophils 6.9 1.6 - 8.3 10e3/uL    Absolute Lymphocytes 1.9 0.8 - 5.3 10e3/uL    Absolute Monocytes 0.8 0.0 - 1.3 10e3/uL    Absolute Eosinophils 0.4 0.0 - 0.7 10e3/uL    Absolute Basophils 0.1 0.0 - 0.2 10e3/uL    Absolute Immature Granulocytes 0.1 <=0.4 10e3/uL    Absolute NRBCs 0.0 10e3/uL   CBC and Differential     Status: None    Narrative    The following orders were created for panel order CBC and Differential.  Procedure                               Abnormality         Status                     ---------                               -----------         ------                     CBC with platelets and d...[[id number]]                      Final result                 Please view results for these tests on the individual orders.

## 2022-07-21 LAB
B BURGDOR IGG+IGM SER QL: 0.06
SARS-COV-2 RNA RESP QL NAA+PROBE: NEGATIVE

## 2022-07-25 LAB
A PHAGOCYTOPH DNA BLD QL NAA+PROBE: NOT DETECTED
E CHAFFEENSIS DNA BLD QL NAA+PROBE: NOT DETECTED
E EWINGII DNA SPEC QL NAA+PROBE: NOT DETECTED
EHRLICHIA DNA SPEC QL NAA+PROBE: NOT DETECTED

## 2022-07-27 ENCOUNTER — VIRTUAL VISIT (OUTPATIENT)
Dept: ALLERGY | Facility: CLINIC | Age: 42
End: 2022-07-27
Payer: COMMERCIAL

## 2022-07-27 DIAGNOSIS — L23.89 ALLERGIC CONTACT DERMATITIS DUE TO OTHER AGENTS: ICD-10-CM

## 2022-07-27 DIAGNOSIS — L50.9 URTICARIA: Primary | ICD-10-CM

## 2022-07-27 PROCEDURE — 99214 OFFICE O/P EST MOD 30 MIN: CPT | Mod: 95 | Performed by: DERMATOLOGY

## 2022-07-27 RX ORDER — HYDROXYZINE HYDROCHLORIDE 25 MG/1
25 TABLET, FILM COATED ORAL AT BEDTIME
Qty: 30 TABLET | Refills: 1 | Status: SHIPPED | OUTPATIENT
Start: 2022-07-27

## 2022-07-27 RX ORDER — LORATADINE 10 MG/1
TABLET ORAL
Qty: 30 TABLET | Refills: 3 | Status: SHIPPED | OUTPATIENT
Start: 2022-07-27

## 2022-07-27 NOTE — PROGRESS NOTES
[facility] Dermato-allergology Note  Virtual visit: store and forward video (plista), start time: 10:20am, end time: 10:55am, date of images: during video  Encounter Date: Jul 27, 2022  ____________________________________________    CC: No chief complaint on file.      HPI:  (Jul 27, 2022)  Ms. Gisela Richards is a(n) 41 year old female who presents today as a return patient for allergy consultation  - Follow-up: in Derm-Allergy clinic as needed  - on July 5th did a photoshoot in the field and then observed on the ankle a small spot, that became bigger (brown, not really infiltrated) and the morning of the 7th July a Dermatologist looked at it and in histology it was a necrosis unknown cause.  - 1 week later itching on that area with vesicular reaction = eczematous reaction  - on the 15th did bacterial culture (no bacterial growth)  - as soon as the patient stopped the vaseline with same adhesive band aid the lesions improved  - patient observe anyway recently reactions to various vaseline type ointment (incl Triamcinolone acetonide) = with cream no problem  --> took at the end orally prednisone and Doxycycline = stopped the Prednisone Monday the 18th of July and the Doxycycline on the 18th. ==> itching and urticarial rash started on the 22nd of July after being in the sun  --> NP prescribed Claritine and Prilosec and Benadryl     Physical exam:  General: In no acute distress, well-developed, well-nourished  Eyes: no conjunctivitis  ENT: no signs of rhinitis   Pulmonary: no wheezing or coughing  Skin:in the moment no signs for skin lesions, but patient had the eczematous lesions on the ankle   Some erythema over the decolletage    Earlier History and Allergy exams:  01/22/2021  Says she hasn't noticed any reaction to the patch testing or where we injected. Would like to try the meloxicam for her back pain. .    Earlier History and Allergy exams:   Ibuprofen does not induce any  reaction.    > Patient had a reaction to Meloxicam in November. Took one Tabl for the first time and about 3-6h later with swelling and redness around the lips with dryness and then a sun burn type of rash over upper chest, stomach and back. No itching. No sun exposure involved. Redness disappeared after 1-2 days. Never tongue swelling or breathing problems. Out of chin skin wheeping. Took almost one week to fully recover.  ==> delayed type reaction    Earlier History and Allergy exams:    Patient is continuing now the IT without any problems, since they are not using ItchEx anymore. Just using ice.  IT injections is done by an primary care PA in [place]    > Patient had a reaction to Meloxicam about 3 h later with angioedema on face and then sun burn type of rash over face --> happened in November  > Ibuprofen does not induce any reaction (last taken last Saturday)    With tramadol tremors and swellings of face and with Percacet (Oxycodon and Acetaminophen) migraines  Vicodin induces erythema  Today she reports that all skin manifestation has resolved. Patient has come from [place], [place]. Patient's spouse is present during evaluation. She reports her first allergy shots took place on November 9th, 2 injections on posterior aspect of right arm and 1 to the posterior aspect of left arm.   - Per chart review immunotherapy: trees/grass/weeds, mites/molds and animals  Within the day of the injection patient noted a diffuse pruritic rash on the posterior aspect of her right arm. She denies using any topicals or oral medications beside Zyrtec to aid with on going rash. She additionally iced the area and skin manifestation had resolved.     The following week she completed her 2nd round of immunotherapy at the same dosage. Again the rash occurred on the posterior aspect of her right arm along with outstanding edema of her right arm and shoulder. This rash was also pruritic, but again denied any  "pain. Her provider was concerned about reaction to the preservative. This rash took 1.5 week to self resolve. The edema persisted for 2-3 weeks and when resolved, a notable dent was appreciated on the right distal aspect of the deltoid. Imaging completed showed atrophy of the deltoid. She denied any recent injections to that area including routine vaccines or steroid injections. Also now having to areas of hypopigmentation overlying the deltoid.  Reports dry skin after resolution of rashes. Denies history of asthma. Extensive history of food sensitives and autoimmune workup that has been unremarkable.     Hx since 19:  The patient comes in today for patch testing day 1. She reports that there is a bruise on the R shoulder that has worsened in the interim. She is unsure if it may be necrosis or sudden muscle atrophy. She reports it appeared on , the day after allergy shot. Also states that in the same area she developed a new \"bump\" that appeared about 2 days ago.   Reports that the IT on the R upper extremity was against trees, weeds, molds, mites, and dog. On the L upper extremity, she had IT injections for cats and dogs.   She is otherwise feeling well overall today. No other skin or allergy concerns at this time.     Medications:  - takes daily Zyrtec and is on IT  - Levothyroxin      Past Medical History:   Patient Active Problem List   Diagnosis     Allergic rhinitis due to dogs     Hypothyroid     Lumbar facet joint pain     Past Medical History:   Diagnosis Date     Constipation     Since young.     Other specified postprocedural states     2014,Slight recurrence within scar - removed in 9th grade and came back right away     Personal history of other medical treatment (CODE)      2, para 2.     Past Surgical History:   Procedure Laterality Date     COLONOSCOPY      ,\"swelling of the lining\" and flattening of villa.     OTHER SURGICAL HISTORY      ~,[id number],OTHER,Left,recurring " within scar     OTHER SURGICAL HISTORY      03/2016,[id number],KNEE ARTHROSCOPY W/ MENISCAL REPAIR     OTHER SURGICAL HISTORY      06/2004,[id number],DILATION AND CURETTAGE,retained placenta       Social History:  Patient reports that she has never smoked. She has never used smokeless tobacco. She reports current alcohol use. She reports that she does not use drugs.    Family History:  Family History   Problem Relation Age of Onset     Family History Negative Father         Good Health,does not see doctor regularly     Thyroid Disease Mother         Thyroid Disease,Hypothyroid     Other - See Comments Mother         Smoker, prediabetes     Thyroid Disease Sister         Thyroid Disease,Hypothyroid     Arthritis Sister         Arthritis     Scoliosis Maternal Half-Sister         Scoliosis     Other - See Comments Maternal Half-Sister         No Known Problems       Medications:  Current Outpatient Medications   Medication Sig Dispense Refill     amphetamine-dextroamphetamine (ADDERALL XR) 25 MG 24 hr capsule Take 25 mg by mouth daily       cetirizine (ZYRTEC) 10 MG tablet Take 10 mg by mouth       cholecalciferol (VITAMIN D3) 25 mcg (1000 units) capsule Take 1 capsule by mouth       doxycycline hyclate (VIBRAMYCIN) 100 MG capsule Take 1 capsule by mouth 2 times daily (Patient not taking: Reported on 7/20/2022)       ferrous sulfate (FEROSUL) 325 (65 Fe) MG tablet Take 1 tablet by mouth daily       fluorometholone (FML FORTE) 0.25 % ophthalmic suspension  (Patient not taking: Reported on 7/20/2022)       folic acid (FOLVITE) 1 MG tablet Take 1 mg by mouth daily       levothyroxine (SYNTHROID/LEVOTHROID) 75 MCG tablet Take 75 mcg by mouth daily (Patient not taking: Reported on 7/20/2022)       levothyroxine (SYNTHROID/LEVOTHROID) 75 MCG tablet Take 1 tablet (75 mcg) by mouth every morning (before breakfast) 90 tablet 4     meloxicam (ANJESO) 30 MG/ML injection Inject 1 mL (30 mg) into the vein once for 1 dose 1 mL 0      mometasone (NASONEX) 50 MCG/ACT spray 1 spray 2 times daily (Patient not taking: Reported on 7/20/2022)       predniSONE (DELTASONE) 50 MG tablet Emergency set: if severe allergic reaction take immediately 100mg Prednisone (2x50mg) and 2 Tabl Cetirizine 10mg and then write precise 12h retrospective diary.If less severe reaction take only the 2 Tabl Cetirizine 2 tablet 1     triamcinolone (KENALOG) 0.1 % external ointment Apply 1 Dose topically 2 times daily (Patient not taking: Reported on 7/20/2022)       vitamin B-12 (CYANOCOBALAMIN) 500 MCG tablet Take 1 tablet by mouth daily         Allergies   Allergen Reactions     Cats Shortness Of Breath     Dogs Fatigue, Palpitations, Shortness Of Breath and Tinnitus     Gentamicin Dermatitis     In patch tests severe reactions against Neomycine, Framycetine, Gentamicin, Bacitracine and Polymyxin.   For reasons of crossreactions I would as well avoid other aminoglycosides such as Vancomycine     Petrolatum Rash     Gluten Meal      Other reaction(s): Joint Pain     Itch-X Rash     Triamcinolone Rash     Fat atrophy after IM injection      Tramadol Other (See Comments)     Other reaction(s): Other - Describe In Comment Field  High doses caused yellow eyes, tremors in her mouth, couldn't feel some body parts.    No issues with low doses  High doses caused yellow eyes, tremors in her mouth, couldn't feel some body parts.    No issues with low doses     Food GI Disturbance     Night shades, stevia, pork     Gel Base Other (See Comments)     Allergy to Itch X. Gets blister/pustular rash in area the itch X is placed.     Hydrocodone-Acetaminophen Rash     Order for PATCH TESTS    [x] Outpatient  [] Inpatient: Head..../ Bed ....      Skin Atopy (atopic dermatitis) [x] Yes   [] No  Rhinitis/Sinusitis:   [x] Yes   [] No  Allergic Asthma:   [] Yes   [x] No  Food Allergy:   [x] Yes   [] No  Leg ulcers:   [] Yes   [x] No  Hand eczema:   [] Yes   [x] No   Leading hand:   [x] R   [] L        [] Ambidextrous                      Reason for tests (suspected allergy): Assessment for potential reaction to disinfections before IT (less likely preservatives in IT, because the reaction not directly over injection site)  Known previous allergies: Trees/grass, mites, dogs/cats  Standardized panels (repeat patch tests in red)  [x] Standard panel (40 tests)  [x] Preservatives & Antimicrobials (31 tests)  [x] Emulsifiers & Additives (25 tests) particularly propylene glycol  [x] Perfumes/Flavours & Plants (25 tests)  [] Hairdresser panel (12 tests)  [x] Rubber Chemicals (22 tests)  [x] Plastics (26 tests)  [] Colorants/Dyes/Food additives (20 tests)  [] Metals (implants/dental) (24 tests)  [] Local anaesthetics/NSAIDs (13 tests)  [x] Antibiotics & Antimycotics (14 tests)   [] Corticosteroids (15 tests)   [] Photopatch test (62 tests)   [] others: ...      [x] Patient's own products: tough strips band aids and vaseline and Triamcinolone cream and ointment    DO NOT test if chemical or biological identity is unknown!     always ask from patient the product information and safety sheets (MSDS)   [] Atopy screen prick test (Atopic predisposition): ...    [] Patient needs consultation with Allergy team (changes of tests may apply)  [] Tests discussed with Allergy team (can have direct appointment for patch tests)    Atopy Screen (Placed 01/13/20 )    No Substance Readings (15 min) Evaluation   POS Histamine 1mg/ml ++    NEG NaCl 0.9% - Slight dermographism     No Substance Readings (15 min) Evaluation   1 Alternaria alternata (tenuis)  -    2 Cladosporium herbarum -    3 Aspergillus fumigatus -    4 Penicillium notatum -    5 Dermatophagoides pteronyssinus ++    6 Dermatophagoides farinae ++      Conclusion: immediate type reaction to house dust mites, but no delayed reaction to moulds or HDM --> The DP and DF reactions remained into the next day.     RESULTS & EVALUATION of PATCH TESTS    Patch test readings after      [x] 2 days, [] 3 days [x] 4 days, [] 5 days,    Applied patch tests with results (import here the list of patch tests):  Order documented by: IVAN HANCOCK LPN  Order reviewed by: Preeti Copeland LPN   Physician:   Date/time of application:1-  Localization of application: back >> Clear    STANDARD Series         No Substance 2 days 4 days remarks   1 Francesco Mix [C] - -    2 Colophony - -    3  2-Mercaptobenzothiazole  NA NA     4 Methylisothiazolinone - -    5 Carba Mix - -    6 Thiuram Mix [A] NA NA    7 Bisphenol A Epoxy Resin - -    8 D-Jgdl-Usywjvxhjgi-Formaldehyde Resin - -    9 Mercapto Mix [A] - -    10 Black Rubber Mix- PPD [B] NA NA    11 Potassium Dichromate  -  -    12 Balsam of [place] (Myroxylon Pereirae Resin) - -    13 Nickel Sulphate Hexahydrate - -    14 Mixed Dialkyl Thiourea - -    15 Paraben Mix [B] - -    16 Methyldibromo Glutaronitrile NA NA    17 Fragrance Mix - -    18 2-Bromo-2-Nitropropane-1,3-Diol (Bronopol) - -    19 Lyral - -    20 Tixocortol-21- Pivalate NA NA    21 Diazolidiyl Urea (Germall II) - -    22 Methyl Methacrylate NA NA    23 Cobalt (II) Chloride Hexahydrate - -    24 Fragrance Mix II  - -    25 Compositae Mix - -    26 Benzoyl Peroxide NA NA    27 Bacitracin - ++/+++    28 Formaldehyde - -    29 Methylchloroisothiazolinone / Methylisothiazolinone - -    30 Corticosteroid Mix - -    31 Sodium Lauryl Sulfate - -    32 Lanolin Alcohol - -    33 Turpentine - -    34 Cetylstearylalcohol - -    35 Chlorhexidine Dicluconate - -    36 Budenoside - -    37 Imidazolidinyl Urea  - -    38 Ethyl-2 Cyanoacrylate NA NA    39 Quaternium 15 (Dowicil 200) - -    40 Decyl Glucoside - -      EMULSIFIERS & ADDITIVES        No Substance 2 days 4 days remarks   41 Polyethylene Glycol-400 NA NA    42 Cocamidopropyl Betaine - -    43 Amerchol L101 NA NA    44 Propylene Glycol - -    45 Triethanolamine - -    46 Sorbitane Sesquiolate - -    47 Isopropylmyristate - -    48  Polysorbate 80  - -    49 Amidoamine   (Stearamidopropyl Dimethylamine) - -    50 Oleamidopropyl Dimethylamine - -    51 Lauryl Glucoside NA NA    52 Coconut Diethanolamide  - -    53 2-Hydroxy-4-Methoxy Benzophenone (Oxybenzone) - -    54 Benzophenone-4 (Sulisobenzon) - -    55 Propolis - -    56 Dexpanthenol - -    57 Carboxymethyl Cellulose Sodium - -    58 Abitol - -    59 Tert-Butylhydroquinone - -    60 Benzyl Salicylate - -     Antioxidant      61 Dodecyl Gallate - -    62 Butylhydroxyanisole (BHA) - -    63 Butylhydroxytoluene (BHT) - -    64 Di-Alpha-Tocopherol (Vit E) - -    65 Propyl Gallate - -      RUBBER CHEMICALS         No Substance 2 days 4 days remarks    Carbamate      66 Zinc Bis ( Diethyldithio carbamate ) (ZDEC) - -    67 Zinc Bis (Dibutyldithiocarbamate) - -    68 1,3-Diphenylguanidine (DPG) - -     Thiourea      69 Dibutylthiourea - -    70 Diphenyltiourea - -    71 Thiourea - -     Mercapto chemicals      72 Morpholinyl Mercaptobenzothiazole - -    73 H-Kmsaikowdg-7-Benzothiazyl-Sulfenamide - -    74 Dibenzothiazyl Disulfide - -     Thiuram chemicals      75 Dipentamethylenethiuram Disulfide - -    76 Tetraethylthiuram Disulfide (Disulfiram) - -    77 Tetramethylthiuram Disulfide - -    78 Tetramethyl Thiuram Monosulfide (TMTM) - -     4-Phenylenediamine derivatives      79 N-Isopropyl-N'-Phenyl-P-Phenylenediamine (IPPD) - -    80 Czvakscv-P-Vmvfcuvlqwfnxkme (DPPD) - -     Various Rubber Additives      81 Hydroquinone Monobenzylether  - -    82 Hexamethylenetetramine - -    83 4,4'-Dihydroxybiphenyl - -    84 Cyclohexylthiophtalimide - -    85 Ethylenediamine Dihydrochloride - -    86 N-Phenyl-B-Naphthylamine - -    87 Dodecyl Mercaptan - -        PLASTICS         No Substance 2 days 4 days remarks    Acrylates - -    88 2-Hydroxyethyl Methacrylate (HEMA) - -    89 1,4-Butandioldimethacrylate (BUDMA) - -    90  2-Ethylhexyl Acrylate - -    91 Bisphenol-A-Dimethacrylate  - -    92  Diurethane-Dimethacrylate - -    93 Ethyleneglycoldimethacrylate (EGDMA) - -    94 Pentaerythritoltriacrylate (CARLOS) - -    95 Triethylene Glycol Dimethacrylate (TEGDMA) - -     Synthetic material/additives       96 F-Glwd-Kyrbndmpmrw - -    97 Tricresyl Phosphate - -    98 4-Xyaa-Phgkriddyuwap - -    99 Bis (2-Ethylhexyl) Phthalate - -    100 Dibutylphthalate - -    101 Dimethylphthalate - -    102 Toluene-2,4-Diisocyanate - -    103 Diphenylmethane-4,4''-Diisocyanate - -     EPOXY RESIN SYSTEMS       Reactive Solvents - -    104 Cresyl Glycidyl Ether - -    105 Butyl Glycidyl Ether - -    106 Phenyl Glycidyl Ether - -    107 1,4-Butanediol Diglycidyl Ether - -    108 1,6-Hexanediole Diglycidyl Ether - -     Hardener / Accelerator - -    109 Ethylenediamine Dihydrochloride - -    110 Triethylenetetramine - -    111 Diethylenetriamine - -    112 Isophorone Diamine (IPD) - -    113 N,N-Dimethyl-P-Toluidine - -        PRESERVATIVES & ANTIMICROBIALS         No Substance 2 days 4 days remarks   114  1,2-Benzisothiazoline-3-One, Sodium Salt - -    115  1,3,5-Anival (2-Hydroxyethyl) - Hexahydrotriazine (Grotan BK) NA NA    116 2-Kixqdyblyamzf-4-Nitro-1, 3-Propanediol - -    117  3, 4, 4' - Triclocarban - -    118 4 - Chloro - 3 - Cresol - -    119 4 - Chloro - 4 - Xylenol (PCMX) NA NA    120 7-Ethylbicyclooxazolidine (Bioban UC3151) - -    121 Benzalkonium Chloride - -    122 Benzyl Alcohol - -    123 Cetalkonium Chloride NA NA    124 Cetylpyrimidine Chloride  - -    125 Chloroacetamide - -    126 DMDM Hydantoin NA NA    127 Glutaraldehyde NA NA    128 Triclosan - -    129 Glyoxal Trimeric Dihydrate - -    130 Iodopropynyl Butylcarbamate - -    131 Octylisothiazoline NA NA    132 Iodoform - -    133 (Nitrobutyl) Morpholine/(Ethylnitro-Trimethylene) Dimorpholine (Bioban P 1487) - -    134 Phenoxyethanol NA NA    135 Phenyl Salicylate - -    136 Povidone Iodine - -    137 Sodium Benzoate - -    138 Sodium Disulfite NA NA     139 Sorbic Acid - -    140 Thimerosal - -     Parabens      141 Butyl-P-Hydroxybenzoate - -    142 Ethyl-P-Hydroxybenzoate - -    143 Methyl-P-Hydroxybenzoate NA NA    144 Propyl-P-Hydroxybenzoate - -         ANTIBIOTICS & ANTIMYCOTICS         No Substance 2 days 4 days remarks   145 Erythromycine - -    146 Framycetine Sulphate (+) +++    147 Fusidic Acid Sodium Salt - -    148 Gentamicin Sulphate - +    149 Neomycine Sulphate (+) ++/+++    150 Oxytetracycline  - -    151 Polymyxin B Sulphate (+) ++    152 Tetracycline-HCL - -    153 Sulfanilamide - -    154 Metronidazole - -    155 Oxyquinoline Mix - -    156 Nitrofurazone - -    157 Nystatin - -    158 Clotrimazole - -      PATIENTS OWN PRODUCTS         No Substance Conc  % solv 2 days 4 days remarks   159 Aspergillus   - -    160 Penicillium   - -    161 D. Farinae   - -    162 D. pteronyssinus   - -    163 Dog   - -    164 Cat   - -        Results of patch tests:                         Interpretation:    - Negative                    A    = Allergic      (+) Erythema    TI   = Toxic/irritant   + E + Infiltration    RaP = Relevance at Present     ++ E/I + Papulovesicle   Rpr  = Relevance Previously     +++ E/I/P + Blister     nR   = No Relevance       DRUGS/SUBSTANCES 1/19/2021    Control Tests (Placed 01/19/21 )    No Substance Readings (15min) Evaluation   POS Histamine 1mg/ml ++    NEG NaCl 0.9% - dermographism      Prick Tests        Substance/ Allergen Concentration Result (15min) Remarks   Meloxicam 30mg/ml as is neg     In vaseline neg Not bigger than neg ctr     Intradermal Tests   immed immed delayed delayed     Substance Conc 1st dil 2nd dil   3 days  3 days remarks   Histamine Hydrochloride (ALK) 0.1 mg/ml        NaCl 0.9%        Meloxicam 1:200/ 1:20 neg neg neg  dermographism     Patch Tests   as is  as is 1:2 paraffin 1:2 paraffin     Substance Conc  3 days  days  3 days  days remarks   Meloxicam  - - - -        [x] Allergic reaction diagnosed  against:     Antibiotics and antimycotics:   Framycetine Sulphate +++  Gentamicin Sulphate +  Neomycine Sulphate ++/+++  Polymyxin B Sulphate ++  Bacitracin ++/+++    Interpretation/ remarks:   Patient has a severe delayed type contact sensitization to various antibiotics that are in many OTC antiseptics and antibiotics. No signs for allergies to rubber chemicals or adhesives. Therefore, we think that the reaction to the band-aids were maybe to topical antibiotics that were added onto the band-aid.       Assessment & Plan:    ==> Final Diagnosis:     #  Localized Eczematous and delayed-type reaction after 2x initial doses of immunotherapies  = reaction to itch-X cream  * chronic illness with exacerbation, progression, side effects from treatment    No signs for allergies to preservative in these allergy extracts     No signs in prick test or patch test for delayed type reaction to some of these allergens (dust mites, molds)    Severe contact sensitizations to topical antibiotics  ==> in open application test clearly positive to the itch-x cream that patient used to reduce the effects of the IT    ==> Comment: This was NOT a reaction to the immunotherapy, but rather to the topical product used afterwards to treat the topical side effects of the IT. Not sure to what ingredient in itch-x was really reacting, but not to Parabens and Propylene Glycol.    #  New flare up of allergic contact dermatitis on ankle  DDx Vaseline or adhesives (Band aids?)  ==> plan repeat of some patch tests (see in red above)  * chronic illness with exacerbation, progression, side effects from treatment    #  Recurrent urticarial rash with pruritus (after using Doxycycline)  * chronic illness with exacerbation, progression, side effects from treatment  Treatment: try Claritine 10mg morning and Noon and Hydroxyzine 25mg at bedtime    # Unlikely angioedema reaction to Meloxicam  * stable chronic illness    In skin tests no signs for specific  immediate type or delayed reaction to Meloxicam. Patient desires to restart Meloxicam for back pain.     No evidence for COX1 intolerance (Ibuprofen is well-tolerated)    Suspect that her prior reaction was unrelated to the Meloxicam    Can restart meloxicam 1/4 tablet, then 1-2 days later, 1/2 tablet, then 1-2 days later she can try a full tablet.     Has emergency set and given handout emergency treatment (has Epipen)    It is imperative that the patient avoids all antibiotics, topically and systemically, that we have tested and that she was positive. Because gentamycin and neomycin were included I would avoid, for safety reasons, cross reacting aminoglycosides including vancomycin.     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 However, it could be that at that time some topical antibiotic has been used that she reacted to and/or lipatrophy after Kenalog injections.     ==> Comment: It is difficult to directly correlate the atrophy on the upper arm to the contact sensitization even though the outline of the reaction looks like the outline of a band-aid. It might be that this atrophy has nothing to do with an allergic reaction. It might be important to really verify if it is a muscle or lipatrophy. Clinically it looks more like lipatrophy which can sometimes be idiopathic. Moreover, a local injection of corticosteroids in this area might as well explain the atrophy (and there was such an injection months ago; not sure if exactly same location?)      These conclusions are made at the best of one's knowledge and belief based on the provided evidence such as patient's history and allergy test results and they can change over time or can be incomplete because of missing information's.      Final conclusions:    With the tendency of the patient to develop delayed-type reactions, I would in the moment not continue with the immunotherapy or we reduce it  to only relevant allergens like house dust mite, but in that case we would maybe perform first prick, intradermal, and patch test with house dust mite immunotherapy to be sure that there is no delayed reaction to these extracts. We recommend just one single major allergen for immunotherapy, such as 50% DF/DP standardized from [facility].We could not see any delayed reaction to house dust mite test and IT solutions.      In patch tests severe reactions against Neomycine, Framycetine, Gentamicin, Bacitracine and Polymyxin. For reasons of crossreactions I would as well avoid other aminoglycosides such as Vancomycin. This was placed into the patient's allergy chart.       Unlikely that she had an angioedema reaction to Meloxicam given negative prick, intradermal, and patch testing. Also do not suspect COX1 intolerance given that she tolerates ibuprofen. Discussed with patient that she can try to restart the Meloxicam as instructed started with 1/4 tablet as long as she has emergency set on hand.     ==> Treatment prescribed/Plan:    In the moment, patient to stop with immunotherapy or we will reduce it to only relevant allergens. We recommend just one single major allergen for immunotherapy, such as 50/50 mixture of DF/DP standardized from [facility].     Prescribed triamcinolone 0.1% cream. Apply topically 2 times daily for 5 days.     Go for antibiotic free foods if possible.    Can try restarting Meloxicam as above as long as she has the emergency set on hand and is accompanied by another person   Maybe, in future, if we could plan additional allergy tests for delayed-type reactions with immunotherapy extracts and/or maybe tetanus toxoid vaccine. These tests should include as well possible cross-reacting antibiotics such aminoglycosides and particularly vancomycin.  Crossreactions between Framycetine/Neomycine and these other antibiotics possible.   Patient counseling:  Discussed with patient it is unlikely that she had an  angioedema reaction to Meloxicam given negative prick, intradermal, and patch testing. Also do not suspect COX1 intolerance given that she tolerated ibuprofen. Discussed with patient that she can try to restart the Meloxicam as instructed started with 1/4 tablet as long as she has emergency set on hand.     These conclusions are made at the best of one's knowledge and belief based on the provided evidence such as patient's history and allergy test results and they can change over time or can be incomplete because of missing information's.    CC Ulysses Mtz MD  [address] [address] on close of this encounter..      Staff: : provider    Follow-up in [facility] for additional patch tests (see series in red) and earlier if hives not under control  ___________________________    I spent a total of 35 minutes with Gisela Richards during today s  visit. This time was spent discussing all the individual test results, correlating them to the clinical relevance, counseling the patient and/or coordinating care

## 2022-07-27 NOTE — LETTER
7/27/2022         RE: Gisela Richards  [address]  [place] [address]        Dear Colleague,    Thank you for referring your patient, Gisela Richards, to the [facility] [place]. Please see a copy of my visit note below.    [facility] Dermato-allergology Note  Virtual visit: store and forward video ([facility] connected), start time: 10:20am, end time: 10:55am, date of images: during video  Encounter Date: Jul 27, 2022  ____________________________________________    CC: No chief complaint on file.      HPI:  (Jul 27, 2022)  Ms. Gisela Richards is a(n) 41 year old female who presents today as a return patient for allergy consultation  - Follow-up: in Derm-Allergy clinic as needed  - on July 5th did a photoshoot in the field and then observed on the ankle a small spot, that became bigger (brown, not really infiltrated) and the morning of the 7th July a Dermatologist looked at it and in histology it was a necrosis unknown cause.  - 1 week later itching on that area with vesicular reaction = eczematous reaction  - on the 15th did bacterial culture (no bacterial growth)  - as soon as the patient stopped the vaseline with same adhesive band aid the lesions improved  - patient observe anyway recently reactions to various vaseline type ointment (incl Triamcinolone acetonide) = with cream no problem  --> took at the end orally prednisone and Doxycycline = stopped the Prednisone Monday the 18th of July and the Doxycycline on the 18th. ==> itching and urticarial rash started on the 22nd of July after being in the sun  --> NP prescribed Claritine and Prilosec and Benadryl     Physical exam:  General: In no acute distress, well-developed, well-nourished  Eyes: no conjunctivitis  ENT: no signs of rhinitis   Pulmonary: no wheezing or coughing  Skin:in the moment no signs for skin lesions, but patient had the eczematous lesions on the ankle   Some erythema over the  decolletage    Earlier History and Allergy exams:  01/22/2021  Says she hasn't noticed any reaction to the patch testing or where we injected. Would like to try the meloxicam for her back pain. .    Earlier History and Allergy exams:   Ibuprofen does not induce any reaction.    > Patient had a reaction to Meloxicam in November. Took one Tabl for the first time and about 3-6h later with swelling and redness around the lips with dryness and then a sun burn type of rash over upper chest, stomach and back. No itching. No sun exposure involved. Redness disappeared after 1-2 days. Never tongue swelling or breathing problems. Out of chin skin wheeping. Took almost one week to fully recover.  ==> delayed type reaction    Earlier History and Allergy exams:    Patient is continuing now the IT without any problems, since they are not using ItchEx anymore. Just using ice.  IT injections is done by an primary care PA in [facility]    > Patient had a reaction to Meloxicam about 3 h later with angioedema on face and then sun burn type of rash over face --> happened in November  > Ibuprofen does not induce any reaction (last taken last Saturday)    With tramadol tremors and swellings of face and with Percacet (Oxycodon and Acetaminophen) migraines  Vicodin induces erythema  Today she reports that all skin manifestation has resolved. Patient has come from [place], [place]. Patient's spouse is present during evaluation. She reports her first allergy shots took place on November 9th, 2 injections on posterior aspect of right arm and 1 to the posterior aspect of left arm.   - Per chart review immunotherapy: trees/grass/weeds, mites/molds and animals  Within the day of the injection patient noted a diffuse pruritic rash on the posterior aspect of her right arm. She denies using any topicals or oral medications beside Zyrtec to aid with on going rash. She additionally iced the area and skin manifestation had resolved.  "    The following week she completed her 2nd round of immunotherapy at the same dosage. Again the rash occurred on the posterior aspect of her right arm along with outstanding edema of her right arm and shoulder. This rash was also pruritic, but again denied any pain. Her provider was concerned about reaction to the preservative. This rash took 1.5 week to self resolve. The edema persisted for 2-3 weeks and when resolved, a notable dent was appreciated on the right distal aspect of the deltoid. Imaging completed showed atrophy of the deltoid. She denied any recent injections to that area including routine vaccines or steroid injections. Also now having to areas of hypopigmentation overlying the deltoid.  Reports dry skin after resolution of rashes. Denies history of asthma. Extensive history of food sensitives and autoimmune workup that has been unremarkable.     Hx since 12/27/19:  The patient comes in today for patch testing day 1. She reports that there is a bruise on the R shoulder that has worsened in the interim. She is unsure if it may be necrosis or sudden muscle atrophy. She reports it appeared on Nov 16, the day after allergy shot. Also states that in the same area she developed a new \"bump\" that appeared about 2 days ago.   Reports that the IT on the R upper extremity was against trees, weeds, molds, mites, and dog. On the L upper extremity, she had IT injections for cats and dogs.   She is otherwise feeling well overall today. No other skin or allergy concerns at this time.     Medications:  - takes daily Zyrtec and is on IT  - Levothyroxin      Past Medical History:   Patient Active Problem List   Diagnosis     Allergic rhinitis due to dogs     Hypothyroid     Lumbar facet joint pain     Past Medical History:   Diagnosis Date     Constipation     Since young.     Other specified postprocedural states     09/24/2014,Slight recurrence within scar - removed in 9th grade and came back right away     " "Personal history of other medical treatment (CODE)      2, para 2.     Past Surgical History:   Procedure Laterality Date     COLONOSCOPY      ,\"swelling of the lining\" and flattening of villa.     OTHER SURGICAL HISTORY      ~,[id number],OTHER,Left,recurring within scar     OTHER SURGICAL HISTORY      2016,[id number],KNEE ARTHROSCOPY W/ MENISCAL REPAIR     OTHER SURGICAL HISTORY      2004,[id number],DILATION AND CURETTAGE,retained placenta       Social History:  Patient reports that she has never smoked. She has never used smokeless tobacco. She reports current alcohol use. She reports that she does not use drugs.    Family History:  Family History   Problem Relation Age of Onset     Family History Negative Father         Good Health,does not see doctor regularly     Thyroid Disease Mother         Thyroid Disease,Hypothyroid     Other - See Comments Mother         Smoker, prediabetes     Thyroid Disease Sister         Thyroid Disease,Hypothyroid     Arthritis Sister         Arthritis     Scoliosis Maternal Half-Sister         Scoliosis     Other - See Comments Maternal Half-Sister         No Known Problems       Medications:  Current Outpatient Medications   Medication Sig Dispense Refill     amphetamine-dextroamphetamine (ADDERALL XR) 25 MG 24 hr capsule Take 25 mg by mouth daily       cetirizine (ZYRTEC) 10 MG tablet Take 10 mg by mouth       cholecalciferol (VITAMIN D3) 25 mcg (1000 units) capsule Take 1 capsule by mouth       doxycycline hyclate (VIBRAMYCIN) 100 MG capsule Take 1 capsule by mouth 2 times daily (Patient not taking: Reported on 2022)       ferrous sulfate (FEROSUL) 325 (65 Fe) MG tablet Take 1 tablet by mouth daily       fluorometholone (FML FORTE) 0.25 % ophthalmic suspension  (Patient not taking: Reported on 2022)       folic acid (FOLVITE) 1 MG tablet Take 1 mg by mouth daily       levothyroxine (SYNTHROID/LEVOTHROID) 75 MCG tablet Take 75 mcg by mouth daily (Patient not " taking: Reported on 7/20/2022)       levothyroxine (SYNTHROID/LEVOTHROID) 75 MCG tablet Take 1 tablet (75 mcg) by mouth every morning (before breakfast) 90 tablet 4     meloxicam (ANJESO) 30 MG/ML injection Inject 1 mL (30 mg) into the vein once for 1 dose 1 mL 0     mometasone (NASONEX) 50 MCG/ACT spray 1 spray 2 times daily (Patient not taking: Reported on 7/20/2022)       predniSONE (DELTASONE) 50 MG tablet Emergency set: if severe allergic reaction take immediately 100mg Prednisone (2x50mg) and 2 Tabl Cetirizine 10mg and then write precise 12h retrospective diary.If less severe reaction take only the 2 Tabl Cetirizine 2 tablet 1     triamcinolone (KENALOG) 0.1 % external ointment Apply 1 Dose topically 2 times daily (Patient not taking: Reported on 7/20/2022)       vitamin B-12 (CYANOCOBALAMIN) 500 MCG tablet Take 1 tablet by mouth daily         Allergies   Allergen Reactions     Cats Shortness Of Breath     Dogs Fatigue, Palpitations, Shortness Of Breath and Tinnitus     Gentamicin Dermatitis     In patch tests severe reactions against Neomycine, Framycetine, Gentamicin, Bacitracine and Polymyxin.   For reasons of crossreactions I would as well avoid other aminoglycosides such as Vancomycine     Petrolatum Rash     Gluten Meal      Other reaction(s): Joint Pain     Itch-X Rash     Triamcinolone Rash     Fat atrophy after IM injection      Tramadol Other (See Comments)     Other reaction(s): Other - Describe In Comment Field  High doses caused yellow eyes, tremors in her mouth, couldn't feel some body parts.    No issues with low doses  High doses caused yellow eyes, tremors in her mouth, couldn't feel some body parts.    No issues with low doses     Food GI Disturbance     Night shades, stevia, pork     Gel Base Other (See Comments)     Allergy to Itch X. Gets blister/pustular rash in area the itch X is placed.     Hydrocodone-Acetaminophen Rash     Order for PATCH TESTS    [x] Outpatient  [] Inpatient:  Head..../ Bed ....      Skin Atopy (atopic dermatitis) [x] Yes   [] No  Rhinitis/Sinusitis:   [x] Yes   [] No  Allergic Asthma:   [] Yes   [x] No  Food Allergy:   [x] Yes   [] No  Leg ulcers:   [] Yes   [x] No  Hand eczema:   [] Yes   [x] No   Leading hand:   [x] R   [] L       [] Ambidextrous                      Reason for tests (suspected allergy): Assessment for potential reaction to disinfections before IT (less likely preservatives in IT, because the reaction not directly over injection site)  Known previous allergies: Trees/grass, mites, dogs/cats  Standardized panels (repeat patch tests in red)  [x] Standard panel (40 tests)  [x] Preservatives & Antimicrobials (31 tests)  [x] Emulsifiers & Additives (25 tests) particularly propylene glycol  [] Perfumes/Flavours & Plants (25 tests)  [] Hairdresser panel (12 tests)  [x] Rubber Chemicals (22 tests)  [x] Plastics (26 tests)  [] Colorants/Dyes/Food additives (20 tests)  [] Metals (implants/dental) (24 tests)  [] Local anaesthetics/NSAIDs (13 tests)  [x] Antibiotics & Antimycotics (14 tests)   [] Corticosteroids (15 tests)   [] Photopatch test (62 tests)   [] others: ...      [x] Patient's own products: tough strips band aids and vaseline and Triamcinolone cream and ointment    DO NOT test if chemical or biological identity is unknown!     always ask from patient the product information and safety sheets (MSDS)   [] Atopy screen prick test (Atopic predisposition): ...    [] Patient needs consultation with Allergy team (changes of tests may apply)  [] Tests discussed with Allergy team (can have direct appointment for patch tests)    Atopy Screen (Placed 01/13/20 )    No Substance Readings (15 min) Evaluation   POS Histamine 1mg/ml ++    NEG NaCl 0.9% - Slight dermographism     No Substance Readings (15 min) Evaluation   1 Alternaria alternata (tenuis)  -    2 Cladosporium herbarum -    3 Aspergillus fumigatus -    4 Penicillium notatum -    5 Dermatophagoides  pteronyssinus ++    6 Dermatophagoides farinae ++      Conclusion: immediate type reaction to house dust mites, but no delayed reaction to moulds or HDM --> The DP and DF reactions remained into the next day.     RESULTS & EVALUATION of PATCH TESTS    Patch test readings after     [x] 2 days, [] 3 days [x] 4 days, [] 5 days,    Applied patch tests with results (import here the list of patch tests):  Order documented by: IVAN HANCOCK LPN  Order reviewed by: Preeti Copeland LPN   Physician:   Date/time of application:1-  Localization of application: back >> Clear    STANDARD Series         No Substance 2 days 4 days remarks   1 Francesco Mix [C] - -    2 Colophony - -    3  2-Mercaptobenzothiazole  NA NA     4 Methylisothiazolinone - -    5 Carba Mix - -    6 Thiuram Mix [A] NA NA    7 Bisphenol A Epoxy Resin - -    8 M-Oihz-Bpqgzehwyjq-Formaldehyde Resin - -    9 Mercapto Mix [A] - -    10 Black Rubber Mix- PPD [B] NA NA    11 Potassium Dichromate  -  -    12 Balsam of [place] (Myroxylon Pereirae Resin) - -    13 Nickel Sulphate Hexahydrate - -    14 Mixed Dialkyl Thiourea - -    15 Paraben Mix [B] - -    16 Methyldibromo Glutaronitrile NA NA    17 Fragrance Mix - -    18 2-Bromo-2-Nitropropane-1,3-Diol (Bronopol) - -    19 Lyral - -    20 Tixocortol-21- Pivalate NA NA    21 Diazolidiyl Urea (Germall II) - -    22 Methyl Methacrylate NA NA    23 Cobalt (II) Chloride Hexahydrate - -    24 Fragrance Mix II  - -    25 Compositae Mix - -    26 Benzoyl Peroxide NA NA    27 Bacitracin - ++/+++    28 Formaldehyde - -    29 Methylchloroisothiazolinone / Methylisothiazolinone - -    30 Corticosteroid Mix - -    31 Sodium Lauryl Sulfate - -    32 Lanolin Alcohol - -    33 Turpentine - -    34 Cetylstearylalcohol - -    35 Chlorhexidine Dicluconate - -    36 Budenoside - -    37 Imidazolidinyl Urea  - -    38 Ethyl-2 Cyanoacrylate NA NA    39 Quaternium 15 (Dowicil 200) - -    40 Decyl Glucoside - -       EMULSIFIERS & ADDITIVES        No Substance 2 days 4 days remarks   41 Polyethylene Glycol-400 NA NA    42 Cocamidopropyl Betaine - -    43 Amerchol L101 NA NA    44 Propylene Glycol - -    45 Triethanolamine - -    46 Sorbitane Sesquiolate - -    47 Isopropylmyristate - -    48 Polysorbate 80  - -    49 Amidoamine   (Stearamidopropyl Dimethylamine) - -    50 Oleamidopropyl Dimethylamine - -    51 Lauryl Glucoside NA NA    52 Coconut Diethanolamide  - -    53 2-Hydroxy-4-Methoxy Benzophenone (Oxybenzone) - -    54 Benzophenone-4 (Sulisobenzon) - -    55 Propolis - -    56 Dexpanthenol - -    57 Carboxymethyl Cellulose Sodium - -    58 Abitol - -    59 Tert-Butylhydroquinone - -    60 Benzyl Salicylate - -     Antioxidant      61 Dodecyl Gallate - -    62 Butylhydroxyanisole (BHA) - -    63 Butylhydroxytoluene (BHT) - -    64 Di-Alpha-Tocopherol (Vit E) - -    65 Propyl Gallate - -      RUBBER CHEMICALS         No Substance 2 days 4 days remarks    Carbamate      66 Zinc Bis ( Diethyldithio carbamate ) (ZDEC) - -    67 Zinc Bis (Dibutyldithiocarbamate) - -    68 1,3-Diphenylguanidine (DPG) - -     Thiourea      69 Dibutylthiourea - -    70 Diphenyltiourea - -    71 Thiourea - -     Mercapto chemicals      72 Morpholinyl Mercaptobenzothiazole - -    73 V-Zetobetoam-3-Benzothiazyl-Sulfenamide - -    74 Dibenzothiazyl Disulfide - -     Thiuram chemicals      75 Dipentamethylenethiuram Disulfide - -    76 Tetraethylthiuram Disulfide (Disulfiram) - -    77 Tetramethylthiuram Disulfide - -    78 Tetramethyl Thiuram Monosulfide (TMTM) - -     4-Phenylenediamine derivatives      79 N-Isopropyl-N'-Phenyl-P-Phenylenediamine (IPPD) - -    80 Xlaoxpsk-Y-Lyskqsdtuicknmrp (DPPD) - -     Various Rubber Additives      81 Hydroquinone Monobenzylether  - -    82 Hexamethylenetetramine - -    83 4,4'-Dihydroxybiphenyl - -    84 Cyclohexylthiophtalimide - -    85 Ethylenediamine Dihydrochloride - -    86 N-Phenyl-B-Naphthylamine - -     87 Dodecyl Mercaptan - -        PLASTICS         No Substance 2 days 4 days remarks    Acrylates - -    88 2-Hydroxyethyl Methacrylate (HEMA) - -    89 1,4-Butandioldimethacrylate (BUDMA) - -    90  2-Ethylhexyl Acrylate - -    91 Bisphenol-A-Dimethacrylate  - -    92 Diurethane-Dimethacrylate - -    93 Ethyleneglycoldimethacrylate (EGDMA) - -    94 Pentaerythritoltriacrylate (CARLOS) - -    95 Triethylene Glycol Dimethacrylate (TEGDMA) - -     Synthetic material/additives       96 I-Tyor-Mfrggjqcuak - -    97 Tricresyl Phosphate - -    98 7-Alkv-Jvzhyahboktei - -    99 Bis (2-Ethylhexyl) Phthalate - -    100 Dibutylphthalate - -    101 Dimethylphthalate - -    102 Toluene-2,4-Diisocyanate - -    103 Diphenylmethane-4,4''-Diisocyanate - -     EPOXY RESIN SYSTEMS       Reactive Solvents - -    104 Cresyl Glycidyl Ether - -    105 Butyl Glycidyl Ether - -    106 Phenyl Glycidyl Ether - -    107 1,4-Butanediol Diglycidyl Ether - -    108 1,6-Hexanediole Diglycidyl Ether - -     Hardener / Accelerator - -    109 Ethylenediamine Dihydrochloride - -    110 Triethylenetetramine - -    111 Diethylenetriamine - -    112 Isophorone Diamine (IPD) - -    113 N,N-Dimethyl-P-Toluidine - -        PRESERVATIVES & ANTIMICROBIALS         No Substance 2 days 4 days remarks   114  1,2-Benzisothiazoline-3-One, Sodium Salt - -    115  1,3,5-Anival (2-Hydroxyethyl) - Hexahydrotriazine (Grotan BK) NA NA    116 6-Echbfvjnddbuq-2-Nitro-1, 3-Propanediol - -    117  3, 4, 4' - Triclocarban - -    118 4 - Chloro - 3 - Cresol - -    119 4 - Chloro - 4 - Xylenol (PCMX) NA NA    120 7-Ethylbicyclooxazolidine (Bioban PM2964) - -    121 Benzalkonium Chloride - -    122 Benzyl Alcohol - -    123 Cetalkonium Chloride NA NA    124 Cetylpyrimidine Chloride  - -    125 Chloroacetamide - -    126 DMDM Hydantoin NA NA    127 Glutaraldehyde NA NA    128 Triclosan - -    129 Glyoxal Trimeric Dihydrate - -    130 Iodopropynyl Butylcarbamate - -    131  Octylisothiazoline NA NA    132 Iodoform - -    133 (Nitrobutyl) Morpholine/(Ethylnitro-Trimethylene) Dimorpholine (Bioban P 1487) - -    134 Phenoxyethanol NA NA    135 Phenyl Salicylate - -    136 Povidone Iodine - -    137 Sodium Benzoate - -    138 Sodium Disulfite NA NA    139 Sorbic Acid - -    140 Thimerosal - -     Parabens      141 Butyl-P-Hydroxybenzoate - -    142 Ethyl-P-Hydroxybenzoate - -    143 Methyl-P-Hydroxybenzoate NA NA    144 Propyl-P-Hydroxybenzoate - -         ANTIBIOTICS & ANTIMYCOTICS         No Substance 2 days 4 days remarks   145 Erythromycine - -    146 Framycetine Sulphate (+) +++    147 Fusidic Acid Sodium Salt - -    148 Gentamicin Sulphate - +    149 Neomycine Sulphate (+) ++/+++    150 Oxytetracycline  - -    151 Polymyxin B Sulphate (+) ++    152 Tetracycline-HCL - -    153 Sulfanilamide - -    154 Metronidazole - -    155 Oxyquinoline Mix - -    156 Nitrofurazone - -    157 Nystatin - -    158 Clotrimazole - -      PATIENTS OWN PRODUCTS         No Substance Conc  % solv 2 days 4 days remarks   159 Aspergillus   - -    160 Penicillium   - -    161 D. Farinae   - -    162 D. pteronyssinus   - -    163 Dog   - -    164 Cat   - -        Results of patch tests:                         Interpretation:    - Negative                    A    = Allergic      (+) Erythema    TI   = Toxic/irritant   + E + Infiltration    RaP = Relevance at Present     ++ E/I + Papulovesicle   Rpr  = Relevance Previously     +++ E/I/P + Blister     nR   = No Relevance       DRUGS/SUBSTANCES 1/19/2021    Control Tests (Placed 01/19/21 )    No Substance Readings (15min) Evaluation   POS Histamine 1mg/ml ++    NEG NaCl 0.9% - dermographism      Prick Tests        Substance/ Allergen Concentration Result (15min) Remarks   Meloxicam 30mg/ml as is neg     In vaseline neg Not bigger than neg ctr     Intradermal Tests   immed immed delayed delayed     Substance Conc 1st dil 2nd dil   3 days  3 days remarks   Histamine  Hydrochloride (ALK) 0.1 mg/ml        NaCl 0.9%        Meloxicam 1:200/ 1:20 neg neg neg  dermographism     Patch Tests   as is  as is 1:2 paraffin 1:2 paraffin     Substance Conc  3 days  days  3 days  days remarks   Meloxicam  - - - -        [x] Allergic reaction diagnosed against:     Antibiotics and antimycotics:   Framycetine Sulphate +++  Gentamicin Sulphate +  Neomycine Sulphate ++/+++  Polymyxin B Sulphate ++  Bacitracin ++/+++    Interpretation/ remarks:   Patient has a severe delayed type contact sensitization to various antibiotics that are in many OTC antiseptics and antibiotics. No signs for allergies to rubber chemicals or adhesives. Therefore, we think that the reaction to the band-aids were maybe to topical antibiotics that were added onto the band-aid.       Assessment & Plan:    ==> Final Diagnosis:     #  Localized Eczematous and delayed-type reaction after 2x initial doses of immunotherapies  = reaction to itch-X cream  * chronic illness with exacerbation, progression, side effects from treatment    No signs for allergies to preservative in these allergy extracts     No signs in prick test or patch test for delayed type reaction to some of these allergens (dust mites, molds)    Severe contact sensitizations to topical antibiotics  ==> in open application test clearly positive to the itch-x cream that patient used to reduce the effects of the IT    ==> Comment: This was NOT a reaction to the immunotherapy, but rather to the topical product used afterwards to treat the topical side effects of the IT. Not sure to what ingredient in itch-x was really reacting, but not to Parabens and Propylene Glycol.    #  New flare up of allergic contact dermatitis on ankle  DDx Vaseline or adhesives (Band aids?)  ==> plan repeat of some patch tests (see in red above)  * chronic illness with exacerbation, progression, side effects from treatment    #  Recurrent urticarial rash with pruritus (after using  Doxycycline)  * chronic illness with exacerbation, progression, side effects from treatment  Treatment: try Claritine 10mg morning and Noon and Hydroxyzine 25mg at bedtime    # Unlikely angioedema reaction to Meloxicam  * stable chronic illness    In skin tests no signs for specific immediate type or delayed reaction to Meloxicam. Patient desires to restart Meloxicam for back pain.     No evidence for COX1 intolerance (Ibuprofen is well-tolerated)    Suspect that her prior reaction was unrelated to the Meloxicam    Can restart meloxicam 1/4 tablet, then 1-2 days later, 1/2 tablet, then 1-2 days later she can try a full tablet.     Has emergency set and given handout emergency treatment (has Epipen)    It is imperative that the patient avoids all antibiotics, topically and systemically, that we have tested and that she was positive. Because gentamycin and neomycin were included I would avoid, for safety reasons, cross reacting aminoglycosides including vancomycin.     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 However, it could be that at that time some topical antibiotic has been used that she reacted to and/or lipatrophy after Kenalog injections.     ==> Comment: It is difficult to directly correlate the atrophy on the upper arm to the contact sensitization even though the outline of the reaction looks like the outline of a band-aid. It might be that this atrophy has nothing to do with an allergic reaction. It might be important to really verify if it is a muscle or lipatrophy. Clinically it looks more like lipatrophy which can sometimes be idiopathic. Moreover, a local injection of corticosteroids in this area might as well explain the atrophy (and there was such an injection months ago; not sure if exactly same location?)      These conclusions are made at the best of one's knowledge and belief based on the provided evidence such as patient's  history and allergy test results and they can change over time or can be incomplete because of missing information's.      Final conclusions:    With the tendency of the patient to develop delayed-type reactions, I would in the moment not continue with the immunotherapy or we reduce it to only relevant allergens like house dust mite, but in that case we would maybe perform first prick, intradermal, and patch test with house dust mite immunotherapy to be sure that there is no delayed reaction to these extracts. We recommend just one single major allergen for immunotherapy, such as 50% DF/DP standardized from [facility].We could not see any delayed reaction to house dust mite test and IT solutions.      In patch tests severe reactions against Neomycine, Framycetine, Gentamicin, Bacitracine and Polymyxin. For reasons of crossreactions I would as well avoid other aminoglycosides such as Vancomycin. This was placed into the patient's allergy chart.       Unlikely that she had an angioedema reaction to Meloxicam given negative prick, intradermal, and patch testing. Also do not suspect COX1 intolerance given that she tolerates ibuprofen. Discussed with patient that she can try to restart the Meloxicam as instructed started with 1/4 tablet as long as she has emergency set on hand.     ==> Treatment prescribed/Plan:    In the moment, patient to stop with immunotherapy or we will reduce it to only relevant allergens. We recommend just one single major allergen for immunotherapy, such as 50/50 mixture of DF/DP standardized from [facility].     Prescribed triamcinolone 0.1% cream. Apply topically 2 times daily for 5 days.     Go for antibiotic free foods if possible.    Can try restarting Meloxicam as above as long as she has the emergency set on hand and is accompanied by another person   Maybe, in future, if we could plan additional allergy tests for delayed-type reactions with immunotherapy extracts and/or maybe tetanus toxoid vaccine.  These tests should include as well possible cross-reacting antibiotics such aminoglycosides and particularly vancomycin.  Crossreactions between Framycetine/Neomycine and these other antibiotics possible.   Patient counseling:  Discussed with patient it is unlikely that she had an angioedema reaction to Meloxicam given negative prick, intradermal, and patch testing. Also do not suspect COX1 intolerance given that she tolerated ibuprofen. Discussed with patient that she can try to restart the Meloxicam as instructed started with 1/4 tablet as long as she has emergency set on hand.     These conclusions are made at the best of one's knowledge and belief based on the provided evidence such as patient's history and allergy test results and they can change over time or can be incomplete because of missing information's.    CC Ulysses Mtz MD  [place] [address] [address] [place], [place] [id number] on close of this encounter..      Staff: : provider    Follow-up in Derm-Allergy clinic for additional patch tests (see series in red) and earlier if hives not under control  ___________________________    I spent a total of 35 minutes with Gisela Richards during today s  visit. This time was spent discussing all the individual test results, correlating them to the clinical relevance, counseling the patient and/or coordinating care           Again, thank you for allowing me to participate in the care of your patient.        Sincerely,        Reginaldo Horne MD     On admission pt met 3/4 SIRS criteria (HR 96, WBC 16.10 with neutrophilic predominance, Tmax 103.2F) found to have positive UA. Denies any urinary symptoms. Discussed with daughter, given history of BPH, concern for underlying urinary retention as nidus for ascending urinary tract infection. Denies constipation. ID consulted. Ucx from 8/25 grew Enterobacter Colacae.  - per ID, no need to treat asymptomatic bacteruria at this time  - monitor for any new urinary symptoms

## 2022-08-25 ENCOUNTER — OFFICE VISIT (OUTPATIENT)
Dept: INTERNAL MEDICINE | Facility: OTHER | Age: 42
End: 2022-08-25
Attending: STUDENT IN AN ORGANIZED HEALTH CARE EDUCATION/TRAINING PROGRAM
Payer: COMMERCIAL

## 2022-08-25 VITALS
DIASTOLIC BLOOD PRESSURE: 84 MMHG | SYSTOLIC BLOOD PRESSURE: 128 MMHG | RESPIRATION RATE: 16 BRPM | BODY MASS INDEX: 24.15 KG/M2 | TEMPERATURE: 97.4 F | WEIGHT: 158.8 LBS | OXYGEN SATURATION: 98 % | HEART RATE: 74 BPM

## 2022-08-25 DIAGNOSIS — D23.9 DERMATOFIBROMA: ICD-10-CM

## 2022-08-25 DIAGNOSIS — L98.9 SKIN LESION: Primary | ICD-10-CM

## 2022-08-25 PROCEDURE — 99213 OFFICE O/P EST LOW 20 MIN: CPT | Mod: 25 | Performed by: STUDENT IN AN ORGANIZED HEALTH CARE EDUCATION/TRAINING PROGRAM

## 2022-08-25 PROCEDURE — 250N000009 HC RX 250: Performed by: STUDENT IN AN ORGANIZED HEALTH CARE EDUCATION/TRAINING PROGRAM

## 2022-08-25 PROCEDURE — 88305 TISSUE EXAM BY PATHOLOGIST: CPT

## 2022-08-25 RX ORDER — LORATADINE 10 MG/1
10 TABLET ORAL
COMMUNITY
Start: 2022-07-26

## 2022-08-25 RX ORDER — TRIAMCINOLONE ACETONIDE 1 MG/G
CREAM TOPICAL
COMMUNITY
Start: 2022-07-22

## 2022-08-25 RX ORDER — FAMOTIDINE 40 MG/1
40 TABLET, FILM COATED ORAL AT BEDTIME
COMMUNITY
Start: 2022-07-26

## 2022-08-25 RX ORDER — LIDOCAINE HYDROCHLORIDE 10 MG/ML
5 INJECTION, SOLUTION INFILTRATION; PERINEURAL ONCE
Status: COMPLETED | OUTPATIENT
Start: 2022-08-25 | End: 2022-08-25

## 2022-08-25 RX ORDER — FAMOTIDINE 40 MG/1
1 TABLET, FILM COATED ORAL AT BEDTIME
COMMUNITY
Start: 2022-07-26 | End: 2022-08-25

## 2022-08-25 RX ORDER — DEXTROAMPHETAMINE SACCHARATE, AMPHETAMINE ASPARTATE, DEXTROAMPHETAMINE SULFATE AND AMPHETAMINE SULFATE 1.25; 1.25; 1.25; 1.25 MG/1; MG/1; MG/1; MG/1
TABLET ORAL
COMMUNITY
Start: 2022-07-31

## 2022-08-25 RX ADMIN — LIDOCAINE HYDROCHLORIDE 5 ML: 10 INJECTION, SOLUTION EPIDURAL; INFILTRATION; INTRACAUDAL; PERINEURAL at 10:04

## 2022-08-25 ASSESSMENT — PAIN SCALES - GENERAL: PAINLEVEL: NO PAIN (0)

## 2022-08-25 NOTE — PROGRESS NOTES
Assessment & Plan         ICD-10-CM    1. Skin lesion  L98.9 lidocaine 1 % injection 5 mL     Surgical Pathology Exam   2. Dermatofibroma  D23.9      Dermatofibroma: Dermatofibroma of the leg appears benign did not biopsy today    Skin lesion: Skin lesion on the upper right arm lateral and near the elbow concerning for atypical lesion such as basal cell carcinoma so was injected with 5 cc of lidocaine without epinephrine and shave biopsy performed.  Patient has allergy to antibiotic ointment and Vaseline so bandage was applied and instructed to keep covered until heals.  I will contact her with pathology results.  Acetaminophen as needed for pain.          No follow-ups on file.    Sanjiv Crawford MD  [facility]      Subjective   Gisela is a 41 year old, presenting for the following health issues:  Derm Problem (Left leg and right arm)      History of Present Illness       Reason for visit:  Abnormal cell growth    She eats 0-1 servings of fruits and vegetables daily.She consumes 0 sweetened beverage(s) daily.She exercises with enough effort to increase her heart rate 10 to 19 minutes per day.  She exercises with enough effort to increase her heart rate 3 or less days per week.   She is taking medications regularly.             Review of Systems         Objective    /84 (BP Location: Right arm, Patient Position: Sitting, Cuff Size: Adult Regular)   Pulse 74   Temp 97.4  F (36.3  C) (Tympanic)   Resp 16   Wt 72 kg (158 lb 12.8 oz)   LMP 08/03/2022 (Exact Date)   SpO2 98%   BMI 24.15 kg/m    Body mass index is 24.15 kg/m .  Physical Exam   General: Pleasant 41-year-old woman sitting clinic no acute distress  Skin: Dermatofibroma on the leg approximately 1 cm, 1 cm atypical macule on the right upper arm above the antecubital fossa.  No hyperpigmentation though abnormal pigment network with crusted blood surrounding and slight ulceration                    .  ..

## 2022-08-25 NOTE — LETTER
August 29, 2022      Gisela OATES [place]  [address]  [facility] [address]        Dear ,    We are writing to inform you of your test results.    Your biopsy result showed a benign dilation of blood vessels, which explains the oozing afterward.  It is not a cancer and does not need to be followed or further biopsied.  This is good news.    Resulted Orders   Surgical Pathology Exam   Result Value Ref Range    Case Report       Surgical Pathology Report                         Case: [id number]                                  Authorizing Provider:  Sanjiv Crawford MD          Collected:           08/25/2022 10:19 AM          Ordering Location:     [facility]    Received:            08/25/2022 11:00 AM                                 Hospital                                                                     Pathologist:           Lab, [facility]                                                                                   Pathologist                                                                  Specimen:    Arm, Right                                                                                 Final Diagnosis       SEE SCANNED REPORT      Case Images         If you have any questions or concerns, please call the clinic at the number listed above.       Sincerely,      Sanjiv Crawford MD

## 2022-08-25 NOTE — NURSING NOTE
"Chief Complaint   Patient presents with     Derm Problem     Left leg and right arm       FOOD SECURITY SCREENING QUESTIONS  Hunger Vital Signs:  Within the past 12 months we worried whether our food would run out before we got money to buy more. Never  Within the past 12 months the food we bought just didn't last and we didn't have money to get more. Never  Marie Chambers LPN 8/25/2022 9:51 AM      Initial /84 (BP Location: Right arm, Patient Position: Sitting, Cuff Size: Adult Regular)   Pulse 74   Temp 97.4  F (36.3  C) (Tympanic)   Resp 16   Wt 72 kg (158 lb 12.8 oz)   LMP 08/03/2022 (Exact Date)   SpO2 98%   BMI 24.15 kg/m   Estimated body mass index is 24.15 kg/m  as calculated from the following:    Height as of 4/17/17: 1.727 m (5' 8\").    Weight as of this encounter: 72 kg (158 lb 12.8 oz).  Medication Reconciliation: complete    Marie Chambers LPN  "

## 2022-08-29 LAB
PATH REPORT.COMMENTS IMP SPEC: NORMAL
PATH REPORT.FINAL DX SPEC: NORMAL
PHOTO IMAGE: NORMAL

## 2022-09-17 ENCOUNTER — HEALTH MAINTENANCE LETTER (OUTPATIENT)
Age: 42
End: 2022-09-17

## 2022-11-23 ENCOUNTER — TELEPHONE (OUTPATIENT)
Dept: ALLERGY | Facility: CLINIC | Age: 42
End: 2022-11-23

## 2022-11-23 ENCOUNTER — TRANSFERRED RECORDS (OUTPATIENT)
Dept: MULTI SPECIALTY CLINIC | Facility: CLINIC | Age: 42
End: 2022-11-23

## 2022-11-23 LAB
ALT SERPL-CCNC: 34 IU/L (ref 6–31)
AST SERPL-CCNC: 30 IU/L (ref 10–40)
CREATININE (EXTERNAL): 0.85 MG/DL (ref 0.4–1)
GLUCOSE (EXTERNAL): 73 MG/DL (ref 70–99)
INR (EXTERNAL): 1 (ref 0.9–1.1)
POTASSIUM (EXTERNAL): 3.8 MEQ/L (ref 3.4–5.1)
TSH SERPL-ACNC: 1.45 UIU/ML (ref 0.4–3.99)

## 2022-11-23 NOTE — TELEPHONE ENCOUNTER
STANDARD Series                                          # Substance 2 days 4 days remarks     1 Francesco Mix [C] - -       2 Colophony - -       3  2-Mercaptobenzothiazole  - -       4 Methylisothiazolinone - -       5 Carba Mix - -       6 Thiuram Mix [A] - -       7 Bisphenol A Epoxy Resin - -       8 P-Bnpa-Zoivqlrlifc-Formaldehyde Resin - -       9 Mercapto Mix [A] - -       10 Black Rubber Mix- PPD [B] - -       11 Potassium Dichromate  -  -       12 Balsam of [place] (Myroxylon Pereirae Resin) - -       13 Nickel Sulphate Hexahydrate - -       14 Mixed Dialkyl Thiourea - -       15 Paraben Mix [B] - -       16 Methyldibromo Glutaronitrile - -       17 Fragrance Mix - -       18 2-Bromo-2-Nitropropane-1,3-Diol (Bronopol) CT - -       19 Lyral - -       20 Tixocortol-21- Pivalate CT - -       21 Diazolidiyl Urea (Germall II) - -       22 Methyl Methacrylate - -       23 Cobalt (II) Chloride Hexahydrate - -       24 Fragrance Mix II  - -       25 Compositae Mix - -       26 Benzoyl Peroxide - -       27 Bacitracin - -       28 Formaldehyde - -       29 Methylchloroisothiazolinone / Methylisothiazolinone - -       30 Corticosteroid Mix CT - -       31 Sodium Lauryl Sulfate - -       32 Lanolin Alcohol - -       33 Turpentine - -       34 Cetylstearylalcohol - -       35 Chlorhexidine Dicluconate - -       36 Budenoside - -       37 Imidazolidinyl Urea  - -       38 Ethyl-2 Cyanoacrylate - -       39 Quaternium 15 (Dowicil 200) - -       40 Decyl Glucoside - -       PRESERVATIVES & ANTIMICROBIALS        # Substance 2 days 4 days remarks   41 1  1,2-Benzisothiazoline-3-One, Sodium Salt - -     2  1,3,5-Anival (2-Hydroxyethyl) - Hexahydrotriazine (Grotan BK) - -     3 2-Votlkekebhqwh-7-Nitro-1, 3-Propanediol - -     4  3, 4, 4' - Triclocarban - -    45 5 4 - Chloro - 3 - Cresol - -     6 4 - Chloro - 4 - Xylenol (PCMX) - -     7 7-Ethylbicyclooxazolidine (Bioban [id number]) - -     8 Benzalkonium Chloride CT - -     9 Benzyl  Alcohol - -    50 10 Cetalkonium Chloride - -     11 Cetylpyrimidine Chloride  - -     12 Chloroacetamide - -     13 DMDM Hydantoin - -     14 Glutaraldehyde - -    55 15 Triclosan - -     16 Glyoxal Trimeric Dihydrate - -     17 Iodopropynyl Butylcarbamate - -     18 Octylisothiazoline - -     19 Bithionol CT - -    60 20 Bioban P 1487 (Nitrobutyl) Morpholine/(Ethylnitro-Trimethylene) Dimorpholine - -     21 Phenoxyethanol - -     22 Phenyl Salicylate - -     23 Povidone Iodine - -     24 Sodium Benzoate - -    65 25 Sodium Disulfite - -     26 Sorbic Acid - -     27 Thimerosal - -     28 Melamine Formaldehyde Resin - -     29 Ethylenediamine Dihydrochloride - -      Parabens      70 30 Butyl-P-Hydroxybenzoate - -     31 Ethyl-P-Hydroxybenzoate - -     32 Methyl-P-Hydroxybenzoate - -    73 33 Propyl-P-Hydroxybenzoate - -      EMULSIFIERS & ADDITIVES       # Substance 2 days 4 days remarks   74 1 Polyethylene Glycol-400 - -    75 2 Cocamidopropyl Betaine - -     3 Amerchol L101 - -     4 Propylene Glycol - -     5 Triethanolamine - -     6 Sorbitane Sesquiolate CT - -    80 7 Isopropylmyristate - -     8 Polysorbate 80 CT - -     9 Amidoamine   (Stearamidopropyl Dimethylamine) - -     10 Oleamidopropyl Dimethylamine - -     11 Lauryl Glucoside - -    85 12 Coconut Diethanolamide  - -     13 2-Hydroxy-4-Methoxy Benzophenone (Oxybenzone) - -     14 Benzophenone-4 (Sulisobenzon) - -     15 Propolis - -     16 Dexpanthenol - -    90 17 Abitol - -     18 Tert-Butylhydroquinone - -     19 Benzyl Salicylate - -     20 Dimethylaminopropylamin (DMPA) CT? D053       21 Zinc Pyrithione (Zinc Omadine) CT? Z006      95 22 Anival(Hydroxymethyl) Nitromethane CT        Antioxidant - -     23 Dodecyl Gallate - -     24 Butylhydroxyanisole (BHA) - -     25 Butylhydroxytoluene (BHT) - -     26 Di-Alpha-Tocopherol (Vit E) - -    100 27 Propyl Gallate - -      PERFUMES, FLAVORS & PLANTS        # Substance 2 days 4 days remarks   101 1  Benzyl Cinnamate - -     2 Di-Limonene (Dipentene) - -     3 Cananga Odorata (Ulises Montgomery) (I) - -     4 Lichen Acid Mix - -    105 5 Mentha Piperita Oil (Peppermint Oil) - -     6 Sesquiterpenelactone mix - -     7 Tea Tree Oil, Oxidized - -     8 Wood Tar Mix - -     9 Abietic Acid - -    110 10 Lavendula Angustifolia Oil (Lavender Oil) - -     11 Fragrance mix II CT * - -      Fragrance Mix I       12 Oakmoss Absolute - -     13 Eugenol - -     14 Geraniol - -    115 15 Hydroxycitronellal - -     16 Isoeugenol - -     17 Cinnamic Aldehyde - -     18 Cinnamic Alcohol  - -      Fragrance mix II       19 Citronellol - -    120 20 Alpha-Hexylcinnamic Aldehyde    - -     21 Citral - -     22 Farnesol - -    123 23 Coumarin - -      Hexylcinnamic aldehyde, Coumarin, Farnesol, Hydroxyisohexy3-cyclohexene carboxaldehyde, citral, citrolellol  PLASTICS        # Substance 2 days 4 days remarks     Acrylates - -    124 1 2-Hydroxyethyl Methacrylate (HEMA) - -    125 2 1,4-Butandioldimethacrylate (BUDMA) - -     3  2-Ethylhexyl Acrylate - -     4 Bisphenol-A-Dimethacrylate  - -     5 Diurethane-Dimethacrylate - -     6 Ethyleneglycoldimethacrylate (EGDMA) - -    130 7 Pentaerythritoltriacrylate (CARLOS) - -     8 Triethylene Glycol Dimethacrylate (TEGDMA) - -      Synthetic material/additives        9 X-Lxzv-Ykcpzpcmpse - -     10 Tricresyl Phosphate - -     11 1-Cxrd-Xfbbkeqbdojmx - -    135 12 Bis (2-Ethylhexyl) Phthalate - -     13 Dibutylphthalate - -     14 Dimethylphthalate - -     15 Toluene-2,4-Diisocyanate - -     16 Diphenylmethane-4,4''-Diisocyanate - -      EPOXY RESIN SYSTEMS        Reactive Solvents - -    140 17 Cresyl Glycidyl Ether - -     18 Butyl Glycidyl Ether - -     19 Phenyl Glycidyl Ether - -     20 1,4-Butanediol Diglycidyl Ether - -     21 1,6-Hexanediole Diglycidyl Ether - -      Hardener / Accelerator - -    145 22 Triethylenetetramine - -     23 Diethylenetriamine - -     24 Isophorone Diamine (IPD)  "- -    148 25 N,N-Dimethyl-P-Toluidine - -      OTHER PRODUCTS        # Substance Conc  % solv 2 days 4 days remarks    1          2          3          4          5          6          7          8          9          10           Patients own products:  è DO NOT test if chemical or biological identity is unknown!   - always ask from patient the product information and safety sheets and consult with the physician   - check neutral pH with pH indicator paper (do not test pH below 5 or over 8 or consult with physician)  - leave-on cosmetics can be tested \"as is\"  - rinse-off products have to be diluted with water, buffer, olive oil or paraffin (discuss with physician)  à remember:   - non-specific toxic/corrosive or biological reactions can occur  - tests with patients own products are not standardized and test conditions are not optimized for patch tests. Whenever possible test with standardized test series and correlate use of product with the result of the patch tests  - if not sure if compounds can be tested under occlusion in patch tests consider open application tests          Results of patch tests:                         Interpretation:  - Negative                    A    = Allergic      (+) Erythema    TI   = Toxic/irritant   + E + Infiltration    RaP = Relevance at Present     ++ E/I + Papulovesicle   Rpr  = Relevance Previously     +++ E/I/P + Blister     nR   = No Relevance        "

## 2022-11-24 NOTE — PROGRESS NOTES
[facility] Dermato-allergology Note  Office visit  Encounter Date: Nov 28, 2022  ____________________________________________    CC: No chief complaint on file.      HPI:  (Nov 28, 2022)  Ms. Gisela Richards is a(n) 42 year old female who presents today as a return patient for allergy tests as planned  - Follow-up in Derm-Allergy clinic for additional patch tests (see series in red) and earlier if hives not under control  - otherwise feeling well in usual state of health    Physical exam:  General: In no acute distress, well-developed, well-nourished  Eyes: no conjunctivitis  ENT: no signs of rhinitis   Pulmonary: no wheezing or coughing  Skin: Focused examination of the skin on test sites was performed = see test results below  No active eczematous skin lesions on tests sites, particularly back    Earlier History and Allergy exams:  (Jul 27, 2022)  - Follow-up: in Derm-Allergy clinic as needed  - on July 5th did a photoshoot in the field and then observed on the ankle a small spot, that became bigger (brown, not really infiltrated) and the morning of the 7th July a Dermatologist looked at it and in histology it was a necrosis unknown cause.  - 1 week later itching on that area with vesicular reaction = eczematous reaction  - on the 15th did bacterial culture (no bacterial growth)  - as soon as the patient stopped the vaseline with same adhesive band aid the lesions improved  - patient observe anyway recently reactions to various vaseline type ointment (incl Triamcinolone acetonide) = with cream no problem  --> took at the end orally prednisone and Doxycycline = stopped the Prednisone Monday the 18th of July and the Doxycycline on the 18th. ==> itching and urticarial rash started on the 22nd of July after being in the sun  --> NP prescribed Claritine and Prilosec and Benadryl   Skin:in the moment no signs for skin lesions, but patient had the eczematous lesions on the ankle   Some erythema over  the decolletage    Earlier History and Allergy exams:  01/22/2021  Says she hasn't noticed any reaction to the patch testing or where we injected. Would like to try the meloxicam for her back pain. .    Earlier History and Allergy exams:   Ibuprofen does not induce any reaction.    > Patient had a reaction to Meloxicam in November. Took one Tabl for the first time and about 3-6h later with swelling and redness around the lips with dryness and then a sun burn type of rash over upper chest, stomach and back. No itching. No sun exposure involved. Redness disappeared after 1-2 days. Never tongue swelling or breathing problems. Out of chin skin wheeping. Took almost one week to fully recover.  ==> delayed type reaction    Earlier History and Allergy exams:    Patient is continuing now the IT without any problems, since they are not using ItchEx anymore. Just using ice.  IT injections is done by an primary care PA in [facility]    > Patient had a reaction to Meloxicam about 3 h later with angioedema on face and then sun burn type of rash over face --> happened in November  > Ibuprofen does not induce any reaction (last taken last Saturday)    With tramadol tremors and swellings of face and with Percacet (Oxycodon and Acetaminophen) migraines  Vicodin induces erythema  Today she reports that all skin manifestation has resolved. Patient has come from [place], [place]. Patient's spouse is present during evaluation. She reports her first allergy shots took place on November 9th, 2 injections on posterior aspect of right arm and 1 to the posterior aspect of left arm.   - Per chart review immunotherapy: trees/grass/weeds, mites/molds and animals  Within the day of the injection patient noted a diffuse pruritic rash on the posterior aspect of her right arm. She denies using any topicals or oral medications beside Zyrtec to aid with on going rash. She additionally iced the area and skin manifestation had  "resolved.     The following week she completed her 2nd round of immunotherapy at the same dosage. Again the rash occurred on the posterior aspect of her right arm along with outstanding edema of her right arm and shoulder. This rash was also pruritic, but again denied any pain. Her provider was concerned about reaction to the preservative. This rash took 1.5 week to self resolve. The edema persisted for 2-3 weeks and when resolved, a notable dent was appreciated on the right distal aspect of the deltoid. Imaging completed showed atrophy of the deltoid. She denied any recent injections to that area including routine vaccines or steroid injections. Also now having to areas of hypopigmentation overlying the deltoid.  Reports dry skin after resolution of rashes. Denies history of asthma. Extensive history of food sensitives and autoimmune workup that has been unremarkable.     Hx since 12/27/19:  The patient comes in today for patch testing day 1. She reports that there is a bruise on the R shoulder that has worsened in the interim. She is unsure if it may be necrosis or sudden muscle atrophy. She reports it appeared on Nov 16, the day after allergy shot. Also states that in the same area she developed a new \"bump\" that appeared about 2 days ago.   Reports that the IT on the R upper extremity was against trees, weeds, molds, mites, and dog. On the L upper extremity, she had IT injections for cats and dogs.   She is otherwise feeling well overall today. No other skin or allergy concerns at this time.     Medications:  - takes daily Zyrtec and is on IT  - Levothyroxin      Past Medical History:   Patient Active Problem List   Diagnosis     Allergic rhinitis due to dogs     Hypothyroid     Lumbar facet joint pain     Past Medical History:   Diagnosis Date     Constipation     Since young.     Other specified postprocedural states     09/24/2014,Slight recurrence within scar - removed in 9th grade and came back right away " "    Personal history of other medical treatment (CODE)      2, para 2.     Past Surgical History:   Procedure Laterality Date     COLONOSCOPY      ,\"swelling of the lining\" and flattening of villa.     OTHER SURGICAL HISTORY      ~,[id number],OTHER,Left,recurring within scar     OTHER SURGICAL HISTORY      2016,[id number],KNEE ARTHROSCOPY W/ MENISCAL REPAIR     OTHER SURGICAL HISTORY      2004,[id number],DILATION AND CURETTAGE,retained placenta       Social History:  Patient reports that she has never smoked. She has never used smokeless tobacco. She reports current alcohol use. She reports that she does not use drugs.    Family History:  Family History   Problem Relation Age of Onset     Family History Negative Father         Good Health,does not see doctor regularly     Thyroid Disease Mother         Thyroid Disease,Hypothyroid     Other - See Comments Mother         Smoker, prediabetes     Thyroid Disease Sister         Thyroid Disease,Hypothyroid     Arthritis Sister         Arthritis     Scoliosis Maternal Half-Sister         Scoliosis     Other - See Comments Maternal Half-Sister         No Known Problems       Medications:  Current Outpatient Medications   Medication Sig Dispense Refill     amphetamine-dextroamphetamine (ADDERALL XR) 25 MG 24 hr capsule Take 25 mg by mouth daily       amphetamine-dextroamphetamine (ADDERALL) 5 MG tablet TAKE 1 TABLET BY MOUTH ONCE DAILY IN THE LATE AFTERNOON       cetirizine (ZYRTEC) 10 MG tablet Take 10 mg by mouth       cholecalciferol (VITAMIN D3) 25 mcg (1000 units) capsule Take 1 capsule by mouth       doxycycline hyclate (VIBRAMYCIN) 100 MG capsule Take 1 capsule by mouth 2 times daily (Patient not taking: Reported on 2022)       famotidine (PEPCID) 40 MG tablet Take 40 mg by mouth At Bedtime (Patient not taking: Reported on 2022)       ferrous sulfate (FEROSUL) 325 (65 Fe) MG tablet Take 1 tablet by mouth daily       fluorometholone (FML FORTE) 0.25 " % ophthalmic suspension  (Patient not taking: Reported on 8/25/2022)       folic acid (FOLVITE) 1 MG tablet Take 1 mg by mouth daily       hydrOXYzine (ATARAX) 25 MG tablet Take 1 tablet (25 mg) by mouth At Bedtime (Patient not taking: Reported on 8/25/2022) 30 tablet 1     levothyroxine (SYNTHROID/LEVOTHROID) 75 MCG tablet Take 75 mcg by mouth daily       levothyroxine (SYNTHROID/LEVOTHROID) 75 MCG tablet Take 1 tablet (75 mcg) by mouth every morning (before breakfast) 90 tablet 4     loratadine (CLARITIN) 10 MG tablet Take 10 mg by mouth (Patient not taking: Reported on 8/25/2022)       loratadine (CLARITIN) 10 MG tablet Morning and evening 1 Tabl of 10mg for about 1 week and if no itching anymore, then reduce to 1 Tabl in the morning. 30 tablet 3     mometasone (NASONEX) 50 MCG/ACT spray 1 spray 2 times daily (Patient not taking: Reported on 7/20/2022)       predniSONE (DELTASONE) 50 MG tablet Emergency set: if severe allergic reaction take immediately 100mg Prednisone (2x50mg) and 2 Tabl Cetirizine 10mg and then write precise 12h retrospective diary.If less severe reaction take only the 2 Tabl Cetirizine 2 tablet 1     triamcinolone (KENALOG) 0.1 % external cream APPLY CREAM EXTERNALLY TWICE DAILY APPLY THIN FILM SPARINGLY TO ITCHY SPOTS. USE UP TO 2 WEEKS AT A TIME       triamcinolone (KENALOG) 0.1 % external cream  (Patient not taking: Reported on 8/25/2022)       triamcinolone (KENALOG) 0.1 % external ointment Apply 1 Dose topically 2 times daily (Patient not taking: Reported on 7/20/2022)       vitamin B-12 (CYANOCOBALAMIN) 500 MCG tablet Take 1 tablet by mouth daily         Allergies   Allergen Reactions     Cats Shortness Of Breath     Dogs Fatigue, Palpitations, Shortness Of Breath and Tinnitus     Gentamicin Dermatitis     In patch tests severe reactions against Neomycine, Framycetine, Gentamicin, Bacitracine and Polymyxin.   For reasons of crossreactions I would as well avoid other aminoglycosides such  as Vancomycine     Petrolatum Rash     Gluten Meal      Other reaction(s): Joint Pain     Itch-X Rash     Triamcinolone Rash     Fat atrophy after IM injection      Tramadol Other (See Comments)     Other reaction(s): Other - Describe In Comment Field  High doses caused yellow eyes, tremors in her mouth, couldn't feel some body parts.    No issues with low doses  High doses caused yellow eyes, tremors in her mouth, couldn't feel some body parts.    No issues with low doses     Food GI Disturbance     Night shades, stevia, pork     Gel Base Other (See Comments)     Allergy to Itch X. Gets blister/pustular rash in area the itch X is placed.     Hydrocodone-Acetaminophen Rash     Order for PATCH TESTS    [x] Outpatient  [] Inpatient: Head..../ Bed ....      Skin Atopy (atopic dermatitis) [x] Yes   [] No  Rhinitis/Sinusitis:   [x] Yes   [] No  Allergic Asthma:   [] Yes   [x] No  Food Allergy:   [x] Yes   [] No  Leg ulcers:   [] Yes   [x] No  Hand eczema:   [] Yes   [x] No   Leading hand:   [x] R   [] L       [] Ambidextrous                      Reason for tests (suspected allergy): Assessment for potential reaction to disinfections before IT (less likely preservatives in IT, because the reaction not directly over injection site)  Known previous allergies: Trees/grass, mites, dogs/cats  Standardized panels (repeat patch tests in red)  [x] Standard panel (40 tests)  [x] Preservatives & Antimicrobials (31 tests)  [x] Emulsifiers & Additives (25 tests) particularly propylene glycol  [x] Perfumes/Flavours & Plants (25 tests)  [] Hairdresser panel (12 tests)  [x] Rubber Chemicals (22 tests)  [x] Plastics (26 tests)  [] Colorants/Dyes/Food additives (20 tests)  [] Metals (implants/dental) (24 tests)  [] Local anaesthetics/NSAIDs (13 tests)  [x] Antibiotics & Antimycotics (14 tests)   [] Corticosteroids (15 tests)   [] Photopatch test (62 tests)   [] others: ...      [x] Patient's own products: tough strips band aids and  vaseline and Triamcinolone cream and ointment    DO NOT test if chemical or biological identity is unknown!     always ask from patient the product information and safety sheets (MSDS)   [] Atopy screen prick test (Atopic predisposition): ...      RESULTS & EVALUATION of PATCH TESTS    Patch test readings after     [x] 2 days, [] 3 days [x] 4 days, [] 5 days,    Applied patch tests with results (import here the list of patch tests):  Order documented by: IVAN HANCOCK LPN  Order reviewed by: Preeti Copeland LPN   Physician:   Date/time of application:1-  Localization of application: back >> Clear    STANDARD Series         No Substance 2 days 4 days remarks   1 Francesco Mix [C] - -    2 Colophony - -    3  2-Mercaptobenzothiazole  NA NA     4 Methylisothiazolinone - -    5 Carba Mix - -    6 Thiuram Mix [A] NA NA    7 Bisphenol A Epoxy Resin - -    8 Z-Cyms-Jiuunruljqt-Formaldehyde Resin - -    9 Mercapto Mix [A] - -    10 Black Rubber Mix- PPD [B] NA NA    11 Potassium Dichromate  -  -    12 Balsam of [place] (Myroxylon Pereirae Resin) - -    13 Nickel Sulphate Hexahydrate - -    14 Mixed Dialkyl Thiourea - -    15 Paraben Mix [B] - -    16 Methyldibromo Glutaronitrile NA NA    17 Fragrance Mix - -    18 2-Bromo-2-Nitropropane-1,3-Diol (Bronopol) - -    19 Lyral - -    20 Tixocortol-21- Pivalate NA NA    21 Diazolidiyl Urea (Germall II) - -    22 Methyl Methacrylate NA NA    23 Cobalt (II) Chloride Hexahydrate - -    24 Fragrance Mix II  - -    25 Compositae Mix - -    26 Benzoyl Peroxide NA NA    27 Bacitracin - ++/+++    28 Formaldehyde - -    29 Methylchloroisothiazolinone / Methylisothiazolinone - -    30 Corticosteroid Mix - -    31 Sodium Lauryl Sulfate - -    32 Lanolin Alcohol - -    33 Turpentine - -    34 Cetylstearylalcohol - -    35 Chlorhexidine Dicluconate - -    36 Budenoside - -    37 Imidazolidinyl Urea  - -    38 Ethyl-2 Cyanoacrylate NA NA    39 Quaternium 15 (Dowicil 200) - -    40  Decyl Glucoside - -      EMULSIFIERS & ADDITIVES        No Substance 2 days 4 days remarks   41 Polyethylene Glycol-400 NA NA    42 Cocamidopropyl Betaine - -    43 Amerchol L101 NA NA    44 Propylene Glycol - -    45 Triethanolamine - -    46 Sorbitane Sesquiolate - -    47 Isopropylmyristate - -    48 Polysorbate 80  - -    49 Amidoamine   (Stearamidopropyl Dimethylamine) - -    50 Oleamidopropyl Dimethylamine - -    51 Lauryl Glucoside NA NA    52 Coconut Diethanolamide  - -    53 2-Hydroxy-4-Methoxy Benzophenone (Oxybenzone) - -    54 Benzophenone-4 (Sulisobenzon) - -    55 Propolis - -    56 Dexpanthenol - -    57 Carboxymethyl Cellulose Sodium - -    58 Abitol - -    59 Tert-Butylhydroquinone - -    60 Benzyl Salicylate - -     Antioxidant      61 Dodecyl Gallate - -    62 Butylhydroxyanisole (BHA) - -    63 Butylhydroxytoluene (BHT) - -    64 Di-Alpha-Tocopherol (Vit E) - -    65 Propyl Gallate - -      RUBBER CHEMICALS         No Substance 2 days 4 days remarks    Carbamate      66 Zinc Bis ( Diethyldithio carbamate ) (ZDEC) - -    67 Zinc Bis (Dibutyldithiocarbamate) - -    68 1,3-Diphenylguanidine (DPG) - -     Thiourea      69 Dibutylthiourea - -    70 Diphenyltiourea - -    71 Thiourea - -     Mercapto chemicals      72 Morpholinyl Mercaptobenzothiazole - -    73 K-Hosqlwrcyz-7-Benzothiazyl-Sulfenamide - -    74 Dibenzothiazyl Disulfide - -     Thiuram chemicals      75 Dipentamethylenethiuram Disulfide - -    76 Tetraethylthiuram Disulfide (Disulfiram) - -    77 Tetramethylthiuram Disulfide - -    78 Tetramethyl Thiuram Monosulfide (TMTM) - -     4-Phenylenediamine derivatives      79 N-Isopropyl-N'-Phenyl-P-Phenylenediamine (IPPD) - -    80 Qmkykhyo-G-Kkopjmwwhvvujzpd (DPPD) - -     Various Rubber Additives      81 Hydroquinone Monobenzylether  - -    82 Hexamethylenetetramine - -    83 4,4'-Dihydroxybiphenyl - -    84 Cyclohexylthiophtalimide - -    85 Ethylenediamine Dihydrochloride - -    86  N-Phenyl-B-Naphthylamine - -    87 Dodecyl Mercaptan - -        PLASTICS         No Substance 2 days 4 days remarks    Acrylates - -    88 2-Hydroxyethyl Methacrylate (HEMA) - -    89 1,4-Butandioldimethacrylate (BUDMA) - -    90  2-Ethylhexyl Acrylate - -    91 Bisphenol-A-Dimethacrylate  - -    92 Diurethane-Dimethacrylate - -    93 Ethyleneglycoldimethacrylate (EGDMA) - -    94 Pentaerythritoltriacrylate (CARLOS) - -    95 Triethylene Glycol Dimethacrylate (TEGDMA) - -     Synthetic material/additives       96 P-Rcjx-Covvmbcyxah - -    97 Tricresyl Phosphate - -    98 0-Lxua-Hxuvtwempcnna - -    99 Bis (2-Ethylhexyl) Phthalate - -    100 Dibutylphthalate - -    101 Dimethylphthalate - -    102 Toluene-2,4-Diisocyanate - -    103 Diphenylmethane-4,4''-Diisocyanate - -     EPOXY RESIN SYSTEMS       Reactive Solvents - -    104 Cresyl Glycidyl Ether - -    105 Butyl Glycidyl Ether - -    106 Phenyl Glycidyl Ether - -    107 1,4-Butanediol Diglycidyl Ether - -    108 1,6-Hexanediole Diglycidyl Ether - -     Hardener / Accelerator - -    109 Ethylenediamine Dihydrochloride - -    110 Triethylenetetramine - -    111 Diethylenetriamine - -    112 Isophorone Diamine (IPD) - -    113 N,N-Dimethyl-P-Toluidine - -        PRESERVATIVES & ANTIMICROBIALS         No Substance 2 days 4 days remarks   114  1,2-Benzisothiazoline-3-One, Sodium Salt - -    115  1,3,5-Anival (2-Hydroxyethyl) - Hexahydrotriazine (Grotan BK) NA NA    116 4-Bdbwqlsdnlcao-4-Nitro-1, 3-Propanediol - -    117  3, 4, 4' - Triclocarban - -    118 4 - Chloro - 3 - Cresol - -    119 4 - Chloro - 4 - Xylenol (PCMX) NA NA    120 7-Ethylbicyclooxazolidine (Bioban [id number]) - -    121 Benzalkonium Chloride - -    122 Benzyl Alcohol - -    123 Cetalkonium Chloride NA NA    124 Cetylpyrimidine Chloride  - -    125 Chloroacetamide - -    126 DMDM Hydantoin NA NA    127 Glutaraldehyde NA NA    128 Triclosan - -    129 Glyoxal Trimeric Dihydrate - -    130 Iodopropynyl  Butylcarbamate - -    131 Octylisothiazoline NA NA    132 Iodoform - -    133 (Nitrobutyl) Morpholine/(Ethylnitro-Trimethylene) Dimorpholine (Bioban P 1487) - -    134 Phenoxyethanol NA NA    135 Phenyl Salicylate - -    136 Povidone Iodine - -    137 Sodium Benzoate - -    138 Sodium Disulfite NA NA    139 Sorbic Acid - -    140 Thimerosal - -     Parabens      141 Butyl-P-Hydroxybenzoate - -    142 Ethyl-P-Hydroxybenzoate - -    143 Methyl-P-Hydroxybenzoate NA NA    144 Propyl-P-Hydroxybenzoate - -         ANTIBIOTICS & ANTIMYCOTICS         No Substance 2 days 4 days remarks   145 Erythromycine - -    146 Framycetine Sulphate (+) +++    147 Fusidic Acid Sodium Salt - -    148 Gentamicin Sulphate - +    149 Neomycine Sulphate (+) ++/+++    150 Oxytetracycline  - -    151 Polymyxin B Sulphate (+) ++    152 Tetracycline-HCL - -    153 Sulfanilamide - -    154 Metronidazole - -    155 Oxyquinoline Mix - -    156 Nitrofurazone - -    157 Nystatin - -    158 Clotrimazole - -      PATIENTS OWN PRODUCTS         No Substance Conc  % solv 2 days 4 days remarks   159 Aspergillus   - -    160 Penicillium   - -    161 D. Farinae   - -    162 D. pteronyssinus   - -    163 Dog   - -    164 Cat   - -        Nov 28, 2022 repeat application of patch tests:  Patch test readings after     [x] 2 days, [] 3 days [x] 4 days, [] 5 days,  Other duration: ...    STANDARD Series                                          # Substance 2 days 4 days remarks     1 Francesco Mix [C] - -       2 Colophony - -       3  2-Mercaptobenzothiazole  - -       4 Methylisothiazolinone - -       5 Carba Mix - -       6 Thiuram Mix [A] - -       7 Bisphenol A Epoxy Resin - -       8 W-Bcbb-Ulkihsrmfhv-Formaldehyde Resin - -       9 Mercapto Mix [A] - -       10 Black Rubber Mix- PPD [B] - -       11 Potassium Dichromate  -  -       12 Balsam of [place] (Myroxylon Pereirae Resin) - -       13 Nickel Sulphate Hexahydrate - -       14 Mixed Dialkyl Thiourea - -        15 Paraben Mix [B] - -       16 Methyldibromo Glutaronitrile - -       17 Fragrance Mix - -       18 2-Bromo-2-Nitropropane-1,3-Diol (Bronopol) CT - -       19 Lyral - -       20 Tixocortol-21- Pivalate CT - -       21 Diazolidiyl Urea (Germall II) - -       22 Methyl Methacrylate - -       23 Cobalt (II) Chloride Hexahydrate - -       24 Fragrance Mix II  - -       25 Compositae Mix - -       26 Benzoyl Peroxide - -       27 Bacitracin - -       28 Formaldehyde - -       29 Methylchloroisothiazolinone / Methylisothiazolinone - -       30 Corticosteroid Mix CT - -       31 Sodium Lauryl Sulfate - -       32 Lanolin Alcohol - -       33 Turpentine - -       34 Cetylstearylalcohol - -       35 Chlorhexidine Dicluconate - -       36 Budenoside - -       37 Imidazolidinyl Urea  - -       38 Ethyl-2 Cyanoacrylate - -       39 Quaternium 15 (Dowicil 200) - -       40 Decyl Glucoside - -       PRESERVATIVES & ANTIMICROBIALS        # Substance 2 days 4 days remarks   41 1  1,2-Benzisothiazoline-3-One, Sodium Salt - -     2  1,3,5-Anival (2-Hydroxyethyl) - Hexahydrotriazine (Grotan BK) - -     3 6-Spdrncpznonkn-3-Nitro-1, 3-Propanediol NA NA     4  3, 4, 4' - Triclocarban - -    45 5 4 - Chloro - 3 - Cresol - -     6 4 - Chloro - 4 - Xylenol (PCMX) - -     7 7-Ethylbicyclooxazolidine (Bioban [id number]) - -     8 Benzalkonium Chloride CT - -     9 Benzyl Alcohol - -    50 10 Cetalkonium Chloride - -     11 Cetylpyrimidine Chloride  - -     12 Chloroacetamide - -     13 DMDM Hydantoin - -     14 Glutaraldehyde - -    55 15 Triclosan - -     16 Glyoxal Trimeric Dihydrate - -     17 Iodopropynyl Butylcarbamate - -     18 Octylisothiazoline - -     19 Bithionol CT - -    60 20 Bioban P 1487 (Nitrobutyl) Morpholine/(Ethylnitro-Trimethylene) Dimorpholine - -     21 Phenoxyethanol - -     22 Phenyl Salicylate - -     23 Povidone Iodine - -     24 Sodium Benzoate - -    65 25 Sodium Disulfite - -     26 Sorbic Acid - -     27 Thimerosal -  -     28 Melamine Formaldehyde Resin - -     29 Ethylenediamine Dihydrochloride - -      Parabens      70 30 Butyl-P-Hydroxybenzoate - -     31 Ethyl-P-Hydroxybenzoate NA NA     32 Methyl-P-Hydroxybenzoate - -    73 33 Propyl-P-Hydroxybenzoate - -      EMULSIFIERS & ADDITIVES       # Substance 2 days 4 days remarks   74 1 Polyethylene Glycol-400 - -    75 2 Cocamidopropyl Betaine - -     3 Amerchol L101 - -     4 Propylene Glycol - -     5 Triethanolamine - -     6 Sorbitane Sesquiolate CT - -    80 7 Isopropylmyristate - -     8 Polysorbate 80 CT - -     9 Amidoamine   (Stearamidopropyl Dimethylamine) NA NA     10 Oleamidopropyl Dimethylamine - -     11 Lauryl Glucoside - -    85 12 Coconut Diethanolamide  - -     13 2-Hydroxy-4-Methoxy Benzophenone (Oxybenzone) - -     14 Benzophenone-4 (Sulisobenzon) NA NA     15 Propolis - -     16 Dexpanthenol - -    90 17 Abitol - -     18 Tert-Butylhydroquinone - -     19 Benzyl Salicylate - -     20 Dimethylaminopropylamin (DMPA) CT? D053       21 Zinc Pyrithione (Zinc Omadine) CT? Z006      95 22 Anival(Hydroxymethyl) Nitromethane CT        Antioxidant - -     23 Dodecyl Gallate - -     24 Butylhydroxyanisole (BHA) - -     25 Butylhydroxytoluene (BHT) NA NA     26 Di-Alpha-Tocopherol (Vit E) - -    100 27 Propyl Gallate - -      PERFUMES, FLAVORS & PLANTS        # Substance 2 days 4 days remarks   101 1 Benzyl Cinnamate - -     2 Di-Limonene (Dipentene) - -     3 Cananga Odorata (Ulises Montgomery) (I) - -     4 Lichen Acid Mix - -    105 5 Mentha Piperita Oil (Peppermint Oil) - -     6 Sesquiterpenelactone mix - -     7 Tea Tree Oil, Oxidized - -     8 Wood Tar Mix - -     9 Abietic Acid - -    110 10 Lavendula Angustifolia Oil (Lavender Oil) - -     11 Fragrance mix II CT * - -      Fragrance Mix I       12 [place] Absolute - -     13 Eugenol - -     14 Geraniol NA NA    115 15 Hydroxycitronellal - -     16 Isoeugenol - -     17 Cinnamic Aldehyde - -     18 Cinnamic Alcohol  NA  NA      Fragrance mix II       19 Citronellol - -    120 20 Alpha-Hexylcinnamic Aldehyde    - -     21 Citral - -     22 Farnesol - -    123 23 Coumarin - -      Hexylcinnamic aldehyde, Coumarin, Farnesol, Hydroxyisohexy3-cyclohexene carboxaldehyde, citral, citrolellol  PLASTICS        # Substance 2 days 4 days remarks     Acrylates - -    124 1 2-Hydroxyethyl Methacrylate (HEMA) - -    125 2 1,4-Butandioldimethacrylate (BUDMA) - -     3  2-Ethylhexyl Acrylate - -     4 Bisphenol-A-Dimethacrylate  - -     5 Diurethane-Dimethacrylate - -     6 Ethyleneglycoldimethacrylate (EGDMA) - -    130 7 Pentaerythritoltriacrylate (CARLOS) - -     8 Triethylene Glycol Dimethacrylate (TEGDMA) - -      Synthetic material/additives        9 L-Cfbw-Jhsnjbiupbl - -     10 Tricresyl Phosphate - -     11 0-Ohjt-Xvvglubdnntil - -    135 12 Bis (2-Ethylhexyl) Phthalate - -     13 Dibutylphthalate - -     14 Dimethylphthalate - -     15 Toluene-2,4-Diisocyanate - -     16 Diphenylmethane-4,4''-Diisocyanate - -      EPOXY RESIN SYSTEMS        Reactive Solvents - -    140 17 Cresyl Glycidyl Ether - -     18 Butyl Glycidyl Ether - -     19 Phenyl Glycidyl Ether - -     20 1,4-Butanediol Diglycidyl Ether - -     21 1,6-Hexanediole Diglycidyl Ether - -      Hardener / Accelerator - -    145 22 Triethylenetetramine - -     23 Diethylenetriamine - -     24 Isophorone Diamine (IPD) - -    148 25 N,N-Dimethyl-P-Toluidine - -      OTHER PRODUCTS  tough strips band aids and vaseline and Triamcinolone cream and ointment        # Substance Conc  % solv 2 days 4 days remarks   149 1 Triamcinolone cream 0.1% as is  - -    150 2 Curad petroleum jelly as is  - -    151 3 Cortizone 10 cream with aloe as is  - -    152 4 Triamcinolone oint 0.1% as is  - -      Patients own products:  è DO NOT test if chemical or biological identity is unknown!   - always ask from patient the product information and safety sheets and consult with the physician   - check  "neutral pH with pH indicator paper (do not test pH below 5 or over 8 or consult with physician)  - leave-on cosmetics can be tested \"as is\"  - rinse-off products have to be diluted with water, buffer, olive oil or paraffin (discuss with physician)  à remember:   - non-specific toxic/corrosive or biological reactions can occur  - tests with patients own products are not standardized and test conditions are not optimized for patch tests. Whenever possible test with standardized test series and correlate use of product with the result of the patch tests  - if not sure if compounds can be tested under occlusion in patch tests consider open application tests      Results of patch tests:                         Interpretation:  - Negative                    A    = Allergic      (+) Erythema    TI   = Toxic/irritant   + E + Infiltration    RaP = Relevance at Present     ++ E/I + Papulovesicle   Rpr  = Relevance Previously     +++ E/I/P + Blister     nR   = No Relevance           Atopy Screen (Placed 01/13/20 )  No Substance Readings (15 min) Evaluation   POS Histamine 1mg/ml ++    NEG NaCl 0.9% - Slight dermographism     No Substance Readings (15 min) Evaluation   1 Alternaria alternata (tenuis)  -    2 Cladosporium herbarum -    3 Aspergillus fumigatus -    4 Penicillium notatum -    5 Dermatophagoides pteronyssinus ++    6 Dermatophagoides farinae ++    Conclusion: immediate type reaction to house dust mites, but no delayed reaction to moulds or HDM --> The DP and DF reactions remained into the next day.       DRUGS/SUBSTANCES 1/19/2021  No Substance Readings (15min) Evaluation   POS Histamine 1mg/ml ++    NEG NaCl 0.9% - dermographism      Prick Tests        Substance/ Allergen Concentration Result (15min) Remarks   Meloxicam 30mg/ml as is neg     In vaseline neg Not bigger than neg ctr     Intradermal Tests   immed immed delayed delayed     Substance Conc 1st dil 2nd dil   3 days  3 days remarks   Histamine " Hydrochloride (ALK) 0.1 mg/ml        NaCl 0.9%        Meloxicam 1:200/ 1:20 neg neg neg  dermographism     Patch Tests   as is  as is 1:2 paraffin 1:2 paraffin     Substance Conc  3 days  days  3 days  days remarks   Meloxicam  - - - -        [x] Allergic reaction diagnosed against:     Antibiotics and antimycotics:   Framycetine Sulphate +++  Gentamicin Sulphate +  Neomycine Sulphate ++/+++  Polymyxin B Sulphate ++  Bacitracin ++/+++     Following additional allergens in repeat patch tests from November 2022 found:   - sdfs      Interpretation/ remarks:   Patient has a severe delayed type contact sensitization to various antibiotics that are in many OTC antiseptics and antibiotics. No signs for allergies to rubber chemicals or adhesives. Therefore, we think that the reaction to the band-aids were maybe to topical antibiotics that were added onto the band-aid.     [] Patient information given   [] ACDS CAMP information's (# ....) to following compounds: .....   [] General information's to following compounds: ......    ___________________________    Assessment & Plan:    ==> Final Diagnosis:     #  Localized Eczematous and delayed-type reaction after 2x initial doses of immunotherapies  = reaction to itch-X cream  * chronic illness with exacerbation, progression, side effects from treatment    No signs for allergies to preservative in these allergy extracts     No signs in prick test or patch test for delayed type reaction to some of these allergens (dust mites, molds)    Severe contact sensitizations to topical antibiotics  ==> in open application test clearly positive to the itch-x cream that patient used to reduce the effects of the IT    ==> Comment: This was NOT a reaction to the immunotherapy, but rather to the topical product used afterwards to treat the topical side effects of the IT. Not sure to what ingredient in itch-x was really reacting, but not to Parabens and Propylene Glycol.    #  New flare up of  allergic contact dermatitis on ankle  DDx Vaseline or adhesives (Band aids?)  DDx reacted in the face to Tile cement board  ==> plan repeat of some patch tests (see in red above)  * chronic illness with exacerbation, progression, side effects from treatment    #  Recurrent urticarial rash with pruritus (after using Doxycycline)  * chronic illness with exacerbation, progression, side effects from treatment  Treatment: try Claritine 10mg morning and Noon and Hydroxyzine 25mg at bedtime    # Unlikely angioedema reaction to Meloxicam  * stable chronic illness    In skin tests no signs for specific immediate type or delayed reaction to Meloxicam. Patient desires to restart Meloxicam for back pain.     No evidence for COX1 intolerance (Ibuprofen is well-tolerated)    Suspect that her prior reaction was unrelated to the Meloxicam    Can restart meloxicam 1/4 tablet, then 1-2 days later, 1/2 tablet, then 1-2 days later she can try a full tablet.     Has emergency set and given handout emergency treatment (has Epipen)    It is imperative that the patient avoids all antibiotics, topically and systemically, that we have tested and that she was positive. Because gentamycin and neomycin were included I would avoid, for safety reasons, cross reacting aminoglycosides including vancomycin.     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 However, it could be that at that time some topical antibiotic has been used that she reacted to and/or lipatrophy after Kenalog injections.     ==> Comment: It is difficult to directly correlate the atrophy on the upper arm to the contact sensitization even though the outline of the reaction looks like the outline of a band-aid. It might be that this atrophy has nothing to do with an allergic reaction. It might be important to really verify if it is a muscle or lipatrophy. Clinically it looks more like lipatrophy which can sometimes  be idiopathic. Moreover, a local injection of corticosteroids in this area might as well explain the atrophy (and there was such an injection months ago; not sure if exactly same location?)      These conclusions are made at the best of one's knowledge and belief based on the provided evidence such as patient's history and allergy test results and they can change over time or can be incomplete because of missing information's.      Final conclusions:    With the tendency of the patient to develop delayed-type reactions, I would in the moment not continue with the immunotherapy or we reduce it to only relevant allergens like house dust mite, but in that case we would maybe perform first prick, intradermal, and patch test with house dust mite immunotherapy to be sure that there is no delayed reaction to these extracts. We recommend just one single major allergen for immunotherapy, such as 50% DF/DP standardized from [facility].We could not see any delayed reaction to house dust mite test and IT solutions.      In patch tests severe reactions against Neomycine, Framycetine, Gentamicin, Bacitracine and Polymyxin. For reasons of crossreactions I would as well avoid other aminoglycosides such as Vancomycin. This was placed into the patient's allergy chart.       Unlikely that she had an angioedema reaction to Meloxicam given negative prick, intradermal, and patch testing. Also do not suspect COX1 intolerance given that she tolerates ibuprofen. Discussed with patient that she can try to restart the Meloxicam as instructed started with 1/4 tablet as long as she has emergency set on hand.     ==> Treatment prescribed/Plan:    In the moment, patient to stop with immunotherapy or we will reduce it to only relevant allergens. We recommend just one single major allergen for immunotherapy, such as 50/50 mixture of DF/DP standardized from [facility].     Prescribed triamcinolone 0.1% cream. Apply topically 2 times daily for 5 days.     Go for  antibiotic free foods if possible.    Can try restarting Meloxicam as above as long as she has the emergency set on hand and is accompanied by another person   Maybe, in future, if we could plan additional allergy tests for delayed-type reactions with immunotherapy extracts and/or maybe tetanus toxoid vaccine. These tests should include as well possible cross-reacting antibiotics such aminoglycosides and particularly vancomycin.  Crossreactions between Framycetine/Neomycine and these other antibiotics possible.   Patient counseling:  Discussed with patient it is unlikely that she had an angioedema reaction to Meloxicam given negative prick, intradermal, and patch testing. Also do not suspect COX1 intolerance given that she tolerated ibuprofen. Discussed with patient that she can try to restart the Meloxicam as instructed started with 1/4 tablet as long as she has emergency set on hand.     These conclusions are made at the best of one's knowledge and belief based on the provided evidence such as patient's history and allergy test results and they can change over time or can be incomplete because of missing information's.    CC Ulysses Mtz MD  [place] [address] [place] [address] on close of this encounter..       Procedures Performed: Allergy tests, including patch tests  Staff: : provider    Follow-up in [facility] clinic for 1st readings of patch tests after 2 days (virtual) and 2nd readings and final conclusions after 4 days (virtual) = from [place]   ___________________________    I spent a total of 34 minutes with Gisela Richards during today s  visit. This time was spent discussing all the individual test results, correlating them to the clinical relevance, counseling the patient and/or coordinating care and performing allergy tests.

## 2022-11-28 ENCOUNTER — OFFICE VISIT (OUTPATIENT)
Dept: ALLERGY | Facility: CLINIC | Age: 42
End: 2022-11-28
Payer: COMMERCIAL

## 2022-11-28 DIAGNOSIS — Z88.9 ATOPY: ICD-10-CM

## 2022-11-28 DIAGNOSIS — Z88.9 DRUG ALLERGY: ICD-10-CM

## 2022-11-28 DIAGNOSIS — L23.89 ALLERGIC CONTACT DERMATITIS DUE TO OTHER AGENTS: Primary | ICD-10-CM

## 2022-11-28 PROCEDURE — 95044 PATCH/APPLICATION TESTS: CPT | Performed by: DERMATOLOGY

## 2022-11-28 NOTE — PATIENT INSTRUCTIONS
Removal of Patch Tests on Day 3:    Remove patches and tape from one test area (one rectangle)          Using the purple surgical markers provided (or other permanent marker), draw a grid around the test area so that the circular indentation is in the center of each square, as below. Try to be as neat as possible and keep the lines as straight as you can (you can use a ruler if you need to)          Redraw the number that was underneath the tape above the grids, as shown below. Try to print clearly.          Repeat the process for the remaining test areas.          Photograph the entire area then take close-up photos of any possible reactions. Examples of possible reactions are spots that look like these:          Return to clinic for evaluation as instructed. You should continue to avoid getting the area wet (no showering or strenuous exercise), exposing the area to UV light, using topical steroids, or scratching.         Who should I call with questions?  [facility], [place]: [phone]  For urgent needs outside of business hours call the [facility] at [phone] and ask for the dermatology resident on call      If you develop any serious or adverse allergic reaction after office hours please seek immediate medical assistance at the nearest clinic or emergency room

## 2022-11-28 NOTE — Clinical Note
11/28/2022         RE: Gisela Richards  [address]  [place] [address]        Dear Colleague,    Thank you for referring your patient, Gisela Richards, to the [facility] [place]. Please see a copy of my visit note below.    [facility] Dermato-allergology Note  Office visit  Encounter Date: Nov 28, 2022  ____________________________________________    CC: No chief complaint on file.      HPI:  (Nov 28, 2022)  Ms. Gisela Richards is a(n) 42 year old female who presents today as a return patient for allergy tests as planned  - Follow-up in Derm-Allergy clinic for additional patch tests (see series in red) and earlier if hives not under control  - otherwise feeling well in usual state of health    Physical exam:  General: In no acute distress, well-developed, well-nourished  Eyes: no conjunctivitis  ENT: no signs of rhinitis   Pulmonary: no wheezing or coughing  Skin: Focused examination of the skin on test sites was performed = see test results below  No active eczematous skin lesions on tests sites, particularly back    Earlier History and Allergy exams:  (Jul 27, 2022)  - Follow-up: in Derm-Allergy clinic as needed  - on July 5th did a photoshoot in the field and then observed on the ankle a small spot, that became bigger (brown, not really infiltrated) and the morning of the 7th July a Dermatologist looked at it and in histology it was a necrosis unknown cause.  - 1 week later itching on that area with vesicular reaction = eczematous reaction  - on the 15th did bacterial culture (no bacterial growth)  - as soon as the patient stopped the vaseline with same adhesive band aid the lesions improved  - patient observe anyway recently reactions to various vaseline type ointment (incl Triamcinolone acetonide) = with cream no problem  --> took at the end orally prednisone and Doxycycline = stopped the Prednisone Monday the 18th of July and the Doxycycline  on the 18th. ==> itching and urticarial rash started on the 22nd of July after being in the sun  --> NP prescribed Claritine and Prilosec and Benadryl   Skin:in the moment no signs for skin lesions, but patient had the eczematous lesions on the ankle   Some erythema over the decolletage    Earlier History and Allergy exams:  01/22/2021  Says she hasn't noticed any reaction to the patch testing or where we injected. Would like to try the meloxicam for her back pain. .    Earlier History and Allergy exams:   Ibuprofen does not induce any reaction.    > Patient had a reaction to Meloxicam in November. Took one Tabl for the first time and about 3-6h later with swelling and redness around the lips with dryness and then a sun burn type of rash over upper chest, stomach and back. No itching. No sun exposure involved. Redness disappeared after 1-2 days. Never tongue swelling or breathing problems. Out of chin skin wheeping. Took almost one week to fully recover.  ==> delayed type reaction    Earlier History and Allergy exams:    Patient is continuing now the IT without any problems, since they are not using ItchEx anymore. Just using ice.  IT injections is done by an primary care PA in [facility]    > Patient had a reaction to Meloxicam about 3 h later with angioedema on face and then sun burn type of rash over face --> happened in November  > Ibuprofen does not induce any reaction (last taken last Saturday)    With tramadol tremors and swellings of face and with Percacet (Oxycodon and Acetaminophen) migraines  Vicodin induces erythema  Today she reports that all skin manifestation has resolved. Patient has come from [place], [place]. Patient's spouse is present during evaluation. She reports her first allergy shots took place on November 9th, 2 injections on posterior aspect of right arm and 1 to the posterior aspect of left arm.   - Per chart review immunotherapy: trees/grass/weeds, mites/molds and  "animals  Within the day of the injection patient noted a diffuse pruritic rash on the posterior aspect of her right arm. She denies using any topicals or oral medications beside Zyrtec to aid with on going rash. She additionally iced the area and skin manifestation had resolved.     The following week she completed her 2nd round of immunotherapy at the same dosage. Again the rash occurred on the posterior aspect of her right arm along with outstanding edema of her right arm and shoulder. This rash was also pruritic, but again denied any pain. Her provider was concerned about reaction to the preservative. This rash took 1.5 week to self resolve. The edema persisted for 2-3 weeks and when resolved, a notable dent was appreciated on the right distal aspect of the deltoid. Imaging completed showed atrophy of the deltoid. She denied any recent injections to that area including routine vaccines or steroid injections. Also now having to areas of hypopigmentation overlying the deltoid.  Reports dry skin after resolution of rashes. Denies history of asthma. Extensive history of food sensitives and autoimmune workup that has been unremarkable.     Hx since 12/27/19:  The patient comes in today for patch testing day 1. She reports that there is a bruise on the R shoulder that has worsened in the interim. She is unsure if it may be necrosis or sudden muscle atrophy. She reports it appeared on Nov 16, the day after allergy shot. Also states that in the same area she developed a new \"bump\" that appeared about 2 days ago.   Reports that the IT on the R upper extremity was against trees, weeds, molds, mites, and dog. On the L upper extremity, she had IT injections for cats and dogs.   She is otherwise feeling well overall today. No other skin or allergy concerns at this time.     Medications:  - takes daily Zyrtec and is on IT  - Levothyroxin      Past Medical History:   Patient Active Problem List   Diagnosis     Allergic rhinitis " "due to dogs     Hypothyroid     Lumbar facet joint pain     Past Medical History:   Diagnosis Date     Constipation     Since young.     Other specified postprocedural states     2014,Slight recurrence within scar - removed in 9th grade and came back right away     Personal history of other medical treatment (CODE)      2, para 2.     Past Surgical History:   Procedure Laterality Date     COLONOSCOPY      ,\"swelling of the lining\" and flattening of villa.     OTHER SURGICAL HISTORY      ~,[id number],OTHER,Left,recurring within scar     OTHER SURGICAL HISTORY      2016,[id number],KNEE ARTHROSCOPY W/ MENISCAL REPAIR     OTHER SURGICAL HISTORY      2004,[id number],DILATION AND CURETTAGE,retained placenta       Social History:  Patient reports that she has never smoked. She has never used smokeless tobacco. She reports current alcohol use. She reports that she does not use drugs.    Family History:  Family History   Problem Relation Age of Onset     Family History Negative Father         Good Health,does not see doctor regularly     Thyroid Disease Mother         Thyroid Disease,Hypothyroid     Other - See Comments Mother         Smoker, prediabetes     Thyroid Disease Sister         Thyroid Disease,Hypothyroid     Arthritis Sister         Arthritis     Scoliosis Maternal Half-Sister         Scoliosis     Other - See Comments Maternal Half-Sister         No Known Problems       Medications:  Current Outpatient Medications   Medication Sig Dispense Refill     amphetamine-dextroamphetamine (ADDERALL XR) 25 MG 24 hr capsule Take 25 mg by mouth daily       amphetamine-dextroamphetamine (ADDERALL) 5 MG tablet TAKE 1 TABLET BY MOUTH ONCE DAILY IN THE LATE AFTERNOON       cetirizine (ZYRTEC) 10 MG tablet Take 10 mg by mouth       cholecalciferol (VITAMIN D3) 25 mcg (1000 units) capsule Take 1 capsule by mouth       doxycycline hyclate (VIBRAMYCIN) 100 MG capsule Take 1 capsule by mouth 2 times daily (Patient " not taking: Reported on 7/20/2022)       famotidine (PEPCID) 40 MG tablet Take 40 mg by mouth At Bedtime (Patient not taking: Reported on 8/25/2022)       ferrous sulfate (FEROSUL) 325 (65 Fe) MG tablet Take 1 tablet by mouth daily       fluorometholone (FML FORTE) 0.25 % ophthalmic suspension  (Patient not taking: Reported on 8/25/2022)       folic acid (FOLVITE) 1 MG tablet Take 1 mg by mouth daily       hydrOXYzine (ATARAX) 25 MG tablet Take 1 tablet (25 mg) by mouth At Bedtime (Patient not taking: Reported on 8/25/2022) 30 tablet 1     levothyroxine (SYNTHROID/LEVOTHROID) 75 MCG tablet Take 75 mcg by mouth daily       levothyroxine (SYNTHROID/LEVOTHROID) 75 MCG tablet Take 1 tablet (75 mcg) by mouth every morning (before breakfast) 90 tablet 4     loratadine (CLARITIN) 10 MG tablet Take 10 mg by mouth (Patient not taking: Reported on 8/25/2022)       loratadine (CLARITIN) 10 MG tablet Morning and evening 1 Tabl of 10mg for about 1 week and if no itching anymore, then reduce to 1 Tabl in the morning. 30 tablet 3     mometasone (NASONEX) 50 MCG/ACT spray 1 spray 2 times daily (Patient not taking: Reported on 7/20/2022)       predniSONE (DELTASONE) 50 MG tablet Emergency set: if severe allergic reaction take immediately 100mg Prednisone (2x50mg) and 2 Tabl Cetirizine 10mg and then write precise 12h retrospective diary.If less severe reaction take only the 2 Tabl Cetirizine 2 tablet 1     triamcinolone (KENALOG) 0.1 % external cream APPLY CREAM EXTERNALLY TWICE DAILY APPLY THIN FILM SPARINGLY TO ITCHY SPOTS. USE UP TO 2 WEEKS AT A TIME       triamcinolone (KENALOG) 0.1 % external cream  (Patient not taking: Reported on 8/25/2022)       triamcinolone (KENALOG) 0.1 % external ointment Apply 1 Dose topically 2 times daily (Patient not taking: Reported on 7/20/2022)       vitamin B-12 (CYANOCOBALAMIN) 500 MCG tablet Take 1 tablet by mouth daily         Allergies   Allergen Reactions     Cats Shortness Of Breath     Dogs  Fatigue, Palpitations, Shortness Of Breath and Tinnitus     Gentamicin Dermatitis     In patch tests severe reactions against Neomycine, Framycetine, Gentamicin, Bacitracine and Polymyxin.   For reasons of crossreactions I would as well avoid other aminoglycosides such as Vancomycine     Petrolatum Rash     Gluten Meal      Other reaction(s): Joint Pain     Itch-X Rash     Triamcinolone Rash     Fat atrophy after IM injection      Tramadol Other (See Comments)     Other reaction(s): Other - Describe In Comment Field  High doses caused yellow eyes, tremors in her mouth, couldn't feel some body parts.    No issues with low doses  High doses caused yellow eyes, tremors in her mouth, couldn't feel some body parts.    No issues with low doses     Food GI Disturbance     Night shades, stevia, pork     Gel Base Other (See Comments)     Allergy to Itch X. Gets blister/pustular rash in area the itch X is placed.     Hydrocodone-Acetaminophen Rash     Order for PATCH TESTS    [x] Outpatient  [] Inpatient: Head..../ Bed ....      Skin Atopy (atopic dermatitis) [x] Yes   [] No  Rhinitis/Sinusitis:   [x] Yes   [] No  Allergic Asthma:   [] Yes   [x] No  Food Allergy:   [x] Yes   [] No  Leg ulcers:   [] Yes   [x] No  Hand eczema:   [] Yes   [x] No   Leading hand:   [x] R   [] L       [] Ambidextrous                      Reason for tests (suspected allergy): Assessment for potential reaction to disinfections before IT (less likely preservatives in IT, because the reaction not directly over injection site)  Known previous allergies: Trees/grass, mites, dogs/cats  Standardized panels (repeat patch tests in red)  [x] Standard panel (40 tests)  [x] Preservatives & Antimicrobials (31 tests)  [x] Emulsifiers & Additives (25 tests) particularly propylene glycol  [x] Perfumes/Flavours & Plants (25 tests)  [] Hairdresser panel (12 tests)  [x] Rubber Chemicals (22 tests)  [x] Plastics (26 tests)  [] Colorants/Dyes/Food additives (20  tests)  [] Metals (implants/dental) (24 tests)  [] Local anaesthetics/NSAIDs (13 tests)  [x] Antibiotics & Antimycotics (14 tests)   [] Corticosteroids (15 tests)   [] Photopatch test (62 tests)   [] others: ...      [x] Patient's own products: tough strips band aids and vaseline and Triamcinolone cream and ointment    DO NOT test if chemical or biological identity is unknown!     always ask from patient the product information and safety sheets (MSDS)   [] Atopy screen prick test (Atopic predisposition): ...      RESULTS & EVALUATION of PATCH TESTS    Patch test readings after     [x] 2 days, [] 3 days [x] 4 days, [] 5 days,    Applied patch tests with results (import here the list of patch tests):  Order documented by: IVAN HANCOCK LPN  Order reviewed by: Preeti Copeland LPN   Physician:   Date/time of application:1-  Localization of application: back >> Clear    STANDARD Series         No Substance 2 days 4 days remarks   1 Francesco Mix [C] - -    2 Colophony - -    3  2-Mercaptobenzothiazole  NA NA     4 Methylisothiazolinone - -    5 Carba Mix - -    6 Thiuram Mix [A] NA NA    7 Bisphenol A Epoxy Resin - -    8 X-Cvoo-Gpaqvkjkyhu-Formaldehyde Resin - -    9 Mercapto Mix [A] - -    10 Black Rubber Mix- PPD [B] NA NA    11 Potassium Dichromate  -  -    12 Balsam of [place] (Myroxylon Pereirae Resin) - -    13 Nickel Sulphate Hexahydrate - -    14 Mixed Dialkyl Thiourea - -    15 Paraben Mix [B] - -    16 Methyldibromo Glutaronitrile NA NA    17 Fragrance Mix - -    18 2-Bromo-2-Nitropropane-1,3-Diol (Bronopol) - -    19 Lyral - -    20 Tixocortol-21- Pivalate NA NA    21 Diazolidiyl Urea (Germall II) - -    22 Methyl Methacrylate NA NA    23 Cobalt (II) Chloride Hexahydrate - -    24 Fragrance Mix II  - -    25 Compositae Mix - -    26 Benzoyl Peroxide NA NA    27 Bacitracin - ++/+++    28 Formaldehyde - -    29 Methylchloroisothiazolinone / Methylisothiazolinone - -    30 Corticosteroid Mix - -     31 Sodium Lauryl Sulfate - -    32 Lanolin Alcohol - -    33 Turpentine - -    34 Cetylstearylalcohol - -    35 Chlorhexidine Dicluconate - -    36 Budenoside - -    37 Imidazolidinyl Urea  - -    38 Ethyl-2 Cyanoacrylate NA NA    39 Quaternium 15 (Dowicil 200) - -    40 Decyl Glucoside - -      EMULSIFIERS & ADDITIVES        No Substance 2 days 4 days remarks   41 Polyethylene Glycol-400 NA NA    42 Cocamidopropyl Betaine - -    43 Amerchol L101 NA NA    44 Propylene Glycol - -    45 Triethanolamine - -    46 Sorbitane Sesquiolate - -    47 Isopropylmyristate - -    48 Polysorbate 80  - -    49 Amidoamine   (Stearamidopropyl Dimethylamine) - -    50 Oleamidopropyl Dimethylamine - -    51 Lauryl Glucoside NA NA    52 Coconut Diethanolamide  - -    53 2-Hydroxy-4-Methoxy Benzophenone (Oxybenzone) - -    54 Benzophenone-4 (Sulisobenzon) - -    55 Propolis - -    56 Dexpanthenol - -    57 Carboxymethyl Cellulose Sodium - -    58 Abitol - -    59 Tert-Butylhydroquinone - -    60 Benzyl Salicylate - -     Antioxidant      61 Dodecyl Gallate - -    62 Butylhydroxyanisole (BHA) - -    63 Butylhydroxytoluene (BHT) - -    64 Di-Alpha-Tocopherol (Vit E) - -    65 Propyl Gallate - -      RUBBER CHEMICALS         No Substance 2 days 4 days remarks    Carbamate      66 Zinc Bis ( Diethyldithio carbamate ) (ZDEC) - -    67 Zinc Bis (Dibutyldithiocarbamate) - -    68 1,3-Diphenylguanidine (DPG) - -     Thiourea      69 Dibutylthiourea - -    70 Diphenyltiourea - -    71 Thiourea - -     Mercapto chemicals      72 Morpholinyl Mercaptobenzothiazole - -    73 H-Zzfatiirio-7-Benzothiazyl-Sulfenamide - -    74 Dibenzothiazyl Disulfide - -     Thiuram chemicals      75 Dipentamethylenethiuram Disulfide - -    76 Tetraethylthiuram Disulfide (Disulfiram) - -    77 Tetramethylthiuram Disulfide - -    78 Tetramethyl Thiuram Monosulfide (TMTM) - -     4-Phenylenediamine derivatives      79 N-Isopropyl-N'-Phenyl-P-Phenylenediamine (IPPD) -  -    80 Redciele-H-Fbupypuzgxzhygoj (DPPD) - -     Various Rubber Additives      81 Hydroquinone Monobenzylether  - -    82 Hexamethylenetetramine - -    83 4,4'-Dihydroxybiphenyl - -    84 Cyclohexylthiophtalimide - -    85 Ethylenediamine Dihydrochloride - -    86 N-Phenyl-B-Naphthylamine - -    87 Dodecyl Mercaptan - -        PLASTICS         No Substance 2 days 4 days remarks    Acrylates - -    88 2-Hydroxyethyl Methacrylate (HEMA) - -    89 1,4-Butandioldimethacrylate (BUDMA) - -    90  2-Ethylhexyl Acrylate - -    91 Bisphenol-A-Dimethacrylate  - -    92 Diurethane-Dimethacrylate - -    93 Ethyleneglycoldimethacrylate (EGDMA) - -    94 Pentaerythritoltriacrylate (CARLOS) - -    95 Triethylene Glycol Dimethacrylate (TEGDMA) - -     Synthetic material/additives       96 G-Gast-Prjanaocjyl - -    97 Tricresyl Phosphate - -    98 8-Smqb-Vjumdnwvjrqxu - -    99 Bis (2-Ethylhexyl) Phthalate - -    100 Dibutylphthalate - -    101 Dimethylphthalate - -    102 Toluene-2,4-Diisocyanate - -    103 Diphenylmethane-4,4''-Diisocyanate - -     EPOXY RESIN SYSTEMS       Reactive Solvents - -    104 Cresyl Glycidyl Ether - -    105 Butyl Glycidyl Ether - -    106 Phenyl Glycidyl Ether - -    107 1,4-Butanediol Diglycidyl Ether - -    108 1,6-Hexanediole Diglycidyl Ether - -     Hardener / Accelerator - -    109 Ethylenediamine Dihydrochloride - -    110 Triethylenetetramine - -    111 Diethylenetriamine - -    112 Isophorone Diamine (IPD) - -    113 N,N-Dimethyl-P-Toluidine - -        PRESERVATIVES & ANTIMICROBIALS         No Substance 2 days 4 days remarks   114  1,2-Benzisothiazoline-3-One, Sodium Salt - -    115  1,3,5-Anival (2-Hydroxyethyl) - Hexahydrotriazine (Grotan BK) NA NA    116 0-Irxxumdldacnd-5-Nitro-1, 3-Propanediol - -    117  3, 4, 4' - Triclocarban - -    118 4 - Chloro - 3 - Cresol - -    119 4 - Chloro - 4 - Xylenol (PCMX) NA NA    120 7-Ethylbicyclooxazolidine ([facility] [id number]) - -    121 Benzalkonium Chloride -  -    122 Benzyl Alcohol - -    123 Cetalkonium Chloride NA NA    124 Cetylpyrimidine Chloride  - -    125 Chloroacetamide - -    126 DMDM Hydantoin NA NA    127 Glutaraldehyde NA NA    128 Triclosan - -    129 Glyoxal Trimeric Dihydrate - -    130 Iodopropynyl Butylcarbamate - -    131 Octylisothiazoline NA NA    132 Iodoform - -    133 (Nitrobutyl) Morpholine/(Ethylnitro-Trimethylene) Dimorpholine (Bioban P 1487) - -    134 Phenoxyethanol NA NA    135 Phenyl Salicylate - -    136 Povidone Iodine - -    137 Sodium Benzoate - -    138 Sodium Disulfite NA NA    139 Sorbic Acid - -    140 Thimerosal - -     Parabens      141 Butyl-P-Hydroxybenzoate - -    142 Ethyl-P-Hydroxybenzoate - -    143 Methyl-P-Hydroxybenzoate NA NA    144 Propyl-P-Hydroxybenzoate - -         ANTIBIOTICS & ANTIMYCOTICS         No Substance 2 days 4 days remarks   145 Erythromycine - -    146 Framycetine Sulphate (+) +++    147 Fusidic Acid Sodium Salt - -    148 Gentamicin Sulphate - +    149 Neomycine Sulphate (+) ++/+++    150 Oxytetracycline  - -    151 Polymyxin B Sulphate (+) ++    152 Tetracycline-HCL - -    153 Sulfanilamide - -    154 Metronidazole - -    155 Oxyquinoline Mix - -    156 Nitrofurazone - -    157 Nystatin - -    158 Clotrimazole - -      PATIENTS OWN PRODUCTS         No Substance Conc  % solv 2 days 4 days remarks   159 Aspergillus   - -    160 Penicillium   - -    161 D. Farinae   - -    162 D. pteronyssinus   - -    163 Dog   - -    164 Cat   - -        Nov 28, 2022 repeat application of patch tests:  Patch test readings after     [x] 2 days, [] 3 days [x] 4 days, [] 5 days,  Other duration: ...    STANDARD Series                                          # Substance 2 days 4 days remarks     1 Francesco Mix [C] - -       2 Colophony - -       3  2-Mercaptobenzothiazole  - -       4 Methylisothiazolinone - -       5 Carba Mix - -       6 Thiuram Mix [A] - -       7 Bisphenol A Epoxy Resin - -       8  N-Mbyt-Uvpctjgcrae-Formaldehyde Resin - -       9 Mercapto Mix [A] - -       10 Black Rubber Mix- PPD [B] - -       11 Potassium Dichromate  -  -       12 Balsam of [place] (Myroxylon Pereirae Resin) - -       13 Nickel Sulphate Hexahydrate - -       14 Mixed Dialkyl Thiourea - -       15 Paraben Mix [B] - -       16 Methyldibromo Glutaronitrile - -       17 Fragrance Mix - -       18 2-Bromo-2-Nitropropane-1,3-Diol (Bronopol) CT - -       19 Lyral - -       20 Tixocortol-21- Pivalate CT - -       21 Diazolidiyl Urea (Germall II) - -       22 Methyl Methacrylate - -       23 Cobalt (II) Chloride Hexahydrate - -       24 Fragrance Mix II  - -       25 Compositae Mix - -       26 Benzoyl Peroxide - -       27 Bacitracin - -       28 Formaldehyde - -       29 Methylchloroisothiazolinone / Methylisothiazolinone - -       30 Corticosteroid Mix CT - -       31 Sodium Lauryl Sulfate - -       32 Lanolin Alcohol - -       33 Turpentine - -       34 Cetylstearylalcohol - -       35 Chlorhexidine Dicluconate - -       36 Budenoside - -       37 Imidazolidinyl Urea  - -       38 Ethyl-2 Cyanoacrylate - -       39 Quaternium 15 (Dowicil 200) - -       40 Decyl Glucoside - -       PRESERVATIVES & ANTIMICROBIALS        # Substance 2 days 4 days remarks   41 1  1,2-Benzisothiazoline-3-One, Sodium Salt - -     2  1,3,5-Anival (2-Hydroxyethyl) - Hexahydrotriazine (Grotan BK) - -     3 4-Rbytmmooofuqj-9-Nitro-1, 3-Propanediol NA NA     4  3, 4, 4' - Triclocarban - -    45 5 4 - Chloro - 3 - Cresol - -     6 4 - Chloro - 4 - Xylenol (PCMX) - -     7 7-Ethylbicyclooxazolidine (Bioban [id number]) - -     8 Benzalkonium Chloride CT - -     9 Benzyl Alcohol - -    50 10 Cetalkonium Chloride - -     11 Cetylpyrimidine Chloride  - -     12 Chloroacetamide - -     13 DMDM Hydantoin - -     14 Glutaraldehyde - -    55 15 Triclosan - -     16 Glyoxal Trimeric Dihydrate - -     17 Iodopropynyl Butylcarbamate - -     18 Octylisothiazoline - -     19  Bithionol CT - -    60 20 Bioban P 1487 (Nitrobutyl) Morpholine/(Ethylnitro-Trimethylene) Dimorpholine - -     21 Phenoxyethanol - -     22 Phenyl Salicylate - -     23 Povidone Iodine - -     24 Sodium Benzoate - -    65 25 Sodium Disulfite - -     26 Sorbic Acid - -     27 Thimerosal - -     28 Melamine Formaldehyde Resin - -     29 Ethylenediamine Dihydrochloride - -      Parabens      70 30 Butyl-P-Hydroxybenzoate - -     31 Ethyl-P-Hydroxybenzoate NA NA     32 Methyl-P-Hydroxybenzoate - -    73 33 Propyl-P-Hydroxybenzoate - -      EMULSIFIERS & ADDITIVES       # Substance 2 days 4 days remarks   74 1 Polyethylene Glycol-400 - -    75 2 Cocamidopropyl Betaine - -     3 Amerchol L101 - -     4 Propylene Glycol - -     5 Triethanolamine - -     6 Sorbitane Sesquiolate CT - -    80 7 Isopropylmyristate - -     8 Polysorbate 80 CT - -     9 Amidoamine   (Stearamidopropyl Dimethylamine) NA NA     10 Oleamidopropyl Dimethylamine - -     11 Lauryl Glucoside - -    85 12 Coconut Diethanolamide  - -     13 2-Hydroxy-4-Methoxy Benzophenone (Oxybenzone) - -     14 Benzophenone-4 (Sulisobenzon) NA NA     15 Propolis - -     16 Dexpanthenol - -    90 17 Abitol - -     18 Tert-Butylhydroquinone - -     19 Benzyl Salicylate - -     20 Dimethylaminopropylamin (DMPA) CT? D053       21 Zinc Pyrithione (Zinc Omadine) CT? Z006      95 22 Anival(Hydroxymethyl) Nitromethane CT        Antioxidant - -     23 Dodecyl Gallate - -     24 Butylhydroxyanisole (BHA) - -     25 Butylhydroxytoluene (BHT) NA NA     26 Di-Alpha-Tocopherol (Vit E) - -    100 27 Propyl Gallate - -      PERFUMES, FLAVORS & PLANTS        # Substance 2 days 4 days remarks   101 1 Benzyl Cinnamate - -     2 Di-Limonene (Dipentene) - -     3 Cananga Odorata (Ylang Ylang) (I) - -     4 Lichen Acid Mix - -    105 5 Mentha Piperita Oil (Peppermint Oil) - -     6 Sesquiterpenelactone mix - -     7 Tea Tree Oil, Oxidized - -     8 Wood Tar Mix - -     9 Abietic Acid - -     110 10 Lavendula Angustifolia Oil (Lavender Oil) - -     11 Fragrance mix II CT * - -      Fragrance Mix I       12 [place] Absolute - -     13 Eugenol - -     14 Geraniol NA NA    115 15 Hydroxycitronellal - -     16 Isoeugenol - -     17 Cinnamic Aldehyde - -     18 Cinnamic Alcohol  NA NA      Fragrance mix II       19 Citronellol - -    120 20 Alpha-Hexylcinnamic Aldehyde    - -     21 Citral - -     22 Farnesol - -    123 23 Coumarin - -      Hexylcinnamic aldehyde, Coumarin, Farnesol, Hydroxyisohexy3-cyclohexene carboxaldehyde, citral, citrolellol  PLASTICS        # Substance 2 days 4 days remarks     Acrylates - -    124 1 2-Hydroxyethyl Methacrylate (HEMA) - -    125 2 1,4-Butandioldimethacrylate (BUDMA) - -     3  2-Ethylhexyl Acrylate - -     4 Bisphenol-A-Dimethacrylate  - -     5 Diurethane-Dimethacrylate - -     6 Ethyleneglycoldimethacrylate (EGDMA) - -    130 7 Pentaerythritoltriacrylate (CARLOS) - -     8 Triethylene Glycol Dimethacrylate (TEGDMA) - -      Synthetic material/additives        9 J-Jhao-Jpjmredqqot - -     10 Tricresyl Phosphate - -     11 4-Kaoe-Nduqmjnvslbph - -    135 12 Bis (2-Ethylhexyl) Phthalate - -     13 Dibutylphthalate - -     14 Dimethylphthalate - -     15 Toluene-2,4-Diisocyanate - -     16 Diphenylmethane-4,4''-Diisocyanate - -      EPOXY RESIN SYSTEMS        Reactive Solvents - -    140 17 Cresyl Glycidyl Ether - -     18 Butyl Glycidyl Ether - -     19 Phenyl Glycidyl Ether - -     20 1,4-Butanediol Diglycidyl Ether - -     21 1,6-Hexanediole Diglycidyl Ether - -      Hardener / Accelerator - -    145 22 Triethylenetetramine - -     23 Diethylenetriamine - -     24 Isophorone Diamine (IPD) - -    148 25 N,N-Dimethyl-P-Toluidine - -      OTHER PRODUCTS  tough strips band aids and vaseline and Triamcinolone cream and ointment        # Substance Conc  % solv 2 days 4 days remarks   149 1 Triamcinolone cream 0.1% as is       150 2 Curad petroleum jelly as is       151 3  "Cortizone 10 cream with aloe as is       152 4 Triamcinolone oint 0.1% as is         Patients own products:  è DO NOT test if chemical or biological identity is unknown!   - always ask from patient the product information and safety sheets and consult with the physician   - check neutral pH with pH indicator paper (do not test pH below 5 or over 8 or consult with physician)  - leave-on cosmetics can be tested \"as is\"  - rinse-off products have to be diluted with water, buffer, olive oil or paraffin (discuss with physician)  à remember:   - non-specific toxic/corrosive or biological reactions can occur  - tests with patients own products are not standardized and test conditions are not optimized for patch tests. Whenever possible test with standardized test series and correlate use of product with the result of the patch tests  - if not sure if compounds can be tested under occlusion in patch tests consider open application tests      Results of patch tests:                         Interpretation:  - Negative                    A    = Allergic      (+) Erythema    TI   = Toxic/irritant   + E + Infiltration    RaP = Relevance at Present     ++ E/I + Papulovesicle   Rpr  = Relevance Previously     +++ E/I/P + Blister     nR   = No Relevance           Atopy Screen (Placed 01/13/20 )  No Substance Readings (15 min) Evaluation   POS Histamine 1mg/ml ++    NEG NaCl 0.9% - Slight dermographism     No Substance Readings (15 min) Evaluation   1 Alternaria alternata (tenuis)  -    2 Cladosporium herbarum -    3 Aspergillus fumigatus -    4 Penicillium notatum -    5 Dermatophagoides pteronyssinus ++    6 Dermatophagoides farinae ++    Conclusion: immediate type reaction to house dust mites, but no delayed reaction to moulds or HDM --> The DP and DF reactions remained into the next day.       DRUGS/SUBSTANCES 1/19/2021  No Substance Readings (15min) Evaluation   POS Histamine 1mg/ml ++    NEG NaCl 0.9% - dermographism "      Prick Tests        Substance/ Allergen Concentration Result (15min) Remarks   Meloxicam 30mg/ml as is neg     In vaseline neg Not bigger than neg ctr     Intradermal Tests   immed immed delayed delayed     Substance Conc 1st dil 2nd dil   3 days  3 days remarks   Histamine Hydrochloride (ALK) 0.1 mg/ml        NaCl 0.9%        Meloxicam 1:200/ 1:20 neg neg neg  dermographism     Patch Tests   as is  as is 1:2 paraffin 1:2 paraffin     Substance Conc  3 days  days  3 days  days remarks   Meloxicam  - - - -        [x] Allergic reaction diagnosed against:     Antibiotics and antimycotics:   Framycetine Sulphate +++  Gentamicin Sulphate +  Neomycine Sulphate ++/+++  Polymyxin B Sulphate ++  Bacitracin ++/+++     Following additional allergens in repeat patch tests from November 2022 found:   - sdfs      Interpretation/ remarks:   Patient has a severe delayed type contact sensitization to various antibiotics that are in many OTC antiseptics and antibiotics. No signs for allergies to rubber chemicals or adhesives. Therefore, we think that the reaction to the band-aids were maybe to topical antibiotics that were added onto the band-aid.     [] Patient information given   [] ACDS CAMP information's (# ....) to following compounds: .....   [] General information's to following compounds: ......    ___________________________    Assessment & Plan:    ==> Final Diagnosis:     #  Localized Eczematous and delayed-type reaction after 2x initial doses of immunotherapies  = reaction to itch-X cream  * chronic illness with exacerbation, progression, side effects from treatment    No signs for allergies to preservative in these allergy extracts     No signs in prick test or patch test for delayed type reaction to some of these allergens (dust mites, molds)    Severe contact sensitizations to topical antibiotics  ==> in open application test clearly positive to the itch-x cream that patient used to reduce the effects of the  IT    ==> Comment: This was NOT a reaction to the immunotherapy, but rather to the topical product used afterwards to treat the topical side effects of the IT. Not sure to what ingredient in itch-x was really reacting, but not to Parabens and Propylene Glycol.    #  New flare up of allergic contact dermatitis on ankle  DDx Vaseline or adhesives (Band aids?)  DDx reacted in the face to Tile cement board  ==> plan repeat of some patch tests (see in red above)  * chronic illness with exacerbation, progression, side effects from treatment    #  Recurrent urticarial rash with pruritus (after using Doxycycline)  * chronic illness with exacerbation, progression, side effects from treatment  Treatment: try Claritine 10mg morning and Noon and Hydroxyzine 25mg at bedtime    # Unlikely angioedema reaction to Meloxicam  * stable chronic illness    In skin tests no signs for specific immediate type or delayed reaction to Meloxicam. Patient desires to restart Meloxicam for back pain.     No evidence for COX1 intolerance (Ibuprofen is well-tolerated)    Suspect that her prior reaction was unrelated to the Meloxicam    Can restart meloxicam 1/4 tablet, then 1-2 days later, 1/2 tablet, then 1-2 days later she can try a full tablet.     Has emergency set and given handout emergency treatment (has Epipen)    It is imperative that the patient avoids all antibiotics, topically and systemically, that we have tested and that she was positive. Because gentamycin and neomycin were included I would avoid, for safety reasons, cross reacting aminoglycosides including vancomycin.     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 However, it could be that at that time some topical antibiotic has been used that she reacted to and/or lipatrophy after Kenalog injections.     ==> Comment: It is difficult to directly correlate the atrophy on the upper arm to the contact sensitization  even though the outline of the reaction looks like the outline of a band-aid. It might be that this atrophy has nothing to do with an allergic reaction. It might be important to really verify if it is a muscle or lipatrophy. Clinically it looks more like lipatrophy which can sometimes be idiopathic. Moreover, a local injection of corticosteroids in this area might as well explain the atrophy (and there was such an injection months ago; not sure if exactly same location?)      These conclusions are made at the best of one's knowledge and belief based on the provided evidence such as patient's history and allergy test results and they can change over time or can be incomplete because of missing information's.      Final conclusions:    With the tendency of the patient to develop delayed-type reactions, I would in the moment not continue with the immunotherapy or we reduce it to only relevant allergens like house dust mite, but in that case we would maybe perform first prick, intradermal, and patch test with house dust mite immunotherapy to be sure that there is no delayed reaction to these extracts. We recommend just one single major allergen for immunotherapy, such as 50% DF/DP standardized from [facility].We could not see any delayed reaction to house dust mite test and IT solutions.      In patch tests severe reactions against Neomycine, Framycetine, Gentamicin, Bacitracine and Polymyxin. For reasons of crossreactions I would as well avoid other aminoglycosides such as Vancomycin. This was placed into the patient's allergy chart.       Unlikely that she had an angioedema reaction to Meloxicam given negative prick, intradermal, and patch testing. Also do not suspect COX1 intolerance given that she tolerates ibuprofen. Discussed with patient that she can try to restart the Meloxicam as instructed started with 1/4 tablet as long as she has emergency set on hand.     ==> Treatment prescribed/Plan:    In the moment, patient to  stop with immunotherapy or we will reduce it to only relevant allergens. We recommend just one single major allergen for immunotherapy, such as 50/50 mixture of DF/DP standardized from [facility].     Prescribed triamcinolone 0.1% cream. Apply topically 2 times daily for 5 days.     Go for antibiotic free foods if possible.    Can try restarting Meloxicam as above as long as she has the emergency set on hand and is accompanied by another person   Maybe, in future, if we could plan additional allergy tests for delayed-type reactions with immunotherapy extracts and/or maybe tetanus toxoid vaccine. These tests should include as well possible cross-reacting antibiotics such aminoglycosides and particularly vancomycin.  Crossreactions between Framycetine/Neomycine and these other antibiotics possible.   Patient counseling:  Discussed with patient it is unlikely that she had an angioedema reaction to Meloxicam given negative prick, intradermal, and patch testing. Also do not suspect COX1 intolerance given that she tolerated ibuprofen. Discussed with patient that she can try to restart the Meloxicam as instructed started with 1/4 tablet as long as she has emergency set on hand.     These conclusions are made at the best of one's knowledge and belief based on the provided evidence such as patient's history and allergy test results and they can change over time or can be incomplete because of missing information's.    [place] [address] on close of this encounter..       Procedures Performed: Allergy tests, including prick, intradermal and patch tests, drug allergy or provocation tests***    {[id number]}: provider    Follow-up in [facility] for 1st readings of patch tests after 2 days (virtual) and 2nd readings and final conclusions after 4 days (virtual) = from [place]   ___________________________    I spent a total of 34 minutes with Gisela OATES  Maurice during today s  visit. This time was spent discussing all the individual test results, correlating them to the clinical relevance, counseling the patient and/or coordinating care and performing allergy tests.            Again, thank you for allowing me to participate in the care of your patient.        Sincerely,        Reginaldo Horne MD

## 2022-11-28 NOTE — NURSING NOTE
Dermatology Rooming Note    Gisela Richards's goals for this visit include:   Chief Complaint   Patient presents with     Allergy Recheck     Patch testing day 1     Rachel Beltrán CMA

## 2022-11-30 ENCOUNTER — VIRTUAL VISIT (OUTPATIENT)
Dept: ALLERGY | Facility: CLINIC | Age: 42
End: 2022-11-30
Payer: COMMERCIAL

## 2022-11-30 DIAGNOSIS — Z88.9 ATOPY: ICD-10-CM

## 2022-11-30 DIAGNOSIS — T78.3XXD ANGIOEDEMA, SUBSEQUENT ENCOUNTER: ICD-10-CM

## 2022-11-30 DIAGNOSIS — L23.89 ALLERGIC CONTACT DERMATITIS DUE TO OTHER AGENTS: Primary | ICD-10-CM

## 2022-11-30 PROCEDURE — 99207 PR NO CHARGE LOS: CPT | Mod: 95 | Performed by: DERMATOLOGY

## 2022-11-30 NOTE — NURSING NOTE
Dermatology Rooming Note    Gisela Richards's goals for this visit include:   Chief Complaint   Patient presents with     Allergy Recheck     Patch testing day 3     Rachel Beltrán CMA

## 2022-11-30 NOTE — PROGRESS NOTES
[facility] Dermato-allergology Note  Virtual visit: store and forward video (avVenta), start time: 11:30am, end time: 1:50am, date of images: during video and in Epic media  Encounter Date: Nov 30, 2022  ____________________________________________    CC: No chief complaint on file.      HPI:  (Nov 30, 2022)  Ms. Gisela Richards is a(n) 42 year old female who presents today as a return patient for allergy consultation  - Follow-up in Derm-Allergy clinic for 1st readings of patch tests after 2 days (virtual) = from [place]   - otherwise feeling well in usual state of health    Physical exam:  General: In no acute distress, well-developed, well-nourished  Eyes: no conjunctivitis  ENT: no signs of rhinitis   Pulmonary: no wheezing or coughing  Skin:Focused examination of the skin on test sites was performed = see test results below    Earlier History and Allergy exams:  (Nov 28, 2022)  - Follow-up in Derm-Allergy clinic for additional patch tests (see series in red) and earlier if hives not under control    Earlier History and Allergy exams:  (Jul 27, 2022)  - Follow-up: in Derm-Allergy clinic as needed  - on July 5th did a photoshoot in the field and then observed on the ankle a small spot, that became bigger (brown, not really infiltrated) and the morning of the 7th July a Dermatologist looked at it and in histology it was a necrosis unknown cause.  - 1 week later itching on that area with vesicular reaction = eczematous reaction  - on the 15th did bacterial culture (no bacterial growth)  - as soon as the patient stopped the vaseline with same adhesive band aid the lesions improved  - patient observe anyway recently reactions to various vaseline type ointment (incl Triamcinolone acetonide) = with cream no problem  --> took at the end orally prednisone and Doxycycline = stopped the Prednisone Monday the 18th of July and the Doxycycline on the 18th. ==> itching and urticarial rash  started on the 22nd of July after being in the sun  --> NP prescribed Claritine and Prilosec and Benadryl   Skin:in the moment no signs for skin lesions, but patient had the eczematous lesions on the ankle   Some erythema over the decolletage    Earlier History and Allergy exams:  01/22/2021  Says she hasn't noticed any reaction to the patch testing or where we injected. Would like to try the meloxicam for her back pain. .    Earlier History and Allergy exams:   Ibuprofen does not induce any reaction.    > Patient had a reaction to Meloxicam in November. Took one Tabl for the first time and about 3-6h later with swelling and redness around the lips with dryness and then a sun burn type of rash over upper chest, stomach and back. No itching. No sun exposure involved. Redness disappeared after 1-2 days. Never tongue swelling or breathing problems. Out of chin skin wheeping. Took almost one week to fully recover.  ==> delayed type reaction    Earlier History and Allergy exams:    Patient is continuing now the IT without any problems, since they are not using ItchEx anymore. Just using ice.  IT injections is done by an primary care PA in [facility]    > Patient had a reaction to Meloxicam about 3 h later with angioedema on face and then sun burn type of rash over face --> happened in November  > Ibuprofen does not induce any reaction (last taken last Saturday)    With tramadol tremors and swellings of face and with Percacet (Oxycodon and Acetaminophen) migraines  Vicodin induces erythema  Today she reports that all skin manifestation has resolved. Patient has come from [place], [place]. Patient's spouse is present during evaluation. She reports her first allergy shots took place on November 9th, 2 injections on posterior aspect of right arm and 1 to the posterior aspect of left arm.   - Per chart review immunotherapy: trees/grass/weeds, mites/molds and animals  Within the day of the injection patient  "noted a diffuse pruritic rash on the posterior aspect of her right arm. She denies using any topicals or oral medications beside Zyrtec to aid with on going rash. She additionally iced the area and skin manifestation had resolved.     The following week she completed her 2nd round of immunotherapy at the same dosage. Again the rash occurred on the posterior aspect of her right arm along with outstanding edema of her right arm and shoulder. This rash was also pruritic, but again denied any pain. Her provider was concerned about reaction to the preservative. This rash took 1.5 week to self resolve. The edema persisted for 2-3 weeks and when resolved, a notable dent was appreciated on the right distal aspect of the deltoid. Imaging completed showed atrophy of the deltoid. She denied any recent injections to that area including routine vaccines or steroid injections. Also now having to areas of hypopigmentation overlying the deltoid.  Reports dry skin after resolution of rashes. Denies history of asthma. Extensive history of food sensitives and autoimmune workup that has been unremarkable.     Hx since 12/27/19:  The patient comes in today for patch testing day 1. She reports that there is a bruise on the R shoulder that has worsened in the interim. She is unsure if it may be necrosis or sudden muscle atrophy. She reports it appeared on Nov 16, the day after allergy shot. Also states that in the same area she developed a new \"bump\" that appeared about 2 days ago.   Reports that the IT on the R upper extremity was against trees, weeds, molds, mites, and dog. On the L upper extremity, she had IT injections for cats and dogs.   She is otherwise feeling well overall today. No other skin or allergy concerns at this time.     Medications:  - takes daily Zyrtec and is on IT  - Levothyroxin      Past Medical History:   Patient Active Problem List   Diagnosis     Allergic rhinitis due to dogs     Hypothyroid     Lumbar facet " "joint pain     Past Medical History:   Diagnosis Date     Constipation     Since young.     Other specified postprocedural states     2014,Slight recurrence within scar - removed in 9th grade and came back right away     Personal history of other medical treatment (CODE)      2, para 2.     Past Surgical History:   Procedure Laterality Date     COLONOSCOPY      ,\"swelling of the lining\" and flattening of villa.     OTHER SURGICAL HISTORY      ~,[id number],OTHER,Left,recurring within scar     OTHER SURGICAL HISTORY      2016,[id number],KNEE ARTHROSCOPY W/ MENISCAL REPAIR     OTHER SURGICAL HISTORY      2004,[id number],DILATION AND CURETTAGE,retained placenta       Social History:  Patient reports that she has never smoked. She has never used smokeless tobacco. She reports current alcohol use. She reports that she does not use drugs.    Family History:  Family History   Problem Relation Age of Onset     Family History Negative Father         Good Health,does not see doctor regularly     Thyroid Disease Mother         Thyroid Disease,Hypothyroid     Other - See Comments Mother         Smoker, prediabetes     Thyroid Disease Sister         Thyroid Disease,Hypothyroid     Arthritis Sister         Arthritis     Scoliosis Maternal Half-Sister         Scoliosis     Other - See Comments Maternal Half-Sister         No Known Problems       Medications:  Current Outpatient Medications   Medication Sig Dispense Refill     amphetamine-dextroamphetamine (ADDERALL XR) 25 MG 24 hr capsule Take 25 mg by mouth daily       amphetamine-dextroamphetamine (ADDERALL) 5 MG tablet TAKE 1 TABLET BY MOUTH ONCE DAILY IN THE LATE AFTERNOON       cetirizine (ZYRTEC) 10 MG tablet Take 10 mg by mouth       cholecalciferol (VITAMIN D3) 25 mcg (1000 units) capsule Take 1 capsule by mouth       doxycycline hyclate (VIBRAMYCIN) 100 MG capsule Take 1 capsule by mouth 2 times daily (Patient not taking: Reported on 2022)       " famotidine (PEPCID) 40 MG tablet Take 40 mg by mouth At Bedtime (Patient not taking: Reported on 8/25/2022)       ferrous sulfate (FEROSUL) 325 (65 Fe) MG tablet Take 1 tablet by mouth daily       fluorometholone (FML FORTE) 0.25 % ophthalmic suspension  (Patient not taking: Reported on 8/25/2022)       folic acid (FOLVITE) 1 MG tablet Take 1 mg by mouth daily       hydrOXYzine (ATARAX) 25 MG tablet Take 1 tablet (25 mg) by mouth At Bedtime (Patient not taking: Reported on 8/25/2022) 30 tablet 1     levothyroxine (SYNTHROID/LEVOTHROID) 75 MCG tablet Take 75 mcg by mouth daily       levothyroxine (SYNTHROID/LEVOTHROID) 75 MCG tablet Take 1 tablet (75 mcg) by mouth every morning (before breakfast) 90 tablet 4     loratadine (CLARITIN) 10 MG tablet Take 10 mg by mouth (Patient not taking: Reported on 8/25/2022)       loratadine (CLARITIN) 10 MG tablet Morning and evening 1 Tabl of 10mg for about 1 week and if no itching anymore, then reduce to 1 Tabl in the morning. 30 tablet 3     mometasone (NASONEX) 50 MCG/ACT spray 1 spray 2 times daily (Patient not taking: Reported on 7/20/2022)       predniSONE (DELTASONE) 50 MG tablet Emergency set: if severe allergic reaction take immediately 100mg Prednisone (2x50mg) and 2 Tabl Cetirizine 10mg and then write precise 12h retrospective diary.If less severe reaction take only the 2 Tabl Cetirizine 2 tablet 1     triamcinolone (KENALOG) 0.1 % external cream APPLY CREAM EXTERNALLY TWICE DAILY APPLY THIN FILM SPARINGLY TO ITCHY SPOTS. USE UP TO 2 WEEKS AT A TIME       triamcinolone (KENALOG) 0.1 % external cream  (Patient not taking: Reported on 8/25/2022)       triamcinolone (KENALOG) 0.1 % external ointment Apply 1 Dose topically 2 times daily (Patient not taking: Reported on 7/20/2022)       vitamin B-12 (CYANOCOBALAMIN) 500 MCG tablet Take 1 tablet by mouth daily         Allergies   Allergen Reactions     Cats Shortness Of Breath     Dogs Fatigue, Palpitations, Shortness Of  Breath and Tinnitus     Gentamicin Dermatitis     In patch tests severe reactions against Neomycine, Framycetine, Gentamicin, Bacitracine and Polymyxin.   For reasons of crossreactions I would as well avoid other aminoglycosides such as Vancomycine     Petrolatum Rash     Gluten Meal      Other reaction(s): Joint Pain     Itch-X Rash     Triamcinolone Rash     Fat atrophy after IM injection      Tramadol Other (See Comments)     Other reaction(s): Other - Describe In Comment Field  High doses caused yellow eyes, tremors in her mouth, couldn't feel some body parts.    No issues with low doses  High doses caused yellow eyes, tremors in her mouth, couldn't feel some body parts.    No issues with low doses     Food GI Disturbance     Night shades, stevia, pork     Gel Base Other (See Comments)     Allergy to Itch X. Gets blister/pustular rash in area the itch X is placed.     Hydrocodone-Acetaminophen Rash     Order for PATCH TESTS    [x] Outpatient  [] Inpatient: Head..../ Bed ....      Skin Atopy (atopic dermatitis) [x] Yes   [] No  Rhinitis/Sinusitis:   [x] Yes   [] No  Allergic Asthma:   [] Yes   [x] No  Food Allergy:   [x] Yes   [] No  Leg ulcers:   [] Yes   [x] No  Hand eczema:   [] Yes   [x] No   Leading hand:   [x] R   [] L       [] Ambidextrous                      Reason for tests (suspected allergy): Assessment for potential reaction to disinfections before IT (less likely preservatives in IT, because the reaction not directly over injection site)  Known previous allergies: Trees/grass, mites, dogs/cats  Standardized panels (repeat patch tests in red)  [x] Standard panel (40 tests)  [x] Preservatives & Antimicrobials (31 tests)  [x] Emulsifiers & Additives (25 tests) particularly propylene glycol  [x] Perfumes/Flavours & Plants (25 tests)  [] Hairdresser panel (12 tests)  [x] Rubber Chemicals (22 tests)  [x] Plastics (26 tests)  [] Colorants/Dyes/Food additives (20 tests)  [] Metals (implants/dental) (24  tests)  [] Local anaesthetics/NSAIDs (13 tests)  [x] Antibiotics & Antimycotics (14 tests)   [] Corticosteroids (15 tests)   [] Photopatch test (62 tests)   [] others: ...      [x] Patient's own products: tough strips band aids and vaseline and Triamcinolone cream and ointment    DO NOT test if chemical or biological identity is unknown!     always ask from patient the product information and safety sheets (MSDS)   [] Atopy screen prick test (Atopic predisposition): ...      RESULTS & EVALUATION of PATCH TESTS    Patch test readings after     [x] 2 days, [] 3 days [x] 4 days, [] 5 days,    Applied patch tests with results (import here the list of patch tests):  Order documented by: IVAN HANCOCK LPN  Order reviewed by: Preeti Copeland LPN   Physician:   Date/time of application:1-  Localization of application: back >> Clear    STANDARD Series         No Substance 2 days 4 days remarks   1 Francesco Mix [C] - -    2 Colophony - -    3  2-Mercaptobenzothiazole  NA NA     4 Methylisothiazolinone - -    5 Carba Mix - -    6 Thiuram Mix [A] NA NA    7 Bisphenol A Epoxy Resin - -    8 L-Uqgb-Ppteqrdlaqt-Formaldehyde Resin - -    9 Mercapto Mix [A] - -    10 Black Rubber Mix- PPD [B] NA NA    11 Potassium Dichromate  -  -    12 Balsam of [place] (Myroxylon Pereirae Resin) - -    13 Nickel Sulphate Hexahydrate - -    14 Mixed Dialkyl Thiourea - -    15 Paraben Mix [B] - -    16 Methyldibromo Glutaronitrile NA NA    17 Fragrance Mix - -    18 2-Bromo-2-Nitropropane-1,3-Diol (Bronopol) - -    19 Lyral - -    20 Tixocortol-21- Pivalate NA NA    21 Diazolidiyl Urea (Germall II) - -    22 Methyl Methacrylate NA NA    23 Cobalt (II) Chloride Hexahydrate - -    24 Fragrance Mix II  - -    25 Compositae Mix - -    26 Benzoyl Peroxide NA NA    27 Bacitracin - ++/+++    28 Formaldehyde - -    29 Methylchloroisothiazolinone / Methylisothiazolinone - -    30 Corticosteroid Mix - -    31 Sodium Lauryl Sulfate - -    32  Lanolin Alcohol - -    33 Turpentine - -    34 Cetylstearylalcohol - -    35 Chlorhexidine Dicluconate - -    36 Budenoside - -    37 Imidazolidinyl Urea  - -    38 Ethyl-2 Cyanoacrylate NA NA    39 Quaternium 15 (Dowicil 200) - -    40 Decyl Glucoside - -      EMULSIFIERS & ADDITIVES        No Substance 2 days 4 days remarks   41 Polyethylene Glycol-400 NA NA    42 Cocamidopropyl Betaine - -    43 Amerchol L101 NA NA    44 Propylene Glycol - -    45 Triethanolamine - -    46 Sorbitane Sesquiolate - -    47 Isopropylmyristate - -    48 Polysorbate 80  - -    49 Amidoamine   (Stearamidopropyl Dimethylamine) - -    50 Oleamidopropyl Dimethylamine - -    51 Lauryl Glucoside NA NA    52 Coconut Diethanolamide  - -    53 2-Hydroxy-4-Methoxy Benzophenone (Oxybenzone) - -    54 Benzophenone-4 (Sulisobenzon) - -    55 Propolis - -    56 Dexpanthenol - -    57 Carboxymethyl Cellulose Sodium - -    58 Abitol - -    59 Tert-Butylhydroquinone - -    60 Benzyl Salicylate - -     Antioxidant      61 Dodecyl Gallate - -    62 Butylhydroxyanisole (BHA) - -    63 Butylhydroxytoluene (BHT) - -    64 Di-Alpha-Tocopherol (Vit E) - -    65 Propyl Gallate - -      RUBBER CHEMICALS         No Substance 2 days 4 days remarks    Carbamate      66 Zinc Bis ( Diethyldithio carbamate ) (ZDEC) - -    67 Zinc Bis (Dibutyldithiocarbamate) - -    68 1,3-Diphenylguanidine (DPG) - -     Thiourea      69 Dibutylthiourea - -    70 Diphenyltiourea - -    71 Thiourea - -     Mercapto chemicals      72 Morpholinyl Mercaptobenzothiazole - -    73 V-Sdhoymvnmi-9-Benzothiazyl-Sulfenamide - -    74 Dibenzothiazyl Disulfide - -     Thiuram chemicals      75 Dipentamethylenethiuram Disulfide - -    76 Tetraethylthiuram Disulfide (Disulfiram) - -    77 Tetramethylthiuram Disulfide - -    78 Tetramethyl Thiuram Monosulfide (TMTM) - -     4-Phenylenediamine derivatives      79 N-Isopropyl-N'-Phenyl-P-Phenylenediamine (IPPD) - -    80 Bidvtzux-T-Qwfetuexhmztnqhl  (DPPD) - -     Various Rubber Additives      81 Hydroquinone Monobenzylether  - -    82 Hexamethylenetetramine - -    83 4,4'-Dihydroxybiphenyl - -    84 Cyclohexylthiophtalimide - -    85 Ethylenediamine Dihydrochloride - -    86 N-Phenyl-B-Naphthylamine - -    87 Dodecyl Mercaptan - -        PLASTICS         No Substance 2 days 4 days remarks    Acrylates - -    88 2-Hydroxyethyl Methacrylate (HEMA) - -    89 1,4-Butandioldimethacrylate (BUDMA) - -    90  2-Ethylhexyl Acrylate - -    91 Bisphenol-A-Dimethacrylate  - -    92 Diurethane-Dimethacrylate - -    93 Ethyleneglycoldimethacrylate (EGDMA) - -    94 Pentaerythritoltriacrylate (CARLOS) - -    95 Triethylene Glycol Dimethacrylate (TEGDMA) - -     Synthetic material/additives       96 M-Tblh-Ojhfmhsaqmc - -    97 Tricresyl Phosphate - -    98 8-Zhzm-Wwjbilihukzcc - -    99 Bis (2-Ethylhexyl) Phthalate - -    100 Dibutylphthalate - -    101 Dimethylphthalate - -    102 Toluene-2,4-Diisocyanate - -    103 Diphenylmethane-4,4''-Diisocyanate - -     EPOXY RESIN SYSTEMS       Reactive Solvents - -    104 Cresyl Glycidyl Ether - -    105 Butyl Glycidyl Ether - -    106 Phenyl Glycidyl Ether - -    107 1,4-Butanediol Diglycidyl Ether - -    108 1,6-Hexanediole Diglycidyl Ether - -     Hardener / Accelerator - -    109 Ethylenediamine Dihydrochloride - -    110 Triethylenetetramine - -    111 Diethylenetriamine - -    112 Isophorone Diamine (IPD) - -    113 N,N-Dimethyl-P-Toluidine - -        PRESERVATIVES & ANTIMICROBIALS         No Substance 2 days 4 days remarks   114  1,2-Benzisothiazoline-3-One, Sodium Salt - -    115  1,3,5-Anival (2-Hydroxyethyl) - Hexahydrotriazine (Grotan BK) NA NA    116 4-Dquvrmlzpjfit-3-Nitro-1, 3-Propanediol - -    117  3, 4, 4' - Triclocarban - -    118 4 - Chloro - 3 - Cresol - -    119 4 - Chloro - 4 - Xylenol (PCMX) NA NA    120 7-Ethylbicyclooxazolidine ([facility] [id number]) - -    121 Benzalkonium Chloride - -    122 Benzyl Alcohol - -    123  Cetalkonium Chloride NA NA    124 Cetylpyrimidine Chloride  - -    125 Chloroacetamide - -    126 DMDM Hydantoin NA NA    127 Glutaraldehyde NA NA    128 Triclosan - -    129 Glyoxal Trimeric Dihydrate - -    130 Iodopropynyl Butylcarbamate - -    131 Octylisothiazoline NA NA    132 Iodoform - -    133 (Nitrobutyl) Morpholine/(Ethylnitro-Trimethylene) Dimorpholine (Bioban P 1487) - -    134 Phenoxyethanol NA NA    135 Phenyl Salicylate - -    136 Povidone Iodine - -    137 Sodium Benzoate - -    138 Sodium Disulfite NA NA    139 Sorbic Acid - -    140 Thimerosal - -     Parabens      141 Butyl-P-Hydroxybenzoate - -    142 Ethyl-P-Hydroxybenzoate - -    143 Methyl-P-Hydroxybenzoate NA NA    144 Propyl-P-Hydroxybenzoate - -         ANTIBIOTICS & ANTIMYCOTICS         No Substance 2 days 4 days remarks   145 Erythromycine - -    146 Framycetine Sulphate (+) +++    147 Fusidic Acid Sodium Salt - -    148 Gentamicin Sulphate - +    149 Neomycine Sulphate (+) ++/+++    150 Oxytetracycline  - -    151 Polymyxin B Sulphate (+) ++    152 Tetracycline-HCL - -    153 Sulfanilamide - -    154 Metronidazole - -    155 Oxyquinoline Mix - -    156 Nitrofurazone - -    157 Nystatin - -    158 Clotrimazole - -      PATIENTS OWN PRODUCTS         No Substance Conc  % solv 2 days 4 days remarks   159 Aspergillus   - -    160 Penicillium   - -    161 D. Farinae   - -    162 D. pteronyssinus   - -    163 Dog   - -    164 Cat   - -        Nov 28, 2022 repeat application of patch tests:  Patch test readings after     [x] 2 days, [] 3 days [x] 4 days, [] 5 days,  Other duration: ...    STANDARD Series                                          # Substance 2 days 4 days remarks     1 Francesco Mix [C] - -       2 Colophony - -       3  2-Mercaptobenzothiazole  - -       4 Methylisothiazolinone - -       5 Carba Mix - -       6 Thiuram Mix [A] - -       7 Bisphenol A Epoxy Resin - -       8 O-Covc-Ttjzjynjbpp-Formaldehyde Resin - -       9 Mercapto  Mix [A] - -       10 Black Rubber Mix- PPD [B] - -       11 Potassium Dichromate  -  -       12 Balsam of [place] (Myroxylon Pereirae Resin) - -       13 Nickel Sulphate Hexahydrate - -       14 Mixed Dialkyl Thiourea - -       15 Paraben Mix [B] - -       16 Methyldibromo Glutaronitrile - -       17 Fragrance Mix - -       18 2-Bromo-2-Nitropropane-1,3-Diol (Bronopol) CT - -       19 Lyral - -       20 Tixocortol-21- Pivalate CT - -       21 Diazolidiyl Urea (Germall II) - -       22 Methyl Methacrylate - -       23 Cobalt (II) Chloride Hexahydrate - -       24 Fragrance Mix II  - -       25 Compositae Mix - -       26 Benzoyl Peroxide - -       27 Bacitracin - -       28 Formaldehyde - -       29 Methylchloroisothiazolinone / Methylisothiazolinone - -       30 Corticosteroid Mix CT (+) -       31 Sodium Lauryl Sulfate - -       32 Lanolin Alcohol - -       33 Turpentine - -       34 Cetylstearylalcohol - -       35 Chlorhexidine Dicluconate - -       36 Budenoside - -       37 Imidazolidinyl Urea  - -       38 Ethyl-2 Cyanoacrylate - -       39 Quaternium 15 (Dowicil 200) - -       40 Decyl Glucoside - -       PRESERVATIVES & ANTIMICROBIALS        # Substance 2 days 4 days remarks   41 1  1,2-Benzisothiazoline-3-One, Sodium Salt - -     2  1,3,5-Anival (2-Hydroxyethyl) - Hexahydrotriazine (Grotan BK) - -     3 9-Mfptjhzgpuork-2-Nitro-1, 3-Propanediol NA NA     4  3, 4, 4' - Triclocarban - -    45 5 4 - Chloro - 3 - Cresol - -     6 4 - Chloro - 4 - Xylenol (PCMX) - -     7 7-Ethylbicyclooxazolidine (Bioban [id number]) - -     8 Benzalkonium Chloride CT - -     9 Benzyl Alcohol - -    50 10 Cetalkonium Chloride - -     11 Cetylpyrimidine Chloride  - -     12 Chloroacetamide - -     13 DMDM Hydantoin - -     14 Glutaraldehyde - -    55 15 Triclosan - -     16 Glyoxal Trimeric Dihydrate - -     17 Iodopropynyl Butylcarbamate - -     18 Octylisothiazoline - -     19 Bithionol CT - -    60 20 Lidya P 1487 (Nitrobutyl)  Morpholine/(Ethylnitro-Trimethylene) Dimorpholine - -     21 Phenoxyethanol - -     22 Phenyl Salicylate - -     23 Povidone Iodine - -     24 Sodium Benzoate - -    65 25 Sodium Disulfite - -     26 Sorbic Acid - -     27 Thimerosal - -     28 Melamine Formaldehyde Resin - -     29 Ethylenediamine Dihydrochloride - -      Parabens      70 30 Butyl-P-Hydroxybenzoate - -     31 Ethyl-P-Hydroxybenzoate NA NA     32 Methyl-P-Hydroxybenzoate - -    73 33 Propyl-P-Hydroxybenzoate - -      EMULSIFIERS & ADDITIVES       # Substance 2 days 4 days remarks   74 1 Polyethylene Glycol-400 - -    75 2 Cocamidopropyl Betaine - -     3 Amerchol L101 - -     4 Propylene Glycol - -     5 Triethanolamine - -     6 Sorbitane Sesquiolate CT - -    80 7 Isopropylmyristate - -     8 Polysorbate 80 CT - -     9 Amidoamine   (Stearamidopropyl Dimethylamine) NA NA     10 Oleamidopropyl Dimethylamine - -     11 Lauryl Glucoside - -    85 12 Coconut Diethanolamide  - -     13 2-Hydroxy-4-Methoxy Benzophenone (Oxybenzone) - -     14 Benzophenone-4 (Sulisobenzon) NA NA     15 Propolis - -     16 Dexpanthenol - -    90 17 Abitol - -     18 Tert-Butylhydroquinone - -     19 Benzyl Salicylate - -     20 Dimethylaminopropylamin (DMPA) CT? D053       21 Zinc Pyrithione (Zinc Omadine) CT? Z006      95 22 Anival(Hydroxymethyl) Nitromethane CT        Antioxidant - -     23 Dodecyl Gallate - -     24 Butylhydroxyanisole (BHA) - -     25 Butylhydroxytoluene (BHT) NA NA     26 Di-Alpha-Tocopherol (Vit E) - -    100 27 Propyl Gallate - -      PERFUMES, FLAVORS & PLANTS        # Substance 2 days 4 days remarks   101 1 Benzyl Cinnamate (+) -     2 Di-Limonene (Dipentene) - -     3 Cananga Odorata (Ylang Ylang) (I) (+) -     4 Lichen Acid Mix - -    105 5 Mentha Piperita Oil (Peppermint Oil) + -     6 Sesquiterpenelactone mix - -     7 Tea Tree Oil, Oxidized - -     8 Wood Tar Mix - -     9 Abietic Acid - -    110 10 Lavendula Angustifolia Oil (Lavender Oil) -  -     11 Fragrance mix II CT * - -      Fragrance Mix I       12 [place] Absolute - -     13 Eugenol - -     14 Geraniol NA NA    115 15 Hydroxycitronellal - -     16 Isoeugenol - -     17 Cinnamic Aldehyde - -     18 Cinnamic Alcohol  NA NA      Fragrance mix II       19 Citronellol - -    120 20 Alpha-Hexylcinnamic Aldehyde    - -     21 Citral - -     22 Farnesol - -    123 23 Coumarin - -      Hexylcinnamic aldehyde, Coumarin, Farnesol, Hydroxyisohexy3-cyclohexene carboxaldehyde, citral, citrolellol  PLASTICS        # Substance 2 days 4 days remarks     Acrylates - -    124 1 2-Hydroxyethyl Methacrylate (HEMA) - -    125 2 1,4-Butandioldimethacrylate (BUDMA) - -     3  2-Ethylhexyl Acrylate - -     4 Bisphenol-A-Dimethacrylate  - -     5 Diurethane-Dimethacrylate - -     6 Ethyleneglycoldimethacrylate (EGDMA) - -    130 7 Pentaerythritoltriacrylate (CARLOS) - -     8 Triethylene Glycol Dimethacrylate (TEGDMA) - -      Synthetic material/additives        9 P-Wzwg-Yivzatolaef - -     10 Tricresyl Phosphate - -     11 7-Txxi-Bndqaatbmrsxc - -    135 12 Bis (2-Ethylhexyl) Phthalate - -     13 Dibutylphthalate - -     14 Dimethylphthalate - -     15 Toluene-2,4-Diisocyanate - -     16 Diphenylmethane-4,4''-Diisocyanate - -      EPOXY RESIN SYSTEMS        Reactive Solvents - -    140 17 Cresyl Glycidyl Ether - -     18 Butyl Glycidyl Ether - -     19 Phenyl Glycidyl Ether - -     20 1,4-Butanediol Diglycidyl Ether - -     21 1,6-Hexanediole Diglycidyl Ether - -      Hardener / Accelerator - -    145 22 Triethylenetetramine - -     23 Diethylenetriamine - -     24 Isophorone Diamine (IPD) - -    148 25 N,N-Dimethyl-P-Toluidine - -      OTHER PRODUCTS  tough strips band aids and vaseline and Triamcinolone cream and ointment        # Substance Conc  % solv 2 days 4 days remarks   149 1 Triamcinolone cream 0.1% as is  - -    150 2 Curad petroleum jelly as is  - -    151 3 Cortizone 10 cream with aloe as is  - -    152 4  "Triamcinolone oint 0.1% as is  - -      Patients own products:  è DO NOT test if chemical or biological identity is unknown!   - always ask from patient the product information and safety sheets and consult with the physician   - check neutral pH with pH indicator paper (do not test pH below 5 or over 8 or consult with physician)  - leave-on cosmetics can be tested \"as is\"  - rinse-off products have to be diluted with water, buffer, olive oil or paraffin (discuss with physician)  à remember:   - non-specific toxic/corrosive or biological reactions can occur  - tests with patients own products are not standardized and test conditions are not optimized for patch tests. Whenever possible test with standardized test series and correlate use of product with the result of the patch tests  - if not sure if compounds can be tested under occlusion in patch tests consider open application tests      Results of patch tests:                         Interpretation:  - Negative                    A    = Allergic      (+) Erythema    TI   = Toxic/irritant   + E + Infiltration    RaP = Relevance at Present     ++ E/I + Papulovesicle   Rpr  = Relevance Previously     +++ E/I/P + Blister     nR   = No Relevance           Atopy Screen (Placed 01/13/20 )  No Substance Readings (15 min) Evaluation   POS Histamine 1mg/ml ++    NEG NaCl 0.9% - Slight dermographism     No Substance Readings (15 min) Evaluation   1 Alternaria alternata (tenuis)  -    2 Cladosporium herbarum -    3 Aspergillus fumigatus -    4 Penicillium notatum -    5 Dermatophagoides pteronyssinus ++    6 Dermatophagoides farinae ++    Conclusion: immediate type reaction to house dust mites, but no delayed reaction to moulds or HDM --> The DP and DF reactions remained into the next day.       DRUGS/SUBSTANCES 1/19/2021  No Substance Readings (15min) Evaluation   POS Histamine 1mg/ml ++    NEG NaCl 0.9% - dermographism      Prick Tests        Substance/ Allergen " Concentration Result (15min) Remarks   Meloxicam 30mg/ml as is neg     In vaseline neg Not bigger than neg ctr     Intradermal Tests   immed immed delayed delayed     Substance Conc 1st dil 2nd dil   3 days  3 days remarks   Histamine Hydrochloride (ALK) 0.1 mg/ml        NaCl 0.9%        Meloxicam 1:200/ 1:20 neg neg neg  dermographism     Patch Tests   as is  as is 1:2 paraffin 1:2 paraffin     Substance Conc  3 days  days  3 days  days remarks   Meloxicam  - - - -        [x] Allergic reaction diagnosed against:     Antibiotics and antimycotics:   Framycetine Sulphate +++  Gentamicin Sulphate +  Neomycine Sulphate ++/+++  Polymyxin B Sulphate ++  Bacitracin ++/+++     Following additional allergens in repeat patch tests from November 2022 found:   >> some initial irritations on day 2 (11/30/2022) to  - corticosteroid mix  - some irritation over peppermint oil, benzyl cinnamate and Ylang-Ylang oil      Interpretation/ remarks:   Patient has a severe delayed type contact sensitization to various antibiotics that are in many OTC antiseptics and antibiotics. No signs for allergies to rubber chemicals or adhesives. Therefore, we think that the reaction to the band-aids were maybe to topical antibiotics that were added onto the band-aid.     [] Patient information given   [] ACDS CAMP information's (# ....) to following compounds: .....   [] General information's to following compounds: ......    ___________________________    Assessment & Plan:    ==> Final Diagnosis:     #  Localized Eczematous and delayed-type reaction after 2x initial doses of immunotherapies  = reaction to itch-X cream  * chronic illness with exacerbation, progression, side effects from treatment    No signs for allergies to preservative in these allergy extracts     No signs in prick test or patch test for delayed type reaction to some of these allergens (dust mites, molds)    Severe contact sensitizations to topical antibiotics  ==> in open  application test clearly positive to the itch-x cream that patient used to reduce the effects of the IT    ==> Comment: This was NOT a reaction to the immunotherapy, but rather to the topical product used afterwards to treat the topical side effects of the IT. Not sure to what ingredient in itch-x was really reacting, but not to Parabens and Propylene Glycol.    #  New flare up of allergic contact dermatitis on ankle  DDx Vaseline or adhesives (Band aids?)  DDx reacted in the face to Tile cement board  ==> plan repeat of some patch tests (see in red above)  * chronic illness with exacerbation, progression, side effects from treatment    #  Recurrent urticarial rash with pruritus (after using Doxycycline)  * chronic illness with exacerbation, progression, side effects from treatment  Treatment: try Claritine 10mg morning and Noon and Hydroxyzine 25mg at bedtime    # Unlikely angioedema reaction to Meloxicam  * stable chronic illness    In skin tests no signs for specific immediate type or delayed reaction to Meloxicam. Patient desires to restart Meloxicam for back pain.     No evidence for COX1 intolerance (Ibuprofen is well-tolerated)    Suspect that her prior reaction was unrelated to the Meloxicam    Can restart meloxicam 1/4 tablet, then 1-2 days later, 1/2 tablet, then 1-2 days later she can try a full tablet.     Has emergency set and given handout emergency treatment (has Epipen)    It is imperative that the patient avoids all antibiotics, topically and systemically, that we have tested and that she was positive. Because gentamycin and neomycin were included I would avoid, for safety reasons, cross reacting aminoglycosides including vancomycin.     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 However, it could be that at that time some topical antibiotic has been used that she reacted to and/or lipatrophy after Kenalog injections.     ==>  Comment: It is difficult to directly correlate the atrophy on the upper arm to the contact sensitization even though the outline of the reaction looks like the outline of a band-aid. It might be that this atrophy has nothing to do with an allergic reaction. It might be important to really verify if it is a muscle or lipatrophy. Clinically it looks more like lipatrophy which can sometimes be idiopathic. Moreover, a local injection of corticosteroids in this area might as well explain the atrophy (and there was such an injection months ago; not sure if exactly same location?)      These conclusions are made at the best of one's knowledge and belief based on the provided evidence such as patient's history and allergy test results and they can change over time or can be incomplete because of missing information's.      Final conclusions:    With the tendency of the patient to develop delayed-type reactions, I would in the moment not continue with the immunotherapy or we reduce it to only relevant allergens like house dust mite, but in that case we would maybe perform first prick, intradermal, and patch test with house dust mite immunotherapy to be sure that there is no delayed reaction to these extracts. We recommend just one single major allergen for immunotherapy, such as 50% DF/DP standardized from [facility].We could not see any delayed reaction to house dust mite test and IT solutions.      In patch tests severe reactions against Neomycine, Framycetine, Gentamicin, Bacitracine and Polymyxin. For reasons of crossreactions I would as well avoid other aminoglycosides such as Vancomycin. This was placed into the patient's allergy chart.       Unlikely that she had an angioedema reaction to Meloxicam given negative prick, intradermal, and patch testing. Also do not suspect COX1 intolerance given that she tolerates ibuprofen. Discussed with patient that she can try to restart the Meloxicam as instructed started with 1/4 tablet  as long as she has emergency set on hand.     ==> Treatment prescribed/Plan:    In the moment, patient to stop with immunotherapy or we will reduce it to only relevant allergens. We recommend just one single major allergen for immunotherapy, such as 50/50 mixture of DF/DP standardized from [facility].     Prescribed triamcinolone 0.1% cream. Apply topically 2 times daily for 5 days.     Go for antibiotic free foods if possible.    Can try restarting Meloxicam as above as long as she has the emergency set on hand and is accompanied by another person   Maybe, in future, if we could plan additional allergy tests for delayed-type reactions with immunotherapy extracts and/or maybe tetanus toxoid vaccine. These tests should include as well possible cross-reacting antibiotics such aminoglycosides and particularly vancomycin.  Crossreactions between Framycetine/Neomycine and these other antibiotics possible.   Patient counseling:  Discussed with patient it is unlikely that she had an angioedema reaction to Meloxicam given negative prick, intradermal, and patch testing. Also do not suspect COX1 intolerance given that she tolerated ibuprofen. Discussed with patient that she can try to restart the Meloxicam as instructed started with 1/4 tablet as long as she has emergency set on hand.     These conclusions are made at the best of one's knowledge and belief based on the provided evidence such as patient's history and allergy test results and they can change over time or can be incomplete because of missing information's.    CC Ulysses Mtz MD  [facility]  [address] [place] [address] on close of this encounter..    ___________________________    Staff: : provider    Follow-up in Derm-Allergy clinic for 2nd readings and final conclusions after 4 days (virtual) = from [place]   ___________________________    I spent a total of 20 minutes with Gisela Richards during today s  visit. This time was spent discussing all  the individual test results, correlating them to the clinical relevance, counseling the patient and/or coordinating care

## 2022-11-30 NOTE — Clinical Note
11/30/2022         RE: Gisela Richards  [address]  [facility] [address]        Dear Colleague,    Thank you for referring your patient, Gisela Richards, to the [facility] [place]. Please see a copy of my visit note below.    [facility] Dermato-allergology Note  Virtual visit: store and forward video (ITYZ connected), start time: 11:30am, end time: 1:50am, date of images: during video and in Epic media  Encounter Date: Nov 30, 2022  ____________________________________________    CC: No chief complaint on file.      HPI:  (Nov 30, 2022)  Ms. Gisela Richards is a(n) 42 year old female who presents today as a return patient for allergy consultation  - Follow-up in Derm-Allergy clinic for 1st readings of patch tests after 2 days (virtual) = from [place]   - otherwise feeling well in usual state of health    Physical exam:  General: In no acute distress, well-developed, well-nourished  Eyes: no conjunctivitis  ENT: no signs of rhinitis   Pulmonary: no wheezing or coughing  Skin:Focused examination of the skin on test sites was performed = see test results below    Earlier History and Allergy exams:  (Nov 28, 2022)  - Follow-up in Derm-Allergy clinic for additional patch tests (see series in red) and earlier if hives not under control    Earlier History and Allergy exams:  (Jul 27, 2022)  - Follow-up: in Derm-Allergy clinic as needed  - on July 5th did a photoshoot in the field and then observed on the ankle a small spot, that became bigger (brown, not really infiltrated) and the morning of the 7th July a Dermatologist looked at it and in histology it was a necrosis unknown cause.  - 1 week later itching on that area with vesicular reaction = eczematous reaction  - on the 15th did bacterial culture (no bacterial growth)  - as soon as the patient stopped the vaseline with same adhesive band aid the lesions improved  - patient observe anyway recently  reactions to various vaseline type ointment (incl Triamcinolone acetonide) = with cream no problem  --> took at the end orally prednisone and Doxycycline = stopped the Prednisone Monday the 18th of July and the Doxycycline on the 18th. ==> itching and urticarial rash started on the 22nd of July after being in the sun  --> NP prescribed Claritine and Prilosec and Benadryl   Skin:in the moment no signs for skin lesions, but patient had the eczematous lesions on the ankle   Some erythema over the decolletage    Earlier History and Allergy exams:  01/22/2021  Says she hasn't noticed any reaction to the patch testing or where we injected. Would like to try the meloxicam for her back pain. .    Earlier History and Allergy exams:   Ibuprofen does not induce any reaction.    > Patient had a reaction to Meloxicam in November. Took one Tabl for the first time and about 3-6h later with swelling and redness around the lips with dryness and then a sun burn type of rash over upper chest, stomach and back. No itching. No sun exposure involved. Redness disappeared after 1-2 days. Never tongue swelling or breathing problems. Out of chin skin wheeping. Took almost one week to fully recover.  ==> delayed type reaction    Earlier History and Allergy exams:    Patient is continuing now the IT without any problems, since they are not using ItchEx anymore. Just using ice.  IT injections is done by an primary care PA in [facility]    > Patient had a reaction to Meloxicam about 3 h later with angioedema on face and then sun burn type of rash over face --> happened in November  > Ibuprofen does not induce any reaction (last taken last Saturday)    With tramadol tremors and swellings of face and with Percacet (Oxycodon and Acetaminophen) migraines  Vicodin induces erythema  Today she reports that all skin manifestation has resolved. Patient has come from [place], [place]. Patient's spouse is present during evaluation. She  "reports her first allergy shots took place on November 9th, 2 injections on posterior aspect of right arm and 1 to the posterior aspect of left arm.   - Per chart review immunotherapy: trees/grass/weeds, mites/molds and animals  Within the day of the injection patient noted a diffuse pruritic rash on the posterior aspect of her right arm. She denies using any topicals or oral medications beside Zyrtec to aid with on going rash. She additionally iced the area and skin manifestation had resolved.     The following week she completed her 2nd round of immunotherapy at the same dosage. Again the rash occurred on the posterior aspect of her right arm along with outstanding edema of her right arm and shoulder. This rash was also pruritic, but again denied any pain. Her provider was concerned about reaction to the preservative. This rash took 1.5 week to self resolve. The edema persisted for 2-3 weeks and when resolved, a notable dent was appreciated on the right distal aspect of the deltoid. Imaging completed showed atrophy of the deltoid. She denied any recent injections to that area including routine vaccines or steroid injections. Also now having to areas of hypopigmentation overlying the deltoid.  Reports dry skin after resolution of rashes. Denies history of asthma. Extensive history of food sensitives and autoimmune workup that has been unremarkable.     Hx since 12/27/19:  The patient comes in today for patch testing day 1. She reports that there is a bruise on the R shoulder that has worsened in the interim. She is unsure if it may be necrosis or sudden muscle atrophy. She reports it appeared on Nov 16, the day after allergy shot. Also states that in the same area she developed a new \"bump\" that appeared about 2 days ago.   Reports that the IT on the R upper extremity was against trees, weeds, molds, mites, and dog. On the L upper extremity, she had IT injections for cats and dogs.   She is otherwise feeling well " "overall today. No other skin or allergy concerns at this time.     Medications:  - takes daily Zyrtec and is on IT  - Levothyroxin      Past Medical History:   Patient Active Problem List   Diagnosis     Allergic rhinitis due to dogs     Hypothyroid     Lumbar facet joint pain     Past Medical History:   Diagnosis Date     Constipation     Since young.     Other specified postprocedural states     2014,Slight recurrence within scar - removed in 9th grade and came back right away     Personal history of other medical treatment (CODE)      2, para 2.     Past Surgical History:   Procedure Laterality Date     COLONOSCOPY      ,\"swelling of the lining\" and flattening of villa.     OTHER SURGICAL HISTORY      ~,[id number],OTHER,Left,recurring within scar     OTHER SURGICAL HISTORY      2016,[id number],KNEE ARTHROSCOPY W/ MENISCAL REPAIR     OTHER SURGICAL HISTORY      2004,[id number],DILATION AND CURETTAGE,retained placenta       Social History:  Patient reports that she has never smoked. She has never used smokeless tobacco. She reports current alcohol use. She reports that she does not use drugs.    Family History:  Family History   Problem Relation Age of Onset     Family History Negative Father         Good Health,does not see doctor regularly     Thyroid Disease Mother         Thyroid Disease,Hypothyroid     Other - See Comments Mother         Smoker, prediabetes     Thyroid Disease Sister         Thyroid Disease,Hypothyroid     Arthritis Sister         Arthritis     Scoliosis Maternal Half-Sister         Scoliosis     Other - See Comments Maternal Half-Sister         No Known Problems       Medications:  Current Outpatient Medications   Medication Sig Dispense Refill     amphetamine-dextroamphetamine (ADDERALL XR) 25 MG 24 hr capsule Take 25 mg by mouth daily       amphetamine-dextroamphetamine (ADDERALL) 5 MG tablet TAKE 1 TABLET BY MOUTH ONCE DAILY IN THE LATE AFTERNOON       cetirizine (ZYRTEC) " 10 MG tablet Take 10 mg by mouth       cholecalciferol (VITAMIN D3) 25 mcg (1000 units) capsule Take 1 capsule by mouth       doxycycline hyclate (VIBRAMYCIN) 100 MG capsule Take 1 capsule by mouth 2 times daily (Patient not taking: Reported on 7/20/2022)       famotidine (PEPCID) 40 MG tablet Take 40 mg by mouth At Bedtime (Patient not taking: Reported on 8/25/2022)       ferrous sulfate (FEROSUL) 325 (65 Fe) MG tablet Take 1 tablet by mouth daily       fluorometholone (FML FORTE) 0.25 % ophthalmic suspension  (Patient not taking: Reported on 8/25/2022)       folic acid (FOLVITE) 1 MG tablet Take 1 mg by mouth daily       hydrOXYzine (ATARAX) 25 MG tablet Take 1 tablet (25 mg) by mouth At Bedtime (Patient not taking: Reported on 8/25/2022) 30 tablet 1     levothyroxine (SYNTHROID/LEVOTHROID) 75 MCG tablet Take 75 mcg by mouth daily       levothyroxine (SYNTHROID/LEVOTHROID) 75 MCG tablet Take 1 tablet (75 mcg) by mouth every morning (before breakfast) 90 tablet 4     loratadine (CLARITIN) 10 MG tablet Take 10 mg by mouth (Patient not taking: Reported on 8/25/2022)       loratadine (CLARITIN) 10 MG tablet Morning and evening 1 Tabl of 10mg for about 1 week and if no itching anymore, then reduce to 1 Tabl in the morning. 30 tablet 3     mometasone (NASONEX) 50 MCG/ACT spray 1 spray 2 times daily (Patient not taking: Reported on 7/20/2022)       predniSONE (DELTASONE) 50 MG tablet Emergency set: if severe allergic reaction take immediately 100mg Prednisone (2x50mg) and 2 Tabl Cetirizine 10mg and then write precise 12h retrospective diary.If less severe reaction take only the 2 Tabl Cetirizine 2 tablet 1     triamcinolone (KENALOG) 0.1 % external cream APPLY CREAM EXTERNALLY TWICE DAILY APPLY THIN FILM SPARINGLY TO ITCHY SPOTS. USE UP TO 2 WEEKS AT A TIME       triamcinolone (KENALOG) 0.1 % external cream  (Patient not taking: Reported on 8/25/2022)       triamcinolone (KENALOG) 0.1 % external ointment Apply 1 Dose  topically 2 times daily (Patient not taking: Reported on 7/20/2022)       vitamin B-12 (CYANOCOBALAMIN) 500 MCG tablet Take 1 tablet by mouth daily         Allergies   Allergen Reactions     Cats Shortness Of Breath     Dogs Fatigue, Palpitations, Shortness Of Breath and Tinnitus     Gentamicin Dermatitis     In patch tests severe reactions against Neomycine, Framycetine, Gentamicin, Bacitracine and Polymyxin.   For reasons of crossreactions I would as well avoid other aminoglycosides such as Vancomycine     Petrolatum Rash     Gluten Meal      Other reaction(s): Joint Pain     Itch-X Rash     Triamcinolone Rash     Fat atrophy after IM injection      Tramadol Other (See Comments)     Other reaction(s): Other - Describe In Comment Field  High doses caused yellow eyes, tremors in her mouth, couldn't feel some body parts.    No issues with low doses  High doses caused yellow eyes, tremors in her mouth, couldn't feel some body parts.    No issues with low doses     Food GI Disturbance     Night shades, stevia, pork     Gel Base Other (See Comments)     Allergy to Itch X. Gets blister/pustular rash in area the itch X is placed.     Hydrocodone-Acetaminophen Rash     Order for PATCH TESTS    [x] Outpatient  [] Inpatient: Head..../ Bed ....      Skin Atopy (atopic dermatitis) [x] Yes   [] No  Rhinitis/Sinusitis:   [x] Yes   [] No  Allergic Asthma:   [] Yes   [x] No  Food Allergy:   [x] Yes   [] No  Leg ulcers:   [] Yes   [x] No  Hand eczema:   [] Yes   [x] No   Leading hand:   [x] R   [] L       [] Ambidextrous                      Reason for tests (suspected allergy): Assessment for potential reaction to disinfections before IT (less likely preservatives in IT, because the reaction not directly over injection site)  Known previous allergies: Trees/grass, mites, dogs/cats  Standardized panels (repeat patch tests in red)  [x] Standard panel (40 tests)  [x] Preservatives & Antimicrobials (31 tests)  [x] Emulsifiers &  Additives (25 tests) particularly propylene glycol  [x] Perfumes/Flavours & Plants (25 tests)  [] Hairdresser panel (12 tests)  [x] Rubber Chemicals (22 tests)  [x] Plastics (26 tests)  [] Colorants/Dyes/Food additives (20 tests)  [] Metals (implants/dental) (24 tests)  [] Local anaesthetics/NSAIDs (13 tests)  [x] Antibiotics & Antimycotics (14 tests)   [] Corticosteroids (15 tests)   [] Photopatch test (62 tests)   [] others: ...      [x] Patient's own products: tough strips band aids and vaseline and Triamcinolone cream and ointment    DO NOT test if chemical or biological identity is unknown!     always ask from patient the product information and safety sheets (MSDS)   [] Atopy screen prick test (Atopic predisposition): ...      RESULTS & EVALUATION of PATCH TESTS    Patch test readings after     [x] 2 days, [] 3 days [x] 4 days, [] 5 days,    Applied patch tests with results (import here the list of patch tests):  Order documented by: IVAN HANCOCK LPN  Order reviewed by: Preeti Copeland LPN   Physician:   Date/time of application:1-  Localization of application: back >> Clear    STANDARD Series         No Substance 2 days 4 days remarks   1 Francesco Mix [C] - -    2 Colophony - -    3  2-Mercaptobenzothiazole  NA NA     4 Methylisothiazolinone - -    5 Carba Mix - -    6 Thiuram Mix [A] NA NA    7 Bisphenol A Epoxy Resin - -    8 Y-Sydd-Ouraqrsvamc-Formaldehyde Resin - -    9 Mercapto Mix [A] - -    10 Black Rubber Mix- PPD [B] NA NA    11 Potassium Dichromate  -  -    12 Balsam of [place] (Myroxylon Pereirae Resin) - -    13 Nickel Sulphate Hexahydrate - -    14 Mixed Dialkyl Thiourea - -    15 Paraben Mix [B] - -    16 Methyldibromo Glutaronitrile NA NA    17 Fragrance Mix - -    18 2-Bromo-2-Nitropropane-1,3-Diol (Bronopol) - -    19 Lyral - -    20 Tixocortol-21- Pivalate NA NA    21 Diazolidiyl Urea (Germall II) - -    22 Methyl Methacrylate NA NA    23 Cobalt (II) Chloride Hexahydrate - -     24 Fragrance Mix II  - -    25 Compositae Mix - -    26 Benzoyl Peroxide NA NA    27 Bacitracin - ++/+++    28 Formaldehyde - -    29 Methylchloroisothiazolinone / Methylisothiazolinone - -    30 Corticosteroid Mix - -    31 Sodium Lauryl Sulfate - -    32 Lanolin Alcohol - -    33 Turpentine - -    34 Cetylstearylalcohol - -    35 Chlorhexidine Dicluconate - -    36 Budenoside - -    37 Imidazolidinyl Urea  - -    38 Ethyl-2 Cyanoacrylate NA NA    39 Quaternium 15 (Dowicil 200) - -    40 Decyl Glucoside - -      EMULSIFIERS & ADDITIVES        No Substance 2 days 4 days remarks   41 Polyethylene Glycol-400 NA NA    42 Cocamidopropyl Betaine - -    43 Amerchol L101 NA NA    44 Propylene Glycol - -    45 Triethanolamine - -    46 Sorbitane Sesquiolate - -    47 Isopropylmyristate - -    48 Polysorbate 80  - -    49 Amidoamine   (Stearamidopropyl Dimethylamine) - -    50 Oleamidopropyl Dimethylamine - -    51 Lauryl Glucoside NA NA    52 Coconut Diethanolamide  - -    53 2-Hydroxy-4-Methoxy Benzophenone (Oxybenzone) - -    54 Benzophenone-4 (Sulisobenzon) - -    55 Propolis - -    56 Dexpanthenol - -    57 Carboxymethyl Cellulose Sodium - -    58 Abitol - -    59 Tert-Butylhydroquinone - -    60 Benzyl Salicylate - -     Antioxidant      61 Dodecyl Gallate - -    62 Butylhydroxyanisole (BHA) - -    63 Butylhydroxytoluene (BHT) - -    64 Di-Alpha-Tocopherol (Vit E) - -    65 Propyl Gallate - -      RUBBER CHEMICALS         No Substance 2 days 4 days remarks    Carbamate      66 Zinc Bis ( Diethyldithio carbamate ) (ZDEC) - -    67 Zinc Bis (Dibutyldithiocarbamate) - -    68 1,3-Diphenylguanidine (DPG) - -     Thiourea      69 Dibutylthiourea - -    70 Diphenyltiourea - -    71 Thiourea - -     Mercapto chemicals      72 Morpholinyl Mercaptobenzothiazole - -    73 D-Gbagdqeujo-4-Benzothiazyl-Sulfenamide - -    74 Dibenzothiazyl Disulfide - -     Thiuram chemicals      75 Dipentamethylenethiuram Disulfide - -    76  Tetraethylthiuram Disulfide (Disulfiram) - -    77 Tetramethylthiuram Disulfide - -    78 Tetramethyl Thiuram Monosulfide (TMTM) - -     4-Phenylenediamine derivatives      79 N-Isopropyl-N'-Phenyl-P-Phenylenediamine (IPPD) - -    80 Moqsgydn-T-Hurlyxiabiqycbzd (DPPD) - -     Various Rubber Additives      81 Hydroquinone Monobenzylether  - -    82 Hexamethylenetetramine - -    83 4,4'-Dihydroxybiphenyl - -    84 Cyclohexylthiophtalimide - -    85 Ethylenediamine Dihydrochloride - -    86 N-Phenyl-B-Naphthylamine - -    87 Dodecyl Mercaptan - -        PLASTICS         No Substance 2 days 4 days remarks    Acrylates - -    88 2-Hydroxyethyl Methacrylate (HEMA) - -    89 1,4-Butandioldimethacrylate (BUDMA) - -    90  2-Ethylhexyl Acrylate - -    91 Bisphenol-A-Dimethacrylate  - -    92 Diurethane-Dimethacrylate - -    93 Ethyleneglycoldimethacrylate (EGDMA) - -    94 Pentaerythritoltriacrylate (CARLOS) - -    95 Triethylene Glycol Dimethacrylate (TEGDMA) - -     Synthetic material/additives       96 A-Ijfr-Sxbhjoyfsxn - -    97 Tricresyl Phosphate - -    98 2-Rvlx-Bhsqbkepctpat - -    99 Bis (2-Ethylhexyl) Phthalate - -    100 Dibutylphthalate - -    101 Dimethylphthalate - -    102 Toluene-2,4-Diisocyanate - -    103 Diphenylmethane-4,4''-Diisocyanate - -     EPOXY RESIN SYSTEMS       Reactive Solvents - -    104 Cresyl Glycidyl Ether - -    105 Butyl Glycidyl Ether - -    106 Phenyl Glycidyl Ether - -    107 1,4-Butanediol Diglycidyl Ether - -    108 1,6-Hexanediole Diglycidyl Ether - -     Hardener / Accelerator - -    109 Ethylenediamine Dihydrochloride - -    110 Triethylenetetramine - -    111 Diethylenetriamine - -    112 Isophorone Diamine (IPD) - -    113 N,N-Dimethyl-P-Toluidine - -        PRESERVATIVES & ANTIMICROBIALS         No Substance 2 days 4 days remarks   114  1,2-Benzisothiazoline-3-One, Sodium Salt - -    115  1,3,5-Anival (2-Hydroxyethyl) - Hexahydrotriazine (Boo BK) NA NA    116  2-Atidchmrwuzvm-7-Nitro-1, 3-Propanediol - -    117  3, 4, 4' - Triclocarban - -    118 4 - Chloro - 3 - Cresol - -    119 4 - Chloro - 4 - Xylenol (PCMX) NA NA    120 7-Ethylbicyclooxazolidine (Bioban [id number]) - -    121 Benzalkonium Chloride - -    122 Benzyl Alcohol - -    123 Cetalkonium Chloride NA NA    124 Cetylpyrimidine Chloride  - -    125 Chloroacetamide - -    126 DMDM Hydantoin NA NA    127 Glutaraldehyde NA NA    128 Triclosan - -    129 Glyoxal Trimeric Dihydrate - -    130 Iodopropynyl Butylcarbamate - -    131 Octylisothiazoline NA NA    132 Iodoform - -    133 (Nitrobutyl) Morpholine/(Ethylnitro-Trimethylene) Dimorpholine (Bioban P 1487) - -    134 Phenoxyethanol NA NA    135 Phenyl Salicylate - -    136 Povidone Iodine - -    137 Sodium Benzoate - -    138 Sodium Disulfite NA NA    139 Sorbic Acid - -    140 Thimerosal - -     Parabens      141 Butyl-P-Hydroxybenzoate - -    142 Ethyl-P-Hydroxybenzoate - -    143 Methyl-P-Hydroxybenzoate NA NA    144 Propyl-P-Hydroxybenzoate - -         ANTIBIOTICS & ANTIMYCOTICS         No Substance 2 days 4 days remarks   145 Erythromycine - -    146 Framycetine Sulphate (+) +++    147 Fusidic Acid Sodium Salt - -    148 Gentamicin Sulphate - +    149 Neomycine Sulphate (+) ++/+++    150 Oxytetracycline  - -    151 Polymyxin B Sulphate (+) ++    152 Tetracycline-HCL - -    153 Sulfanilamide - -    154 Metronidazole - -    155 Oxyquinoline Mix - -    156 Nitrofurazone - -    157 Nystatin - -    158 Clotrimazole - -      PATIENTS OWN PRODUCTS         No Substance Conc  % solv 2 days 4 days remarks   159 Aspergillus   - -    160 Penicillium   - -    161 D. Farinae   - -    162 D. pteronyssinus   - -    163 Dog   - -    164 Cat   - -        Nov 28, 2022 repeat application of patch tests:  Patch test readings after     [x] 2 days, [] 3 days [x] 4 days, [] 5 days,  Other duration: ...    STANDARD Series                                          # Substance 2 days 4 days  remarks     1 Francesco Mix [C] - -       2 Colophony - -       3  2-Mercaptobenzothiazole  - -       4 Methylisothiazolinone - -       5 Carba Mix - -       6 Thiuram Mix [A] - -       7 Bisphenol A Epoxy Resin - -       8 B-Avar-Sjdubqrmrgh-Formaldehyde Resin - -       9 Mercapto Mix [A] - -       10 Black Rubber Mix- PPD [B] - -       11 Potassium Dichromate  -  -       12 Balsam of [place] (Myroxylon Pereirae Resin) - -       13 Nickel Sulphate Hexahydrate - -       14 Mixed Dialkyl Thiourea - -       15 Paraben Mix [B] - -       16 Methyldibromo Glutaronitrile - -       17 Fragrance Mix - -       18 2-Bromo-2-Nitropropane-1,3-Diol (Bronopol) CT - -       19 Lyral - -       20 Tixocortol-21- Pivalate CT - -       21 Diazolidiyl Urea (Germall II) - -       22 Methyl Methacrylate - -       23 Cobalt (II) Chloride Hexahydrate - -       24 Fragrance Mix II  - -       25 Compositae Mix - -       26 Benzoyl Peroxide - -       27 Bacitracin - -       28 Formaldehyde - -       29 Methylchloroisothiazolinone / Methylisothiazolinone - -       30 Corticosteroid Mix CT (+) -       31 Sodium Lauryl Sulfate - -       32 Lanolin Alcohol - -       33 Turpentine - -       34 Cetylstearylalcohol - -       35 Chlorhexidine Dicluconate - -       36 Budenoside - -       37 Imidazolidinyl Urea  - -       38 Ethyl-2 Cyanoacrylate - -       39 Quaternium 15 (Dowicil 200) - -       40 Decyl Glucoside - -       PRESERVATIVES & ANTIMICROBIALS        # Substance 2 days 4 days remarks   41 1  1,2-Benzisothiazoline-3-One, Sodium Salt - -     2  1,3,5-Anival (2-Hydroxyethyl) - Hexahydrotriazine (Grotan BK) - -     3 9-Mornvrdfgljok-6-Nitro-1, 3-Propanediol NA NA     4  3, 4, 4' - Triclocarban - -    45 5 4 - Chloro - 3 - Cresol - -     6 4 - Chloro - 4 - Xylenol (PCMX) - -     7 7-Ethylbicyclooxazolidine (Bioban [id number]) - -     8 Benzalkonium Chloride CT - -     9 Benzyl Alcohol - -    50 10 Cetalkonium Chloride - -     11 Cetylpyrimidine Chloride  -  -     12 Chloroacetamide - -     13 DMDM Hydantoin - -     14 Glutaraldehyde - -    55 15 Triclosan - -     16 Glyoxal Trimeric Dihydrate - -     17 Iodopropynyl Butylcarbamate - -     18 Octylisothiazoline - -     19 Bithionol CT - -    60 20 Bioban P 1487 (Nitrobutyl) Morpholine/(Ethylnitro-Trimethylene) Dimorpholine - -     21 Phenoxyethanol - -     22 Phenyl Salicylate - -     23 Povidone Iodine - -     24 Sodium Benzoate - -    65 25 Sodium Disulfite - -     26 Sorbic Acid - -     27 Thimerosal - -     28 Melamine Formaldehyde Resin - -     29 Ethylenediamine Dihydrochloride - -      Parabens      70 30 Butyl-P-Hydroxybenzoate - -     31 Ethyl-P-Hydroxybenzoate NA NA     32 Methyl-P-Hydroxybenzoate - -    73 33 Propyl-P-Hydroxybenzoate - -      EMULSIFIERS & ADDITIVES       # Substance 2 days 4 days remarks   74 1 Polyethylene Glycol-400 - -    75 2 Cocamidopropyl Betaine - -     3 Amerchol L101 - -     4 Propylene Glycol - -     5 Triethanolamine - -     6 Sorbitane Sesquiolate CT - -    80 7 Isopropylmyristate - -     8 Polysorbate 80 CT - -     9 Amidoamine   (Stearamidopropyl Dimethylamine) NA NA     10 Oleamidopropyl Dimethylamine - -     11 Lauryl Glucoside - -    85 12 Coconut Diethanolamide  - -     13 2-Hydroxy-4-Methoxy Benzophenone (Oxybenzone) - -     14 Benzophenone-4 (Sulisobenzon) NA NA     15 Propolis - -     16 Dexpanthenol - -    90 17 Abitol - -     18 Tert-Butylhydroquinone - -     19 Benzyl Salicylate - -     20 Dimethylaminopropylamin (DMPA) CT? D053       21 Zinc Pyrithione (Zinc Omadine) CT? Z006      95 22 Anival(Hydroxymethyl) Nitromethane CT        Antioxidant - -     23 Dodecyl Gallate - -     24 Butylhydroxyanisole (BHA) - -     25 Butylhydroxytoluene (BHT) NA NA     26 Di-Alpha-Tocopherol (Vit E) - -    100 27 Propyl Gallate - -      PERFUMES, FLAVORS & PLANTS        # Substance 2 days 4 days remarks   101 1 Benzyl Cinnamate (+) -     2 Di-Limonene (Dipentene) - -     3 Cananga  Odorata (Ulises Montgomery) (I) (+) -     4 Lichen Acid Mix - -    105 5 Mentha Piperita Oil (Peppermint Oil) + -     6 Sesquiterpenelactone mix - -     7 Tea Tree Oil, Oxidized - -     8 Wood Tar Mix - -     9 Abietic Acid - -    110 10 Lavendula Angustifolia Oil (Lavender Oil) - -     11 Fragrance mix II CT * - -      Fragrance Mix I       12 [place] Absolute - -     13 Eugenol - -     14 Geraniol NA NA    115 15 Hydroxycitronellal - -     16 Isoeugenol - -     17 Cinnamic Aldehyde - -     18 Cinnamic Alcohol  NA NA      Fragrance mix II       19 Citronellol - -    120 20 Alpha-Hexylcinnamic Aldehyde    - -     21 Citral - -     22 Farnesol - -    123 23 Coumarin - -      Hexylcinnamic aldehyde, Coumarin, Farnesol, Hydroxyisohexy3-cyclohexene carboxaldehyde, citral, citrolellol  PLASTICS        # Substance 2 days 4 days remarks     Acrylates - -    124 1 2-Hydroxyethyl Methacrylate (HEMA) - -    125 2 1,4-Butandioldimethacrylate (BUDMA) - -     3  2-Ethylhexyl Acrylate - -     4 Bisphenol-A-Dimethacrylate  - -     5 Diurethane-Dimethacrylate - -     6 Ethyleneglycoldimethacrylate (EGDMA) - -    130 7 Pentaerythritoltriacrylate (CARLOS) - -     8 Triethylene Glycol Dimethacrylate (TEGDMA) - -      Synthetic material/additives        9 T-Mjxa-Pulpmnohltk - -     10 Tricresyl Phosphate - -     11 3-Msfv-Vqxturicdbodr - -    135 12 Bis (2-Ethylhexyl) Phthalate - -     13 Dibutylphthalate - -     14 Dimethylphthalate - -     15 Toluene-2,4-Diisocyanate - -     16 Diphenylmethane-4,4''-Diisocyanate - -      EPOXY RESIN SYSTEMS        Reactive Solvents - -    140 17 Cresyl Glycidyl Ether - -     18 Butyl Glycidyl Ether - -     19 Phenyl Glycidyl Ether - -     20 1,4-Butanediol Diglycidyl Ether - -     21 1,6-Hexanediole Diglycidyl Ether - -      Hardener / Accelerator - -    145 22 Triethylenetetramine - -     23 Diethylenetriamine - -     24 Isophorone Diamine (IPD) - -    148 25 N,N-Dimethyl-P-Toluidine - -      OTHER  "PRODUCTS  tough strips band aids and vaseline and Triamcinolone cream and ointment        # Substance Conc  % solv 2 days 4 days remarks   149 1 Triamcinolone cream 0.1% as is  - -    150 2 Curad petroleum jelly as is  - -    151 3 Cortizone 10 cream with aloe as is  - -    152 4 Triamcinolone oint 0.1% as is  - -      Patients own products:  è DO NOT test if chemical or biological identity is unknown!   - always ask from patient the product information and safety sheets and consult with the physician   - check neutral pH with pH indicator paper (do not test pH below 5 or over 8 or consult with physician)  - leave-on cosmetics can be tested \"as is\"  - rinse-off products have to be diluted with water, buffer, olive oil or paraffin (discuss with physician)  à remember:   - non-specific toxic/corrosive or biological reactions can occur  - tests with patients own products are not standardized and test conditions are not optimized for patch tests. Whenever possible test with standardized test series and correlate use of product with the result of the patch tests  - if not sure if compounds can be tested under occlusion in patch tests consider open application tests      Results of patch tests:                         Interpretation:  - Negative                    A    = Allergic      (+) Erythema    TI   = Toxic/irritant   + E + Infiltration    RaP = Relevance at Present     ++ E/I + Papulovesicle   Rpr  = Relevance Previously     +++ E/I/P + Blister     nR   = No Relevance           Atopy Screen (Placed 01/13/20 )  No Substance Readings (15 min) Evaluation   POS Histamine 1mg/ml ++    NEG NaCl 0.9% - Slight dermographism     No Substance Readings (15 min) Evaluation   1 Alternaria alternata (tenuis)  -    2 Cladosporium herbarum -    3 Aspergillus fumigatus -    4 Penicillium notatum -    5 Dermatophagoides pteronyssinus ++    6 Dermatophagoides farinae ++    Conclusion: immediate type reaction to house dust mites, but " no delayed reaction to moulds or HDM --> The DP and DF reactions remained into the next day.       DRUGS/SUBSTANCES 1/19/2021  No Substance Readings (15min) Evaluation   POS Histamine 1mg/ml ++    NEG NaCl 0.9% - dermographism      Prick Tests        Substance/ Allergen Concentration Result (15min) Remarks   Meloxicam 30mg/ml as is neg     In vaseline neg Not bigger than neg ctr     Intradermal Tests   immed immed delayed delayed     Substance Conc 1st dil 2nd dil   3 days  3 days remarks   Histamine Hydrochloride (ALK) 0.1 mg/ml        NaCl 0.9%        Meloxicam 1:200/ 1:20 neg neg neg  dermographism     Patch Tests   as is  as is 1:2 paraffin 1:2 paraffin     Substance Conc  3 days  days  3 days  days remarks   Meloxicam  - - - -        [x] Allergic reaction diagnosed against:     Antibiotics and antimycotics:   Framycetine Sulphate +++  Gentamicin Sulphate +  Neomycine Sulphate ++/+++  Polymyxin B Sulphate ++  Bacitracin ++/+++     Following additional allergens in repeat patch tests from November 2022 found:   >> some initial irritations on day 2 (11/30/2022) to  - corticosteroid mix  - some irritation over peppermint oil, benzyl cinnamate and Ylang-Ylang oil      Interpretation/ remarks:   Patient has a severe delayed type contact sensitization to various antibiotics that are in many OTC antiseptics and antibiotics. No signs for allergies to rubber chemicals or adhesives. Therefore, we think that the reaction to the band-aids were maybe to topical antibiotics that were added onto the band-aid.     [] Patient information given   [] ACDS CAMP information's (# ....) to following compounds: .....   [] General information's to following compounds: ......    ___________________________    Assessment & Plan:    ==> Final Diagnosis:     #  Localized Eczematous and delayed-type reaction after 2x initial doses of immunotherapies  = reaction to itch-X cream  * chronic illness with exacerbation, progression, side effects  from treatment    No signs for allergies to preservative in these allergy extracts     No signs in prick test or patch test for delayed type reaction to some of these allergens (dust mites, molds)    Severe contact sensitizations to topical antibiotics  ==> in open application test clearly positive to the itch-x cream that patient used to reduce the effects of the IT    ==> Comment: This was NOT a reaction to the immunotherapy, but rather to the topical product used afterwards to treat the topical side effects of the IT. Not sure to what ingredient in itch-x was really reacting, but not to Parabens and Propylene Glycol.    #  New flare up of allergic contact dermatitis on ankle  DDx Vaseline or adhesives (Band aids?)  DDx reacted in the face to Tile cement board  ==> plan repeat of some patch tests (see in red above)  * chronic illness with exacerbation, progression, side effects from treatment    #  Recurrent urticarial rash with pruritus (after using Doxycycline)  * chronic illness with exacerbation, progression, side effects from treatment  Treatment: try Claritine 10mg morning and Noon and Hydroxyzine 25mg at bedtime    # Unlikely angioedema reaction to Meloxicam  * stable chronic illness    In skin tests no signs for specific immediate type or delayed reaction to Meloxicam. Patient desires to restart Meloxicam for back pain.     No evidence for COX1 intolerance (Ibuprofen is well-tolerated)    Suspect that her prior reaction was unrelated to the Meloxicam    Can restart meloxicam 1/4 tablet, then 1-2 days later, 1/2 tablet, then 1-2 days later she can try a full tablet.     Has emergency set and given handout emergency treatment (has Epipen)    It is imperative that the patient avoids all antibiotics, topically and systemically, that we have tested and that she was positive. Because gentamycin and neomycin were included I would avoid, for safety reasons, cross reacting aminoglycosides including vancomycin.      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 However, it could be that at that time some topical antibiotic has been used that she reacted to and/or lipatrophy after Kenalog injections.     ==> Comment: It is difficult to directly correlate the atrophy on the upper arm to the contact sensitization even though the outline of the reaction looks like the outline of a band-aid. It might be that this atrophy has nothing to do with an allergic reaction. It might be important to really verify if it is a muscle or lipatrophy. Clinically it looks more like lipatrophy which can sometimes be idiopathic. Moreover, a local injection of corticosteroids in this area might as well explain the atrophy (and there was such an injection months ago; not sure if exactly same location?)      These conclusions are made at the best of one's knowledge and belief based on the provided evidence such as patient's history and allergy test results and they can change over time or can be incomplete because of missing information's.      Final conclusions:    With the tendency of the patient to develop delayed-type reactions, I would in the moment not continue with the immunotherapy or we reduce it to only relevant allergens like house dust mite, but in that case we would maybe perform first prick, intradermal, and patch test with house dust mite immunotherapy to be sure that there is no delayed reaction to these extracts. We recommend just one single major allergen for immunotherapy, such as 50% DF/DP standardized from [facility].We could not see any delayed reaction to house dust mite test and IT solutions.      In patch tests severe reactions against Neomycine, Framycetine, Gentamicin, Bacitracine and Polymyxin. For reasons of crossreactions I would as well avoid other aminoglycosides such as Vancomycin. This was placed into the patient's allergy chart.       Unlikely that she had an  angioedema reaction to Meloxicam given negative prick, intradermal, and patch testing. Also do not suspect COX1 intolerance given that she tolerates ibuprofen. Discussed with patient that she can try to restart the Meloxicam as instructed started with 1/4 tablet as long as she has emergency set on hand.     ==> Treatment prescribed/Plan:    In the moment, patient to stop with immunotherapy or we will reduce it to only relevant allergens. We recommend just one single major allergen for immunotherapy, such as 50/50 mixture of DF/DP standardized from [facility].     Prescribed triamcinolone 0.1% cream. Apply topically 2 times daily for 5 days.     Go for antibiotic free foods if possible.    Can try restarting Meloxicam as above as long as she has the emergency set on hand and is accompanied by another person   Maybe, in future, if we could plan additional allergy tests for delayed-type reactions with immunotherapy extracts and/or maybe tetanus toxoid vaccine. These tests should include as well possible cross-reacting antibiotics such aminoglycosides and particularly vancomycin.  Crossreactions between Framycetine/Neomycine and these other antibiotics possible.   Patient counseling:  Discussed with patient it is unlikely that she had an angioedema reaction to Meloxicam given negative prick, intradermal, and patch testing. Also do not suspect COX1 intolerance given that she tolerated ibuprofen. Discussed with patient that she can try to restart the Meloxicam as instructed started with 1/4 tablet as long as she has emergency set on hand.     These conclusions are made at the best of one's knowledge and belief based on the provided evidence such as patient's history and allergy test results and they can change over time or can be incomplete because of missing information's.    CC Ulysses Mtz MD  [place] [address] [address] [place] [id number] on close of this  encounter..    ___________________________    {[id number]}: provider    Follow-up in [facility] clinic for 2nd readings and final conclusions after 4 days (virtual) = from [place]   ___________________________    I spent a total of *** minutes with Gisela Richards during today s  visit. This time was spent discussing all the individual test results, correlating them to the clinical relevance, counseling the patient and/or coordinating care and performing allergy tests or procedures.           Again, thank you for allowing me to participate in the care of your patient.        Sincerely,        Reginaldo Horne MD

## 2022-12-01 NOTE — PROGRESS NOTES
[facility] Dermato-allergology Note  Virtual visit: store and forward video ([employer]), start time: 10:05am, end time: 10:40am, date of images: during video and in Epic media  Encounter Date: Dec 2, 2022  ____________________________________________    CC: No chief complaint on file.      HPI:  (Dec 2, 2022)  Ms. Gisela Richards is a(n) 42 year old female who presents today as a return patient for allergy consultation  - Follow-up in Derm-Allergy clinic for 2nd readings and final conclusions after 4 days (virtual) = from [place]   - otherwise feeling well in usual state of health    Physical exam:  General: In no acute distress, well-developed, well-nourished  Eyes: no conjunctivitis  ENT: no signs of rhinitis   Pulmonary: no wheezing or coughing  Skin:Focused examination of the skin on test sites was performed = see test results below    Earlier History and Allergy exams:  (Nov 30, 2022)  - Follow-up in Derm-Allergy clinic for 1st readings of patch tests after 2 days (virtual) = from [place]     Earlier History and Allergy exams:  (Nov 28, 2022)  - Follow-up in Derm-Allergy clinic for additional patch tests (see series in red) and earlier if hives not under control    Earlier History and Allergy exams:  (Jul 27, 2022)  - Follow-up: in Derm-Allergy clinic as needed  - on July 5th did a photoshoot in the field and then observed on the ankle a small spot, that became bigger (brown, not really infiltrated) and the morning of the 7th July a Dermatologist looked at it and in histology it was a necrosis unknown cause.  - 1 week later itching on that area with vesicular reaction = eczematous reaction  - on the 15th did bacterial culture (no bacterial growth)  - as soon as the patient stopped the vaseline with same adhesive band aid the lesions improved  - patient observe anyway recently reactions to various vaseline type ointment (incl Triamcinolone acetonide) = with cream no  problem  --> took at the end orally prednisone and Doxycycline = stopped the Prednisone Monday the 18th of July and the Doxycycline on the 18th. ==> itching and urticarial rash started on the 22nd of July after being in the sun  --> NP prescribed Claritine and Prilosec and Benadryl   Skin:in the moment no signs for skin lesions, but patient had the eczematous lesions on the ankle   Some erythema over the decolletage    Earlier History and Allergy exams:  01/22/2021  Says she hasn't noticed any reaction to the patch testing or where we injected. Would like to try the meloxicam for her back pain. .    Earlier History and Allergy exams:   Ibuprofen does not induce any reaction.    > Patient had a reaction to Meloxicam in November. Took one Tabl for the first time and about 3-6h later with swelling and redness around the lips with dryness and then a sun burn type of rash over upper chest, stomach and back. No itching. No sun exposure involved. Redness disappeared after 1-2 days. Never tongue swelling or breathing problems. Out of chin skin wheeping. Took almost one week to fully recover.  ==> delayed type reaction    Earlier History and Allergy exams:    Patient is continuing now the IT without any problems, since they are not using ItchEx anymore. Just using ice.  IT injections is done by an primary care PA in [facility]    > Patient had a reaction to Meloxicam about 3 h later with angioedema on face and then sun burn type of rash over face --> happened in November  > Ibuprofen does not induce any reaction (last taken last Saturday)    With tramadol tremors and swellings of face and with Percacet (Oxycodon and Acetaminophen) migraines  Vicodin induces erythema  Today she reports that all skin manifestation has resolved. Patient has come from [place], [place]. Patient's spouse is present during evaluation. She reports her first allergy shots took place on November 9th, 2 injections on posterior aspect  "of right arm and 1 to the posterior aspect of left arm.   - Per chart review immunotherapy: trees/grass/weeds, mites/molds and animals  Within the day of the injection patient noted a diffuse pruritic rash on the posterior aspect of her right arm. She denies using any topicals or oral medications beside Zyrtec to aid with on going rash. She additionally iced the area and skin manifestation had resolved.     The following week she completed her 2nd round of immunotherapy at the same dosage. Again the rash occurred on the posterior aspect of her right arm along with outstanding edema of her right arm and shoulder. This rash was also pruritic, but again denied any pain. Her provider was concerned about reaction to the preservative. This rash took 1.5 week to self resolve. The edema persisted for 2-3 weeks and when resolved, a notable dent was appreciated on the right distal aspect of the deltoid. Imaging completed showed atrophy of the deltoid. She denied any recent injections to that area including routine vaccines or steroid injections. Also now having to areas of hypopigmentation overlying the deltoid.  Reports dry skin after resolution of rashes. Denies history of asthma. Extensive history of food sensitives and autoimmune workup that has been unremarkable.     Hx since 12/27/19:  The patient comes in today for patch testing day 1. She reports that there is a bruise on the R shoulder that has worsened in the interim. She is unsure if it may be necrosis or sudden muscle atrophy. She reports it appeared on Nov 16, the day after allergy shot. Also states that in the same area she developed a new \"bump\" that appeared about 2 days ago.   Reports that the IT on the R upper extremity was against trees, weeds, molds, mites, and dog. On the L upper extremity, she had IT injections for cats and dogs.   She is otherwise feeling well overall today. No other skin or allergy concerns at this time.     Medications:  - takes daily " "Zyrtec and is on IT  - Levothyroxin      Past Medical History:   Patient Active Problem List   Diagnosis     Allergic rhinitis due to dogs     Hypothyroid     Lumbar facet joint pain     Past Medical History:   Diagnosis Date     Constipation     Since young.     Other specified postprocedural states     2014,Slight recurrence within scar - removed in 9th grade and came back right away     Personal history of other medical treatment (CODE)      2, para 2.     Past Surgical History:   Procedure Laterality Date     COLONOSCOPY      ,\"swelling of the lining\" and flattening of villa.     OTHER SURGICAL HISTORY      ~,[id number],OTHER,Left,recurring within scar     OTHER SURGICAL HISTORY      2016,[id number],KNEE ARTHROSCOPY W/ MENISCAL REPAIR     OTHER SURGICAL HISTORY      2004,[id number],DILATION AND CURETTAGE,retained placenta       Social History:  Patient reports that she has never smoked. She has never used smokeless tobacco. She reports current alcohol use. She reports that she does not use drugs.    Family History:  Family History   Problem Relation Age of Onset     Family History Negative Father         Good Health,does not see doctor regularly     Thyroid Disease Mother         Thyroid Disease,Hypothyroid     Other - See Comments Mother         Smoker, prediabetes     Thyroid Disease Sister         Thyroid Disease,Hypothyroid     Arthritis Sister         Arthritis     Scoliosis Maternal Half-Sister         Scoliosis     Other - See Comments Maternal Half-Sister         No Known Problems       Medications:  Current Outpatient Medications   Medication Sig Dispense Refill     amphetamine-dextroamphetamine (ADDERALL XR) 25 MG 24 hr capsule Take 25 mg by mouth daily       amphetamine-dextroamphetamine (ADDERALL) 5 MG tablet TAKE 1 TABLET BY MOUTH ONCE DAILY IN THE LATE AFTERNOON       cetirizine (ZYRTEC) 10 MG tablet Take 10 mg by mouth       cholecalciferol (VITAMIN D3) 25 mcg (1000 units) capsule " Take 1 capsule by mouth       doxycycline hyclate (VIBRAMYCIN) 100 MG capsule Take 1 capsule by mouth 2 times daily (Patient not taking: Reported on 7/20/2022)       famotidine (PEPCID) 40 MG tablet Take 40 mg by mouth At Bedtime (Patient not taking: Reported on 8/25/2022)       ferrous sulfate (FEROSUL) 325 (65 Fe) MG tablet Take 1 tablet by mouth daily       fluorometholone (FML FORTE) 0.25 % ophthalmic suspension  (Patient not taking: Reported on 8/25/2022)       folic acid (FOLVITE) 1 MG tablet Take 1 mg by mouth daily       hydrOXYzine (ATARAX) 25 MG tablet Take 1 tablet (25 mg) by mouth At Bedtime (Patient not taking: Reported on 8/25/2022) 30 tablet 1     levothyroxine (SYNTHROID/LEVOTHROID) 75 MCG tablet Take 75 mcg by mouth daily       levothyroxine (SYNTHROID/LEVOTHROID) 75 MCG tablet Take 1 tablet (75 mcg) by mouth every morning (before breakfast) 90 tablet 4     loratadine (CLARITIN) 10 MG tablet Take 10 mg by mouth (Patient not taking: Reported on 8/25/2022)       loratadine (CLARITIN) 10 MG tablet Morning and evening 1 Tabl of 10mg for about 1 week and if no itching anymore, then reduce to 1 Tabl in the morning. 30 tablet 3     mometasone (NASONEX) 50 MCG/ACT spray 1 spray 2 times daily (Patient not taking: Reported on 7/20/2022)       predniSONE (DELTASONE) 50 MG tablet Emergency set: if severe allergic reaction take immediately 100mg Prednisone (2x50mg) and 2 Tabl Cetirizine 10mg and then write precise 12h retrospective diary.If less severe reaction take only the 2 Tabl Cetirizine 2 tablet 1     triamcinolone (KENALOG) 0.1 % external cream APPLY CREAM EXTERNALLY TWICE DAILY APPLY THIN FILM SPARINGLY TO ITCHY SPOTS. USE UP TO 2 WEEKS AT A TIME       triamcinolone (KENALOG) 0.1 % external cream  (Patient not taking: Reported on 8/25/2022)       triamcinolone (KENALOG) 0.1 % external ointment Apply 1 Dose topically 2 times daily (Patient not taking: Reported on 7/20/2022)       vitamin B-12  (CYANOCOBALAMIN) 500 MCG tablet Take 1 tablet by mouth daily         Allergies   Allergen Reactions     Cats Shortness Of Breath     Dogs Fatigue, Palpitations, Shortness Of Breath and Tinnitus     Gentamicin Dermatitis     In patch tests severe reactions against Neomycine, Framycetine, Gentamicin, Bacitracine and Polymyxin.   For reasons of crossreactions I would as well avoid other aminoglycosides such as Vancomycine     Petrolatum Rash     Gluten Meal      Other reaction(s): Joint Pain     Itch-X Rash     Triamcinolone Rash     Fat atrophy after IM injection      Tramadol Other (See Comments)     Other reaction(s): Other - Describe In Comment Field  High doses caused yellow eyes, tremors in her mouth, couldn't feel some body parts.    No issues with low doses  High doses caused yellow eyes, tremors in her mouth, couldn't feel some body parts.    No issues with low doses     Food GI Disturbance     Night shades, stevia, pork     Gel Base Other (See Comments)     Allergy to Itch X. Gets blister/pustular rash in area the itch X is placed.     Hydrocodone-Acetaminophen Rash     Order for PATCH TESTS    [x] Outpatient  [] Inpatient: Head..../ Bed ....      Skin Atopy (atopic dermatitis) [x] Yes   [] No  Rhinitis/Sinusitis:   [x] Yes   [] No  Allergic Asthma:   [] Yes   [x] No  Food Allergy:   [x] Yes   [] No  Leg ulcers:   [] Yes   [x] No  Hand eczema:   [] Yes   [x] No   Leading hand:   [x] R   [] L       [] Ambidextrous                      Reason for tests (suspected allergy): Assessment for potential reaction to disinfections before IT (less likely preservatives in IT, because the reaction not directly over injection site)  Known previous allergies: Trees/grass, mites, dogs/cats  Standardized panels (repeat patch tests in red)  [x] Standard panel (40 tests)  [x] Preservatives & Antimicrobials (31 tests)  [x] Emulsifiers & Additives (25 tests) particularly propylene glycol  [x] Perfumes/Flavours & Plants (25  tests)  [] Hairdresser panel (12 tests)  [x] Rubber Chemicals (22 tests)  [x] Plastics (26 tests)  [] Colorants/Dyes/Food additives (20 tests)  [] Metals (implants/dental) (24 tests)  [] Local anaesthetics/NSAIDs (13 tests)  [x] Antibiotics & Antimycotics (14 tests)   [] Corticosteroids (15 tests)   [] Photopatch test (62 tests)   [] others: ...      [x] Patient's own products: tough strips band aids and vaseline and Triamcinolone cream and ointment    DO NOT test if chemical or biological identity is unknown!     always ask from patient the product information and safety sheets (MSDS)   [] Atopy screen prick test (Atopic predisposition): ...      RESULTS & EVALUATION of PATCH TESTS    Patch test readings after     [x] 2 days, [] 3 days [x] 4 days, [] 5 days,    Applied patch tests with results (import here the list of patch tests):  Order documented by: IVAN HANCOCK LPN  Order reviewed by: Preeti Copeland LPN   Physician:   Date/time of application:1-  Localization of application: back >> Clear    STANDARD Series         No Substance 2 days 4 days remarks   1 Francesco Mix [C] - -    2 Colophony - -    3  2-Mercaptobenzothiazole  NA NA     4 Methylisothiazolinone - -    5 Carba Mix - -    6 Thiuram Mix [A] NA NA    7 Bisphenol A Epoxy Resin - -    8 G-Riyk-Kbvieuttlux-Formaldehyde Resin - -    9 Mercapto Mix [A] - -    10 Black Rubber Mix- PPD [B] NA NA    11 Potassium Dichromate  -  -    12 Balsam of [place] (Myroxylon Pereirae Resin) - -    13 Nickel Sulphate Hexahydrate - -    14 Mixed Dialkyl Thiourea - -    15 Paraben Mix [B] - -    16 Methyldibromo Glutaronitrile NA NA    17 Fragrance Mix - -    18 2-Bromo-2-Nitropropane-1,3-Diol (Bronopol) - -    19 Lyral - -    20 Tixocortol-21- Pivalate NA NA    21 Diazolidiyl Urea (Germall II) - -    22 Methyl Methacrylate NA NA    23 Cobalt (II) Chloride Hexahydrate - -    24 Fragrance Mix II  - -    25 Compositae Mix - -    26 Benzoyl Peroxide NA NA    27  Bacitracin - ++/+++    28 Formaldehyde - -    29 Methylchloroisothiazolinone / Methylisothiazolinone - -    30 Corticosteroid Mix - -    31 Sodium Lauryl Sulfate - -    32 Lanolin Alcohol - -    33 Turpentine - -    34 Cetylstearylalcohol - -    35 Chlorhexidine Dicluconate - -    36 Budenoside - -    37 Imidazolidinyl Urea  - -    38 Ethyl-2 Cyanoacrylate NA NA    39 Quaternium 15 (Dowicil 200) - -    40 Decyl Glucoside - -      EMULSIFIERS & ADDITIVES        No Substance 2 days 4 days remarks   41 Polyethylene Glycol-400 NA NA    42 Cocamidopropyl Betaine - -    43 Amerchol L101 NA NA    44 Propylene Glycol - -    45 Triethanolamine - -    46 Sorbitane Sesquiolate - -    47 Isopropylmyristate - -    48 Polysorbate 80  - -    49 Amidoamine   (Stearamidopropyl Dimethylamine) - -    50 Oleamidopropyl Dimethylamine - -    51 Lauryl Glucoside NA NA    52 Coconut Diethanolamide  - -    53 2-Hydroxy-4-Methoxy Benzophenone (Oxybenzone) - -    54 Benzophenone-4 (Sulisobenzon) - -    55 Propolis - -    56 Dexpanthenol - -    57 Carboxymethyl Cellulose Sodium - -    58 Abitol - -    59 Tert-Butylhydroquinone - -    60 Benzyl Salicylate - -     Antioxidant      61 Dodecyl Gallate - -    62 Butylhydroxyanisole (BHA) - -    63 Butylhydroxytoluene (BHT) - -    64 Di-Alpha-Tocopherol (Vit E) - -    65 Propyl Gallate - -      RUBBER CHEMICALS         No Substance 2 days 4 days remarks    Carbamate      66 Zinc Bis ( Diethyldithio carbamate ) (ZDEC) - -    67 Zinc Bis (Dibutyldithiocarbamate) - -    68 1,3-Diphenylguanidine (DPG) - -     Thiourea      69 Dibutylthiourea - -    70 Diphenyltiourea - -    71 Thiourea - -     Mercapto chemicals      72 Morpholinyl Mercaptobenzothiazole - -    73 H-Gmpvppgsds-1-Benzothiazyl-Sulfenamide - -    74 Dibenzothiazyl Disulfide - -     Thiuram chemicals      75 Dipentamethylenethiuram Disulfide - -    76 Tetraethylthiuram Disulfide (Disulfiram) - -    77 Tetramethylthiuram Disulfide - -    78  Tetramethyl Thiuram Monosulfide (TMTM) - -     4-Phenylenediamine derivatives      79 N-Isopropyl-N'-Phenyl-P-Phenylenediamine (IPPD) - -    80 Rxzfswjl-J-Gnaykqwhdjzeskqu (DPPD) - -     Various Rubber Additives      81 Hydroquinone Monobenzylether  - -    82 Hexamethylenetetramine - -    83 4,4'-Dihydroxybiphenyl - -    84 Cyclohexylthiophtalimide - -    85 Ethylenediamine Dihydrochloride - -    86 N-Phenyl-B-Naphthylamine - -    87 Dodecyl Mercaptan - -        PLASTICS         No Substance 2 days 4 days remarks    Acrylates - -    88 2-Hydroxyethyl Methacrylate (HEMA) - -    89 1,4-Butandioldimethacrylate (BUDMA) - -    90  2-Ethylhexyl Acrylate - -    91 Bisphenol-A-Dimethacrylate  - -    92 Diurethane-Dimethacrylate - -    93 Ethyleneglycoldimethacrylate (EGDMA) - -    94 Pentaerythritoltriacrylate (CARLOS) - -    95 Triethylene Glycol Dimethacrylate (TEGDMA) - -     Synthetic material/additives       96 U-Zsbc-Dylsdtfmojh - -    97 Tricresyl Phosphate - -    98 3-Toaw-Yvotnmtgatmku - -    99 Bis (2-Ethylhexyl) Phthalate - -    100 Dibutylphthalate - -    101 Dimethylphthalate - -    102 Toluene-2,4-Diisocyanate - -    103 Diphenylmethane-4,4''-Diisocyanate - -     EPOXY RESIN SYSTEMS       Reactive Solvents - -    104 Cresyl Glycidyl Ether - -    105 Butyl Glycidyl Ether - -    106 Phenyl Glycidyl Ether - -    107 1,4-Butanediol Diglycidyl Ether - -    108 1,6-Hexanediole Diglycidyl Ether - -     Hardener / Accelerator - -    109 Ethylenediamine Dihydrochloride - -    110 Triethylenetetramine - -    111 Diethylenetriamine - -    112 Isophorone Diamine (IPD) - -    113 N,N-Dimethyl-P-Toluidine - -        PRESERVATIVES & ANTIMICROBIALS         No Substance 2 days 4 days remarks   114  1,2-Benzisothiazoline-3-One, Sodium Salt - -    115  1,3,5-Anival (2-Hydroxyethyl) - Hexahydrotriazine (Grotan BK) NA NA    116 1-Nomsnjucfxgwt-4-Nitro-1, 3-Propanediol - -    117  3, 4, 4' - Triclocarban - -    118 4 - Chloro - 3 -  Cresol - -    119 4 - Chloro - 4 - Xylenol (PCMX) NA NA    120 7-Ethylbicyclooxazolidine (Bioban [id number]) - -    121 Benzalkonium Chloride - -    122 Benzyl Alcohol - -    123 Cetalkonium Chloride NA NA    124 Cetylpyrimidine Chloride  - -    125 Chloroacetamide - -    126 DMDM Hydantoin NA NA    127 Glutaraldehyde NA NA    128 Triclosan - -    129 Glyoxal Trimeric Dihydrate - -    130 Iodopropynyl Butylcarbamate - -    131 Octylisothiazoline NA NA    132 Iodoform - -    133 (Nitrobutyl) Morpholine/(Ethylnitro-Trimethylene) Dimorpholine (Bioban P 1487) - -    134 Phenoxyethanol NA NA    135 Phenyl Salicylate - -    136 Povidone Iodine - -    137 Sodium Benzoate - -    138 Sodium Disulfite NA NA    139 Sorbic Acid - -    140 Thimerosal - -     Parabens      141 Butyl-P-Hydroxybenzoate - -    142 Ethyl-P-Hydroxybenzoate - -    143 Methyl-P-Hydroxybenzoate NA NA    144 Propyl-P-Hydroxybenzoate - -         ANTIBIOTICS & ANTIMYCOTICS         No Substance 2 days 4 days remarks   145 Erythromycine - -    146 Framycetine Sulphate (+) +++    147 Fusidic Acid Sodium Salt - -    148 Gentamicin Sulphate - +    149 Neomycine Sulphate (+) ++/+++    150 Oxytetracycline  - -    151 Polymyxin B Sulphate (+) ++    152 Tetracycline-HCL - -    153 Sulfanilamide - -    154 Metronidazole - -    155 Oxyquinoline Mix - -    156 Nitrofurazone - -    157 Nystatin - -    158 Clotrimazole - -      PATIENTS OWN PRODUCTS         No Substance Conc  % solv 2 days 4 days remarks   159 Aspergillus   - -    160 Penicillium   - -    161 D. Farinae   - -    162 D. pteronyssinus   - -    163 Dog   - -    164 Cat   - -        Nov 28, 2022 repeat application of patch tests:  Patch test readings after     [x] 2 days, [] 3 days [x] 4 days, [] 5 days,  Other duration: ...    STANDARD Series                                          # Substance 2 days 4 days remarks     1 Francesco Mix [C] - -       2 Colophony - -       3  2-Mercaptobenzothiazole  - -       4  Methylisothiazolinone - -       5 Carba Mix - -       6 Thiuram Mix [A] - -       7 Bisphenol A Epoxy Resin - -       8 C-Gppl-Hduolxfdecv-Formaldehyde Resin - -       9 Mercapto Mix [A] - -       10 Black Rubber Mix- PPD [B] - -       11 Potassium Dichromate  -  -       12 Balsam of [place] (Myroxylon Pereirae Resin) - -       13 Nickel Sulphate Hexahydrate - -       14 Mixed Dialkyl Thiourea - -       15 Paraben Mix [B] - -       16 Methyldibromo Glutaronitrile - -       17 Fragrance Mix - +       18 2-Bromo-2-Nitropropane-1,3-Diol (Bronopol) CT - -       19 Lyral - -       20 Tixocortol-21- Pivalate CT - -       21 Diazolidiyl Urea (Germall II) - -       22 Methyl Methacrylate - -       23 Cobalt (II) Chloride Hexahydrate - -       24 Fragrance Mix II  - -       25 Compositae Mix - (+)       26 Benzoyl Peroxide - -       27 Bacitracin - +++       28 Formaldehyde - -       29 Methylchloroisothiazolinone / Methylisothiazolinone - -       30 Corticosteroid Mix CT (+) -       31 Sodium Lauryl Sulfate - -       32 Lanolin Alcohol - -       33 Turpentine - -       34 Cetylstearylalcohol - -       35 Chlorhexidine Dicluconate - -       36 Budenoside - -       37 Imidazolidinyl Urea  - -       38 Ethyl-2 Cyanoacrylate - -       39 Quaternium 15 (Dowicil 200) - -       40 Decyl Glucoside - -       PRESERVATIVES & ANTIMICROBIALS        # Substance 2 days 4 days remarks   41 1  1,2-Benzisothiazoline-3-One, Sodium Salt - -     2  1,3,5-Anival (2-Hydroxyethyl) - Hexahydrotriazine (Grotan BK) - -     3 8-Qsofhzyhbaber-7-Nitro-1, 3-Propanediol NA NA     4  3, 4, 4' - Triclocarban - -    45 5 4 - Chloro - 3 - Cresol - -     6 4 - Chloro - 4 - Xylenol (PCMX) - -     7 7-Ethylbicyclooxazolidine (Bioban [id number]) - -     8 Benzalkonium Chloride CT - -     9 Benzyl Alcohol - -    50 10 Cetalkonium Chloride - -     11 Cetylpyrimidine Chloride  - -     12 Chloroacetamide - -     13 DMDM Hydantoin - -     14 Glutaraldehyde - -    55 15  Triclosan - -     16 Glyoxal Trimeric Dihydrate - -     17 Iodopropynyl Butylcarbamate - -     18 Octylisothiazoline - -     19 Bithionol CT - -    60 20 Bioban P 1487 (Nitrobutyl) Morpholine/(Ethylnitro-Trimethylene) Dimorpholine - -     21 Phenoxyethanol - -     22 Phenyl Salicylate - -     23 Povidone Iodine - -     24 Sodium Benzoate - -    65 25 Sodium Disulfite - -     26 Sorbic Acid - -     27 Thimerosal - -     28 Melamine Formaldehyde Resin - -     29 Ethylenediamine Dihydrochloride - -      Parabens      70 30 Butyl-P-Hydroxybenzoate - -     31 Ethyl-P-Hydroxybenzoate NA NA     32 Methyl-P-Hydroxybenzoate - -    73 33 Propyl-P-Hydroxybenzoate - -      EMULSIFIERS & ADDITIVES       # Substance 2 days 4 days remarks   74 1 Polyethylene Glycol-400 - -    75 2 Cocamidopropyl Betaine - -     3 Amerchol L101 - -     4 Propylene Glycol - -     5 Triethanolamine - -     6 Sorbitane Sesquiolate CT - -    80 7 Isopropylmyristate - -     8 Polysorbate 80 CT - -     9 Amidoamine   (Stearamidopropyl Dimethylamine) NA NA     10 Oleamidopropyl Dimethylamine - -     11 Lauryl Glucoside - -    85 12 Coconut Diethanolamide  - -     13 2-Hydroxy-4-Methoxy Benzophenone (Oxybenzone) - -     14 Benzophenone-4 (Sulisobenzon) NA NA     15 Propolis - +/++     16 Dexpanthenol - -    90 17 Abitol - -     18 Tert-Butylhydroquinone - -     19 Benzyl Salicylate - -     20 Dimethylaminopropylamin (DMPA) CT? D053       21 Zinc Pyrithione (Zinc Omadine) CT? Z006      95 22 Anival(Hydroxymethyl) Nitromethane CT        Antioxidant - -     23 Dodecyl Gallate - -     24 Butylhydroxyanisole (BHA) - -     25 Butylhydroxytoluene (BHT) NA NA     26 Di-Alpha-Tocopherol (Vit E) - -    100 27 Propyl Gallate - -      PERFUMES, FLAVORS & PLANTS        # Substance 2 days 4 days remarks   101 1 Benzyl Cinnamate (+) -     2 Di-Limonene (Dipentene) - -     3 Cananga Odorata (Ylang Ylang) (I) (+) -     4 Lichen Acid Mix - -    105 5 Mentha Piperita Oil  (Peppermint Oil) + -     6 Sesquiterpenelactone mix - -     7 Tea Tree Oil, Oxidized - -     8 Wood Tar Mix - +/++     9 Abietic Acid - -    110 10 Lavendula Angustifolia Oil (Lavender Oil) - -     11 Fragrance mix II CT * - -      Fragrance Mix I       12 Oakmoss Absolute - -     13 Eugenol - -     14 Geraniol NA NA    115 15 Hydroxycitronellal - -     16 Isoeugenol - -     17 Cinnamic Aldehyde - -     18 Cinnamic Alcohol  NA NA      Fragrance mix II       19 Citronellol - -    120 20 Alpha-Hexylcinnamic Aldehyde    - -     21 Citral - -     22 Farnesol - -    123 23 Coumarin - -      Hexylcinnamic aldehyde, Coumarin, Farnesol, Hydroxyisohexy3-cyclohexene carboxaldehyde, citral, citrolellol  PLASTICS        # Substance 2 days 4 days remarks     Acrylates - -    124 1 2-Hydroxyethyl Methacrylate (HEMA) - -    125 2 1,4-Butandioldimethacrylate (BUDMA) - -     3  2-Ethylhexyl Acrylate - -     4 Bisphenol-A-Dimethacrylate  - -     5 Diurethane-Dimethacrylate - -     6 Ethyleneglycoldimethacrylate (EGDMA) - -    130 7 Pentaerythritoltriacrylate (CARLOS) - -     8 Triethylene Glycol Dimethacrylate (TEGDMA) - -      Synthetic material/additives        9 J-Nmex-Ftbiushhvpz - -     10 Tricresyl Phosphate - -     11 8-Qpgo-Okgibkcolqwwz - -    135 12 Bis (2-Ethylhexyl) Phthalate - -     13 Dibutylphthalate - -     14 Dimethylphthalate - -     15 Toluene-2,4-Diisocyanate - -     16 Diphenylmethane-4,4''-Diisocyanate - -      EPOXY RESIN SYSTEMS        Reactive Solvents - -    140 17 Cresyl Glycidyl Ether - -     18 Butyl Glycidyl Ether - -     19 Phenyl Glycidyl Ether - -     20 1,4-Butanediol Diglycidyl Ether - -     21 1,6-Hexanediole Diglycidyl Ether - -      Hardener / Accelerator - -    145 22 Triethylenetetramine - -     23 Diethylenetriamine - -     24 Isophorone Diamine (IPD) - -    148 25 N,N-Dimethyl-P-Toluidine - -      OTHER PRODUCTS  tough strips band aids and vaseline and Triamcinolone cream and ointment        #  Substance Conc  % solv 2 days 4 days remarks   149 1 Triamcinolone cream 0.1% as is  - -    150 2 Curad petroleum jelly as is  - -    151 3 Cortizone 10 cream with aloe as is  - -    152 4 Triamcinolone oint 0.1% as is  - -        Results of patch tests:                         Interpretation:  - Negative                    A    = Allergic      (+) Erythema    TI   = Toxic/irritant   + E + Infiltration    RaP = Relevance at Present     ++ E/I + Papulovesicle   Rpr  = Relevance Previously     +++ E/I/P + Blister     nR   = No Relevance           Atopy Screen (Placed 01/13/20 )  No Substance Readings (15 min) Evaluation   POS Histamine 1mg/ml ++    NEG NaCl 0.9% - Slight dermographism     No Substance Readings (15 min) Evaluation   1 Alternaria alternata (tenuis)  -    2 Cladosporium herbarum -    3 Aspergillus fumigatus -    4 Penicillium notatum -    5 Dermatophagoides pteronyssinus ++    6 Dermatophagoides farinae ++    Conclusion: immediate type reaction to house dust mites, but no delayed reaction to moulds or HDM --> The DP and DF reactions remained into the next day.       DRUGS/SUBSTANCES 1/19/2021  No Substance Readings (15min) Evaluation   POS Histamine 1mg/ml ++    NEG NaCl 0.9% - dermographism      Prick Tests        Substance/ Allergen Concentration Result (15min) Remarks   Meloxicam 30mg/ml as is neg     In vaseline neg Not bigger than neg ctr     Intradermal Tests   immed immed delayed delayed     Substance Conc 1st dil 2nd dil   3 days  3 days remarks   Histamine Hydrochloride (ALK) 0.1 mg/ml        NaCl 0.9%        Meloxicam 1:200/ 1:20 neg neg neg  dermographism     Patch Tests   as is  as is 1:2 paraffin 1:2 paraffin     Substance Conc  3 days  days  3 days  days remarks   Meloxicam  - - - -        [x] Allergic reaction diagnosed against:     Antibiotics and antimycotics:   Framycetine Sulphate +++  Gentamicin Sulphate +  Neomycine Sulphate ++/+++  Polymyxin B Sulphate ++  Bacitracin ++/+++ and  repeated 11/30/2022 +++    Following additional allergens in repeat patch tests from November 2022 found:     Reactions to various fragrances/flavors such as   +/++ wood tar mix,  +/++ Propolis, + fragrance mix and + Compositae mix      Interpretation/ remarks:   Patient has a severe delayed type contact sensitization to various antibiotics that are in many OTC antiseptics and antibiotics. No signs for allergies to rubber chemicals or adhesives, but reactions to organic flavors and fragrances and this was probably the problem for the allergic contact dermatitis to the Vaseline gel with fragrances in there. Not sure if Propolis (honey), wood tar (flavor from pine sap in candies) and Compositae plays a role with the GI symptoms    [x] Patient information given   [x] ACDS CAMP information's (# [id number], and [id number]) to following  compounds:  Neomycine Sulphate, Polymyxin B Sulphate, Bacitracin, Propolis, fragrance  mix, Compositae mix, wood tar mix    [] General information's to following compounds: ......    ___________________________    Assessment & Plan:    ==> Final Diagnosis:     #  Localized Eczematous and delayed-type reaction after 2x initial doses of immunotherapies  = reaction to itch-X cream  * chronic illness with exacerbation, progression, side effects from treatment    No signs for allergies to preservative in these allergy extracts     No signs in prick test or patch test for delayed type reaction to some of these allergens (dust mites, molds)    Severe contact sensitizations to topical antibiotics  ==> in open application test clearly positive to the itch-x cream that patient used to reduce the effects of the IT    ==> Comment: This was NOT a reaction to the immunotherapy, but rather to the topical product used afterwards to treat the topical side effects of the IT. Not sure to what ingredient in itch-x was really reacting, but not to Parabens and Propylene Glycol.    #  New flare up of allergic  contact dermatitis on ankle to fragrances in the Vaseline cocoa butter healing jelly  >> additional contact sensitizations to fragrances and flavors  * chronic illness with exacerbation, progression, side effects from treatment    #  Recurrent urticarial rash with pruritus (after using Doxycycline)  * chronic illness with exacerbation, progression, side effects from treatment  Treatment: try Claritine 10mg morning and Noon and Hydroxyzine 25mg at bedtime    # Unlikely angioedema reaction to Meloxicam  * stable chronic illness    In skin tests no signs for specific immediate type or delayed reaction to Meloxicam. Patient desires to restart Meloxicam for back pain.     No evidence for COX1 intolerance (Ibuprofen is well-tolerated)    Suspect that her prior reaction was unrelated to the Meloxicam    Can restart meloxicam 1/4 tablet, then 1-2 days later, 1/2 tablet, then 1-2 days later she can try a full tablet.     Has emergency set and given handout emergency treatment (has Epipen)    It is imperative that the patient avoids all antibiotics, topically and systemically, that we have tested and that she was positive. Because gentamycin and neomycin were included I would avoid, for safety reasons, cross reacting aminoglycosides including vancomycin.     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 However, it could be that at that time some topical antibiotic has been used that she reacted to and/or lipatrophy after Kenalog injections.     ==> Comment: It is difficult to directly correlate the atrophy on the upper arm to the contact sensitization even though the outline of the reaction looks like the outline of a band-aid. It might be that this atrophy has nothing to do with an allergic reaction. It might be important to really verify if it is a muscle or lipatrophy. Clinically it looks more like lipatrophy which can sometimes be idiopathic. Moreover, a  local injection of corticosteroids in this area might as well explain the atrophy (and there was such an injection months ago; not sure if exactly same location?)      These conclusions are made at the best of one's knowledge and belief based on the provided evidence such as patient's history and allergy test results and they can change over time or can be incomplete because of missing information's.       ==> Treatment prescribed/Plan:      Follow the recommendations of the [facility]AMP [facility] and use only products from the Brinda    See for informations on wood tar mix, Compositae and Propolis also concerning food.    These conclusions are made at the best of one's knowledge and belief based on the provided evidence such as patient's history and allergy test results and they can change over time or can be incomplete because of missing information's.    CC Ulysses Mtz MD  [place] [id number] [address] [place], [place] [id number] on close of this encounter..  ___________________________    Staff: : provider    Follow-up in [facility] clinic in about 4 months  ___________________________    I spent a total of 35 minutes with Gisela Richards during today s  visit. This time was spent discussing all the individual test results, correlating them to the clinical relevance, counseling the patient and/or coordinating care. Moreover time was spent to install and explain the CAMP [employer] from [employer] with the informations on the individual allergens and propositions of products that can be used. Please see Assessment and Plan for additional details.

## 2022-12-02 ENCOUNTER — VIRTUAL VISIT (OUTPATIENT)
Dept: ALLERGY | Facility: CLINIC | Age: 42
End: 2022-12-02
Payer: COMMERCIAL

## 2022-12-02 DIAGNOSIS — L23.89 ALLERGIC CONTACT DERMATITIS DUE TO OTHER AGENTS: Primary | ICD-10-CM

## 2022-12-02 DIAGNOSIS — L50.9 URTICARIA: ICD-10-CM

## 2022-12-02 PROCEDURE — 99214 OFFICE O/P EST MOD 30 MIN: CPT | Mod: 95 | Performed by: DERMATOLOGY

## 2022-12-02 NOTE — Clinical Note
12/2/2022         RE: Gisela Richards  [address]  [facility] [address]        Dear Colleague,    Thank you for referring your patient, Gisela Richards, to the [facility]. Please see a copy of my visit note below.    [facility] Dermato-allergology Note  Virtual visit: store and forward video ([employer] connected), start time: 10:00am, end time: 10:40am, date of images: during video and in Epic media  Encounter Date: Dec 2, 2022  ____________________________________________    CC: No chief complaint on file.      HPI:  (Dec 2, 2022)  Ms. Gisela Richards is a(n) 42 year old female who presents today as a return patient for allergy consultation  - Follow-up in Derm-Allergy clinic for 2nd readings and final conclusions after 4 days (virtual) = from [place]   - otherwise feeling well in usual state of health    Physical exam:  General: In no acute distress, well-developed, well-nourished  Eyes: no conjunctivitis  ENT: no signs of rhinitis   Pulmonary: no wheezing or coughing  Skin:Focused examination of the skin on test sites was performed = see test results below    Earlier History and Allergy exams:  (Nov 30, 2022)  - Follow-up in Derm-Allergy clinic for 1st readings of patch tests after 2 days (virtual) = from [place]     Earlier History and Allergy exams:  (Nov 28, 2022)  - Follow-up in Derm-Allergy clinic for additional patch tests (see series in red) and earlier if hives not under control    Earlier History and Allergy exams:  (Jul 27, 2022)  - Follow-up: in Derm-Allergy clinic as needed  - on July 5th did a photoshoot in the field and then observed on the ankle a small spot, that became bigger (brown, not really infiltrated) and the morning of the 7th July a Dermatologist looked at it and in histology it was a necrosis unknown cause.  - 1 week later itching on that area with vesicular reaction = eczematous reaction  - on the 15th  did bacterial culture (no bacterial growth)  - as soon as the patient stopped the vaseline with same adhesive band aid the lesions improved  - patient observe anyway recently reactions to various vaseline type ointment (incl Triamcinolone acetonide) = with cream no problem  --> took at the end orally prednisone and Doxycycline = stopped the Prednisone Monday the 18th of July and the Doxycycline on the 18th. ==> itching and urticarial rash started on the 22nd of July after being in the sun  --> NP prescribed Claritine and Prilosec and Benadryl   Skin:in the moment no signs for skin lesions, but patient had the eczematous lesions on the ankle   Some erythema over the decolletage    Earlier History and Allergy exams:  01/22/2021  Says she hasn't noticed any reaction to the patch testing or where we injected. Would like to try the meloxicam for her back pain. .    Earlier History and Allergy exams:   Ibuprofen does not induce any reaction.    > Patient had a reaction to Meloxicam in November. Took one Tabl for the first time and about 3-6h later with swelling and redness around the lips with dryness and then a sun burn type of rash over upper chest, stomach and back. No itching. No sun exposure involved. Redness disappeared after 1-2 days. Never tongue swelling or breathing problems. Out of chin skin wheeping. Took almost one week to fully recover.  ==> delayed type reaction    Earlier History and Allergy exams:    Patient is continuing now the IT without any problems, since they are not using ItchEx anymore. Just using ice.  IT injections is done by an primary care PA in [facility] [place]    > Patient had a reaction to Meloxicam about 3 h later with angioedema on face and then sun burn type of rash over face --> happened in November  > Ibuprofen does not induce any reaction (last taken last Saturday)    With tramadol tremors and swellings of face and with Percacet (Oxycodon and Acetaminophen) migraines  Vicodin  "induces erythema  Today she reports that all skin manifestation has resolved. Patient has come from [place], [place]. Patient's spouse is present during evaluation. She reports her first allergy shots took place on November 9th, 2 injections on posterior aspect of right arm and 1 to the posterior aspect of left arm.   - Per chart review immunotherapy: trees/grass/weeds, mites/molds and animals  Within the day of the injection patient noted a diffuse pruritic rash on the posterior aspect of her right arm. She denies using any topicals or oral medications beside Zyrtec to aid with on going rash. She additionally iced the area and skin manifestation had resolved.     The following week she completed her 2nd round of immunotherapy at the same dosage. Again the rash occurred on the posterior aspect of her right arm along with outstanding edema of her right arm and shoulder. This rash was also pruritic, but again denied any pain. Her provider was concerned about reaction to the preservative. This rash took 1.5 week to self resolve. The edema persisted for 2-3 weeks and when resolved, a notable dent was appreciated on the right distal aspect of the deltoid. Imaging completed showed atrophy of the deltoid. She denied any recent injections to that area including routine vaccines or steroid injections. Also now having to areas of hypopigmentation overlying the deltoid.  Reports dry skin after resolution of rashes. Denies history of asthma. Extensive history of food sensitives and autoimmune workup that has been unremarkable.     Hx since 12/27/19:  The patient comes in today for patch testing day 1. She reports that there is a bruise on the R shoulder that has worsened in the interim. She is unsure if it may be necrosis or sudden muscle atrophy. She reports it appeared on Nov 16, the day after allergy shot. Also states that in the same area she developed a new \"bump\" that appeared about 2 days ago.   Reports that the " "IT on the R upper extremity was against trees, weeds, molds, mites, and dog. On the L upper extremity, she had IT injections for cats and dogs.   She is otherwise feeling well overall today. No other skin or allergy concerns at this time.     Medications:  - takes daily Zyrtec and is on IT  - Levothyroxin      Past Medical History:   Patient Active Problem List   Diagnosis     Allergic rhinitis due to dogs     Hypothyroid     Lumbar facet joint pain     Past Medical History:   Diagnosis Date     Constipation     Since young.     Other specified postprocedural states     2014,Slight recurrence within scar - removed in 9th grade and came back right away     Personal history of other medical treatment (CODE)      2, para 2.     Past Surgical History:   Procedure Laterality Date     COLONOSCOPY      ,\"swelling of the lining\" and flattening of villa.     OTHER SURGICAL HISTORY      ~,[id number],OTHER,Left,recurring within scar     OTHER SURGICAL HISTORY      2016,[id number],KNEE ARTHROSCOPY W/ MENISCAL REPAIR     OTHER SURGICAL HISTORY      2004,[id number],DILATION AND CURETTAGE,retained placenta       Social History:  Patient reports that she has never smoked. She has never used smokeless tobacco. She reports current alcohol use. She reports that she does not use drugs.    Family History:  Family History   Problem Relation Age of Onset     Family History Negative Father         Good Health,does not see doctor regularly     Thyroid Disease Mother         Thyroid Disease,Hypothyroid     Other - See Comments Mother         Smoker, prediabetes     Thyroid Disease Sister         Thyroid Disease,Hypothyroid     Arthritis Sister         Arthritis     Scoliosis Maternal Half-Sister         Scoliosis     Other - See Comments Maternal Half-Sister         No Known Problems       Medications:  Current Outpatient Medications   Medication Sig Dispense Refill     amphetamine-dextroamphetamine (ADDERALL XR) 25 MG 24 hr " capsule Take 25 mg by mouth daily       amphetamine-dextroamphetamine (ADDERALL) 5 MG tablet TAKE 1 TABLET BY MOUTH ONCE DAILY IN THE LATE AFTERNOON       cetirizine (ZYRTEC) 10 MG tablet Take 10 mg by mouth       cholecalciferol (VITAMIN D3) 25 mcg (1000 units) capsule Take 1 capsule by mouth       doxycycline hyclate (VIBRAMYCIN) 100 MG capsule Take 1 capsule by mouth 2 times daily (Patient not taking: Reported on 7/20/2022)       famotidine (PEPCID) 40 MG tablet Take 40 mg by mouth At Bedtime (Patient not taking: Reported on 8/25/2022)       ferrous sulfate (FEROSUL) 325 (65 Fe) MG tablet Take 1 tablet by mouth daily       fluorometholone (FML FORTE) 0.25 % ophthalmic suspension  (Patient not taking: Reported on 8/25/2022)       folic acid (FOLVITE) 1 MG tablet Take 1 mg by mouth daily       hydrOXYzine (ATARAX) 25 MG tablet Take 1 tablet (25 mg) by mouth At Bedtime (Patient not taking: Reported on 8/25/2022) 30 tablet 1     levothyroxine (SYNTHROID/LEVOTHROID) 75 MCG tablet Take 75 mcg by mouth daily       levothyroxine (SYNTHROID/LEVOTHROID) 75 MCG tablet Take 1 tablet (75 mcg) by mouth every morning (before breakfast) 90 tablet 4     loratadine (CLARITIN) 10 MG tablet Take 10 mg by mouth (Patient not taking: Reported on 8/25/2022)       loratadine (CLARITIN) 10 MG tablet Morning and evening 1 Tabl of 10mg for about 1 week and if no itching anymore, then reduce to 1 Tabl in the morning. 30 tablet 3     mometasone (NASONEX) 50 MCG/ACT spray 1 spray 2 times daily (Patient not taking: Reported on 7/20/2022)       predniSONE (DELTASONE) 50 MG tablet Emergency set: if severe allergic reaction take immediately 100mg Prednisone (2x50mg) and 2 Tabl Cetirizine 10mg and then write precise 12h retrospective diary.If less severe reaction take only the 2 Tabl Cetirizine 2 tablet 1     triamcinolone (KENALOG) 0.1 % external cream APPLY CREAM EXTERNALLY TWICE DAILY APPLY THIN FILM SPARINGLY TO ITCHY SPOTS. USE UP TO 2 WEEKS  AT A TIME       triamcinolone (KENALOG) 0.1 % external cream  (Patient not taking: Reported on 8/25/2022)       triamcinolone (KENALOG) 0.1 % external ointment Apply 1 Dose topically 2 times daily (Patient not taking: Reported on 7/20/2022)       vitamin B-12 (CYANOCOBALAMIN) 500 MCG tablet Take 1 tablet by mouth daily         Allergies   Allergen Reactions     Cats Shortness Of Breath     Dogs Fatigue, Palpitations, Shortness Of Breath and Tinnitus     Gentamicin Dermatitis     In patch tests severe reactions against Neomycine, Framycetine, Gentamicin, Bacitracine and Polymyxin.   For reasons of crossreactions I would as well avoid other aminoglycosides such as Vancomycine     Petrolatum Rash     Gluten Meal      Other reaction(s): Joint Pain     Itch-X Rash     Triamcinolone Rash     Fat atrophy after IM injection      Tramadol Other (See Comments)     Other reaction(s): Other - Describe In Comment Field  High doses caused yellow eyes, tremors in her mouth, couldn't feel some body parts.    No issues with low doses  High doses caused yellow eyes, tremors in her mouth, couldn't feel some body parts.    No issues with low doses     Food GI Disturbance     Night shades, stevia, pork     Gel Base Other (See Comments)     Allergy to Itch X. Gets blister/pustular rash in area the itch X is placed.     Hydrocodone-Acetaminophen Rash     Order for PATCH TESTS    [x] Outpatient  [] Inpatient: Head..../ Bed ....      Skin Atopy (atopic dermatitis) [x] Yes   [] No  Rhinitis/Sinusitis:   [x] Yes   [] No  Allergic Asthma:   [] Yes   [x] No  Food Allergy:   [x] Yes   [] No  Leg ulcers:   [] Yes   [x] No  Hand eczema:   [] Yes   [x] No   Leading hand:   [x] R   [] L       [] Ambidextrous                      Reason for tests (suspected allergy): Assessment for potential reaction to disinfections before IT (less likely preservatives in IT, because the reaction not directly over injection site)  Known previous allergies:  Trees/grass, mites, dogs/cats  Standardized panels (repeat patch tests in red)  [x] Standard panel (40 tests)  [x] Preservatives & Antimicrobials (31 tests)  [x] Emulsifiers & Additives (25 tests) particularly propylene glycol  [x] Perfumes/Flavours & Plants (25 tests)  [] Hairdresser panel (12 tests)  [x] Rubber Chemicals (22 tests)  [x] Plastics (26 tests)  [] Colorants/Dyes/Food additives (20 tests)  [] Metals (implants/dental) (24 tests)  [] Local anaesthetics/NSAIDs (13 tests)  [x] Antibiotics & Antimycotics (14 tests)   [] Corticosteroids (15 tests)   [] Photopatch test (62 tests)   [] others: ...      [x] Patient's own products: tough strips band aids and vaseline and Triamcinolone cream and ointment    DO NOT test if chemical or biological identity is unknown!     always ask from patient the product information and safety sheets (MSDS)   [] Atopy screen prick test (Atopic predisposition): ...      RESULTS & EVALUATION of PATCH TESTS    Patch test readings after     [x] 2 days, [] 3 days [x] 4 days, [] 5 days,    Applied patch tests with results (import here the list of patch tests):  Order documented by: IVAN HANCOCK LPN  Order reviewed by: Preeti Copeland LPN   Physician:   Date/time of application:1-  Localization of application: back >> Clear    STANDARD Series         No Substance 2 days 4 days remarks   1 Francesco Mix [C] - -    2 Colophony - -    3  2-Mercaptobenzothiazole  NA NA     4 Methylisothiazolinone - -    5 Carba Mix - -    6 Thiuram Mix [A] NA NA    7 Bisphenol A Epoxy Resin - -    8 V-Mvey-Jwqnqpyycav-Formaldehyde Resin - -    9 Mercapto Mix [A] - -    10 Black Rubber Mix- PPD [B] NA NA    11 Potassium Dichromate  -  -    12 Balsam of [place] (Myroxylon Pereirae Resin) - -    13 Nickel Sulphate Hexahydrate - -    14 Mixed Dialkyl Thiourea - -    15 Paraben Mix [B] - -    16 Methyldibromo Glutaronitrile NA NA    17 Fragrance Mix - -    18 2-Bromo-2-Nitropropane-1,3-Diol  (Bronopol) - -    19 Lyral - -    20 Tixocortol-21- Pivalate NA NA    21 Diazolidiyl Urea (Germall II) - -    22 Methyl Methacrylate NA NA    23 Cobalt (II) Chloride Hexahydrate - -    24 Fragrance Mix II  - -    25 Compositae Mix - -    26 Benzoyl Peroxide NA NA    27 Bacitracin - ++/+++    28 Formaldehyde - -    29 Methylchloroisothiazolinone / Methylisothiazolinone - -    30 Corticosteroid Mix - -    31 Sodium Lauryl Sulfate - -    32 Lanolin Alcohol - -    33 Turpentine - -    34 Cetylstearylalcohol - -    35 Chlorhexidine Dicluconate - -    36 Budenoside - -    37 Imidazolidinyl Urea  - -    38 Ethyl-2 Cyanoacrylate NA NA    39 Quaternium 15 (Dowicil 200) - -    40 Decyl Glucoside - -      EMULSIFIERS & ADDITIVES        No Substance 2 days 4 days remarks   41 Polyethylene Glycol-400 NA NA    42 Cocamidopropyl Betaine - -    43 Amerchol L101 NA NA    44 Propylene Glycol - -    45 Triethanolamine - -    46 Sorbitane Sesquiolate - -    47 Isopropylmyristate - -    48 Polysorbate 80  - -    49 Amidoamine   (Stearamidopropyl Dimethylamine) - -    50 Oleamidopropyl Dimethylamine - -    51 Lauryl Glucoside NA NA    52 Coconut Diethanolamide  - -    53 2-Hydroxy-4-Methoxy Benzophenone (Oxybenzone) - -    54 Benzophenone-4 (Sulisobenzon) - -    55 Propolis - -    56 Dexpanthenol - -    57 Carboxymethyl Cellulose Sodium - -    58 Abitol - -    59 Tert-Butylhydroquinone - -    60 Benzyl Salicylate - -     Antioxidant      61 Dodecyl Gallate - -    62 Butylhydroxyanisole (BHA) - -    63 Butylhydroxytoluene (BHT) - -    64 Di-Alpha-Tocopherol (Vit E) - -    65 Propyl Gallate - -      RUBBER CHEMICALS         No Substance 2 days 4 days remarks    Carbamate      66 Zinc Bis ( Diethyldithio carbamate ) (ZDEC) - -    67 Zinc Bis (Dibutyldithiocarbamate) - -    68 1,3-Diphenylguanidine (DPG) - -     Thiourea      69 Dibutylthiourea - -    70 Diphenyltiourea - -    71 Thiourea - -     Mercapto chemicals      72 Morpholinyl  Mercaptobenzothiazole - -    73 V-Ackxbefolc-9-Benzothiazyl-Sulfenamide - -    74 Dibenzothiazyl Disulfide - -     Thiuram chemicals      75 Dipentamethylenethiuram Disulfide - -    76 Tetraethylthiuram Disulfide (Disulfiram) - -    77 Tetramethylthiuram Disulfide - -    78 Tetramethyl Thiuram Monosulfide (TMTM) - -     4-Phenylenediamine derivatives      79 N-Isopropyl-N'-Phenyl-P-Phenylenediamine (IPPD) - -    80 Zomikepu-W-Zeomrafzehtcgiym (DPPD) - -     Various Rubber Additives      81 Hydroquinone Monobenzylether  - -    82 Hexamethylenetetramine - -    83 4,4'-Dihydroxybiphenyl - -    84 Cyclohexylthiophtalimide - -    85 Ethylenediamine Dihydrochloride - -    86 N-Phenyl-B-Naphthylamine - -    87 Dodecyl Mercaptan - -        PLASTICS         No Substance 2 days 4 days remarks    Acrylates - -    88 2-Hydroxyethyl Methacrylate (HEMA) - -    89 1,4-Butandioldimethacrylate (BUDMA) - -    90  2-Ethylhexyl Acrylate - -    91 Bisphenol-A-Dimethacrylate  - -    92 Diurethane-Dimethacrylate - -    93 Ethyleneglycoldimethacrylate (EGDMA) - -    94 Pentaerythritoltriacrylate (CARLOS) - -    95 Triethylene Glycol Dimethacrylate (TEGDMA) - -     Synthetic material/additives       96 V-Jsmw-Cgtnbqsfunj - -    97 Tricresyl Phosphate - -    98 7-Qxmx-Xxnewvsqwffdy - -    99 Bis (2-Ethylhexyl) Phthalate - -    100 Dibutylphthalate - -    101 Dimethylphthalate - -    102 Toluene-2,4-Diisocyanate - -    103 Diphenylmethane-4,4''-Diisocyanate - -     EPOXY RESIN SYSTEMS       Reactive Solvents - -    104 Cresyl Glycidyl Ether - -    105 Butyl Glycidyl Ether - -    106 Phenyl Glycidyl Ether - -    107 1,4-Butanediol Diglycidyl Ether - -    108 1,6-Hexanediole Diglycidyl Ether - -     Hardener / Accelerator - -    109 Ethylenediamine Dihydrochloride - -    110 Triethylenetetramine - -    111 Diethylenetriamine - -    112 Isophorone Diamine (IPD) - -    113 N,N-Dimethyl-P-Toluidine - -        PRESERVATIVES & ANTIMICROBIALS          No Substance 2 days 4 days remarks   114  1,2-Benzisothiazoline-3-One, Sodium Salt - -    115  1,3,5-Anival (2-Hydroxyethyl) - Hexahydrotriazine (Grotan BK) NA NA    116 9-Claecfiornsav-1-Nitro-1, 3-Propanediol - -    117  3, 4, 4' - Triclocarban - -    118 4 - Chloro - 3 - Cresol - -    119 4 - Chloro - 4 - Xylenol (PCMX) NA NA    120 7-Ethylbicyclooxazolidine (Bioban [id number]) - -    121 Benzalkonium Chloride - -    122 Benzyl Alcohol - -    123 Cetalkonium Chloride NA NA    124 Cetylpyrimidine Chloride  - -    125 Chloroacetamide - -    126 DMDM Hydantoin NA NA    127 Glutaraldehyde NA NA    128 Triclosan - -    129 Glyoxal Trimeric Dihydrate - -    130 Iodopropynyl Butylcarbamate - -    131 Octylisothiazoline NA NA    132 Iodoform - -    133 (Nitrobutyl) Morpholine/(Ethylnitro-Trimethylene) Dimorpholine (Bioban P 1487) - -    134 Phenoxyethanol NA NA    135 Phenyl Salicylate - -    136 Povidone Iodine - -    137 Sodium Benzoate - -    138 Sodium Disulfite NA NA    139 Sorbic Acid - -    140 Thimerosal - -     Parabens      141 Butyl-P-Hydroxybenzoate - -    142 Ethyl-P-Hydroxybenzoate - -    143 Methyl-P-Hydroxybenzoate NA NA    144 Propyl-P-Hydroxybenzoate - -         ANTIBIOTICS & ANTIMYCOTICS         No Substance 2 days 4 days remarks   145 Erythromycine - -    146 Framycetine Sulphate (+) +++    147 Fusidic Acid Sodium Salt - -    148 Gentamicin Sulphate - +    149 Neomycine Sulphate (+) ++/+++    150 Oxytetracycline  - -    151 Polymyxin B Sulphate (+) ++    152 Tetracycline-HCL - -    153 Sulfanilamide - -    154 Metronidazole - -    155 Oxyquinoline Mix - -    156 Nitrofurazone - -    157 Nystatin - -    158 Clotrimazole - -      PATIENTS OWN PRODUCTS         No Substance Conc  % solv 2 days 4 days remarks   159 Aspergillus   - -    160 Penicillium   - -    161 D. Farinae   - -    162 D. pteronyssinus   - -    163 Dog   - -    164 Cat   - -        Nov 28, 2022 repeat application of patch tests:  Patch test  readings after     [x] 2 days, [] 3 days [x] 4 days, [] 5 days,  Other duration: ...    STANDARD Series                                          # Substance 2 days 4 days remarks     1 Francesco Mix [C] - -       2 Colophony - -       3  2-Mercaptobenzothiazole  - -       4 Methylisothiazolinone - -       5 Carba Mix - -       6 Thiuram Mix [A] - -       7 Bisphenol A Epoxy Resin - -       8 F-Vizd-Ocprrpqrecw-Formaldehyde Resin - -       9 Mercapto Mix [A] - -       10 Black Rubber Mix- PPD [B] - -       11 Potassium Dichromate  -  -       12 Balsam of [place] (Myroxylon Pereirae Resin) - -       13 Nickel Sulphate Hexahydrate - -       14 Mixed Dialkyl Thiourea - -       15 Paraben Mix [B] - -       16 Methyldibromo Glutaronitrile - -       17 Fragrance Mix - +       18 2-Bromo-2-Nitropropane-1,3-Diol (Bronopol) CT - -       19 Lyral - -       20 Tixocortol-21- Pivalate CT - -       21 Diazolidiyl Urea (Germall II) - -       22 Methyl Methacrylate - -       23 Cobalt (II) Chloride Hexahydrate - -       24 Fragrance Mix II  - -       25 Compositae Mix - (+)       26 Benzoyl Peroxide - -       27 Bacitracin - +++       28 Formaldehyde - -       29 Methylchloroisothiazolinone / Methylisothiazolinone - -       30 Corticosteroid Mix CT (+) -       31 Sodium Lauryl Sulfate - -       32 Lanolin Alcohol - -       33 Turpentine - -       34 Cetylstearylalcohol - -       35 Chlorhexidine Dicluconate - -       36 Budenoside - -       37 Imidazolidinyl Urea  - -       38 Ethyl-2 Cyanoacrylate - -       39 Quaternium 15 (Dowicil 200) - -       40 Decyl Glucoside - -       PRESERVATIVES & ANTIMICROBIALS        # Substance 2 days 4 days remarks   41 1  1,2-Benzisothiazoline-3-One, Sodium Salt - -     2  1,3,5-Anival (2-Hydroxyethyl) - Hexahydrotriazine (Grotan BK) - -     3 1-Gfchsdlevyced-8-Nitro-1, 3-Propanediol NA NA     4  3, 4, 4' - Triclocarban - -    45 5 4 - Chloro - 3 - Cresol - -     6 4 - Chloro - 4 - Xylenol (PCMX) - -     7  7-Ethylbicyclooxazolidine (Bioban [id number]) - -     8 Benzalkonium Chloride CT - -     9 Benzyl Alcohol - -    50 10 Cetalkonium Chloride - -     11 Cetylpyrimidine Chloride  - -     12 Chloroacetamide - -     13 DMDM Hydantoin - -     14 Glutaraldehyde - -    55 15 Triclosan - -     16 Glyoxal Trimeric Dihydrate - -     17 Iodopropynyl Butylcarbamate - -     18 Octylisothiazoline - -     19 Bithionol CT - -    60 20 Bioban P 1487 (Nitrobutyl) Morpholine/(Ethylnitro-Trimethylene) Dimorpholine - -     21 Phenoxyethanol - -     22 Phenyl Salicylate - -     23 Povidone Iodine - -     24 Sodium Benzoate - -    65 25 Sodium Disulfite - -     26 Sorbic Acid - -     27 Thimerosal - -     28 Melamine Formaldehyde Resin - -     29 Ethylenediamine Dihydrochloride - -      Parabens      70 30 Butyl-P-Hydroxybenzoate - -     31 Ethyl-P-Hydroxybenzoate NA NA     32 Methyl-P-Hydroxybenzoate - -    73 33 Propyl-P-Hydroxybenzoate - -      EMULSIFIERS & ADDITIVES       # Substance 2 days 4 days remarks   74 1 Polyethylene Glycol-400 - -    75 2 Cocamidopropyl Betaine - -     3 Amerchol L101 - -     4 Propylene Glycol - -     5 Triethanolamine - -     6 Sorbitane Sesquiolate CT - -    80 7 Isopropylmyristate - -     8 Polysorbate 80 CT - -     9 Amidoamine   (Stearamidopropyl Dimethylamine) NA NA     10 Oleamidopropyl Dimethylamine - -     11 Lauryl Glucoside - -    85 12 Coconut Diethanolamide  - -     13 2-Hydroxy-4-Methoxy Benzophenone (Oxybenzone) - -     14 Benzophenone-4 (Sulisobenzon) NA NA     15 Propolis - +/++     16 Dexpanthenol - -    90 17 Abitol - -     18 Tert-Butylhydroquinone - -     19 Benzyl Salicylate - -     20 Dimethylaminopropylamin (DMPA) CT? D053       21 Zinc Pyrithione (Zinc Omadine) CT? Z006      95 22 Anival(Hydroxymethyl) Nitromethane CT        Antioxidant - -     23 Dodecyl Gallate - -     24 Butylhydroxyanisole (BHA) - -     25 Butylhydroxytoluene (BHT) NA NA     26 Di-Alpha-Tocopherol (Vit E) - -    100  27 Propyl Gallate - -      PERFUMES, FLAVORS & PLANTS        # Substance 2 days 4 days remarks   101 1 Benzyl Cinnamate (+) -     2 Di-Limonene (Dipentene) - -     3 Cananga Odorata (Ulises Montgomery) (I) (+) -     4 Lichen Acid Mix - -    105 5 Mentha Piperita Oil (Peppermint Oil) + -     6 Sesquiterpenelactone mix - -     7 Tea Tree Oil, Oxidized - -     8 Wood Tar Mix - +/++     9 Abietic Acid - -    110 10 Lavendula Angustifolia Oil (Lavender Oil) - -     11 Fragrance mix II CT * - -      Fragrance Mix I       12 [place]moss Absolute - -     13 Eugenol - -     14 Geraniol NA NA    115 15 Hydroxycitronellal - -     16 Isoeugenol - -     17 Cinnamic Aldehyde - -     18 Cinnamic Alcohol  NA NA      Fragrance mix II       19 Citronellol - -    120 20 Alpha-Hexylcinnamic Aldehyde    - -     21 Citral - -     22 Farnesol - -    123 23 Coumarin - -      Hexylcinnamic aldehyde, Coumarin, Farnesol, Hydroxyisohexy3-cyclohexene carboxaldehyde, citral, citrolellol  PLASTICS        # Substance 2 days 4 days remarks     Acrylates - -    124 1 2-Hydroxyethyl Methacrylate (HEMA) - -    125 2 1,4-Butandioldimethacrylate (BUDMA) - -     3  2-Ethylhexyl Acrylate - -     4 Bisphenol-A-Dimethacrylate  - -     5 Diurethane-Dimethacrylate - -     6 Ethyleneglycoldimethacrylate (EGDMA) - -    130 7 Pentaerythritoltriacrylate (CARLOS) - -     8 Triethylene Glycol Dimethacrylate (TEGDMA) - -      Synthetic material/additives        9 M-Jcql-Ehuvqeuwvud - -     10 Tricresyl Phosphate - -     11 6-Fpxy-Sbszeiwmdenid - -    135 12 Bis (2-Ethylhexyl) Phthalate - -     13 Dibutylphthalate - -     14 Dimethylphthalate - -     15 Toluene-2,4-Diisocyanate - -     16 Diphenylmethane-4,4''-Diisocyanate - -      EPOXY RESIN SYSTEMS        Reactive Solvents - -    140 17 Cresyl Glycidyl Ether - -     18 Butyl Glycidyl Ether - -     19 Phenyl Glycidyl Ether - -     20 1,4-Butanediol Diglycidyl Ether - -     21 1,6-Hexanediole Diglycidyl Ether - -      Hardener  / Accelerator - -    145 22 Triethylenetetramine - -     23 Diethylenetriamine - -     24 Isophorone Diamine (IPD) - -    148 25 N,N-Dimethyl-P-Toluidine - -      OTHER PRODUCTS  tough strips band aids and vaseline and Triamcinolone cream and ointment        # Substance Conc  % solv 2 days 4 days remarks   149 1 Triamcinolone cream 0.1% as is  - -    150 2 Curad petroleum jelly as is  - -    151 3 Cortizone 10 cream with aloe as is  - -    152 4 Triamcinolone oint 0.1% as is  - -        Results of patch tests:                         Interpretation:  - Negative                    A    = Allergic      (+) Erythema    TI   = Toxic/irritant   + E + Infiltration    RaP = Relevance at Present     ++ E/I + Papulovesicle   Rpr  = Relevance Previously     +++ E/I/P + Blister     nR   = No Relevance           Atopy Screen (Placed 01/13/20 )  No Substance Readings (15 min) Evaluation   POS Histamine 1mg/ml ++    NEG NaCl 0.9% - Slight dermographism     No Substance Readings (15 min) Evaluation   1 Alternaria alternata (tenuis)  -    2 Cladosporium herbarum -    3 Aspergillus fumigatus -    4 Penicillium notatum -    5 Dermatophagoides pteronyssinus ++    6 Dermatophagoides farinae ++    Conclusion: immediate type reaction to house dust mites, but no delayed reaction to moulds or HDM --> The DP and DF reactions remained into the next day.       DRUGS/SUBSTANCES 1/19/2021  No Substance Readings (15min) Evaluation   POS Histamine 1mg/ml ++    NEG NaCl 0.9% - dermographism      Prick Tests        Substance/ Allergen Concentration Result (15min) Remarks   Meloxicam 30mg/ml as is neg     In vaseline neg Not bigger than neg ctr     Intradermal Tests   immed immed delayed delayed     Substance Conc 1st dil 2nd dil   3 days  3 days remarks   Histamine Hydrochloride (ALK) 0.1 mg/ml        NaCl 0.9%        Meloxicam 1:200/ 1:20 neg neg neg  dermographism     Patch Tests   as is  as is 1:2 paraffin 1:2 paraffin     Substance Conc  3 days   days  3 days  days remarks   Meloxicam  - - - -        [x] Allergic reaction diagnosed against:     Antibiotics and antimycotics:   Framycetine Sulphate +++  Gentamicin Sulphate +  Neomycine Sulphate ++/+++  Polymyxin B Sulphate ++  Bacitracin ++/+++ and repeated 11/30/2022 +++    Following additional allergens in repeat patch tests from November 2022 found:     Reactions to various fragrances/flavors such as   +/++ wood tar mix,  +/++ Propolis, + fragrance mix and + Compositae mix      Interpretation/ remarks:   Patient has a severe delayed type contact sensitization to various antibiotics that are in many OTC antiseptics and antibiotics. No signs for allergies to rubber chemicals or adhesives, but reactions to organic flavors and fragrances and this was probably the problem for the allergic contact dermatitis to the Vaseline gel with fragrances in there. Not sure if Propolis (honey), wood tar (flavor from pine sap in candies) and Compositae plays a role with the GI symptoms    [x] Patient information given   [x] ACDS CAMP information's (# [id number], and [id number]) to following  compounds:  Neomycine Sulphate, Polymyxin B Sulphate, Bacitracin, Propolis, fragrance  mix, Compositae mix, wood tar mix    [] General information's to following compounds: ......    ___________________________    Assessment & Plan:    ==> Final Diagnosis:     #  Localized Eczematous and delayed-type reaction after 2x initial doses of immunotherapies  = reaction to itch-X cream  * chronic illness with exacerbation, progression, side effects from treatment    No signs for allergies to preservative in these allergy extracts     No signs in prick test or patch test for delayed type reaction to some of these allergens (dust mites, molds)    Severe contact sensitizations to topical antibiotics  ==> in open application test clearly positive to the itch-x cream that patient used to reduce the effects of the IT    ==> Comment: This was NOT a  reaction to the immunotherapy, but rather to the topical product used afterwards to treat the topical side effects of the IT. Not sure to what ingredient in itch-x was really reacting, but not to Parabens and Propylene Glycol.    #  New flare up of allergic contact dermatitis on ankle to fragrances in the Vaseline cocoa butter healing jelly  >> additional contact sensitizations to fragrances and flavors  * chronic illness with exacerbation, progression, side effects from treatment    #  Recurrent urticarial rash with pruritus (after using Doxycycline)  * chronic illness with exacerbation, progression, side effects from treatment  Treatment: try Claritine 10mg morning and Noon and Hydroxyzine 25mg at bedtime    # Unlikely angioedema reaction to Meloxicam  * stable chronic illness    In skin tests no signs for specific immediate type or delayed reaction to Meloxicam. Patient desires to restart Meloxicam for back pain.     No evidence for COX1 intolerance (Ibuprofen is well-tolerated)    Suspect that her prior reaction was unrelated to the Meloxicam    Can restart meloxicam 1/4 tablet, then 1-2 days later, 1/2 tablet, then 1-2 days later she can try a full tablet.     Has emergency set and given handout emergency treatment (has Epipen)    It is imperative that the patient avoids all antibiotics, topically and systemically, that we have tested and that she was positive. Because gentamycin and neomycin were included I would avoid, for safety reasons, cross reacting aminoglycosides including vancomycin.     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 However, it could be that at that time some topical antibiotic has been used that she reacted to and/or lipatrophy after Kenalog injections.     ==> Comment: It is difficult to directly correlate the atrophy on the upper arm to the contact sensitization even though the outline of the reaction looks like the  outline of a band-aid. It might be that this atrophy has nothing to do with an allergic reaction. It might be important to really verify if it is a muscle or lipatrophy. Clinically it looks more like lipatrophy which can sometimes be idiopathic. Moreover, a local injection of corticosteroids in this area might as well explain the atrophy (and there was such an injection months ago; not sure if exactly same location?)      These conclusions are made at the best of one's knowledge and belief based on the provided evidence such as patient's history and allergy test results and they can change over time or can be incomplete because of missing information's.       ==> Treatment prescribed/Plan:      Follow the recommendations of the CAMP Brinda and use only products from the Brinda    See for informations on wood tar mix, Compositae and Propolis also concerning food.    These conclusions are made at the best of one's knowledge and belief based on the provided evidence such as patient's history and allergy test results and they can change over time or can be incomplete because of missing information's.    [place] [address] on close of this encounter..  ___________________________    {[id number]}: provider    Follow-up in Derm-Allergy clinic ***  ___________________________    I spent a total of 40 minutes with Gisela Richards during today s  visit. This time was spent discussing all the individual test results, correlating them to the clinical relevance, counseling the patient and/or coordinating care. Moreover time was spent to install and explain the CAMP Brinda from [employer] with the informations on the individual allergens and propositions of products that can be used. Please see Assessment and Plan for additional details.          Again, thank you for allowing me to participate in the care of your patient.         Sincerely,        Reginalod Horne MD

## 2022-12-02 NOTE — PATIENT INSTRUCTIONS
Nov 28, 2022 repeat application of patch tests:  Patch test readings after     [x] 2 days, [] 3 days [x] 4 days, [] 5 days,  Other duration: ...    STANDARD Series                                          # Substance 2 days 4 days remarks     1 Francesco Mix [C] - -       2 Colophony - -       3  2-Mercaptobenzothiazole  - -       4 Methylisothiazolinone - -       5 Carba Mix - -       6 Thiuram Mix [A] - -       7 Bisphenol A Epoxy Resin - -       8 E-Llqi-Ngkzdbaphfz-Formaldehyde Resin - -       9 Mercapto Mix [A] - -       10 Black Rubber Mix- PPD [B] - -       11 Potassium Dichromate  -  -       12 Balsam of [place] (Myroxylon Pereirae Resin) - -       13 Nickel Sulphate Hexahydrate - -       14 Mixed Dialkyl Thiourea - -       15 Paraben Mix [B] - -       16 Methyldibromo Glutaronitrile - -       17 Fragrance Mix - +       18 2-Bromo-2-Nitropropane-1,3-Diol (Bronopol) CT - -       19 Lyral - -       20 Tixocortol-21- Pivalate CT - -       21 Diazolidiyl Urea (Germall II) - -       22 Methyl Methacrylate - -       23 Cobalt (II) Chloride Hexahydrate - -       24 Fragrance Mix II  - -       25 Compositae Mix - (+)       26 Benzoyl Peroxide - -       27 Bacitracin - +++       28 Formaldehyde - -       29 Methylchloroisothiazolinone / Methylisothiazolinone - -       30 Corticosteroid Mix CT (+) -       31 Sodium Lauryl Sulfate - -       32 Lanolin Alcohol - -       33 Turpentine - -       34 Cetylstearylalcohol - -       35 Chlorhexidine Dicluconate - -       36 Budenoside - -       37 Imidazolidinyl Urea  - -       38 Ethyl-2 Cyanoacrylate - -       39 Quaternium 15 (Dowicil 200) - -       40 Decyl Glucoside - -       PRESERVATIVES & ANTIMICROBIALS        # Substance 2 days 4 days remarks   41 1  1,2-Benzisothiazoline-3-One, Sodium Salt - -     2  1,3,5-Anival (2-Hydroxyethyl) - Hexahydrotriazine (Grotan BK) - -     3 9-Ninhozizibkys-5-Nitro-1, 3-Propanediol NA NA     4  3, 4, 4' - Triclocarban - -    45 5 4 - Chloro - 3 -  Cresol - -     6 4 - Chloro - 4 - Xylenol (PCMX) - -     7 7-Ethylbicyclooxazolidine (Bioban UY7115) - -     8 Benzalkonium Chloride CT - -     9 Benzyl Alcohol - -    50 10 Cetalkonium Chloride - -     11 Cetylpyrimidine Chloride  - -     12 Chloroacetamide - -     13 DMDM Hydantoin - -     14 Glutaraldehyde - -    55 15 Triclosan - -     16 Glyoxal Trimeric Dihydrate - -     17 Iodopropynyl Butylcarbamate - -     18 Octylisothiazoline - -     19 Bithionol CT - -    60 20 Bioban P 1487 (Nitrobutyl) Morpholine/(Ethylnitro-Trimethylene) Dimorpholine - -     21 Phenoxyethanol - -     22 Phenyl Salicylate - -     23 Povidone Iodine - -     24 Sodium Benzoate - -    65 25 Sodium Disulfite - -     26 Sorbic Acid - -     27 Thimerosal - -     28 Melamine Formaldehyde Resin - -     29 Ethylenediamine Dihydrochloride - -      Parabens      70 30 Butyl-P-Hydroxybenzoate - -     31 Ethyl-P-Hydroxybenzoate NA NA     32 Methyl-P-Hydroxybenzoate - -    73 33 Propyl-P-Hydroxybenzoate - -      EMULSIFIERS & ADDITIVES       # Substance 2 days 4 days remarks   74 1 Polyethylene Glycol-400 - -    75 2 Cocamidopropyl Betaine - -     3 Amerchol L101 - -     4 Propylene Glycol - -     5 Triethanolamine - -     6 Sorbitane Sesquiolate CT - -    80 7 Isopropylmyristate - -     8 Polysorbate 80 CT - -     9 Amidoamine   (Stearamidopropyl Dimethylamine) NA NA     10 Oleamidopropyl Dimethylamine - -     11 Lauryl Glucoside - -    85 12 Coconut Diethanolamide  - -     13 2-Hydroxy-4-Methoxy Benzophenone (Oxybenzone) - -     14 Benzophenone-4 (Sulisobenzon) NA NA     15 Propolis - +/++     16 Dexpanthenol - -    90 17 Abitol - -     18 Tert-Butylhydroquinone - -     19 Benzyl Salicylate - -     20 Dimethylaminopropylamin (DMPA) CT? D053       21 Zinc Pyrithione (Zinc Omadine) CT? Z006      95 22 Anival(Hydroxymethyl) Nitromethane CT        Antioxidant - -     23 Dodecyl Gallate - -     24 Butylhydroxyanisole (BHA) - -     25 Butylhydroxytoluene  (BHT) NA NA     26 Di-Alpha-Tocopherol (Vit E) - -    100 27 Propyl Gallate - -      PERFUMES, FLAVORS & PLANTS        # Substance 2 days 4 days remarks   101 1 Benzyl Cinnamate (+) -     2 Di-Limonene (Dipentene) - -     3 Cananga Odorata (Ulises Montgomery) (I) (+) -     4 Lichen Acid Mix - -    105 5 Mentha Piperita Oil (Peppermint Oil) + -     6 Sesquiterpenelactone mix - -     7 Tea Tree Oil, Oxidized - -     8 Wood Tar Mix - +/++     9 Abietic Acid - -    110 10 Lavendula Angustifolia Oil (Lavender Oil) - -     11 Fragrance mix II CT * - -      Fragrance Mix I       12 [place] Absolute - -     13 Eugenol - -     14 Geraniol NA NA    115 15 Hydroxycitronellal - -     16 Isoeugenol - -     17 Cinnamic Aldehyde - -     18 Cinnamic Alcohol  NA NA      Fragrance mix II       19 Citronellol - -    120 20 Alpha-Hexylcinnamic Aldehyde    - -     21 Citral - -     22 Farnesol - -    123 23 Coumarin - -    Hexylcinnamic aldehyde, Coumarin, Farnesol, Hydroxyisohexy3-cyclohexene carboxaldehyde, citral, citrolellol  PLASTICS        # Substance 2 days 4 days remarks     Acrylates - -    124 1 2-Hydroxyethyl Methacrylate (HEMA) - -    125 2 1,4-Butandioldimethacrylate (BUDMA) - -     3  2-Ethylhexyl Acrylate - -     4 Bisphenol-A-Dimethacrylate  - -     5 Diurethane-Dimethacrylate - -     6 Ethyleneglycoldimethacrylate (EGDMA) - -    130 7 Pentaerythritoltriacrylate (CARLOS) - -     8 Triethylene Glycol Dimethacrylate (TEGDMA) - -      Synthetic material/additives        9 G-Pdhv-Kmohhjborxg - -     10 Tricresyl Phosphate - -     11 1-Zazn-Focvrzyhjkogw - -    135 12 Bis (2-Ethylhexyl) Phthalate - -     13 Dibutylphthalate - -     14 Dimethylphthalate - -     15 Toluene-2,4-Diisocyanate - -     16 Diphenylmethane-4,4''-Diisocyanate - -      EPOXY RESIN SYSTEMS        Reactive Solvents - -    140 17 Cresyl Glycidyl Ether - -     18 Butyl Glycidyl Ether - -     19 Phenyl Glycidyl Ether - -     20 1,4-Butanediol Diglycidyl Ether - -      21 1,6-Hexanediole Diglycidyl Ether - -      Hardener / Accelerator - -    145 22 Triethylenetetramine - -     23 Diethylenetriamine - -     24 Isophorone Diamine (IPD) - -    148 25 N,N-Dimethyl-P-Toluidine - -      OTHER PRODUCTS  tough strips band aids and vaseline and Triamcinolone cream and ointment        # Substance Conc  % solv 2 days 4 days remarks   149 1 Triamcinolone cream 0.1% as is  - -    150 2 Curad petroleum jelly as is  - -    151 3 Cortizone 10 cream with aloe as is  - -    152 4 Triamcinolone oint 0.1% as is  - -        Results of patch tests:                         Interpretation:  - Negative                    A    = Allergic      (+) Erythema    TI   = Toxic/irritant   + E + Infiltration    RaP = Relevance at Present     ++ E/I + Papulovesicle   Rpr  = Relevance Previously     +++ E/I/P + Blister     nR   = No Relevance           Atopy Screen (Placed 01/13/20 )  No Substance Readings (15 min) Evaluation   POS Histamine 1mg/ml ++    NEG NaCl 0.9% - Slight dermographism     No Substance Readings (15 min) Evaluation   1 Alternaria alternata (tenuis)  -    2 Cladosporium herbarum -    3 Aspergillus fumigatus -    4 Penicillium notatum -    5 Dermatophagoides pteronyssinus ++    6 Dermatophagoides farinae ++    Conclusion: immediate type reaction to house dust mites, but no delayed reaction to moulds or HDM --> The DP and DF reactions remained into the next day.       DRUGS/SUBSTANCES 1/19/2021  No Substance Readings (15min) Evaluation   POS Histamine 1mg/ml ++    NEG NaCl 0.9% - dermographism      Prick Tests        Substance/ Allergen Concentration Result (15min) Remarks   Meloxicam 30mg/ml as is neg     In vaseline neg Not bigger than neg ctr     Intradermal Tests   immed immed delayed delayed     Substance Conc 1st dil 2nd dil   3 days  3 days remarks   Histamine Hydrochloride (ALK) 0.1 mg/ml        NaCl 0.9%        Meloxicam 1:200/ 1:20 neg neg neg  dermographism     Patch Tests   as is   as is 1:2 paraffin 1:2 paraffin     Substance Conc  3 days  days  3 days  days remarks   Meloxicam  - - - -        [x] Allergic reaction diagnosed against:     Antibiotics and antimycotics:   Framycetine Sulphate +++  Gentamicin Sulphate +  Neomycine Sulphate ++/+++  Polymyxin B Sulphate ++  Bacitracin ++/+++ and repeated 11/30/2022 +++    Following additional allergens in repeat patch tests from November 2022 found:     Reactions to various fragrances/flavors such as   +/++ wood tar mix,  +/++ Propolis, + fragrance mix and + Compositae mix      Interpretation/ remarks:   Patient has a severe delayed type contact sensitization to various antibiotics that are in many OTC antiseptics and antibiotics. No signs for allergies to rubber chemicals or adhesives. Therefore, we think that the reaction to the band-aids were maybe to topical antibiotics that were added onto the band-aid.     [x] Patient information given   [x] ACDS CAMP information's (# [id number], and [id number]) to following  compounds: Neomycine Sulphate, Polymyxin B Sulphate, Bacitracin, Propolis, fragrance  mix, Compositae mix, wood tar mix    [] General information's to following compounds: ......    ___________________________    Assessment & Plan:    ==> Final Diagnosis:     #  Localized Eczematous and delayed-type reaction after 2x initial doses of immunotherapies  = reaction to itch-X cream  * chronic illness with exacerbation, progression, side effects from treatment  No signs for allergies to preservative in these allergy extracts   No signs in prick test or patch test for delayed type reaction to some of these allergens (dust mites, molds)  Severe contact sensitizations to topical antibiotics  ==> in open application test clearly positive to the itch-x cream that patient used to reduce the effects of the IT    ==> Comment: This was NOT a reaction to the immunotherapy, but rather to the topical product used afterwards to treat the topical side  effects of the IT. Not sure to what ingredient in itch-x was really reacting, but not to Parabens and Propylene Glycol.    #  New flare up of allergic contact dermatitis on ankle to fragrances in the Vaseline cocoa butter healing jelly  >> additional contact sensitizations to fragrances and flavors  * chronic illness with exacerbation, progression, side effects from treatment            Compositae mix  PRINT    Where is this allergen found?    Compositae mix consists of extracts of the following plants from the compositae or Astraceae family: common yarrow, mountain arnica, Somali chamomile, feverfew, and the common tansy. Extracts of these plants may be found in cosmetics and skin care products and hair care products, anti-inflammatory medications, ointments and tinctures, mouth washes, and herbal lozenges.    How can you avoid contact with this allergen?    Avoid products that list any of the following names in the ingredients:     Achillea   Achillea millefolium   Arnica   Arnica flower   Arnica latifolia   Arnica montana   Blue chamomile oil   [place] [address]   Chamomile   Chamomilla romana   Common tan   Common yarrow   FEMA No. 2273   Feverfew   Paiz arnicae   Somali chamomile   Somali chamomile extract   Leopard's bane   Matricaria chamomilla L.   Matricaria oil   [facility]   [place] arnica   Mountain tobacco   Oil of matricaria   Sy   Tanacetum vulgare   Wild chamomile oil   Guerra's bane   Yarrow   Yarrow herb   EPA Pesticide Chemical Code 916402    What are some products that may contain this allergen?    Anti-Inflammatory Medications, Ointments and Tinctures  Cosmetics  Lip Balms  Hair Care Products  Herbal Lozenges  Herbal Teas  Mouth Washes  Skin Care Products        Propolis [B]  PRINT  THOMAS#: [id number]  Where is this allergen found?    Propolis is a wax-like resinous substance produced by the honeybee (Apis mellifera). Propolis is considered a traditional medicine by many and is used for a variety of  conditions, including inflammations, sore throat, viral diseases, ulcers, burn and wound care, to promote heart health, cataracts, acne, asthma, arthritis, boils, bleeding gums, bedsores and blisters, burning tongue, callused feet, common colds, corns, diaper rash, diarrhea, eczema, flu, female complaints, gastritis, gout, hay-fever, hair loss, insect bites, rhinitis, earache, inflammation of the prostate, moles, psoriasis, swollen glands, tendonitis, and warts. Depending upon its composition, propolis may is also used as an antibiotic, antifungal, antimicrobial, and an immunomodulator. Recent dental research indicates that it may protect against caries and canker sores, and has been used for canal debridement in endodontic procedures.    How can you avoid contact with this allergen?    Avoid products that list any of the following names in the ingredients:     Bee bread   Bee glue   [facility] [[email or web address]] [id number]   Hive dross  What are some products that may contain this allergen?    Cosmetics   Lip balm/lipstick   Lip liner  Cough Syrups  Food   Honey  Gum  Lotions  Lozenges  Ointments   Lip protectant   Cold sore treatment  Pills  Toothpaste  Vitamins      Wood tar mix  PRINT    Where is this allergen found?    Wood tar is an additive in the flavoring of candy, alcohol, and other foods. The byproducts of wood tar are charcoal and turpentine.    How can you avoid contact with this allergen?    Avoid products that list any of the following names in the ingredients:     [facility] [address] [facility]   [facility] [phone]   EPA Pesticide Chemical Code   156701   FEMA No. 2967   FEMA No. 2968   Pyroligneous acid   Pyroligneous acid extract   Pyroligneous acids   Pyroligneous vinegar   Wood Tar   Wood vinegar  What are some products that may contain this allergen?    Candy  Liquor

## 2022-12-02 NOTE — NURSING NOTE
Dermatology Rooming Note    Gisela Richards's goals for this visit include:   Chief Complaint   Patient presents with     Allergy Recheck     Patch testing day 5     Rachel Beltrán, CMA

## 2023-04-06 ENCOUNTER — OFFICE VISIT (OUTPATIENT)
Dept: FAMILY MEDICINE | Facility: OTHER | Age: 43
End: 2023-04-06
Attending: NURSE PRACTITIONER
Payer: COMMERCIAL

## 2023-04-06 VITALS
TEMPERATURE: 96.8 F | BODY MASS INDEX: 24.3 KG/M2 | OXYGEN SATURATION: 98 % | WEIGHT: 159.8 LBS | HEART RATE: 71 BPM | DIASTOLIC BLOOD PRESSURE: 88 MMHG | SYSTOLIC BLOOD PRESSURE: 122 MMHG | RESPIRATION RATE: 16 BRPM

## 2023-04-06 DIAGNOSIS — J01.40 ACUTE NON-RECURRENT PANSINUSITIS: Primary | ICD-10-CM

## 2023-04-06 PROCEDURE — 99213 OFFICE O/P EST LOW 20 MIN: CPT

## 2023-04-06 RX ORDER — LEVOTHYROXINE SODIUM 75 UG/1
1 TABLET ORAL DAILY
COMMUNITY
Start: 2023-02-03

## 2023-04-06 RX ORDER — LISDEXAMFETAMINE DIMESYLATE 40 MG/1
CAPSULE ORAL
COMMUNITY
Start: 2023-03-24

## 2023-04-06 RX ORDER — CEFDINIR 300 MG/1
300 CAPSULE ORAL 2 TIMES DAILY
Qty: 20 CAPSULE | Refills: 0 | Status: SHIPPED | OUTPATIENT
Start: 2023-04-06 | End: 2023-04-16

## 2023-04-06 RX ORDER — POLYETHYLENE GLYCOL 3350, SODIUM CHLORIDE, SODIUM BICARBONATE, POTASSIUM CHLORIDE 420; 11.2; 5.72; 1.48 G/4L; G/4L; G/4L; G/4L
POWDER, FOR SOLUTION ORAL
COMMUNITY
Start: 2022-10-31

## 2023-04-06 ASSESSMENT — PAIN SCALES - GENERAL: PAINLEVEL: SEVERE PAIN (6)

## 2023-04-06 NOTE — PATIENT INSTRUCTIONS
Please refer to your AVS for follow up and pain/symptoms management recommendations (I.e.: medications, helpful conservative treatment modalities, appropriate follow up if need to a specialist or family practice, etc.). Please return to urgent care if your symptoms change or worsen.     Discharge instructions:  -If you were prescribed a medication(s), please take this as prescribed/directed  -Monitor your symptoms, if changing/worsening, return to UC/ER or PCP for follow up    Sinus Infection:   1. Dry out congestion with flonase (1spray in both nostrils 2x daily for 3-5 days) and pseudoephedrine (1-2 tabs every 4-6 hrs for 3-5 days) unless contraindicated     2. Antihistamine such as Claritin or Zyrtec, etc. Generic brands are OK as well.     3. Use a saline spray/Neti Pot/sinus flush (Ariel Med Sinus Rinse) 2-3 times daily to irrigate sinuses/mucosal tissue. This dilutes and moves secretions.     4. Tylenol or ibuprofen for pain and fevers - alternate every 4 hours as needed. I.e.: Ibuprofen at 8am, Tylenol 12pm, Ibuprofen 4pm    -Daily maximum of Tylenol is 4000mg (recommend staying under 3000mg)   -Daily maximum of Ibuprofen is 3200 mg    5. Plenty of fluids and rest as needed.     6. Chew, yawn and speak to help eustachian tubes drain.     * If you are a smoker, try to quit *     - Consider the following over-the-counter products if you are older than 1 year and not pregnant: honey/chestal for cough relief and sambucus/elderberry for viral upper-respiratory symptoms.

## 2023-04-06 NOTE — NURSING NOTE
Pt presents to clinic today for possible sinus infection. Patient reports a new job that has quite a bit of dust and has been feeling symptoms for 3-5 weeks. Patient has tried allergy medications, nasal spray, and allergy/steroid nasal rinses. Patient also reports a painful lump in left armpit.       FOOD SECURITY SCREENING QUESTIONS:    The next two questions are to help us understand your food security.  If you are feeling you need any assistance in this area, we have resources available to support you today.    Hunger Vital Signs:  Within the past 12 months we worried whether our food would run out before we got money to buy more. Never  Within the past 12 months the food we bought just didn't last and we didn't have money to get more. Never    Medication Reconciliation: complete  Savi Pimentel LPN,LPN on 4/6/2023 at 6:05 PM

## 2023-04-06 NOTE — PROGRESS NOTES
ASSESSMENT/PLAN:    I have reviewed the nursing notes.  I have reviewed the findings, diagnosis, plan and need for follow up with the patient.    1. Acute non-recurrent pansinusitis  - cefdinir (OMNICEF) 300 MG capsule; Take 1 capsule (300 mg) by mouth 2 times daily for 10 days  Dispense: 20 capsule; Refill: 0    Physical exam and symptoms consistent with acute pansinusitis.  Will treat with Omnicef twice a day for 10 days as patient states that Augmentin has not worked well for her in the past and she has does not like doxycycline. Discussed symptomatic treatment - Encouraged fluids, elevation, humidifier, sinus rinse/netti pot, rest, etc. May use over-the-counter Tylenol or ibuprofen PRN.  Advised patient to continue use of her antihistamine and fluticasone nasal spray.    Discussed warning signs/symptoms indicative of need to f/u    Follow up if symptoms persist or worsen or concerns    I explained my diagnostic considerations and recommendations to the patient, who voiced understanding and agreement with the treatment plan. All questions were answered. We discussed potential side effects of any prescribed or recommended therapies, as well as expectations for response to treatments.    Julio Ruffin, NINI CNP  4/6/2023  6:11 PM    HPI:    Gisela Richards is a 42 year old female  who [facility] today for concerns of sinus symptoms    sinus infection x 5 weeks.     Symptoms:   Location: frontal and maxillary  Nasal congestion: Yes: +  Purulent nasal discharge: Yes: +  Headache: Yes: +  Facial pain: Yes: +   Cough: Yes: +  Tooth pain/symptoms: No  Myalgias: No  Presence of fever: No   Halitosis: No   Anosmia: No    Metallic taste in the mouth: No     Treatments tried: fluids, OTC meds and rest with minimal improvement.     History of similar symptoms: Yes: hx of sinusitis  Prior workup: No    PCP: Dr. Paniagua    Past Medical History:   Diagnosis Date     Constipation     Since young.     Other  "specified postprocedural states     2014,Slight recurrence within scar - removed in 9th grade and came back right away     Personal history of other medical treatment (CODE)      2, para 2.     Past Surgical History:   Procedure Laterality Date     COLONOSCOPY      ,\"swelling of the lining\" and flattening of villa.     OTHER SURGICAL HISTORY      ~,[id number],OTHER,Left,recurring within scar     OTHER SURGICAL HISTORY      2016,[id number],KNEE ARTHROSCOPY W/ MENISCAL REPAIR     OTHER SURGICAL HISTORY      2004,[id number],DILATION AND CURETTAGE,retained placenta     Social History     Tobacco Use     Smoking status: Former     Types: Cigarettes     Smokeless tobacco: Never     Tobacco comments:     \"10 cigarettes in my life before I was 19\"   Vaping Use     Vaping status: Never Used   Substance Use Topics     Alcohol use: Yes     Alcohol/week: 0.0 standard drinks of alcohol     Comment: Alcoholic Drinks/day: once monthly     Current Outpatient Medications   Medication Sig Dispense Refill     cetirizine (ZYRTEC) 10 MG tablet Take 10 mg by mouth       cholecalciferol (VITAMIN D3) 25 mcg (1000 units) capsule Take 1 capsule by mouth       ferrous sulfate (FEROSUL) 325 (65 Fe) MG tablet Take 1 tablet by mouth daily       folic acid (FOLVITE) 1 MG tablet Take 1 mg by mouth daily       hydrOXYzine (ATARAX) 25 MG tablet Take 1 tablet (25 mg) by mouth At Bedtime 30 tablet 1     levothyroxine (SYNTHROID/LEVOTHROID) 75 MCG tablet Take 1 tablet by mouth daily       polyethylene glycol-electrolytes (NULYTELY) 420 g solution TAKE PREP AS SPLIT DOSE PRIOR TO PROCEDURE PER GASTRO INSTRUCTION LETTER       vitamin B-12 (CYANOCOBALAMIN) 500 MCG tablet Take 1 tablet by mouth daily       VYVANSE 40 MG capsule TAKE 1 CAPSULE BY MOUTH IN THE MORNING WITH FOOD       amphetamine-dextroamphetamine (ADDERALL XR) 25 MG 24 hr capsule Take 25 mg by mouth daily (Patient not taking: Reported on 2023)       " amphetamine-dextroamphetamine (ADDERALL) 5 MG tablet TAKE 1 TABLET BY MOUTH ONCE DAILY IN THE LATE AFTERNOON (Patient not taking: Reported on 4/6/2023)       doxycycline hyclate (VIBRAMYCIN) 100 MG capsule Take 1 capsule by mouth 2 times daily (Patient not taking: Reported on 7/20/2022)       famotidine (PEPCID) 40 MG tablet Take 40 mg by mouth At Bedtime (Patient not taking: Reported on 8/25/2022)       fluorometholone (FML FORTE) 0.25 % ophthalmic suspension  (Patient not taking: Reported on 8/25/2022)       levothyroxine (SYNTHROID/LEVOTHROID) 75 MCG tablet Take 75 mcg by mouth daily (Patient not taking: Reported on 4/6/2023)       levothyroxine (SYNTHROID/LEVOTHROID) 75 MCG tablet Take 1 tablet (75 mcg) by mouth every morning (before breakfast) 90 tablet 4     loratadine (CLARITIN) 10 MG tablet Take 10 mg by mouth (Patient not taking: Reported on 8/25/2022)       loratadine (CLARITIN) 10 MG tablet Morning and evening 1 Tabl of 10mg for about 1 week and if no itching anymore, then reduce to 1 Tabl in the morning. (Patient not taking: Reported on 4/6/2023) 30 tablet 3     mometasone (NASONEX) 50 MCG/ACT spray 1 spray 2 times daily       predniSONE (DELTASONE) 50 MG tablet Emergency set: if severe allergic reaction take immediately 100mg Prednisone (2x50mg) and 2 Tabl Cetirizine 10mg and then write precise 12h retrospective diary.If less severe reaction take only the 2 Tabl Cetirizine (Patient not taking: Reported on 4/6/2023) 2 tablet 1     triamcinolone (KENALOG) 0.1 % external cream APPLY CREAM EXTERNALLY TWICE DAILY APPLY THIN FILM SPARINGLY TO ITCHY SPOTS. USE UP TO 2 WEEKS AT A TIME (Patient not taking: Reported on 4/6/2023)       triamcinolone (KENALOG) 0.1 % external cream  (Patient not taking: Reported on 8/25/2022)       triamcinolone (KENALOG) 0.1 % external ointment Apply 1 Dose topically 2 times daily (Patient not taking: Reported on 7/20/2022)       Allergies   Allergen Reactions     Cats Shortness Of  Breath     Dogs Fatigue, Palpitations, Shortness Of Breath and Tinnitus     Gentamicin Dermatitis     In patch tests severe reactions against Neomycine, Framycetine, Gentamicin, Bacitracine and Polymyxin.   For reasons of crossreactions I would as well avoid other aminoglycosides such as Vancomycine     Petrolatum Rash     Gluten Meal      Other reaction(s): Joint Pain     Itch-X Rash     Triamcinolone Rash     Fat atrophy after IM injection      Tramadol Other (See Comments)     Other reaction(s): Other - Describe In Comment Field  High doses caused yellow eyes, tremors in her mouth, couldn't feel some body parts.    No issues with low doses  High doses caused yellow eyes, tremors in her mouth, couldn't feel some body parts.    No issues with low doses     Food GI Disturbance     Night shades, stevia, pork     Gel Base Other (See Comments)     Allergy to Itch X. Gets blister/pustular rash in area the itch X is placed.     Hydrocodone-Acetaminophen Rash     Past medical history, past surgical history, current medications and allergies reviewed and accurate to the best of my knowledge.      ROS:  Refer to HPI    BP (!) 136/94 (BP Location: Left arm, Patient Position: Sitting, Cuff Size: Adult Regular)   Pulse 71   Temp 96.8  F (36  C) (Tympanic)   Resp 16   Wt 72.5 kg (159 lb 12.8 oz)   LMP 03/31/2023 (Exact Date)   SpO2 98%   BMI 24.30 kg/m      EXAM:  General Appearance: Well appearing 42 year old female, appropriate appearance for age. No acute distress   Ears: Left TM intact, translucent with bony landmarks appreciated, no erythema, no effusion, no bulging, no purulence.  Right TM intact, translucent with bony landmarks appreciated, no erythema, no effusion, no bulging, no purulence.  Left auditory canal clear.  Right auditory canal clear.  Normal external ears, non tender.  Eyes: conjunctivae normal without erythema or irritation, corneas clear, no drainage or crusting, no eyelid swelling, pupils equal    Oropharynx: moist mucous membranes, posterior pharynx without erythema, tonsils symmetric and 1+, no erythema, no exudates or petechiae, no post nasal drip seen, no trismus, voice clear.    Sinuses:  Mild sinus tenderness upon palpation of the frontal and maxillary sinuses  Nose:  Bilateral nares: mild erythema and edema, purulent drainage and congestion   Neck: supple without adenopathy  Respiratory: normal chest wall and respirations.  Normal effort.  Clear to auscultation bilaterally, no wheezing, crackles or rhonchi.  No increased work of breathing.  No cough appreciated.  Cardiac: RRR with no murmurs  Neuro: Alert and oriented to person, place, and time.    Psychological: normal affect, alert, oriented, and pleasant.

## 2023-05-02 ENCOUNTER — OFFICE VISIT (OUTPATIENT)
Dept: FAMILY MEDICINE | Facility: OTHER | Age: 43
End: 2023-05-02
Payer: COMMERCIAL

## 2023-05-02 VITALS
HEIGHT: 68 IN | DIASTOLIC BLOOD PRESSURE: 100 MMHG | BODY MASS INDEX: 24.35 KG/M2 | SYSTOLIC BLOOD PRESSURE: 130 MMHG | WEIGHT: 160.7 LBS | RESPIRATION RATE: 16 BRPM | HEART RATE: 82 BPM | TEMPERATURE: 97.5 F | OXYGEN SATURATION: 99 %

## 2023-05-02 DIAGNOSIS — J01.41 ACUTE RECURRENT PANSINUSITIS: Primary | ICD-10-CM

## 2023-05-02 PROCEDURE — 99213 OFFICE O/P EST LOW 20 MIN: CPT

## 2023-05-02 RX ORDER — DEXTROAMPHETAMINE SACCHARATE, AMPHETAMINE ASPARTATE, DEXTROAMPHETAMINE SULFATE AND AMPHETAMINE SULFATE 2.5; 2.5; 2.5; 2.5 MG/1; MG/1; MG/1; MG/1
10 TABLET ORAL DAILY PRN
COMMUNITY
Start: 2023-05-01

## 2023-05-02 ASSESSMENT — PAIN SCALES - GENERAL: PAINLEVEL: MILD PAIN (3)

## 2023-05-02 NOTE — PROGRESS NOTES
ASSESSMENT/PLAN:    I have reviewed the nursing notes.  I have reviewed the findings, diagnosis, plan and need for follow up with the patient.    1. Acute recurrent pansinusitis  - amoxicillin-clavulanate (AUGMENTIN) 875-125 MG tablet; Take 1 tablet by mouth 2 times daily for 7 days  Dispense: 14 tablet; Refill: 0    Physical exam and symptoms consistent with recurrent pansinusitis.  We will treat with Augmentin twice a day for 7 days.  Advised patient to continue taking her Zyrtec and Nasonex as prescribed. Discussed symptomatic treatment - Encouraged fluids, elevation, humidifier, sinus rinse/netti pot, rest, etc. May use over-the-counter Tylenol or ibuprofen PRN.  Advised patient that if her symptoms persist that she should follow-up with her allergist and should consider a follow-up with ENT as well.    Discussed warning signs/symptoms indicative of need to f/u    Follow up if symptoms persist or worsen or concerns    I explained my diagnostic considerations and recommendations to the patient, who voiced understanding and agreement with the treatment plan. All questions were answered. We discussed potential side effects of any prescribed or recommended therapies, as well as expectations for response to treatments.    Julio Ruffin, NINI CNP  5/2/2023  6:34 PM    HPI:    Gisela Richards is a 42 year old female  who presents to [facility] today for concerns of sinus symptoms     sinus infection x 3 weeks.     Symptoms:   Location: diffuse  Nasal congestion: Yes: +  Purulent nasal discharge: Yes: +  Headache: Yes: +  Facial pain: Yes: +   Cough: Yes: +  Tooth pain/symptoms: No  Myalgias: No  Presence of fever: No   Halitosis: No   Anosmia: No    Metallic taste in the mouth: No    Patient states that she has a long history of sinus issues.  She states that she used to have a dog which caused frequent sinus infections.  She states that she then got rid of her dog and her symptoms improved.  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 Report included the following information SBAR, Kardex and MAR. "an increase in sinus symptoms possibly due to her job, she thinks that there may be allergens in the events or in the carpet.  Patient has seen ENT in the past and had a CT of her sinuses which she states showed narrowing of one of her nasal passages.  She also follows with an allergist.    Treatments tried: fluids, OTC meds and rest with minimal improvement.  Patient states that she has been taking Benadryl multiple times a day and doing sinus rinses.  She takes a daily Zyrtec.  She has stopped doing the Benadryl and sinus rinses over the past few days due to concerns of rebound symptoms.    History of similar symptoms: Yes  Prior workup: Yes: 24 treated for sinusitis with erlin    PCP: Dr. Paniagua    Past Medical History:   Diagnosis Date     Constipation     Since young.     Other specified postprocedural states     2014,Slight recurrence within scar - removed in 9th grade and came back right away     Personal history of other medical treatment (CODE)      2, para 2.     Past Surgical History:   Procedure Laterality Date     COLONOSCOPY      ,\"swelling of the lining\" and flattening of villa.     OTHER SURGICAL HISTORY      ~,[id number],OTHER,Left,recurring within scar     OTHER SURGICAL HISTORY      2016,[id number],KNEE ARTHROSCOPY W/ MENISCAL REPAIR     OTHER SURGICAL HISTORY      2004,[id number],DILATION AND CURETTAGE,retained placenta     Social History     Tobacco Use     Smoking status: Former     Types: Cigarettes     Smokeless tobacco: Never     Tobacco comments:     \"10 cigarettes in my life before I was 19\"   Vaping Use     Vaping status: Never Used   Substance Use Topics     Alcohol use: Yes     Alcohol/week: 0.0 standard drinks of alcohol     Comment: Alcoholic Drinks/day: once monthly     Current Outpatient Medications   Medication Sig Dispense Refill     amphetamine-dextroamphetamine (ADDERALL) 10 MG tablet Take 10 mg by mouth daily as needed       cetirizine (ZYRTEC) 10 MG " tablet Take 10 mg by mouth       cholecalciferol (VITAMIN D3) 25 mcg (1000 units) capsule Take 1 capsule by mouth       ferrous sulfate (FEROSUL) 325 (65 Fe) MG tablet Take 1 tablet by mouth daily       folic acid (FOLVITE) 1 MG tablet Take 1 mg by mouth daily       hydrOXYzine (ATARAX) 25 MG tablet Take 1 tablet (25 mg) by mouth At Bedtime 30 tablet 1     levothyroxine (SYNTHROID/LEVOTHROID) 75 MCG tablet Take 1 tablet by mouth daily       polyethylene glycol-electrolytes (NULYTELY) 420 g solution TAKE PREP AS SPLIT DOSE PRIOR TO PROCEDURE PER GASTRO INSTRUCTION LETTER       vitamin B-12 (CYANOCOBALAMIN) 500 MCG tablet Take 1 tablet by mouth daily       VYVANSE 40 MG capsule TAKE 1 CAPSULE BY MOUTH IN THE MORNING WITH FOOD       amphetamine-dextroamphetamine (ADDERALL XR) 25 MG 24 hr capsule Take 25 mg by mouth daily (Patient not taking: Reported on 4/6/2023)       amphetamine-dextroamphetamine (ADDERALL) 5 MG tablet TAKE 1 TABLET BY MOUTH ONCE DAILY IN THE LATE AFTERNOON (Patient not taking: Reported on 4/6/2023)       doxycycline hyclate (VIBRAMYCIN) 100 MG capsule Take 1 capsule by mouth 2 times daily (Patient not taking: Reported on 7/20/2022)       famotidine (PEPCID) 40 MG tablet Take 40 mg by mouth At Bedtime (Patient not taking: Reported on 8/25/2022)       fluorometholone (FML FORTE) 0.25 % ophthalmic suspension  (Patient not taking: Reported on 8/25/2022)       levothyroxine (SYNTHROID/LEVOTHROID) 75 MCG tablet Take 75 mcg by mouth daily (Patient not taking: Reported on 4/6/2023)       levothyroxine (SYNTHROID/LEVOTHROID) 75 MCG tablet Take 1 tablet (75 mcg) by mouth every morning (before breakfast) 90 tablet 4     loratadine (CLARITIN) 10 MG tablet Take 10 mg by mouth (Patient not taking: Reported on 8/25/2022)       loratadine (CLARITIN) 10 MG tablet Morning and evening 1 Tabl of 10mg for about 1 week and if no itching anymore, then reduce to 1 Tabl in the morning. (Patient not taking: Reported on  4/6/2023) 30 tablet 3     mometasone (NASONEX) 50 MCG/ACT spray 1 spray 2 times daily       predniSONE (DELTASONE) 50 MG tablet Emergency set: if severe allergic reaction take immediately 100mg Prednisone (2x50mg) and 2 Tabl Cetirizine 10mg and then write precise 12h retrospective diary.If less severe reaction take only the 2 Tabl Cetirizine (Patient not taking: Reported on 4/6/2023) 2 tablet 1     triamcinolone (KENALOG) 0.1 % external cream APPLY CREAM EXTERNALLY TWICE DAILY APPLY THIN FILM SPARINGLY TO ITCHY SPOTS. USE UP TO 2 WEEKS AT A TIME (Patient not taking: Reported on 4/6/2023)       triamcinolone (KENALOG) 0.1 % external cream  (Patient not taking: Reported on 8/25/2022)       triamcinolone (KENALOG) 0.1 % external ointment Apply 1 Dose topically 2 times daily (Patient not taking: Reported on 7/20/2022)       Allergies   Allergen Reactions     Cats Shortness Of Breath     Dogs Fatigue, Palpitations, Shortness Of Breath and Tinnitus     Gentamicin Dermatitis     In patch tests severe reactions against Neomycine, Framycetine, Gentamicin, Bacitracine and Polymyxin.   For reasons of crossreactions I would as well avoid other aminoglycosides such as Vancomycine     Petrolatum Rash     Gluten Meal      Other reaction(s): Joint Pain     Pramoxine-Benzyl Alcohol Rash     Triamcinolone Rash     Fat atrophy after IM injection      Tramadol Other (See Comments)     Other reaction(s): Other - Describe In Comment Field  High doses caused yellow eyes, tremors in her mouth, couldn't feel some body parts.    No issues with low doses  High doses caused yellow eyes, tremors in her mouth, couldn't feel some body parts.    No issues with low doses     Food GI Disturbance     Night shades, stevia, pork     Gel Base Other (See Comments)     Allergy to Itch X. Gets blister/pustular rash in area the itch X is placed.     Hydrocodone-Acetaminophen Rash     Past medical history, past surgical history, current medications and  "allergies reviewed and accurate to the best of my knowledge.      ROS:  Refer to HPI    BP (!) 130/100 (BP Location: Left arm, Patient Position: Sitting, Cuff Size: Adult Regular)   Pulse 82   Temp 97.5  F (36.4  C) (Tympanic)   Resp 16   Ht 1.715 m (5' 7.5\")   Wt 72.9 kg (160 lb 11.2 oz)   LMP 04/27/2023 (Exact Date)   SpO2 99%   BMI 24.80 kg/m      EXAM:  General Appearance: Well appearing 42 year old female, appropriate appearance for age. No acute distress   Ears: Left TM intact, translucent with bony landmarks appreciated, no erythema, no effusion, no bulging, no purulence.  Right TM intact, translucent with bony landmarks appreciated, no erythema, no effusion, no bulging, no purulence.  Left auditory canal clear.  Right auditory canal clear.  Normal external ears, non tender.  Eyes: conjunctivae normal without erythema or irritation, corneas clear, no drainage or crusting, no eyelid swelling, pupils equal   Oropharynx: moist mucous membranes, posterior pharynx without erythema, tonsils symmetric and 1+, no erythema, no exudates or petechiae, purulent post nasal drip seen, no trismus, voice clear.    Sinuses:  sinus tenderness upon palpation of the frontal and maxillary sinuses  Nose:  Bilateral nares: mild erythema and edema, purulent drainage and congestion   Neck: supple without adenopathy  Respiratory: normal chest wall and respirations.  Normal effort.  Clear to auscultation bilaterally, no wheezing, crackles or rhonchi.  No increased work of breathing.  No cough appreciated.  Cardiac: RRR with no murmurs  Neuro: Alert and oriented to person, place, and time.    Psychological: normal affect, alert, oriented, and pleasant.     "

## 2023-05-02 NOTE — NURSING NOTE
Pt presents to clinic today for continued sinus infection. [facility] thinking her allergies were acting up pretty bad but was diagnosed with a sinus infection. Patient also thought she maybe was allergic to her new job. 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 Patient completed abx and reports feeling slightly better but is still feeling like she has a sinus infection.       FOOD SECURITY SCREENING QUESTIONS:    The next two questions are to help us understand your food security.  If you are feeling you need any assistance in this area, we have resources available to support you today.    Hunger Vital Signs:  Within the past 12 months we worried whether our food would run out before we got money to buy more. Never  Within the past 12 months the food we bought just didn't last and we didn't have money to get more. Never      Medication Reconciliation: complete  Savi Pimentel LPN,LPN on 5/2/2023 at 6:29 PM

## 2023-06-04 ENCOUNTER — HEALTH MAINTENANCE LETTER (OUTPATIENT)
Age: 43
End: 2023-06-04

## 2023-06-06 ENCOUNTER — TELEPHONE (OUTPATIENT)
Dept: ALLERGY | Facility: CLINIC | Age: 43
End: 2023-06-06
Payer: COMMERCIAL

## 2023-06-06 NOTE — TELEPHONE ENCOUNTER
Via phone pt was scheduled for the  following:    Appointment type: Return Allergy Video  Provider:   Return date: 6-16-23  Specialty phone number: [phone]

## 2023-06-06 NOTE — TELEPHONE ENCOUNTER
Lvm my chart msg for patient to call to schedule the  following:    Appointment type: Return Allergy  Provider:   Return date: 6-10-23  Specialty phone number: [phone]

## 2023-06-14 NOTE — PROGRESS NOTES
[facility] Dermato-allergology Note  Virtual visit: store and forward video ([employer] connected), start time: 12:50pm, end time: 1:20pm, date of images: during video  Encounter Date: Jun 16, 2023  ____________________________________________    CC: No chief complaint on file.      HPI:  (Jun 16, 2023)  Ms. Gisela Richards is a(n) 42 year old female who presents today as a return patient for allergy consultation  - Follow-up in Derm-Allergy clinic in about 4 months  >> was working in a local [employer] in Feb/March. Started with red, itchy eyes. Later, when changed to full time was tired and patient forgot one time to take the antihistamines --> then developed recurrent Sinus infections, constant PND, sinus pressure and puffy eyes, fatigue and again wheezing and coughing. Feels better on Weekends.  >> will have with employer a air quality test    >> on Cetirizine once daily and Benadryl and nasal rinses    - otherwise feeling well in usual state of health    Physical exam:  General: In no acute distress, well-developed, well-nourished  Eyes: no conjunctivitis  ENT: no signs of rhinitis   Pulmonary: no wheezing or coughing  Skin:Focused examination of the skin on test sites was performed = see test results below    Earlier History and Allergy exams:  (Dec 2, 2022)  - Follow-up in Derm-Allergy clinic for 2nd readings and final conclusions after 4 days (virtual) = from [facility]     Earlier History and Allergy exams:  (Nov 30, 2022)  - Follow-up in Derm-Allergy clinic for 1st readings of patch tests after 2 days (virtual) = from [facility]     Earlier History and Allergy exams:  (Nov 28, 2022)  - Follow-up in Derm-Allergy clinic for additional patch tests (see series in red) and earlier if hives not under control    Earlier History and Allergy exams:  (Jul 27, 2022)  - Follow-up: in Derm-Allergy clinic as needed  - on July 5th did a photoshoot in the field and then observed on the ankle  a small spot, that became bigger (brown, not really infiltrated) and the morning of the 7th July a Dermatologist looked at it and in histology it was a necrosis unknown cause.  - 1 week later itching on that area with vesicular reaction = eczematous reaction  - on the 15th did bacterial culture (no bacterial growth)  - as soon as the patient stopped the vaseline with same adhesive band aid the lesions improved  - patient observe anyway recently reactions to various vaseline type ointment (incl Triamcinolone acetonide) = with cream no problem  --> took at the end orally prednisone and Doxycycline = stopped the Prednisone Monday the 18th of July and the Doxycycline on the 18th. ==> itching and urticarial rash started on the 22nd of July after being in the sun  --> NP prescribed Claritine and Prilosec and Benadryl   Skin:in the moment no signs for skin lesions, but patient had the eczematous lesions on the ankle   Some erythema over the decolletage    Earlier History and Allergy exams:  01/22/2021  Says she hasn't noticed any reaction to the patch testing or where we injected. Would like to try the meloxicam for her back pain. .    Earlier History and Allergy exams:   Ibuprofen does not induce any reaction.    > Patient had a reaction to Meloxicam in November. Took one Tabl for the first time and about 3-6h later with swelling and redness around the lips with dryness and then a sun burn type of rash over upper chest, stomach and back. No itching. No sun exposure involved. Redness disappeared after 1-2 days. Never tongue swelling or breathing problems. Out of chin skin wheeping. Took almost one week to fully recover.  ==> delayed type reaction    Earlier History and Allergy exams:    Patient is continuing now the IT without any problems, since they are not using ItchEx anymore. Just using ice.  IT injections is done by an primary care PA in [facility]    > Patient had a reaction to Meloxicam about 3 h later  with angioedema on face and then sun burn type of rash over face --> happened in November  > Ibuprofen does not induce any reaction (last taken last Saturday)    With tramadol tremors and swellings of face and with Percacet (Oxycodon and Acetaminophen) migraines  Vicodin induces erythema  Today she reports that all skin manifestation has resolved. Patient has come from [place], [place]. Patient's spouse is present during evaluation. She reports her first allergy shots took place on November 9th, 2 injections on posterior aspect of right arm and 1 to the posterior aspect of left arm.   - Per chart review immunotherapy: trees/grass/weeds, mites/molds and animals  Within the day of the injection patient noted a diffuse pruritic rash on the posterior aspect of her right arm. She denies using any topicals or oral medications beside Zyrtec to aid with on going rash. She additionally iced the area and skin manifestation had resolved.     The following week she completed her 2nd round of immunotherapy at the same dosage. Again the rash occurred on the posterior aspect of her right arm along with outstanding edema of her right arm and shoulder. This rash was also pruritic, but again denied any pain. Her provider was concerned about reaction to the preservative. This rash took 1.5 week to self resolve. The edema persisted for 2-3 weeks and when resolved, a notable dent was appreciated on the right distal aspect of the deltoid. Imaging completed showed atrophy of the deltoid. She denied any recent injections to that area including routine vaccines or steroid injections. Also now having to areas of hypopigmentation overlying the deltoid.  Reports dry skin after resolution of rashes. Denies history of asthma. Extensive history of food sensitives and autoimmune workup that has been unremarkable.     Hx since 12/27/19:  The patient comes in today for patch testing day 1. She reports that there is a bruise on the R shoulder  "that has worsened in the interim. She is unsure if it may be necrosis or sudden muscle atrophy. She reports it appeared on , the day after allergy shot. Also states that in the same area she developed a new \"bump\" that appeared about 2 days ago.   Reports that the IT on the R upper extremity was against trees, weeds, molds, mites, and dog. On the L upper extremity, she had IT injections for cats and dogs.   She is otherwise feeling well overall today. No other skin or allergy concerns at this time.     Medications:  - takes daily Zyrtec and is on IT  - Levothyroxin      Past Medical History:   Patient Active Problem List   Diagnosis     Allergic rhinitis due to dogs     Hypothyroid     Lumbar facet joint pain     Past Medical History:   Diagnosis Date     Constipation     Since young.     Other specified postprocedural states     2014,Slight recurrence within scar - removed in 9th grade and came back right away     Personal history of other medical treatment (CODE)      2, para 2.     Past Surgical History:   Procedure Laterality Date     COLONOSCOPY      ,\"swelling of the lining\" and flattening of villa.     OTHER SURGICAL HISTORY      ~,[id number],OTHER,Left,recurring within scar     OTHER SURGICAL HISTORY      2016,[id number],KNEE ARTHROSCOPY W/ MENISCAL REPAIR     OTHER SURGICAL HISTORY      2004,[id number],DILATION AND CURETTAGE,retained placenta       Social History:  Patient reports that she has quit smoking. Her smoking use included cigarettes. She has never used smokeless tobacco. She reports current alcohol use. She reports that she does not use drugs.    Family History:  Family History   Problem Relation Age of Onset     Family History Negative Father         Good Health,does not see doctor regularly     Thyroid Disease Mother         Thyroid Disease,Hypothyroid     Other - See Comments Mother         Smoker, prediabetes     Thyroid Disease Sister         Thyroid Disease,Hypothyroid "     Arthritis Sister         Arthritis     Scoliosis Maternal Half-Sister         Scoliosis     Other - See Comments Maternal Half-Sister         No Known Problems       Medications:  Current Outpatient Medications   Medication Sig Dispense Refill     amphetamine-dextroamphetamine (ADDERALL XR) 25 MG 24 hr capsule Take 25 mg by mouth daily (Patient not taking: Reported on 4/6/2023)       amphetamine-dextroamphetamine (ADDERALL) 10 MG tablet Take 10 mg by mouth daily as needed       amphetamine-dextroamphetamine (ADDERALL) 5 MG tablet TAKE 1 TABLET BY MOUTH ONCE DAILY IN THE LATE AFTERNOON (Patient not taking: Reported on 4/6/2023)       cetirizine (ZYRTEC) 10 MG tablet Take 10 mg by mouth       cholecalciferol (VITAMIN D3) 25 mcg (1000 units) capsule Take 1 capsule by mouth       doxycycline hyclate (VIBRAMYCIN) 100 MG capsule Take 1 capsule by mouth 2 times daily (Patient not taking: Reported on 7/20/2022)       famotidine (PEPCID) 40 MG tablet Take 40 mg by mouth At Bedtime (Patient not taking: Reported on 8/25/2022)       ferrous sulfate (FEROSUL) 325 (65 Fe) MG tablet Take 1 tablet by mouth daily       fluorometholone (FML FORTE) 0.25 % ophthalmic suspension  (Patient not taking: Reported on 8/25/2022)       folic acid (FOLVITE) 1 MG tablet Take 1 mg by mouth daily       hydrOXYzine (ATARAX) 25 MG tablet Take 1 tablet (25 mg) by mouth At Bedtime 30 tablet 1     levothyroxine (SYNTHROID/LEVOTHROID) 75 MCG tablet Take 1 tablet by mouth daily       levothyroxine (SYNTHROID/LEVOTHROID) 75 MCG tablet Take 75 mcg by mouth daily (Patient not taking: Reported on 4/6/2023)       levothyroxine (SYNTHROID/LEVOTHROID) 75 MCG tablet Take 1 tablet (75 mcg) by mouth every morning (before breakfast) 90 tablet 4     loratadine (CLARITIN) 10 MG tablet Take 10 mg by mouth (Patient not taking: Reported on 8/25/2022)       loratadine (CLARITIN) 10 MG tablet Morning and evening 1 Tabl of 10mg for about 1 week and if no itching  anymore, then reduce to 1 Tabl in the morning. (Patient not taking: Reported on 4/6/2023) 30 tablet 3     mometasone (NASONEX) 50 MCG/ACT spray 1 spray 2 times daily       polyethylene glycol-electrolytes (NULYTELY) 420 g solution TAKE PREP AS SPLIT DOSE PRIOR TO PROCEDURE PER GASTRO INSTRUCTION LETTER       predniSONE (DELTASONE) 50 MG tablet Emergency set: if severe allergic reaction take immediately 100mg Prednisone (2x50mg) and 2 Tabl Cetirizine 10mg and then write precise 12h retrospective diary.If less severe reaction take only the 2 Tabl Cetirizine (Patient not taking: Reported on 4/6/2023) 2 tablet 1     triamcinolone (KENALOG) 0.1 % external cream APPLY CREAM EXTERNALLY TWICE DAILY APPLY THIN FILM SPARINGLY TO ITCHY SPOTS. USE UP TO 2 WEEKS AT A TIME (Patient not taking: Reported on 4/6/2023)       triamcinolone (KENALOG) 0.1 % external cream  (Patient not taking: Reported on 8/25/2022)       triamcinolone (KENALOG) 0.1 % external ointment Apply 1 Dose topically 2 times daily (Patient not taking: Reported on 7/20/2022)       vitamin B-12 (CYANOCOBALAMIN) 500 MCG tablet Take 1 tablet by mouth daily       VYVANSE 40 MG capsule TAKE 1 CAPSULE BY MOUTH IN THE MORNING WITH FOOD         Allergies   Allergen Reactions     Cats Shortness Of Breath     Dogs Fatigue, Palpitations, Shortness Of Breath and Tinnitus     Gentamicin Dermatitis     In patch tests severe reactions against Neomycine, Framycetine, Gentamicin, Bacitracine and Polymyxin.   For reasons of crossreactions I would as well avoid other aminoglycosides such as Vancomycine     Petrolatum Rash     Gluten Meal      Other reaction(s): Joint Pain     Pramoxine-Benzyl Alcohol Rash     Triamcinolone Rash     Fat atrophy after IM injection      Tramadol Other (See Comments)     Other reaction(s): Other - Describe In Comment Field  High doses caused yellow eyes, tremors in her mouth, couldn't feel some body parts.    No issues with low doses  High doses caused  yellow eyes, tremors in her mouth, couldn't feel some body parts.    No issues with low doses     Food GI Disturbance     Night shades, stevia, pork     Gel Base Other (See Comments)     Allergy to Itch X. Gets blister/pustular rash in area the itch X is placed.     Hydrocodone-Acetaminophen Rash     Order for PATCH TESTS    [x] Outpatient  [] Inpatient: Head..../ Bed ....      Skin Atopy (atopic dermatitis) [x] Yes   [] No  Rhinitis/Sinusitis:   [x] Yes   [] No  Allergic Asthma:   [] Yes   [x] No  Food Allergy:   [x] Yes   [] No  Leg ulcers:   [] Yes   [x] No  Hand eczema:   [] Yes   [x] No   Leading hand:   [x] R   [] L       [] Ambidextrous                      Reason for tests (suspected allergy): Assessment for potential reaction to disinfections before IT (less likely preservatives in IT, because the reaction not directly over injection site)  Known previous allergies: Trees/grass, mites, dogs/cats  Standardized panels (repeat patch tests in red)  [x] Standard panel (40 tests)  [x] Preservatives & Antimicrobials (31 tests)  [x] Emulsifiers & Additives (25 tests) particularly propylene glycol  [x] Perfumes/Flavours & Plants (25 tests)  [] Hairdresser panel (12 tests)  [x] Rubber Chemicals (22 tests)  [x] Plastics (26 tests)  [] Colorants/Dyes/Food additives (20 tests)  [] Metals (implants/dental) (24 tests)  [] Local anaesthetics/NSAIDs (13 tests)  [x] Antibiotics & Antimycotics (14 tests)   [] Corticosteroids (15 tests)   [] Photopatch test (62 tests)   [] others: ...      [x] Patient's own products: tough strips band aids and vaseline and Triamcinolone cream and ointment    DO NOT test if chemical or biological identity is unknown!     always ask from patient the product information and safety sheets (MSDS)   [] Atopy screen prick test (Atopic predisposition): ...      RESULTS & EVALUATION of PATCH TESTS    Patch test readings after     [x] 2 days, [] 3 days [x] 4 days, [] 5 days,    Applied patch tests with  results (import here the list of patch tests):  Order documented by: IVAN HANCOCK LPN  Order reviewed by: Preeti Copeland LPN   Physician:   Date/time of application:1-  Localization of application: back >> Clear    STANDARD Series         No Substance 2 days 4 days remarks   1 Francesco Mix [C] - -    2 Colophony - -    3  2-Mercaptobenzothiazole  NA NA     4 Methylisothiazolinone - -    5 Carba Mix - -    6 Thiuram Mix [A] NA NA    7 Bisphenol A Epoxy Resin - -    8 A-Xgsi-Tcrbtfmroyx-Formaldehyde Resin - -    9 Mercapto Mix [A] - -    10 Black Rubber Mix- PPD [B] NA NA    11 Potassium Dichromate  -  -    12 Balsam of [place] (Myroxylon Pereirae Resin) - -    13 Nickel Sulphate Hexahydrate - -    14 Mixed Dialkyl Thiourea - -    15 Paraben Mix [B] - -    16 Methyldibromo Glutaronitrile NA NA    17 Fragrance Mix - -    18 2-Bromo-2-Nitropropane-1,3-Diol (Bronopol) - -    19 Lyral - -    20 Tixocortol-21- Pivalate NA NA    21 Diazolidiyl Urea (Germall II) - -    22 Methyl Methacrylate NA NA    23 Cobalt (II) Chloride Hexahydrate - -    24 Fragrance Mix II  - -    25 Compositae Mix - -    26 Benzoyl Peroxide NA NA    27 Bacitracin - ++/+++    28 Formaldehyde - -    29 Methylchloroisothiazolinone / Methylisothiazolinone - -    30 Corticosteroid Mix - -    31 Sodium Lauryl Sulfate - -    32 Lanolin Alcohol - -    33 Turpentine - -    34 Cetylstearylalcohol - -    35 Chlorhexidine Dicluconate - -    36 Budenoside - -    37 Imidazolidinyl Urea  - -    38 Ethyl-2 Cyanoacrylate NA NA    39 Quaternium 15 (Dowicil 200) - -    40 Decyl Glucoside - -      EMULSIFIERS & ADDITIVES        No Substance 2 days 4 days remarks   41 Polyethylene Glycol-400 NA NA    42 Cocamidopropyl Betaine - -    43 Amerchol L101 NA NA    44 Propylene Glycol - -    45 Triethanolamine - -    46 Sorbitane Sesquiolate - -    47 Isopropylmyristate - -    48 Polysorbate 80  - -    49 Amidoamine   (Stearamidopropyl Dimethylamine) - -    50  Oleamidopropyl Dimethylamine - -    51 Lauryl Glucoside NA NA    52 Coconut Diethanolamide  - -    53 2-Hydroxy-4-Methoxy Benzophenone (Oxybenzone) - -    54 Benzophenone-4 (Sulisobenzon) - -    55 Propolis - -    56 Dexpanthenol - -    57 Carboxymethyl Cellulose Sodium - -    58 Abitol - -    59 Tert-Butylhydroquinone - -    60 Benzyl Salicylate - -     Antioxidant      61 Dodecyl Gallate - -    62 Butylhydroxyanisole (BHA) - -    63 Butylhydroxytoluene (BHT) - -    64 Di-Alpha-Tocopherol (Vit E) - -    65 Propyl Gallate - -      RUBBER CHEMICALS         No Substance 2 days 4 days remarks    Carbamate      66 Zinc Bis ( Diethyldithio carbamate ) (ZDEC) - -    67 Zinc Bis (Dibutyldithiocarbamate) - -    68 1,3-Diphenylguanidine (DPG) - -     Thiourea      69 Dibutylthiourea - -    70 Diphenyltiourea - -    71 Thiourea - -     Mercapto chemicals      72 Morpholinyl Mercaptobenzothiazole - -    73 Z-Csylmwsipe-9-Benzothiazyl-Sulfenamide - -    74 Dibenzothiazyl Disulfide - -     Thiuram chemicals      75 Dipentamethylenethiuram Disulfide - -    76 Tetraethylthiuram Disulfide (Disulfiram) - -    77 Tetramethylthiuram Disulfide - -    78 Tetramethyl Thiuram Monosulfide (TMTM) - -     4-Phenylenediamine derivatives      79 N-Isopropyl-N'-Phenyl-P-Phenylenediamine (IPPD) - -    80 Qxkhfroh-C-Qegbwqzlrmacxyvd (DPPD) - -     Various Rubber Additives      81 Hydroquinone Monobenzylether  - -    82 Hexamethylenetetramine - -    83 4,4'-Dihydroxybiphenyl - -    84 Cyclohexylthiophtalimide - -    85 Ethylenediamine Dihydrochloride - -    86 N-Phenyl-B-Naphthylamine - -    87 Dodecyl Mercaptan - -        PLASTICS         No Substance 2 days 4 days remarks    Acrylates - -    88 2-Hydroxyethyl Methacrylate (HEMA) - -    89 1,4-Butandioldimethacrylate (BUDMA) - -    90  2-Ethylhexyl Acrylate - -    91 Bisphenol-A-Dimethacrylate  - -    92 Diurethane-Dimethacrylate - -    93 Ethyleneglycoldimethacrylate (EGDMA) - -    94  Pentaerythritoltriacrylate (CARLOS) - -    95 Triethylene Glycol Dimethacrylate (TEGDMA) - -     Synthetic material/additives       96 M-Ychm-Hiwtlstqcrk - -    97 Tricresyl Phosphate - -    98 2-Jxhl-Fnekbrpnsiaqk - -    99 Bis (2-Ethylhexyl) Phthalate - -    100 Dibutylphthalate - -    101 Dimethylphthalate - -    102 Toluene-2,4-Diisocyanate - -    103 Diphenylmethane-4,4''-Diisocyanate - -     EPOXY RESIN SYSTEMS       Reactive Solvents - -    104 Cresyl Glycidyl Ether - -    105 Butyl Glycidyl Ether - -    106 Phenyl Glycidyl Ether - -    107 1,4-Butanediol Diglycidyl Ether - -    108 1,6-Hexanediole Diglycidyl Ether - -     Hardener / Accelerator - -    109 Ethylenediamine Dihydrochloride - -    110 Triethylenetetramine - -    111 Diethylenetriamine - -    112 Isophorone Diamine (IPD) - -    113 N,N-Dimethyl-P-Toluidine - -        PRESERVATIVES & ANTIMICROBIALS         No Substance 2 days 4 days remarks   114  1,2-Benzisothiazoline-3-One, Sodium Salt - -    115  1,3,5-Anival (2-Hydroxyethyl) - Hexahydrotriazine (Grotan BK) NA NA    116 5-Xdloskftdzwia-0-Nitro-1, 3-Propanediol - -    117  3, 4, 4' - Triclocarban - -    118 4 - Chloro - 3 - Cresol - -    119 4 - Chloro - 4 - Xylenol (PCMX) NA NA    120 7-Ethylbicyclooxazolidine ([place] [id number]) - -    121 Benzalkonium Chloride - -    122 Benzyl Alcohol - -    123 Cetalkonium Chloride NA NA    124 Cetylpyrimidine Chloride  - -    125 Chloroacetamide - -    126 DMDM Hydantoin NA NA    127 Glutaraldehyde NA NA    128 Triclosan - -    129 Glyoxal Trimeric Dihydrate - -    130 Iodopropynyl Butylcarbamate - -    131 Octylisothiazoline NA NA    132 Iodoform - -    133 (Nitrobutyl) Morpholine/(Ethylnitro-Trimethylene) Dimorpholine (Bioban P 1487) - -    134 Phenoxyethanol NA NA    135 Phenyl Salicylate - -    136 Povidone Iodine - -    137 Sodium Benzoate - -    138 Sodium Disulfite NA NA    139 Sorbic Acid - -    140 Thimerosal - -     Parabens      141  Butyl-P-Hydroxybenzoate - -    142 Ethyl-P-Hydroxybenzoate - -    143 Methyl-P-Hydroxybenzoate NA NA    144 Propyl-P-Hydroxybenzoate - -         ANTIBIOTICS & ANTIMYCOTICS         No Substance 2 days 4 days remarks   145 Erythromycine - -    146 Framycetine Sulphate (+) +++    147 Fusidic Acid Sodium Salt - -    148 Gentamicin Sulphate - +    149 Neomycine Sulphate (+) ++/+++    150 Oxytetracycline  - -    151 Polymyxin B Sulphate (+) ++    152 Tetracycline-HCL - -    153 Sulfanilamide - -    154 Metronidazole - -    155 Oxyquinoline Mix - -    156 Nitrofurazone - -    157 Nystatin - -    158 Clotrimazole - -      PATIENTS OWN PRODUCTS         No Substance Conc  % solv 2 days 4 days remarks   159 Aspergillus   - -    160 Penicillium   - -    161 D. Farinae   - -    162 D. pteronyssinus   - -    163 Dog   - -    164 Cat   - -        Nov 28, 2022 repeat application of patch tests:  Patch test readings after     [x] 2 days, [] 3 days [x] 4 days, [] 5 days,  Other duration: ...    STANDARD Series                                          # Substance 2 days 4 days remarks     1 Francesco Mix [C] - -       2 Colophony - -       3  2-Mercaptobenzothiazole  - -       4 Methylisothiazolinone - -       5 Carba Mix - -       6 Thiuram Mix [A] - -       7 Bisphenol A Epoxy Resin - -       8 B-Rrcr-Zuvkxxkfpkd-Formaldehyde Resin - -       9 Mercapto Mix [A] - -       10 Black Rubber Mix- PPD [B] - -       11 Potassium Dichromate  -  -       12 Balsam of [place] (Myroxylon Pereirae Resin) - -       13 Nickel Sulphate Hexahydrate - -       14 Mixed Dialkyl Thiourea - -       15 Paraben Mix [B] - -       16 Methyldibromo Glutaronitrile - -       17 Fragrance Mix - +       18 2-Bromo-2-Nitropropane-1,3-Diol (Bronopol) CT - -       19 Lyral - -       20 Tixocortol-21- Pivalate CT - -       21 Diazolidiyl Urea (Germall II) - -       22 Methyl Methacrylate - -       23 Cobalt (II) Chloride Hexahydrate - -       24 Fragrance Mix II  - -        25 Compositae Mix - (+)       26 Benzoyl Peroxide - -       27 Bacitracin - +++       28 Formaldehyde - -       29 Methylchloroisothiazolinone / Methylisothiazolinone - -       30 Corticosteroid Mix CT (+) -       31 Sodium Lauryl Sulfate - -       32 Lanolin Alcohol - -       33 Turpentine - -       34 Cetylstearylalcohol - -       35 Chlorhexidine Dicluconate - -       36 Budenoside - -       37 Imidazolidinyl Urea  - -       38 Ethyl-2 Cyanoacrylate - -       39 Quaternium 15 (Dowicil 200) - -       40 Decyl Glucoside - -       PRESERVATIVES & ANTIMICROBIALS        # Substance 2 days 4 days remarks   41 1  1,2-Benzisothiazoline-3-One, Sodium Salt - -     2  1,3,5-Anival (2-Hydroxyethyl) - Hexahydrotriazine (Grotan BK) - -     3 3-Qvpgnrykyqvzc-8-Nitro-1, 3-Propanediol NA NA     4  3, 4, 4' - Triclocarban - -    45 5 4 - Chloro - 3 - Cresol - -     6 4 - Chloro - 4 - Xylenol (PCMX) - -     7 7-Ethylbicyclooxazolidine (Bioban [id number]) - -     8 Benzalkonium Chloride CT - -     9 Benzyl Alcohol - -    50 10 Cetalkonium Chloride - -     11 Cetylpyrimidine Chloride  - -     12 Chloroacetamide - -     13 DMDM Hydantoin - -     14 Glutaraldehyde - -    55 15 Triclosan - -     16 Glyoxal Trimeric Dihydrate - -     17 Iodopropynyl Butylcarbamate - -     18 Octylisothiazoline - -     19 Bithionol CT - -    60 20 Bioban P 1487 (Nitrobutyl) Morpholine/(Ethylnitro-Trimethylene) Dimorpholine - -     21 Phenoxyethanol - -     22 Phenyl Salicylate - -     23 Povidone Iodine - -     24 Sodium Benzoate - -    65 25 Sodium Disulfite - -     26 Sorbic Acid - -     27 Thimerosal - -     28 Melamine Formaldehyde Resin - -     29 Ethylenediamine Dihydrochloride - -      Parabens      70 30 Butyl-P-Hydroxybenzoate - -     31 Ethyl-P-Hydroxybenzoate NA NA     32 Methyl-P-Hydroxybenzoate - -    73 33 Propyl-P-Hydroxybenzoate - -      EMULSIFIERS & ADDITIVES       # Substance 2 days 4 days remarks   74 1 Polyethylene Glycol-400 - -    75 2  Cocamidopropyl Betaine - -     3 Amerchol L101 - -     4 Propylene Glycol - -     5 Triethanolamine - -     6 Sorbitane Sesquiolate CT - -    80 7 Isopropylmyristate - -     8 Polysorbate 80 CT - -     9 Amidoamine   (Stearamidopropyl Dimethylamine) NA NA     10 Oleamidopropyl Dimethylamine - -     11 Lauryl Glucoside - -    85 12 Coconut Diethanolamide  - -     13 2-Hydroxy-4-Methoxy Benzophenone (Oxybenzone) - -     14 Benzophenone-4 (Sulisobenzon) NA NA     15 Propolis - +/++     16 Dexpanthenol - -    90 17 Abitol - -     18 Tert-Butylhydroquinone - -     19 Benzyl Salicylate - -     20 Dimethylaminopropylamin (DMPA) CT? D053       21 Zinc Pyrithione (Zinc Omadine) CT? Z006      95 22 Anival(Hydroxymethyl) Nitromethane CT        Antioxidant - -     23 Dodecyl Gallate - -     24 Butylhydroxyanisole (BHA) - -     25 Butylhydroxytoluene (BHT) NA NA     26 Di-Alpha-Tocopherol (Vit E) - -    100 27 Propyl Gallate - -      PERFUMES, FLAVORS & PLANTS        # Substance 2 days 4 days remarks   101 1 Benzyl Cinnamate (+) -     2 Di-Limonene (Dipentene) - -     3 Cananga Odorata (Ulises Montgomery) (I) (+) -     4 Lichen Acid Mix - -    105 5 Mentha Piperita Oil (Peppermint Oil) + -     6 Sesquiterpenelactone mix - -     7 Tea Tree Oil, Oxidized - -     8 Wood Tar Mix - +/++     9 Abietic Acid - -    110 10 Lavendula Angustifolia Oil (Lavender Oil) - -     11 Fragrance mix II CT * - -      Fragrance Mix I       12 Oakmoss Absolute - -     13 Eugenol - -     14 Geraniol NA NA    115 15 Hydroxycitronellal - -     16 Isoeugenol - -     17 Cinnamic Aldehyde - -     18 Cinnamic Alcohol  NA NA      Fragrance mix II       19 Citronellol - -    120 20 Alpha-Hexylcinnamic Aldehyde    - -     21 Citral - -     22 Farnesol - -    123 23 Coumarin - -      Hexylcinnamic aldehyde, Coumarin, Farnesol, Hydroxyisohexy3-cyclohexene carboxaldehyde, citral, citrolellol  PLASTICS        # Substance 2 days 4 days remarks     Acrylates - -    124 1  2-Hydroxyethyl Methacrylate (HEMA) - -    125 2 1,4-Butandioldimethacrylate (BUDMA) - -     3  2-Ethylhexyl Acrylate - -     4 Bisphenol-A-Dimethacrylate  - -     5 Diurethane-Dimethacrylate - -     6 Ethyleneglycoldimethacrylate (EGDMA) - -    130 7 Pentaerythritoltriacrylate (CARLOS) - -     8 Triethylene Glycol Dimethacrylate (TEGDMA) - -      Synthetic material/additives        9 M-Lzxb-Pepnxvvyvbt - -     10 Tricresyl Phosphate - -     11 6-Hnpk-Juotdxrskfqvr - -    135 12 Bis (2-Ethylhexyl) Phthalate - -     13 Dibutylphthalate - -     14 Dimethylphthalate - -     15 Toluene-2,4-Diisocyanate - -     16 Diphenylmethane-4,4''-Diisocyanate - -      EPOXY RESIN SYSTEMS        Reactive Solvents - -    140 17 Cresyl Glycidyl Ether - -     18 Butyl Glycidyl Ether - -     19 Phenyl Glycidyl Ether - -     20 1,4-Butanediol Diglycidyl Ether - -     21 1,6-Hexanediole Diglycidyl Ether - -      Hardener / Accelerator - -    145 22 Triethylenetetramine - -     23 Diethylenetriamine - -     24 Isophorone Diamine (IPD) - -    148 25 N,N-Dimethyl-P-Toluidine - -      OTHER PRODUCTS  tough strips band aids and vaseline and Triamcinolone cream and ointment        # Substance Conc  % solv 2 days 4 days remarks   149 1 Triamcinolone cream 0.1% as is  - -    150 2 Curad petroleum jelly as is  - -    151 3 Cortizone 10 cream with aloe as is  - -    152 4 Triamcinolone oint 0.1% as is  - -        Results of patch tests:                         Interpretation:  - Negative                    A    = Allergic      (+) Erythema    TI   = Toxic/irritant   + E + Infiltration    RaP = Relevance at Present     ++ E/I + Papulovesicle   Rpr  = Relevance Previously     +++ E/I/P + Blister     nR   = No Relevance           Atopy Screen (Placed 01/13/20 )  No Substance Readings (15 min) Evaluation   POS Histamine 1mg/ml ++    NEG NaCl 0.9% - Slight dermographism     No Substance Readings (15 min) Evaluation   1 Alternaria alternata (tenuis)  -    2  Cladosporium herbarum -    3 Aspergillus fumigatus -    4 Penicillium notatum -    5 Dermatophagoides pteronyssinus ++    6 Dermatophagoides farinae ++    Conclusion: immediate type reaction to house dust mites, but no delayed reaction to moulds or HDM --> The DP and DF reactions remained into the next day.       DRUGS/SUBSTANCES 1/19/2021  No Substance Readings (15min) Evaluation   POS Histamine 1mg/ml ++    NEG NaCl 0.9% - dermographism      Prick Tests        Substance/ Allergen Concentration Result (15min) Remarks   Meloxicam 30mg/ml as is neg     In vaseline neg Not bigger than neg ctr     Intradermal Tests   immed immed delayed delayed     Substance Conc 1st dil 2nd dil   3 days  3 days remarks   Histamine Hydrochloride (ALK) 0.1 mg/ml        NaCl 0.9%        Meloxicam 1:200/ 1:20 neg neg neg  dermographism     Patch Tests   as is  as is 1:2 paraffin 1:2 paraffin     Substance Conc  3 days  days  3 days  days remarks   Meloxicam  - - - -        [x] Allergic reaction diagnosed against:     Antibiotics and antimycotics:   Framycetine Sulphate +++  Gentamicin Sulphate +  Neomycine Sulphate ++/+++  Polymyxin B Sulphate ++  Bacitracin ++/+++ and repeated 11/30/2022 +++    Following additional allergens in repeat patch tests from November 2022 found:     Reactions to various fragrances/flavors such as   +/++ wood tar mix,  +/++ Propolis, + fragrance mix and + Compositae mix      Interpretation/ remarks:   Patient has a severe delayed type contact sensitization to various antibiotics that are in many OTC antiseptics and antibiotics. No signs for allergies to rubber chemicals or adhesives, but reactions to organic flavors and fragrances and this was probably the problem for the allergic contact dermatitis to the Vaseline gel with fragrances in there. Not sure if Propolis (honey), wood tar (flavor from pine sap in candies) and Compositae plays a role with the GI symptoms    [x] Patient information given   [x] [facility]  information's (# [id number], and [id number]) to following  compounds:  Neomycine Sulphate, Polymyxin B Sulphate, Bacitracin, Propolis, fragrance  mix, Compositae mix, wood tar mix    [] General information's to following compounds: ......    ___________________________    Assessment & Plan:    ==> Final Diagnosis:     #  Localized Eczematous and delayed-type reaction after 2x initial doses of immunotherapies  = reaction to itch-X cream  * chronic illness with exacerbation, progression, side effects from treatment    No signs for allergies to preservative in these allergy extracts     No signs in prick test or patch test for delayed type reaction to some of these allergens (dust mites, molds)    Severe contact sensitizations to topical antibiotics  ==> in open application test clearly positive to the itch-x cream that patient used to reduce the effects of the IT    ==> Comment: This was NOT a reaction to the immunotherapy, but rather to the topical product used afterwards to treat the topical side effects of the IT. Not sure to what ingredient in itch-x was really reacting, but not to Parabens and Propylene Glycol.    #  New flare up of allergic contact dermatitis on ankle to fragrances in the Vaseline cocoa butter healing jelly  >> additional contact sensitizations to fragrances and flavors  * chronic illness with exacerbation, progression, side effects from treatment    #  Chronic recurrent Rhinosinusitis, Postnasal drip and fatigue    Most likely environmental allergy to dust mites (maybe also molds)  * chronic illness with exacerbation, progression, side effects from treatment    #  Recurrent urticarial rash with pruritus (after using Doxycycline)  * chronic illness with exacerbation, progression, side effects from treatment  Treatment: try Claritine 10mg morning and Noon and Hydroxyzine 25mg at bedtime prn    # Unlikely angioedema reaction to Meloxicam  * stable chronic illness    In skin tests no signs for  specific immediate type or delayed reaction to Meloxicam. Patient desires to restart Meloxicam for back pain.     No evidence for COX1 intolerance (Ibuprofen is well-tolerated)    Suspect that her prior reaction was unrelated to the Meloxicam    Can restart meloxicam 1/4 tablet, then 1-2 days later, 1/2 tablet, then 1-2 days later she can try a full tablet.     Has emergency set and given handout emergency treatment (has Epipen)    It is imperative that the patient avoids all antibiotics, topically and systemically, that we have tested and that she was positive. Because gentamycin and neomycin were included I would avoid, for safety reasons, cross reacting aminoglycosides including vancomycin.     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 However, it could be that at that time some topical antibiotic has been used that she reacted to and/or lipatrophy after Kenalog injections.     ==> Comment: It is difficult to directly correlate the atrophy on the upper arm to the contact sensitization even though the outline of the reaction looks like the outline of a band-aid. It might be that this atrophy has nothing to do with an allergic reaction. It might be important to really verify if it is a muscle or lipatrophy. Clinically it looks more like lipatrophy which can sometimes be idiopathic. Moreover, a local injection of corticosteroids in this area might as well explain the atrophy (and there was such an injection months ago; not sure if exactly same location?)      These conclusions are made at the best of one's knowledge and belief based on the provided evidence such as patient's history and allergy test results and they can change over time or can be incomplete because of missing information's.       ==> Treatment prescribed/Plan:      Follow the recommendations of the [facility] and use only products from the [facility]    See for informations on wood tar mix,  Compositae and Propolis also concerning food.    >> patient should go to ENT in [place] to evaluate for the chronic sinus injections = ENT re-evaluation  >> go back to allergist in [place] and perform skin tests with molds and environmental allergens that have been found in the school  >> then discuss possibility of oral immunotherapy to dust mites (patient had problems with the Immunotherapy with dose increase and no change found)  --> try Nasonex nasal spray 2xdaily  --> try Allegra 180mg at bedtime  --> Symbicort 160 inhalations 2xdaily    These conclusions are made at the best of one's knowledge and belief based on the provided evidence such as patient's history and allergy test results and they can change over time or can be incomplete because of missing information's.    CC Ulysses Mtz MD  [place] [address] [place] [id number] on close of this encounter..  ___________________________    Staff: : provider    Follow-up in Derm-Allergy clinic if necessary  ___________________________    I spent a total of 30 minutes with Gisela Richards during today s  visit. This time was spent discussing all the individual test results, correlating them to the clinical relevance, counseling the patient and/or coordinating care.

## 2023-06-16 ENCOUNTER — VIRTUAL VISIT (OUTPATIENT)
Dept: ALLERGY | Facility: CLINIC | Age: 43
End: 2023-06-16
Payer: COMMERCIAL

## 2023-06-16 DIAGNOSIS — J32.9 CHRONIC RHINOSINUSITIS: ICD-10-CM

## 2023-06-16 DIAGNOSIS — R09.82 ALLERGIC RHINITIS WITH POSTNASAL DRIP: ICD-10-CM

## 2023-06-16 DIAGNOSIS — J30.89 NON-SEASONAL ALLERGIC RHINITIS DUE TO OTHER ALLERGIC TRIGGER: Primary | ICD-10-CM

## 2023-06-16 DIAGNOSIS — J30.9 ALLERGIC RHINITIS WITH POSTNASAL DRIP: ICD-10-CM

## 2023-06-16 PROCEDURE — 99214 OFFICE O/P EST MOD 30 MIN: CPT | Mod: VID | Performed by: DERMATOLOGY

## 2023-06-16 RX ORDER — BUDESONIDE AND FORMOTEROL FUMARATE DIHYDRATE 160; 4.5 UG/1; UG/1
2 AEROSOL RESPIRATORY (INHALATION) 2 TIMES DAILY
Qty: 10.2 G | Refills: 3 | Status: SHIPPED | OUTPATIENT
Start: 2023-06-16

## 2023-06-16 RX ORDER — FEXOFENADINE HCL 180 MG/1
180 TABLET ORAL AT BEDTIME
Qty: 30 TABLET | Refills: 3 | Status: SHIPPED | OUTPATIENT
Start: 2023-06-16

## 2023-06-16 RX ORDER — MOMETASONE FUROATE MONOHYDRATE 50 UG/1
2 SPRAY, METERED NASAL AT BEDTIME
Qty: 17 G | Refills: 3 | Status: SHIPPED | OUTPATIENT
Start: 2023-06-16

## 2023-06-16 NOTE — Clinical Note
6/16/2023         RE: Gisela Richards  [address]  [facility] [address]        Dear Colleague,    Thank you for referring your patient, Gisela Richards, to the [facility] [place]. Please see a copy of my visit note below.    [facility] Dermato-allergology Note  Virtual visit: store and forward video ([employer] connected), start time: 12:50pm, end time: 1:20pm, date of images: during video  Encounter Date: Jun 16, 2023  ____________________________________________    CC: No chief complaint on file.      HPI:  (Jun 16, 2023)  Ms. Gisela Richards is a(n) 42 year old female who presents today as a return patient for allergy consultation  - Follow-up in Derm-Allergy clinic in about 4 months  >> was working in a local [employer] in Feb/March. Started with red, itchy eyes. Later, when changed to full time was tired and patient forgot one time to take the antihistamines --> then developed recurrent Sinus infections, constant PND, sinus pressure and puffy eyes, fatigue and again wheezing and coughing. Feels better on Weekends.  >> will have with employer a air quality test    >> on Cetirizine once daily and Benadryl and nasal rinses    - otherwise feeling well in usual state of health    Physical exam:  General: In no acute distress, well-developed, well-nourished  Eyes: no conjunctivitis  ENT: no signs of rhinitis   Pulmonary: no wheezing or coughing  Skin:Focused examination of the skin on test sites was performed = see test results below    Earlier History and Allergy exams:  (Dec 2, 2022)  - Follow-up in Derm-Allergy clinic for 2nd readings and final conclusions after 4 days (virtual) = from [place]     Earlier History and Allergy exams:  (Nov 30, 2022)  - Follow-up in Derm-Allergy clinic for 1st readings of patch tests after 2 days (virtual) = from [place]     Earlier History and Allergy exams:  (Nov 28, 2022)  - Follow-up in Derm-Allergy  clinic for additional patch tests (see series in red) and earlier if hives not under control    Earlier History and Allergy exams:  (Jul 27, 2022)  - Follow-up: in Derm-Allergy clinic as needed  - on July 5th did a photoshoot in the field and then observed on the ankle a small spot, that became bigger (brown, not really infiltrated) and the morning of the 7th July a Dermatologist looked at it and in histology it was a necrosis unknown cause.  - 1 week later itching on that area with vesicular reaction = eczematous reaction  - on the 15th did bacterial culture (no bacterial growth)  - as soon as the patient stopped the vaseline with same adhesive band aid the lesions improved  - patient observe anyway recently reactions to various vaseline type ointment (incl Triamcinolone acetonide) = with cream no problem  --> took at the end orally prednisone and Doxycycline = stopped the Prednisone Monday the 18th of July and the Doxycycline on the 18th. ==> itching and urticarial rash started on the 22nd of July after being in the sun  --> NP prescribed Claritine and Prilosec and Benadryl   Skin:in the moment no signs for skin lesions, but patient had the eczematous lesions on the ankle   Some erythema over the decolletage    Earlier History and Allergy exams:  01/22/2021  Says she hasn't noticed any reaction to the patch testing or where we injected. Would like to try the meloxicam for her back pain. .    Earlier History and Allergy exams:   Ibuprofen does not induce any reaction.    > Patient had a reaction to Meloxicam in November. Took one Tabl for the first time and about 3-6h later with swelling and redness around the lips with dryness and then a sun burn type of rash over upper chest, stomach and back. No itching. No sun exposure involved. Redness disappeared after 1-2 days. Never tongue swelling or breathing problems. Out of chin skin wheeping. Took almost one week to fully recover.  ==> delayed type reaction    Earlier  History and Allergy exams:    Patient is continuing now the IT without any problems, since they are not using ItchEx anymore. Just using ice.  IT injections is done by an primary care PA in [facility]    > Patient had a reaction to Meloxicam about 3 h later with angioedema on face and then sun burn type of rash over face --> happened in November  > Ibuprofen does not induce any reaction (last taken last Saturday)    With tramadol tremors and swellings of face and with Percacet (Oxycodon and Acetaminophen) migraines  Vicodin induces erythema  Today she reports that all skin manifestation has resolved. Patient has come from [place], [place]. Patient's spouse is present during evaluation. She reports her first allergy shots took place on November 9th, 2 injections on posterior aspect of right arm and 1 to the posterior aspect of left arm.   - Per chart review immunotherapy: trees/grass/weeds, mites/molds and animals  Within the day of the injection patient noted a diffuse pruritic rash on the posterior aspect of her right arm. She denies using any topicals or oral medications beside Zyrtec to aid with on going rash. She additionally iced the area and skin manifestation had resolved.     The following week she completed her 2nd round of immunotherapy at the same dosage. Again the rash occurred on the posterior aspect of her right arm along with outstanding edema of her right arm and shoulder. This rash was also pruritic, but again denied any pain. Her provider was concerned about reaction to the preservative. This rash took 1.5 week to self resolve. The edema persisted for 2-3 weeks and when resolved, a notable dent was appreciated on the right distal aspect of the deltoid. Imaging completed showed atrophy of the deltoid. She denied any recent injections to that area including routine vaccines or steroid injections. Also now having to areas of hypopigmentation overlying the deltoid.  Reports dry skin  "after resolution of rashes. Denies history of asthma. Extensive history of food sensitives and autoimmune workup that has been unremarkable.     Hx since 19:  The patient comes in today for patch testing day 1. She reports that there is a bruise on the R shoulder that has worsened in the interim. She is unsure if it may be necrosis or sudden muscle atrophy. She reports it appeared on , the day after allergy shot. Also states that in the same area she developed a new \"bump\" that appeared about 2 days ago.   Reports that the IT on the R upper extremity was against trees, weeds, molds, mites, and dog. On the L upper extremity, she had IT injections for cats and dogs.   She is otherwise feeling well overall today. No other skin or allergy concerns at this time.     Medications:  - takes daily Zyrtec and is on IT  - Levothyroxin      Past Medical History:   Patient Active Problem List   Diagnosis     Allergic rhinitis due to dogs     Hypothyroid     Lumbar facet joint pain     Past Medical History:   Diagnosis Date     Constipation     Since young.     Other specified postprocedural states     2014,Slight recurrence within scar - removed in 9th grade and came back right away     Personal history of other medical treatment (CODE)      2, para 2.     Past Surgical History:   Procedure Laterality Date     COLONOSCOPY      ,\"swelling of the lining\" and flattening of villa.     OTHER SURGICAL HISTORY      ~,[id number],OTHER,Left,recurring within scar     OTHER SURGICAL HISTORY      2016,[id number],KNEE ARTHROSCOPY W/ MENISCAL REPAIR     OTHER SURGICAL HISTORY      2004,[id number],DILATION AND CURETTAGE,retained placenta       Social History:  Patient reports that she has quit smoking. Her smoking use included cigarettes. She has never used smokeless tobacco. She reports current alcohol use. She reports that she does not use drugs.    Family History:  Family History   Problem Relation Age of Onset "     Family History Negative Father         Good Health,does not see doctor regularly     Thyroid Disease Mother         Thyroid Disease,Hypothyroid     Other - See Comments Mother         Smoker, prediabetes     Thyroid Disease Sister         Thyroid Disease,Hypothyroid     Arthritis Sister         Arthritis     Scoliosis Maternal Half-Sister         Scoliosis     Other - See Comments Maternal Half-Sister         No Known Problems       Medications:  Current Outpatient Medications   Medication Sig Dispense Refill     amphetamine-dextroamphetamine (ADDERALL XR) 25 MG 24 hr capsule Take 25 mg by mouth daily (Patient not taking: Reported on 4/6/2023)       amphetamine-dextroamphetamine (ADDERALL) 10 MG tablet Take 10 mg by mouth daily as needed       amphetamine-dextroamphetamine (ADDERALL) 5 MG tablet TAKE 1 TABLET BY MOUTH ONCE DAILY IN THE LATE AFTERNOON (Patient not taking: Reported on 4/6/2023)       cetirizine (ZYRTEC) 10 MG tablet Take 10 mg by mouth       cholecalciferol (VITAMIN D3) 25 mcg (1000 units) capsule Take 1 capsule by mouth       doxycycline hyclate (VIBRAMYCIN) 100 MG capsule Take 1 capsule by mouth 2 times daily (Patient not taking: Reported on 7/20/2022)       famotidine (PEPCID) 40 MG tablet Take 40 mg by mouth At Bedtime (Patient not taking: Reported on 8/25/2022)       ferrous sulfate (FEROSUL) 325 (65 Fe) MG tablet Take 1 tablet by mouth daily       fluorometholone (FML FORTE) 0.25 % ophthalmic suspension  (Patient not taking: Reported on 8/25/2022)       folic acid (FOLVITE) 1 MG tablet Take 1 mg by mouth daily       hydrOXYzine (ATARAX) 25 MG tablet Take 1 tablet (25 mg) by mouth At Bedtime 30 tablet 1     levothyroxine (SYNTHROID/LEVOTHROID) 75 MCG tablet Take 1 tablet by mouth daily       levothyroxine (SYNTHROID/LEVOTHROID) 75 MCG tablet Take 75 mcg by mouth daily (Patient not taking: Reported on 4/6/2023)       levothyroxine (SYNTHROID/LEVOTHROID) 75 MCG tablet Take 1 tablet (75 mcg) by  mouth every morning (before breakfast) 90 tablet 4     loratadine (CLARITIN) 10 MG tablet Take 10 mg by mouth (Patient not taking: Reported on 8/25/2022)       loratadine (CLARITIN) 10 MG tablet Morning and evening 1 Tabl of 10mg for about 1 week and if no itching anymore, then reduce to 1 Tabl in the morning. (Patient not taking: Reported on 4/6/2023) 30 tablet 3     mometasone (NASONEX) 50 MCG/ACT spray 1 spray 2 times daily       polyethylene glycol-electrolytes (NULYTELY) 420 g solution TAKE PREP AS SPLIT DOSE PRIOR TO PROCEDURE PER GASTRO INSTRUCTION LETTER       predniSONE (DELTASONE) 50 MG tablet Emergency set: if severe allergic reaction take immediately 100mg Prednisone (2x50mg) and 2 Tabl Cetirizine 10mg and then write precise 12h retrospective diary.If less severe reaction take only the 2 Tabl Cetirizine (Patient not taking: Reported on 4/6/2023) 2 tablet 1     triamcinolone (KENALOG) 0.1 % external cream APPLY CREAM EXTERNALLY TWICE DAILY APPLY THIN FILM SPARINGLY TO ITCHY SPOTS. USE UP TO 2 WEEKS AT A TIME (Patient not taking: Reported on 4/6/2023)       triamcinolone (KENALOG) 0.1 % external cream  (Patient not taking: Reported on 8/25/2022)       triamcinolone (KENALOG) 0.1 % external ointment Apply 1 Dose topically 2 times daily (Patient not taking: Reported on 7/20/2022)       vitamin B-12 (CYANOCOBALAMIN) 500 MCG tablet Take 1 tablet by mouth daily       VYVANSE 40 MG capsule TAKE 1 CAPSULE BY MOUTH IN THE MORNING WITH FOOD         Allergies   Allergen Reactions     Cats Shortness Of Breath     Dogs Fatigue, Palpitations, Shortness Of Breath and Tinnitus     Gentamicin Dermatitis     In patch tests severe reactions against Neomycine, Framycetine, Gentamicin, Bacitracine and Polymyxin.   For reasons of crossreactions I would as well avoid other aminoglycosides such as Vancomycine     Petrolatum Rash     Gluten Meal      Other reaction(s): Joint Pain     Pramoxine-Benzyl Alcohol Rash      Triamcinolone Rash     Fat atrophy after IM injection      Tramadol Other (See Comments)     Other reaction(s): Other - Describe In Comment Field  High doses caused yellow eyes, tremors in her mouth, couldn't feel some body parts.    No issues with low doses  High doses caused yellow eyes, tremors in her mouth, couldn't feel some body parts.    No issues with low doses     Food GI Disturbance     Night shades, stevia, pork     Gel Base Other (See Comments)     Allergy to Itch X. Gets blister/pustular rash in area the itch X is placed.     Hydrocodone-Acetaminophen Rash     Order for PATCH TESTS    [x] Outpatient  [] Inpatient: Head..../ Bed ....      Skin Atopy (atopic dermatitis) [x] Yes   [] No  Rhinitis/Sinusitis:   [x] Yes   [] No  Allergic Asthma:   [] Yes   [x] No  Food Allergy:   [x] Yes   [] No  Leg ulcers:   [] Yes   [x] No  Hand eczema:   [] Yes   [x] No   Leading hand:   [x] R   [] L       [] Ambidextrous                      Reason for tests (suspected allergy): Assessment for potential reaction to disinfections before IT (less likely preservatives in IT, because the reaction not directly over injection site)  Known previous allergies: Trees/grass, mites, dogs/cats  Standardized panels (repeat patch tests in red)  [x] Standard panel (40 tests)  [x] Preservatives & Antimicrobials (31 tests)  [x] Emulsifiers & Additives (25 tests) particularly propylene glycol  [x] Perfumes/Flavours & Plants (25 tests)  [] Hairdresser panel (12 tests)  [x] Rubber Chemicals (22 tests)  [x] Plastics (26 tests)  [] Colorants/Dyes/Food additives (20 tests)  [] Metals (implants/dental) (24 tests)  [] Local anaesthetics/NSAIDs (13 tests)  [x] Antibiotics & Antimycotics (14 tests)   [] Corticosteroids (15 tests)   [] Photopatch test (62 tests)   [] others: ...      [x] Patient's own products: tough strips band aids and vaseline and Triamcinolone cream and ointment    DO NOT test if chemical or biological identity is unknown!      always ask from patient the product information and safety sheets (MSDS)   [] Atopy screen prick test (Atopic predisposition): ...      RESULTS & EVALUATION of PATCH TESTS    Patch test readings after     [x] 2 days, [] 3 days [x] 4 days, [] 5 days,    Applied patch tests with results (import here the list of patch tests):  Order documented by: IVAN HANCOCK LPN  Order reviewed by: Preeti Copeland LPN   Physician:   Date/time of application:1-  Localization of application: back >> Clear    STANDARD Series         No Substance 2 days 4 days remarks   1 Francesco Mix [C] - -    2 Colophony - -    3  2-Mercaptobenzothiazole  NA NA     4 Methylisothiazolinone - -    5 Carba Mix - -    6 Thiuram Mix [A] NA NA    7 Bisphenol A Epoxy Resin - -    8 N-Tpkd-Fdvvoehacxn-Formaldehyde Resin - -    9 Mercapto Mix [A] - -    10 Black Rubber Mix- PPD [B] NA NA    11 Potassium Dichromate  -  -    12 Balsam of [place] (Myroxylon Pereirae Resin) - -    13 Nickel Sulphate Hexahydrate - -    14 Mixed Dialkyl Thiourea - -    15 Paraben Mix [B] - -    16 Methyldibromo Glutaronitrile NA NA    17 Fragrance Mix - -    18 2-Bromo-2-Nitropropane-1,3-Diol (Bronopol) - -    19 Lyral - -    20 Tixocortol-21- Pivalate NA NA    21 Diazolidiyl Urea (Germall II) - -    22 Methyl Methacrylate NA NA    23 Cobalt (II) Chloride Hexahydrate - -    24 Fragrance Mix II  - -    25 Compositae Mix - -    26 Benzoyl Peroxide NA NA    27 Bacitracin - ++/+++    28 Formaldehyde - -    29 Methylchloroisothiazolinone / Methylisothiazolinone - -    30 Corticosteroid Mix - -    31 Sodium Lauryl Sulfate - -    32 Lanolin Alcohol - -    33 Turpentine - -    34 Cetylstearylalcohol - -    35 Chlorhexidine Dicluconate - -    36 Budenoside - -    37 Imidazolidinyl Urea  - -    38 Ethyl-2 Cyanoacrylate NA NA    39 Quaternium 15 (Dowicil 200) - -    40 Decyl Glucoside - -      EMULSIFIERS & ADDITIVES        No Substance 2 days 4 days remarks   41  Polyethylene Glycol-400 NA NA    42 Cocamidopropyl Betaine - -    43 Amerchol L101 NA NA    44 Propylene Glycol - -    45 Triethanolamine - -    46 Sorbitane Sesquiolate - -    47 Isopropylmyristate - -    48 Polysorbate 80  - -    49 Amidoamine   (Stearamidopropyl Dimethylamine) - -    50 Oleamidopropyl Dimethylamine - -    51 Lauryl Glucoside NA NA    52 Coconut Diethanolamide  - -    53 2-Hydroxy-4-Methoxy Benzophenone (Oxybenzone) - -    54 Benzophenone-4 (Sulisobenzon) - -    55 Propolis - -    56 Dexpanthenol - -    57 Carboxymethyl Cellulose Sodium - -    58 Abitol - -    59 Tert-Butylhydroquinone - -    60 Benzyl Salicylate - -     Antioxidant      61 Dodecyl Gallate - -    62 Butylhydroxyanisole (BHA) - -    63 Butylhydroxytoluene (BHT) - -    64 Di-Alpha-Tocopherol (Vit E) - -    65 Propyl Gallate - -      RUBBER CHEMICALS         No Substance 2 days 4 days remarks    Carbamate      66 Zinc Bis ( Diethyldithio carbamate ) (ZDEC) - -    67 Zinc Bis (Dibutyldithiocarbamate) - -    68 1,3-Diphenylguanidine (DPG) - -     Thiourea      69 Dibutylthiourea - -    70 Diphenyltiourea - -    71 Thiourea - -     Mercapto chemicals      72 Morpholinyl Mercaptobenzothiazole - -    73 W-Fvbwpzqbcl-2-Benzothiazyl-Sulfenamide - -    74 Dibenzothiazyl Disulfide - -     Thiuram chemicals      75 Dipentamethylenethiuram Disulfide - -    76 Tetraethylthiuram Disulfide (Disulfiram) - -    77 Tetramethylthiuram Disulfide - -    78 Tetramethyl Thiuram Monosulfide (TMTM) - -     4-Phenylenediamine derivatives      79 N-Isopropyl-N'-Phenyl-P-Phenylenediamine (IPPD) - -    80 Xvxoyvuu-Y-Ljotinbpniclevqu (DPPD) - -     Various Rubber Additives      81 Hydroquinone Monobenzylether  - -    82 Hexamethylenetetramine - -    83 4,4'-Dihydroxybiphenyl - -    84 Cyclohexylthiophtalimide - -    85 Ethylenediamine Dihydrochloride - -    86 N-Phenyl-B-Naphthylamine - -    87 Dodecyl Mercaptan - -        PLASTICS         No Substance 2 days 4  days remarks    Acrylates - -    88 2-Hydroxyethyl Methacrylate (HEMA) - -    89 1,4-Butandioldimethacrylate (BUDMA) - -    90  2-Ethylhexyl Acrylate - -    91 Bisphenol-A-Dimethacrylate  - -    92 Diurethane-Dimethacrylate - -    93 Ethyleneglycoldimethacrylate (EGDMA) - -    94 Pentaerythritoltriacrylate (CARLOS) - -    95 Triethylene Glycol Dimethacrylate (TEGDMA) - -     Synthetic material/additives       96 B-Tlpb-Vthxmihkgvc - -    97 Tricresyl Phosphate - -    98 4-Saem-Pbaiugldsaqcz - -    99 Bis (2-Ethylhexyl) Phthalate - -    100 Dibutylphthalate - -    101 Dimethylphthalate - -    102 Toluene-2,4-Diisocyanate - -    103 Diphenylmethane-4,4''-Diisocyanate - -     EPOXY RESIN SYSTEMS       Reactive Solvents - -    104 Cresyl Glycidyl Ether - -    105 Butyl Glycidyl Ether - -    106 Phenyl Glycidyl Ether - -    107 1,4-Butanediol Diglycidyl Ether - -    108 1,6-Hexanediole Diglycidyl Ether - -     Hardener / Accelerator - -    109 Ethylenediamine Dihydrochloride - -    110 Triethylenetetramine - -    111 Diethylenetriamine - -    112 Isophorone Diamine (IPD) - -    113 N,N-Dimethyl-P-Toluidine - -        PRESERVATIVES & ANTIMICROBIALS         No Substance 2 days 4 days remarks   114  1,2-Benzisothiazoline-3-One, Sodium Salt - -    115  1,3,5-Anival (2-Hydroxyethyl) - Hexahydrotriazine (Grotan BK) NA NA    116 6-Hyfuvnodijhek-9-Nitro-1, 3-Propanediol - -    117  3, 4, 4' - Triclocarban - -    118 4 - Chloro - 3 - Cresol - -    119 4 - Chloro - 4 - Xylenol (PCMX) NA NA    120 7-Ethylbicyclooxazolidine (Bioban [id number]) - -    121 Benzalkonium Chloride - -    122 Benzyl Alcohol - -    123 Cetalkonium Chloride NA NA    124 Cetylpyrimidine Chloride  - -    125 Chloroacetamide - -    126 DMDM Hydantoin NA NA    127 Glutaraldehyde NA NA    128 Triclosan - -    129 Glyoxal Trimeric Dihydrate - -    130 Iodopropynyl Butylcarbamate - -    131 Octylisothiazoline NA NA    132 Iodoform - -    133 (Nitrobutyl)  Morpholine/(Ethylnitro-Trimethylene) Dimorpholine (Bioban P 1487) - -    134 Phenoxyethanol NA NA    135 Phenyl Salicylate - -    136 Povidone Iodine - -    137 Sodium Benzoate - -    138 Sodium Disulfite NA NA    139 Sorbic Acid - -    140 Thimerosal - -     Parabens      141 Butyl-P-Hydroxybenzoate - -    142 Ethyl-P-Hydroxybenzoate - -    143 Methyl-P-Hydroxybenzoate NA NA    144 Propyl-P-Hydroxybenzoate - -         ANTIBIOTICS & ANTIMYCOTICS         No Substance 2 days 4 days remarks   145 Erythromycine - -    146 Framycetine Sulphate (+) +++    147 Fusidic Acid Sodium Salt - -    148 Gentamicin Sulphate - +    149 Neomycine Sulphate (+) ++/+++    150 Oxytetracycline  - -    151 Polymyxin B Sulphate (+) ++    152 Tetracycline-HCL - -    153 Sulfanilamide - -    154 Metronidazole - -    155 Oxyquinoline Mix - -    156 Nitrofurazone - -    157 Nystatin - -    158 Clotrimazole - -      PATIENTS OWN PRODUCTS         No Substance Conc  % solv 2 days 4 days remarks   159 Aspergillus   - -    160 Penicillium   - -    161 D. Farinae   - -    162 D. pteronyssinus   - -    163 Dog   - -    164 Cat   - -        Nov 28, 2022 repeat application of patch tests:  Patch test readings after     [x] 2 days, [] 3 days [x] 4 days, [] 5 days,  Other duration: ...    STANDARD Series                                          # Substance 2 days 4 days remarks     1 Francesco Mix [C] - -       2 Colophony - -       3  2-Mercaptobenzothiazole  - -       4 Methylisothiazolinone - -       5 Carba Mix - -       6 Thiuram Mix [A] - -       7 Bisphenol A Epoxy Resin - -       8 F-Yshy-Rbnxxwitcsf-Formaldehyde Resin - -       9 Mercapto Mix [A] - -       10 Black Rubber Mix- PPD [B] - -       11 Potassium Dichromate  -  -       12 Balsam of [place] (Myroxylon Pereirae Resin) - -       13 Nickel Sulphate Hexahydrate - -       14 Mixed Dialkyl Thiourea - -       15 Paraben Mix [B] - -       16 Methyldibromo Glutaronitrile - -       17 Fragrance Mix - +        18 2-Bromo-2-Nitropropane-1,3-Diol (Bronopol) CT - -       19 Lyral - -       20 Tixocortol-21- Pivalate CT - -       21 Diazolidiyl Urea (Germall II) - -       22 Methyl Methacrylate - -       23 Cobalt (II) Chloride Hexahydrate - -       24 Fragrance Mix II  - -       25 Compositae Mix - (+)       26 Benzoyl Peroxide - -       27 Bacitracin - +++       28 Formaldehyde - -       29 Methylchloroisothiazolinone / Methylisothiazolinone - -       30 Corticosteroid Mix CT (+) -       31 Sodium Lauryl Sulfate - -       32 Lanolin Alcohol - -       33 Turpentine - -       34 Cetylstearylalcohol - -       35 Chlorhexidine Dicluconate - -       36 Budenoside - -       37 Imidazolidinyl Urea  - -       38 Ethyl-2 Cyanoacrylate - -       39 Quaternium 15 (Dowicil 200) - -       40 Decyl Glucoside - -       PRESERVATIVES & ANTIMICROBIALS        # Substance 2 days 4 days remarks   41 1  1,2-Benzisothiazoline-3-One, Sodium Salt - -     2  1,3,5-Anival (2-Hydroxyethyl) - Hexahydrotriazine (Grotan BK) - -     3 9-Oygpajuorisjj-6-Nitro-1, 3-Propanediol NA NA     4  3, 4, 4' - Triclocarban - -    45 5 4 - Chloro - 3 - Cresol - -     6 4 - Chloro - 4 - Xylenol (PCMX) - -     7 7-Ethylbicyclooxazolidine (Bioban UE9856) - -     8 Benzalkonium Chloride CT - -     9 Benzyl Alcohol - -    50 10 Cetalkonium Chloride - -     11 Cetylpyrimidine Chloride  - -     12 Chloroacetamide - -     13 DMDM Hydantoin - -     14 Glutaraldehyde - -    55 15 Triclosan - -     16 Glyoxal Trimeric Dihydrate - -     17 Iodopropynyl Butylcarbamate - -     18 Octylisothiazoline - -     19 Bithionol CT - -    60 20 Bioban P 1487 (Nitrobutyl) Morpholine/(Ethylnitro-Trimethylene) Dimorpholine - -     21 Phenoxyethanol - -     22 Phenyl Salicylate - -     23 Povidone Iodine - -     24 Sodium Benzoate - -    65 25 Sodium Disulfite - -     26 Sorbic Acid - -     27 Thimerosal - -     28 Melamine Formaldehyde Resin - -     29 Ethylenediamine Dihydrochloride - -       Parabens      70 30 Butyl-P-Hydroxybenzoate - -     31 Ethyl-P-Hydroxybenzoate NA NA     32 Methyl-P-Hydroxybenzoate - -    73 33 Propyl-P-Hydroxybenzoate - -      EMULSIFIERS & ADDITIVES       # Substance 2 days 4 days remarks   74 1 Polyethylene Glycol-400 - -    75 2 Cocamidopropyl Betaine - -     3 Amerchol L101 - -     4 Propylene Glycol - -     5 Triethanolamine - -     6 Sorbitane Sesquiolate CT - -    80 7 Isopropylmyristate - -     8 Polysorbate 80 CT - -     9 Amidoamine   (Stearamidopropyl Dimethylamine) NA NA     10 Oleamidopropyl Dimethylamine - -     11 Lauryl Glucoside - -    85 12 Coconut Diethanolamide  - -     13 2-Hydroxy-4-Methoxy Benzophenone (Oxybenzone) - -     14 Benzophenone-4 (Sulisobenzon) NA NA     15 Propolis - +/++     16 Dexpanthenol - -    90 17 Abitol - -     18 Tert-Butylhydroquinone - -     19 Benzyl Salicylate - -     20 Dimethylaminopropylamin (DMPA) CT? D053       21 Zinc Pyrithione (Zinc Omadine) CT? Z006      95 22 Anival(Hydroxymethyl) Nitromethane CT        Antioxidant - -     23 Dodecyl Gallate - -     24 Butylhydroxyanisole (BHA) - -     25 Butylhydroxytoluene (BHT) NA NA     26 Di-Alpha-Tocopherol (Vit E) - -    100 27 Propyl Gallate - -      PERFUMES, FLAVORS & PLANTS        # Substance 2 days 4 days remarks   101 1 Benzyl Cinnamate (+) -     2 Di-Limonene (Dipentene) - -     3 Cananga Odorata (Ylang Ylang) (I) (+) -     4 Lichen Acid Mix - -    105 5 Mentha Piperita Oil (Peppermint Oil) + -     6 Sesquiterpenelactone mix - -     7 Tea Tree Oil, Oxidized - -     8 Wood Tar Mix - +/++     9 Abietic Acid - -    110 10 Lavendula Angustifolia Oil (Lavender Oil) - -     11 Fragrance mix II CT * - -      Fragrance Mix I       12 Oakmoss Absolute - -     13 Eugenol - -     14 Geraniol NA NA    115 15 Hydroxycitronellal - -     16 Isoeugenol - -     17 Cinnamic Aldehyde - -     18 Cinnamic Alcohol  NA NA      Fragrance mix II       19 Citronellol - -    120 20  Alpha-Hexylcinnamic Aldehyde    - -     21 Citral - -     22 Farnesol - -    123 23 Coumarin - -      Hexylcinnamic aldehyde, Coumarin, Farnesol, Hydroxyisohexy3-cyclohexene carboxaldehyde, citral, citrolellol  PLASTICS        # Substance 2 days 4 days remarks     Acrylates - -    124 1 2-Hydroxyethyl Methacrylate (HEMA) - -    125 2 1,4-Butandioldimethacrylate (BUDMA) - -     3  2-Ethylhexyl Acrylate - -     4 Bisphenol-A-Dimethacrylate  - -     5 Diurethane-Dimethacrylate - -     6 Ethyleneglycoldimethacrylate (EGDMA) - -    130 7 Pentaerythritoltriacrylate (CARLOS) - -     8 Triethylene Glycol Dimethacrylate (TEGDMA) - -      Synthetic material/additives        9 X-Cszv-Qmkjtbultmb - -     10 Tricresyl Phosphate - -     11 1-Lfmx-Qgubvavgyccez - -    135 12 Bis (2-Ethylhexyl) Phthalate - -     13 Dibutylphthalate - -     14 Dimethylphthalate - -     15 Toluene-2,4-Diisocyanate - -     16 Diphenylmethane-4,4''-Diisocyanate - -      EPOXY RESIN SYSTEMS        Reactive Solvents - -    140 17 Cresyl Glycidyl Ether - -     18 Butyl Glycidyl Ether - -     19 Phenyl Glycidyl Ether - -     20 1,4-Butanediol Diglycidyl Ether - -     21 1,6-Hexanediole Diglycidyl Ether - -      Hardener / Accelerator - -    145 22 Triethylenetetramine - -     23 Diethylenetriamine - -     24 Isophorone Diamine (IPD) - -    148 25 N,N-Dimethyl-P-Toluidine - -      OTHER PRODUCTS  tough strips band aids and vaseline and Triamcinolone cream and ointment        # Substance Conc  % solv 2 days 4 days remarks   149 1 Triamcinolone cream 0.1% as is  - -    150 2 Curad petroleum jelly as is  - -    151 3 Cortizone 10 cream with aloe as is  - -    152 4 Triamcinolone oint 0.1% as is  - -        Results of patch tests:                         Interpretation:  - Negative                    A    = Allergic      (+) Erythema    TI   = Toxic/irritant   + E + Infiltration    RaP = Relevance at Present     ++ E/I + Papulovesicle   Rpr  = Relevance  Previously     +++ E/I/P + Blister     nR   = No Relevance           Atopy Screen (Placed 01/13/20 )  No Substance Readings (15 min) Evaluation   POS Histamine 1mg/ml ++    NEG NaCl 0.9% - Slight dermographism     No Substance Readings (15 min) Evaluation   1 Alternaria alternata (tenuis)  -    2 Cladosporium herbarum -    3 Aspergillus fumigatus -    4 Penicillium notatum -    5 Dermatophagoides pteronyssinus ++    6 Dermatophagoides farinae ++    Conclusion: immediate type reaction to house dust mites, but no delayed reaction to moulds or HDM --> The DP and DF reactions remained into the next day.       DRUGS/SUBSTANCES 1/19/2021  No Substance Readings (15min) Evaluation   POS Histamine 1mg/ml ++    NEG NaCl 0.9% - dermographism      Prick Tests        Substance/ Allergen Concentration Result (15min) Remarks   Meloxicam 30mg/ml as is neg     In vaseline neg Not bigger than neg ctr     Intradermal Tests   immed immed delayed delayed     Substance Conc 1st dil 2nd dil   3 days  3 days remarks   Histamine Hydrochloride (ALK) 0.1 mg/ml        NaCl 0.9%        Meloxicam 1:200/ 1:20 neg neg neg  dermographism     Patch Tests   as is  as is 1:2 paraffin 1:2 paraffin     Substance Conc  3 days  days  3 days  days remarks   Meloxicam  - - - -        [x] Allergic reaction diagnosed against:     Antibiotics and antimycotics:   Framycetine Sulphate +++  Gentamicin Sulphate +  Neomycine Sulphate ++/+++  Polymyxin B Sulphate ++  Bacitracin ++/+++ and repeated 11/30/2022 +++    Following additional allergens in repeat patch tests from November 2022 found:     Reactions to various fragrances/flavors such as   +/++ wood tar mix,  +/++ Propolis, + fragrance mix and + Compositae mix      Interpretation/ remarks:   Patient has a severe delayed type contact sensitization to various antibiotics that are in many OTC antiseptics and antibiotics. No signs for allergies to rubber chemicals or adhesives, but reactions to organic flavors and  fragrances and this was probably the problem for the allergic contact dermatitis to the Vaseline gel with fragrances in there. Not sure if Propolis (honey), wood tar (flavor from pine sap in candies) and Compositae plays a role with the GI symptoms    [x] Patient information given   [x] [facility] information's (# [id number], and [id number]) to following  compounds:  Neomycine Sulphate, Polymyxin B Sulphate, Bacitracin, Propolis, fragrance  mix, Compositae mix, wood tar mix    [] General information's to following compounds: ......    ___________________________    Assessment & Plan:    ==> Final Diagnosis:     #  Localized Eczematous and delayed-type reaction after 2x initial doses of immunotherapies  = reaction to itch-X cream  * chronic illness with exacerbation, progression, side effects from treatment    No signs for allergies to preservative in these allergy extracts     No signs in prick test or patch test for delayed type reaction to some of these allergens (dust mites, molds)    Severe contact sensitizations to topical antibiotics  ==> in open application test clearly positive to the itch-x cream that patient used to reduce the effects of the IT    ==> Comment: This was NOT a reaction to the immunotherapy, but rather to the topical product used afterwards to treat the topical side effects of the IT. Not sure to what ingredient in itch-x was really reacting, but not to Parabens and Propylene Glycol.    #  New flare up of allergic contact dermatitis on ankle to fragrances in the Vaseline cocoa butter healing jelly  >> additional contact sensitizations to fragrances and flavors  * chronic illness with exacerbation, progression, side effects from treatment    #  Chronic recurrent Rhinosinusitis, Postnasal drip and fatigue    Most likely environmental allergy to dust mites (maybe also molds)  * chronic illness with exacerbation, progression, side effects from treatment    #  Recurrent urticarial rash with  pruritus (after using Doxycycline)  * chronic illness with exacerbation, progression, side effects from treatment  Treatment: try Claritine 10mg morning and Noon and Hydroxyzine 25mg at bedtime prn    # Unlikely angioedema reaction to Meloxicam  * stable chronic illness    In skin tests no signs for specific immediate type or delayed reaction to Meloxicam. Patient desires to restart Meloxicam for back pain.     No evidence for COX1 intolerance (Ibuprofen is well-tolerated)    Suspect that her prior reaction was unrelated to the Meloxicam    Can restart meloxicam 1/4 tablet, then 1-2 days later, 1/2 tablet, then 1-2 days later she can try a full tablet.     Has emergency set and given handout emergency treatment (has Epipen)    It is imperative that the patient avoids all antibiotics, topically and systemically, that we have tested and that she was positive. Because gentamycin and neomycin were included I would avoid, for safety reasons, cross reacting aminoglycosides including vancomycin.     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 However, it could be that at that time some topical antibiotic has been used that she reacted to and/or lipatrophy after Kenalog injections.     ==> Comment: It is difficult to directly correlate the atrophy on the upper arm to the contact sensitization even though the outline of the reaction looks like the outline of a band-aid. It might be that this atrophy has nothing to do with an allergic reaction. It might be important to really verify if it is a muscle or lipatrophy. Clinically it looks more like lipatrophy which can sometimes be idiopathic. Moreover, a local injection of corticosteroids in this area might as well explain the atrophy (and there was such an injection months ago; not sure if exactly same location?)      These conclusions are made at the best of one's knowledge and belief based on the provided  evidence such as patient's history and allergy test results and they can change over time or can be incomplete because of missing information's.       ==> Treatment prescribed/Plan:      Follow the recommendations of the [facility] and use only products from the [facility]    See for informations on wood tar mix, Compositae and Propolis also concerning food.    >> patient should go to ENT in [place] to evaluate for the chronic sinus injections = ENT re-evaluation  >> go back to allergist in [place] and perform skin tests with molds and environmental allergens that have been found in the school  >> then discuss possibility of oral immunotherapy to dust mites (patient had problems with the Immunotherapy with dose increase and no change found)  --> try Nasonex nasal spray 2xdaily  --> try Allegra 180mg at bedtime  --> Symbicort 160 inhalations 2xdaily    These conclusions are made at the best of one's knowledge and belief based on the provided evidence such as patient's history and allergy test results and they can change over time or can be incomplete because of missing information's.    CC Ulysses Mtz MD  [facility] [address] [place] [id number] on close of this encounter..  ___________________________    Staff: : provider    Follow-up in Derm-Allergy clinic if necessary  ___________________________    I spent a total of *** minutes with Gisela Richards during today s  visit. This time was spent discussing all the individual test results, correlating them to the clinical relevance, counseling the patient and/or coordinating care.           Again, thank you for allowing me to participate in the care of your patient.        Sincerely,        Reginaldo Horne MD

## 2023-06-16 NOTE — PATIENT INSTRUCTIONS
>> patient should go to ENT in [place] to evaluate for the chronic sinus injections = ENT re-evaluation  >> go back to allergist in [place] and perform skin tests with molds and environmental allergens that have been found in the school  >> then discuss possibility of oral immunotherapy to dust mites (patient had problems with the Immunotherapy with dose increase and no change found)  --> try Nasonex nasal spray 2xdaily  --> try Allegra 180mg at bedtime  --> Symbicort 160 inhalations 2xdaily

## 2023-06-20 ENCOUNTER — OFFICE VISIT (OUTPATIENT)
Dept: FAMILY MEDICINE | Facility: OTHER | Age: 43
End: 2023-06-20
Payer: COMMERCIAL

## 2023-06-20 VITALS
OXYGEN SATURATION: 98 % | WEIGHT: 160 LBS | HEART RATE: 74 BPM | DIASTOLIC BLOOD PRESSURE: 88 MMHG | SYSTOLIC BLOOD PRESSURE: 138 MMHG | RESPIRATION RATE: 16 BRPM | HEIGHT: 67 IN | BODY MASS INDEX: 25.11 KG/M2 | TEMPERATURE: 97.2 F

## 2023-06-20 DIAGNOSIS — J01.41 ACUTE RECURRENT PANSINUSITIS: Primary | ICD-10-CM

## 2023-06-20 PROCEDURE — 99213 OFFICE O/P EST LOW 20 MIN: CPT

## 2023-06-20 ASSESSMENT — PAIN SCALES - GENERAL: PAINLEVEL: MODERATE PAIN (4)

## 2023-06-20 NOTE — PROGRESS NOTES
ASSESSMENT/PLAN:    I have reviewed the nursing notes.  I have reviewed the findings, diagnosis, plan and need for follow up with the patient.    1. Acute recurrent pansinusitis  - Adult ENT  Referral; Future  - amoxicillin-clavulanate (AUGMENTIN) 875-125 MG tablet; Take 1 tablet by mouth 2 times daily for 10 days  Dispense: 20 tablet; Refill: 0    Physical exam and symptoms consistent with recurrent pansinusitis.  Patient believes that her sinus infections are triggered by her workspace.  She states that her symptoms flareup every time she goes into her office at her place of employment.  She has discussed this with her employer multiple times and has moved offices a couple of times but continues to have symptoms.  She is considering either working from home more often or quitting her job.  Will treat patient's pansinusitis with Augmentin.  Advised her to take medication with food to reduce risk of GI upset.  Advised her to continue taking her allergy medication as prescribed and continuing her sinus rinses.  A referral was placed to ENT for further evaluation.    Discussed warning signs/symptoms indicative of need to f/u    Follow up if symptoms persist or worsen or concerns    I explained my diagnostic considerations and recommendations to the patient, who voiced understanding and agreement with the treatment plan. All questions were answered. We discussed potential side effects of any prescribed or recommended therapies, as well as expectations for response to treatments.    NINI Villalobos CNP  6/20/2023  11:22 AM    HPI:    Gisela Richards is a 42 year old female  who presents to [facility] today for concerns of sinus symptoms    sinus infection over 1 week.     Symptoms:   Location: diffuse  Nasal congestion: Yes: +  Purulent nasal discharge: Yes: +   Headache: Yes: +  Facial pain: Yes: +   Cough: Yes: +  Tooth pain/symptoms: No  Myalgias: No  Presence of fever: No   Halitosis: No  "  Anosmia: No    Metallic taste in the mouth: No  Dizziness: Yes: +     Treatments tried: zyrtec and saline nasal rinses with no improvement.     History of similar symptoms: Yes: two previous sinus infections in past two months  Prior workup: No    PCP: Dr. Paniagua    Past Medical History:   Diagnosis Date     Constipation     Since young.     Other specified postprocedural states     2014,Slight recurrence within scar - removed in 9th grade and came back right away     Personal history of other medical treatment (CODE)      2, para 2.     Past Surgical History:   Procedure Laterality Date     COLONOSCOPY      ,\"swelling of the lining\" and flattening of villa.     OTHER SURGICAL HISTORY      ~,[id number],OTHER,Left,recurring within scar     OTHER SURGICAL HISTORY      2016,[id number],KNEE ARTHROSCOPY W/ MENISCAL REPAIR     OTHER SURGICAL HISTORY      2004,[id number],DILATION AND CURETTAGE,retained placenta     Social History     Tobacco Use     Smoking status: Former     Types: Cigarettes     Passive exposure: Past     Smokeless tobacco: Never     Tobacco comments:     \"10 cigarettes in my life before I was 19\"   Vaping Use     Vaping status: Never Used   Substance Use Topics     Alcohol use: Yes     Alcohol/week: 0.0 standard drinks of alcohol     Comment: Alcoholic Drinks/day: once monthly     Current Outpatient Medications   Medication Sig Dispense Refill     amphetamine-dextroamphetamine (ADDERALL) 10 MG tablet Take 10 mg by mouth daily as needed       cetirizine (ZYRTEC) 10 MG tablet Take 10 mg by mouth       cholecalciferol (VITAMIN D3) 25 mcg (1000 units) capsule Take 1 capsule by mouth       ferrous sulfate (FEROSUL) 325 (65 Fe) MG tablet Take 1 tablet by mouth daily       folic acid (FOLVITE) 1 MG tablet Take 1 mg by mouth daily       hydrOXYzine (ATARAX) 25 MG tablet Take 1 tablet (25 mg) by mouth At Bedtime 30 tablet 1     levothyroxine (SYNTHROID/LEVOTHROID) 75 MCG tablet Take 75 mcg by " mouth daily       mometasone (NASONEX) 50 MCG/ACT nasal spray Spray 2 sprays into both nostrils At Bedtime 17 g 3     vitamin B-12 (CYANOCOBALAMIN) 500 MCG tablet Take 1 tablet by mouth daily       VYVANSE 40 MG capsule TAKE 1 CAPSULE BY MOUTH IN THE MORNING WITH FOOD       amphetamine-dextroamphetamine (ADDERALL XR) 25 MG 24 hr capsule Take 25 mg by mouth daily (Patient not taking: Reported on 6/20/2023)       amphetamine-dextroamphetamine (ADDERALL) 5 MG tablet  (Patient not taking: Reported on 6/20/2023)       budesonide-formoterol (SYMBICORT) 160-4.5 MCG/ACT Inhaler Inhale 2 puffs into the lungs 2 times daily (Patient not taking: Reported on 6/20/2023) 10.2 g 3     doxycycline hyclate (VIBRAMYCIN) 100 MG capsule Take 1 capsule by mouth 2 times daily (Patient not taking: Reported on 7/20/2022)       famotidine (PEPCID) 40 MG tablet Take 40 mg by mouth At Bedtime (Patient not taking: Reported on 8/25/2022)       fexofenadine (ALLEGRA) 180 MG tablet Take 1 tablet (180 mg) by mouth At Bedtime (Patient not taking: Reported on 6/20/2023) 30 tablet 3     fluorometholone (FML FORTE) 0.25 % ophthalmic suspension  (Patient not taking: Reported on 8/25/2022)       levothyroxine (SYNTHROID/LEVOTHROID) 75 MCG tablet Take 1 tablet by mouth daily       levothyroxine (SYNTHROID/LEVOTHROID) 75 MCG tablet Take 1 tablet (75 mcg) by mouth every morning (before breakfast) 90 tablet 4     loratadine (CLARITIN) 10 MG tablet Take 10 mg by mouth (Patient not taking: Reported on 8/25/2022)       loratadine (CLARITIN) 10 MG tablet Morning and evening 1 Tabl of 10mg for about 1 week and if no itching anymore, then reduce to 1 Tabl in the morning. (Patient not taking: Reported on 4/6/2023) 30 tablet 3     mometasone (NASONEX) 50 MCG/ACT spray 1 spray 2 times daily       polyethylene glycol-electrolytes (NULYTELY) 420 g solution TAKE PREP AS SPLIT DOSE PRIOR TO PROCEDURE PER GASTRO INSTRUCTION LETTER (Patient not taking: Reported on  "6/20/2023)       predniSONE (DELTASONE) 50 MG tablet Emergency set: if severe allergic reaction take immediately 100mg Prednisone (2x50mg) and 2 Tabl Cetirizine 10mg and then write precise 12h retrospective diary.If less severe reaction take only the 2 Tabl Cetirizine 2 tablet 1     triamcinolone (KENALOG) 0.1 % external cream  (Patient not taking: Reported on 6/20/2023)       triamcinolone (KENALOG) 0.1 % external cream  (Patient not taking: Reported on 8/25/2022)       triamcinolone (KENALOG) 0.1 % external ointment Apply 1 Dose topically 2 times daily (Patient not taking: Reported on 7/20/2022)       Allergies   Allergen Reactions     Cats Shortness Of Breath     Dogs Fatigue, Palpitations, Shortness Of Breath and Tinnitus     Gentamicin Dermatitis     In patch tests severe reactions against Neomycine, Framycetine, Gentamicin, Bacitracine and Polymyxin.   For reasons of crossreactions I would as well avoid other aminoglycosides such as Vancomycine     Petrolatum Rash     Gluten Meal      Other reaction(s): Joint Pain     Pramoxine-Benzyl Alcohol Rash     Triamcinolone Rash     Fat atrophy after IM injection      Tramadol Other (See Comments)     Other reaction(s): Other - Describe In Comment Field  High doses caused yellow eyes, tremors in her mouth, couldn't feel some body parts.    No issues with low doses  High doses caused yellow eyes, tremors in her mouth, couldn't feel some body parts.    No issues with low doses     Food GI Disturbance     Night shades, stevia, pork     Gel Base Other (See Comments)     Allergy to Itch X. Gets blister/pustular rash in area the itch X is placed.     Hydrocodone-Acetaminophen Rash     Past medical history, past surgical history, current medications and allergies reviewed and accurate to the best of my knowledge.      ROS:  Refer to HPI    /88   Pulse 74   Temp 97.2  F (36.2  C) (Temporal)   Resp 16   Ht 1.702 m (5' 7\")   Wt 72.6 kg (160 lb)   LMP 06/15/2023 " (Exact Date)   SpO2 98%   Breastfeeding No   BMI 25.06 kg/m      EXAM:  General Appearance: Well appearing 42 year old female, appropriate appearance for age. No acute distress   Ears: Left TM intact, translucent with bony landmarks appreciated, no erythema, no effusion, no bulging, no purulence.  Right TM intact, translucent with bony landmarks appreciated, no erythema, no effusion, no bulging, no purulence.  Left auditory canal clear.  Right auditory canal clear.  Normal external ears, non tender.  Eyes: conjunctivae normal without erythema or irritation, corneas clear, no drainage or crusting, no eyelid swelling, pupils equal   Oropharynx: moist mucous membranes, posterior pharynx without erythema, tonsils symmetric and 1+, no erythema, no exudates or petechiae, no post nasal drip seen, no trismus, voice clear.    Sinuses:  sinus tenderness upon palpation of the frontal and maxillary sinuses  Nose:  Bilateral nares: moderate erythema and edema, purulent drainage and congestion   Neck: supple without adenopathy  Respiratory: normal chest wall and respirations.  Normal effort.  Clear to auscultation bilaterally, no wheezing, crackles or rhonchi.  No increased work of breathing.  No cough appreciated.  Cardiac: RRR with no murmurs  Musculoskeletal:  Equal movement of bilateral upper extremities.  Equal movement of bilateral lower extremities.  Normal gait.    Dermatological: no rashes noted of exposed skin  Neuro: Alert and oriented to person, place, and time. Psychological: normal affect, alert, oriented, and pleasant.

## 2023-06-20 NOTE — NURSING NOTE
"Chief Complaint   Patient presents with     Sinus Problem     Sinus pain and pressure for a week   She has had sinus pain ,[reesing and left ear pain for a week. She feels dizzy at times and has been tired.  Genet Alva LPN..................6/20/2023   10:47 AM        Initial /88   Pulse 74   Temp 97.2  F (36.2  C) (Temporal)   Resp 16   Ht 1.702 m (5' 7\")   Wt 72.6 kg (160 lb)   LMP 06/15/2023 (Exact Date)   SpO2 98%   Breastfeeding No   BMI 25.06 kg/m   Estimated body mass index is 25.06 kg/m  as calculated from the following:    Height as of this encounter: 1.702 m (5' 7\").    Weight as of this encounter: 72.6 kg (160 lb).  Medication Reconciliation: complete    FOOD SECURITY SCREENING QUESTIONS  Hunger Vital Signs:  Within the past 12 months we worried whether our food would run out before we got money to buy more. Never  Within the past 12 months the food we bought just didn't last and we didn't have money to get more. Never        Advance care directive on file? no  Advance care directive provided to patient? declined     Genet Alva LPN  "

## 2024-02-28 ENCOUNTER — VIRTUAL VISIT (OUTPATIENT)
Dept: ALLERGY | Facility: CLINIC | Age: 44
End: 2024-02-28
Payer: COMMERCIAL

## 2024-02-28 DIAGNOSIS — L23.89 ALLERGIC CONTACT DERMATITIS DUE TO OTHER AGENTS: Primary | ICD-10-CM

## 2024-02-28 DIAGNOSIS — J32.9 CHRONIC RHINOSINUSITIS: ICD-10-CM

## 2024-02-28 DIAGNOSIS — J30.89 NON-SEASONAL ALLERGIC RHINITIS DUE TO OTHER ALLERGIC TRIGGER: ICD-10-CM

## 2024-02-28 DIAGNOSIS — T78.3XXD ANGIOEDEMA, SUBSEQUENT ENCOUNTER: ICD-10-CM

## 2024-02-28 DIAGNOSIS — Z88.9 ATOPY: ICD-10-CM

## 2024-02-28 DIAGNOSIS — R09.82 ALLERGIC RHINITIS WITH POSTNASAL DRIP: ICD-10-CM

## 2024-02-28 DIAGNOSIS — J30.9 ALLERGIC RHINITIS WITH POSTNASAL DRIP: ICD-10-CM

## 2024-02-28 PROCEDURE — 99213 OFFICE O/P EST LOW 20 MIN: CPT | Mod: 95 | Performed by: DERMATOLOGY

## 2024-02-28 RX ORDER — TRIAMCINOLONE ACETONIDE 0.25 MG/G
OINTMENT TOPICAL 2 TIMES DAILY PRN
Qty: 15 G | Refills: 2 | Status: SHIPPED | OUTPATIENT
Start: 2024-02-28

## 2024-02-28 NOTE — PROGRESS NOTES
[facility] Dermato-allergology Note  Virtual visit: store and forward video ([employer] connected), start time: 7:16am, end time: 7:40am, date of images: during video  Encounter Date: Feb 28, 2024  ____________________________________________    CC: No chief complaint on file.      HPI:  (Feb 28, 2024)  Ms. Gisela Richards is a(n) 43 year old female who presents today as a return patient for allergy consultation  - Follow-up in Derm-Allergy clinic if necessary  - patient had eyelid swelling after putting on a sun screen and a topical cosmetic names shine on. And in there are many essential oils and this was probably the reason for the flare up  --> most likely allergic reaction to organic fragrances  - otherwise feeling well in usual state of health    Physical exam:  General: In no acute distress, well-developed, well-nourished  Eyes: no conjunctivitis  ENT: no signs of rhinitis   Pulmonary: no wheezing or coughing  Skin: no active skin lesions, but in photo from 1 week ago periorbital swelling with eczematous lesions    Earlier History and Allergy exams:  (Jun 16, 2023)  - Follow-up in Derm-Allergy clinic in about 4 months  >> was working in a local [employer] in Feb/March. Started with red, itchy eyes. Later, when changed to full time was tired and patient forgot one time to take the antihistamines --> then developed recurrent Sinus infections, constant PND, sinus pressure and puffy eyes, fatigue and again wheezing and coughing. Feels better on Weekends.  >> will have with employer a air quality test  >> on Cetirizine once daily and Benadryl and nasal rinses    (Dec 2, 2022)  - Follow-up in Derm-Allergy clinic for 2nd readings and final conclusions after 4 days (virtual) = from [place]     (Nov 30, 2022)  - Follow-up in Derm-Allergy clinic for 1st readings of patch tests after 2 days (virtual) = from [facility]     (Nov 28, 2022)  - Follow-up in Derm-Allergy clinic for additional  patch tests (see series in red) and earlier if hives not under control    (Jul 27, 2022)  - Follow-up: in Derm-Allergy clinic as needed  - on July 5th did a photoshoot in the field and then observed on the ankle a small spot, that became bigger (brown, not really infiltrated) and the morning of the 7th July a Dermatologist looked at it and in histology it was a necrosis unknown cause.  - 1 week later itching on that area with vesicular reaction = eczematous reaction  - on the 15th did bacterial culture (no bacterial growth)  - as soon as the patient stopped the vaseline with same adhesive band aid the lesions improved  - patient observe anyway recently reactions to various vaseline type ointment (incl Triamcinolone acetonide) = with cream no problem  --> took at the end orally prednisone and Doxycycline = stopped the Prednisone Monday the 18th of July and the Doxycycline on the 18th. ==> itching and urticarial rash started on the 22nd of July after being in the sun  --> NP prescribed Claritine and Prilosec and Benadryl   Skin:in the moment no signs for skin lesions, but patient had the eczematous lesions on the ankle   Some erythema over the decolletage    Earlier History and Allergy exams:  01/22/2021  Says she hasn't noticed any reaction to the patch testing or where we injected. Would like to try the meloxicam for her back pain. .    Earlier History and Allergy exams:   Ibuprofen does not induce any reaction.    > Patient had a reaction to Meloxicam in November. Took one Tabl for the first time and about 3-6h later with swelling and redness around the lips with dryness and then a sun burn type of rash over upper chest, stomach and back. No itching. No sun exposure involved. Redness disappeared after 1-2 days. Never tongue swelling or breathing problems. Out of chin skin wheeping. Took almost one week to fully recover.  ==> delayed type reaction    Earlier History and Allergy exams:    Patient is continuing now  the IT without any problems, since they are not using ItchEx anymore. Just using ice.  IT injections is done by an primary care PA in [facility]    > Patient had a reaction to Meloxicam about 3 h later with angioedema on face and then sun burn type of rash over face --> happened in November  > Ibuprofen does not induce any reaction (last taken last Saturday)    With tramadol tremors and swellings of face and with Percacet (Oxycodon and Acetaminophen) migraines  Vicodin induces erythema  Today she reports that all skin manifestation has resolved. Patient has come from [place], [place]. Patient's spouse is present during evaluation. She reports her first allergy shots took place on November 9th, 2 injections on posterior aspect of right arm and 1 to the posterior aspect of left arm.   - Per chart review immunotherapy: trees/grass/weeds, mites/molds and animals  Within the day of the injection patient noted a diffuse pruritic rash on the posterior aspect of her right arm. She denies using any topicals or oral medications beside Zyrtec to aid with on going rash. She additionally iced the area and skin manifestation had resolved.     The following week she completed her 2nd round of immunotherapy at the same dosage. Again the rash occurred on the posterior aspect of her right arm along with outstanding edema of her right arm and shoulder. This rash was also pruritic, but again denied any pain. Her provider was concerned about reaction to the preservative. This rash took 1.5 week to self resolve. The edema persisted for 2-3 weeks and when resolved, a notable dent was appreciated on the right distal aspect of the deltoid. Imaging completed showed atrophy of the deltoid. She denied any recent injections to that area including routine vaccines or steroid injections. Also now having to areas of hypopigmentation overlying the deltoid.  Reports dry skin after resolution of rashes. Denies history of asthma.  "Extensive history of food sensitives and autoimmune workup that has been unremarkable.     Hx since 19:  The patient comes in today for patch testing day 1. She reports that there is a bruise on the R shoulder that has worsened in the interim. She is unsure if it may be necrosis or sudden muscle atrophy. She reports it appeared on , the day after allergy shot. Also states that in the same area she developed a new \"bump\" that appeared about 2 days ago.   Reports that the IT on the R upper extremity was against trees, weeds, molds, mites, and dog. On the L upper extremity, she had IT injections for cats and dogs.   She is otherwise feeling well overall today. No other skin or allergy concerns at this time.     Medications:  - takes daily Zyrtec and is on IT  - Levothyroxin      Past Medical History:   Patient Active Problem List   Diagnosis    Allergic rhinitis due to dogs    Hypothyroid    Lumbar facet joint pain     Past Medical History:   Diagnosis Date    Constipation     Since young.    Other specified postprocedural states     2014,Slight recurrence within scar - removed in 9th grade and came back right away    Personal history of other medical treatment (CODE)      2, para 2.     Past Surgical History:   Procedure Laterality Date    COLONOSCOPY      ,\"swelling of the lining\" and flattening of villa.    OTHER SURGICAL HISTORY      ~,[id number],OTHER,Left,recurring within scar    OTHER SURGICAL HISTORY      2016,[id number],KNEE ARTHROSCOPY W/ MENISCAL REPAIR    OTHER SURGICAL HISTORY      2004,[id number],DILATION AND CURETTAGE,retained placenta       Social History:  Patient reports that she has quit smoking. Her smoking use included cigarettes. She has been exposed to tobacco smoke. She has never used smokeless tobacco. She reports current alcohol use. She reports that she does not use drugs.    Family History:  Family History   Problem Relation Age of Onset    Family History Negative " Father         Good Health,does not see doctor regularly    Thyroid Disease Mother         Thyroid Disease,Hypothyroid    Other - See Comments Mother         Smoker, prediabetes    Thyroid Disease Sister         Thyroid Disease,Hypothyroid    Arthritis Sister         Arthritis    Scoliosis Maternal Half-Sister         Scoliosis    Other - See Comments Maternal Half-Sister         No Known Problems       Medications:  Current Outpatient Medications   Medication Sig Dispense Refill    amphetamine-dextroamphetamine (ADDERALL XR) 25 MG 24 hr capsule Take 25 mg by mouth daily (Patient not taking: Reported on 6/20/2023)      amphetamine-dextroamphetamine (ADDERALL) 10 MG tablet Take 10 mg by mouth daily as needed      amphetamine-dextroamphetamine (ADDERALL) 5 MG tablet  (Patient not taking: Reported on 6/20/2023)      budesonide-formoterol (SYMBICORT) 160-4.5 MCG/ACT Inhaler Inhale 2 puffs into the lungs 2 times daily (Patient not taking: Reported on 6/20/2023) 10.2 g 3    cetirizine (ZYRTEC) 10 MG tablet Take 10 mg by mouth      cholecalciferol (VITAMIN D3) 25 mcg (1000 units) capsule Take 1 capsule by mouth      doxycycline hyclate (VIBRAMYCIN) 100 MG capsule Take 1 capsule by mouth 2 times daily (Patient not taking: Reported on 7/20/2022)      famotidine (PEPCID) 40 MG tablet Take 40 mg by mouth At Bedtime (Patient not taking: Reported on 8/25/2022)      ferrous sulfate (FEROSUL) 325 (65 Fe) MG tablet Take 1 tablet by mouth daily      fexofenadine (ALLEGRA) 180 MG tablet Take 1 tablet (180 mg) by mouth At Bedtime (Patient not taking: Reported on 6/20/2023) 30 tablet 3    fluorometholone (FML FORTE) 0.25 % ophthalmic suspension  (Patient not taking: Reported on 8/25/2022)      folic acid (FOLVITE) 1 MG tablet Take 1 mg by mouth daily      hydrOXYzine (ATARAX) 25 MG tablet Take 1 tablet (25 mg) by mouth At Bedtime 30 tablet 1    levothyroxine (SYNTHROID/LEVOTHROID) 75 MCG tablet Take 1 tablet by mouth daily       levothyroxine (SYNTHROID/LEVOTHROID) 75 MCG tablet Take 75 mcg by mouth daily      levothyroxine (SYNTHROID/LEVOTHROID) 75 MCG tablet Take 1 tablet (75 mcg) by mouth every morning (before breakfast) 90 tablet 4    loratadine (CLARITIN) 10 MG tablet Take 10 mg by mouth (Patient not taking: Reported on 8/25/2022)      loratadine (CLARITIN) 10 MG tablet Morning and evening 1 Tabl of 10mg for about 1 week and if no itching anymore, then reduce to 1 Tabl in the morning. (Patient not taking: Reported on 4/6/2023) 30 tablet 3    mometasone (NASONEX) 50 MCG/ACT nasal spray Spray 2 sprays into both nostrils At Bedtime 17 g 3    mometasone (NASONEX) 50 MCG/ACT spray 1 spray 2 times daily      polyethylene glycol-electrolytes (NULYTELY) 420 g solution TAKE PREP AS SPLIT DOSE PRIOR TO PROCEDURE PER GASTRO INSTRUCTION LETTER (Patient not taking: Reported on 6/20/2023)      predniSONE (DELTASONE) 50 MG tablet Emergency set: if severe allergic reaction take immediately 100mg Prednisone (2x50mg) and 2 Tabl Cetirizine 10mg and then write precise 12h retrospective diary.If less severe reaction take only the 2 Tabl Cetirizine 2 tablet 1    triamcinolone (KENALOG) 0.1 % external cream  (Patient not taking: Reported on 6/20/2023)      triamcinolone (KENALOG) 0.1 % external cream  (Patient not taking: Reported on 8/25/2022)      triamcinolone (KENALOG) 0.1 % external ointment Apply 1 Dose topically 2 times daily (Patient not taking: Reported on 7/20/2022)      vitamin B-12 (CYANOCOBALAMIN) 500 MCG tablet Take 1 tablet by mouth daily      VYVANSE 40 MG capsule TAKE 1 CAPSULE BY MOUTH IN THE MORNING WITH FOOD         Allergies   Allergen Reactions    Cats Shortness Of Breath    Dogs Fatigue, Palpitations, Shortness Of Breath and Tinnitus    Gentamicin Dermatitis     In patch tests severe reactions against Neomycine, Framycetine, Gentamicin, Bacitracine and Polymyxin.   For reasons of crossreactions I would as well avoid other  aminoglycosides such as Vancomycine    Petrolatum Rash    Gluten Meal      Other reaction(s): Joint Pain    Pramoxine-Benzyl Alcohol Rash    Triamcinolone Rash     Fat atrophy after IM injection     Tramadol Other (See Comments)     Other reaction(s): Other - Describe In Comment Field  High doses caused yellow eyes, tremors in her mouth, couldn't feel some body parts.    No issues with low doses  High doses caused yellow eyes, tremors in her mouth, couldn't feel some body parts.    No issues with low doses    Food GI Disturbance     Night shades, stevia, pork    Gel Base Other (See Comments)     Allergy to Itch X. Gets blister/pustular rash in area the itch X is placed.    Hydrocodone-Acetaminophen Rash     Order for PATCH TESTS    [x] Outpatient  [] Inpatient: Head..../ Bed ....      Skin Atopy (atopic dermatitis) [x] Yes   [] No  Rhinitis/Sinusitis:   [x] Yes   [] No  Allergic Asthma:   [] Yes   [x] No  Food Allergy:   [x] Yes   [] No  Leg ulcers:   [] Yes   [x] No  Hand eczema:   [] Yes   [x] No   Leading hand:   [x] R   [] L       [] Ambidextrous                      Reason for tests (suspected allergy): Assessment for potential reaction to disinfections before IT (less likely preservatives in IT, because the reaction not directly over injection site)  Known previous allergies: Trees/grass, mites, dogs/cats  Standardized panels (repeat patch tests in red)  [x] Standard panel (40 tests)  [x] Preservatives & Antimicrobials (31 tests)  [x] Emulsifiers & Additives (25 tests) particularly propylene glycol  [x] Perfumes/Flavours & Plants (25 tests)  [] Hairdresser panel (12 tests)  [x] Rubber Chemicals (22 tests)  [x] Plastics (26 tests)  [] Colorants/Dyes/Food additives (20 tests)  [] Metals (implants/dental) (24 tests)  [] Local anaesthetics/NSAIDs (13 tests)  [x] Antibiotics & Antimycotics (14 tests)   [] Corticosteroids (15 tests)   [] Photopatch test (62 tests)   [] others: ...      [x] Patient's own products:  tough strips band aids and vaseline and Triamcinolone cream and ointment  DO NOT test if chemical or biological identity is unknown!   always ask from patient the product information and safety sheets (MSDS)   [] Atopy screen prick test (Atopic predisposition): ...      RESULTS & EVALUATION of PATCH TESTS    Patch test readings after     [x] 2 days, [] 3 days [x] 4 days, [] 5 days,    Applied patch tests with results (import here the list of patch tests):  Order documented by: IVAN HANCOCK LPN  Order reviewed by: Preeti Copeland LPN   Physician:   Date/time of application:1-  Localization of application: back >> Clear    STANDARD Series         No Substance 2 days 4 days remarks   1 Francesco Mix [C] - -    2 Colophony - -    3  2-Mercaptobenzothiazole  NA NA     4 Methylisothiazolinone - -    5 Carba Mix - -    6 Thiuram Mix [A] NA NA    7 Bisphenol A Epoxy Resin - -    8 W-Fwhg-Rasjoyqctth-Formaldehyde Resin - -    9 Mercapto Mix [A] - -    10 Black Rubber Mix- PPD [B] NA NA    11 Potassium Dichromate  -  -    12 Balsam of [place] (Myroxylon Pereirae Resin) - -    13 Nickel Sulphate Hexahydrate - -    14 Mixed Dialkyl Thiourea - -    15 Paraben Mix [B] - -    16 Methyldibromo Glutaronitrile NA NA    17 Fragrance Mix - -    18 2-Bromo-2-Nitropropane-1,3-Diol (Bronopol) - -    19 Lyral - -    20 Tixocortol-21- Pivalate NA NA    21 Diazolidiyl Urea (Germall II) - -    22 Methyl Methacrylate NA NA    23 Cobalt (II) Chloride Hexahydrate - -    24 Fragrance Mix II  - -    25 Compositae Mix - -    26 Benzoyl Peroxide NA NA    27 Bacitracin - ++/+++    28 Formaldehyde - -    29 Methylchloroisothiazolinone / Methylisothiazolinone - -    30 Corticosteroid Mix - -    31 Sodium Lauryl Sulfate - -    32 Lanolin Alcohol - -    33 Turpentine - -    34 Cetylstearylalcohol - -    35 Chlorhexidine Dicluconate - -    36 Budenoside - -    37 Imidazolidinyl Urea  - -    38 Ethyl-2 Cyanoacrylate NA NA    39 Quaternium 15  (Dowicil 200) - -    40 Decyl Glucoside - -      EMULSIFIERS & ADDITIVES        No Substance 2 days 4 days remarks   41 Polyethylene Glycol-400 NA NA    42 Cocamidopropyl Betaine - -    43 Amerchol L101 NA NA    44 Propylene Glycol - -    45 Triethanolamine - -    46 Sorbitane Sesquiolate - -    47 Isopropylmyristate - -    48 Polysorbate 80  - -    49 Amidoamine   (Stearamidopropyl Dimethylamine) - -    50 Oleamidopropyl Dimethylamine - -    51 Lauryl Glucoside NA NA    52 Coconut Diethanolamide  - -    53 2-Hydroxy-4-Methoxy Benzophenone (Oxybenzone) - -    54 Benzophenone-4 (Sulisobenzon) - -    55 Propolis - -    56 Dexpanthenol - -    57 Carboxymethyl Cellulose Sodium - -    58 Abitol - -    59 Tert-Butylhydroquinone - -    60 Benzyl Salicylate - -     Antioxidant      61 Dodecyl Gallate - -    62 Butylhydroxyanisole (BHA) - -    63 Butylhydroxytoluene (BHT) - -    64 Di-Alpha-Tocopherol (Vit E) - -    65 Propyl Gallate - -      RUBBER CHEMICALS         No Substance 2 days 4 days remarks    Carbamate      66 Zinc Bis ( Diethyldithio carbamate ) (ZDEC) - -    67 Zinc Bis (Dibutyldithiocarbamate) - -    68 1,3-Diphenylguanidine (DPG) - -     Thiourea      69 Dibutylthiourea - -    70 Diphenyltiourea - -    71 Thiourea - -     Mercapto chemicals      72 Morpholinyl Mercaptobenzothiazole - -    73 Q-Pmxhisxlqy-1-Benzothiazyl-Sulfenamide - -    74 Dibenzothiazyl Disulfide - -     Thiuram chemicals      75 Dipentamethylenethiuram Disulfide - -    76 Tetraethylthiuram Disulfide (Disulfiram) - -    77 Tetramethylthiuram Disulfide - -    78 Tetramethyl Thiuram Monosulfide (TMTM) - -     4-Phenylenediamine derivatives      79 N-Isopropyl-N'-Phenyl-P-Phenylenediamine (IPPD) - -    80 Dmgnycim-V-Emglbasvkyzndfbo (DPPD) - -     Various Rubber Additives      81 Hydroquinone Monobenzylether  - -    82 Hexamethylenetetramine - -    83 4,4'-Dihydroxybiphenyl - -    84 Cyclohexylthiophtalimide - -    85 Ethylenediamine  Dihydrochloride - -    86 N-Phenyl-B-Naphthylamine - -    87 Dodecyl Mercaptan - -        PLASTICS         No Substance 2 days 4 days remarks    Acrylates - -    88 2-Hydroxyethyl Methacrylate (HEMA) - -    89 1,4-Butandioldimethacrylate (BUDMA) - -    90  2-Ethylhexyl Acrylate - -    91 Bisphenol-A-Dimethacrylate  - -    92 Diurethane-Dimethacrylate - -    93 Ethyleneglycoldimethacrylate (EGDMA) - -    94 Pentaerythritoltriacrylate (CARLOS) - -    95 Triethylene Glycol Dimethacrylate (TEGDMA) - -     Synthetic material/additives       96 W-Yeoa-Pwewtlrpmxc - -    97 Tricresyl Phosphate - -    98 8-Cavx-Puzmmikrkwgme - -    99 Bis (2-Ethylhexyl) Phthalate - -    100 Dibutylphthalate - -    101 Dimethylphthalate - -    102 Toluene-2,4-Diisocyanate - -    103 Diphenylmethane-4,4''-Diisocyanate - -     EPOXY RESIN SYSTEMS       Reactive Solvents - -    104 Cresyl Glycidyl Ether - -    105 Butyl Glycidyl Ether - -    106 Phenyl Glycidyl Ether - -    107 1,4-Butanediol Diglycidyl Ether - -    108 1,6-Hexanediole Diglycidyl Ether - -     Hardener / Accelerator - -    109 Ethylenediamine Dihydrochloride - -    110 Triethylenetetramine - -    111 Diethylenetriamine - -    112 Isophorone Diamine (IPD) - -    113 N,N-Dimethyl-P-Toluidine - -        PRESERVATIVES & ANTIMICROBIALS         No Substance 2 days 4 days remarks   114  1,2-Benzisothiazoline-3-One, Sodium Salt - -    115  1,3,5-Anival (2-Hydroxyethyl) - Hexahydrotriazine (Grotan BK) NA NA    116 8-Xwbocnrudcddm-7-Nitro-1, 3-Propanediol - -    117  3, 4, 4' - Triclocarban - -    118 4 - Chloro - 3 - Cresol - -    119 4 - Chloro - 4 - Xylenol (PCMX) NA NA    120 7-Ethylbicyclooxazolidine (Bioban [id number]) - -    121 Benzalkonium Chloride - -    122 Benzyl Alcohol - -    123 Cetalkonium Chloride NA NA    124 Cetylpyrimidine Chloride  - -    125 Chloroacetamide - -    126 DMDM Hydantoin NA NA    127 Glutaraldehyde NA NA    128 Triclosan - -    129 Glyoxal Trimeric Dihydrate - -     130 Iodopropynyl Butylcarbamate - -    131 Octylisothiazoline NA NA    132 Iodoform - -    133 (Nitrobutyl) Morpholine/(Ethylnitro-Trimethylene) Dimorpholine (Bioban P 1487) - -    134 Phenoxyethanol NA NA    135 Phenyl Salicylate - -    136 Povidone Iodine - -    137 Sodium Benzoate - -    138 Sodium Disulfite NA NA    139 Sorbic Acid - -    140 Thimerosal - -     Parabens      141 Butyl-P-Hydroxybenzoate - -    142 Ethyl-P-Hydroxybenzoate - -    143 Methyl-P-Hydroxybenzoate NA NA    144 Propyl-P-Hydroxybenzoate - -         ANTIBIOTICS & ANTIMYCOTICS         No Substance 2 days 4 days remarks   145 Erythromycine - -    146 Framycetine Sulphate (+) +++    147 Fusidic Acid Sodium Salt - -    148 Gentamicin Sulphate - +    149 Neomycine Sulphate (+) ++/+++    150 Oxytetracycline  - -    151 Polymyxin B Sulphate (+) ++    152 Tetracycline-HCL - -    153 Sulfanilamide - -    154 Metronidazole - -    155 Oxyquinoline Mix - -    156 Nitrofurazone - -    157 Nystatin - -    158 Clotrimazole - -      PATIENTS OWN PRODUCTS         No Substance Conc  % solv 2 days 4 days remarks   159 Aspergillus   - -    160 Penicillium   - -    161 D. Farinae   - -    162 D. pteronyssinus   - -    163 Dog   - -    164 Cat   - -        Nov 28, 2022 repeat application of patch tests:  Patch test readings after     [x] 2 days, [] 3 days [x] 4 days, [] 5 days,  Other duration: ...    STANDARD Series                                          # Substance 2 days 4 days remarks     1 Francesco Mix [C] - -       2 Colophony - -       3  2-Mercaptobenzothiazole  - -       4 Methylisothiazolinone - -       5 Carba Mix - -       6 Thiuram Mix [A] - -       7 Bisphenol A Epoxy Resin - -       8 L-Uzyt-Ynyxpavhgke-Formaldehyde Resin - -       9 Mercapto Mix [A] - -       10 Black Rubber Mix- PPD [B] - -       11 Potassium Dichromate  -  -       12 Balsam of [place] (Myroxylon Pereirae Resin) - -       13 Nickel Sulphate Hexahydrate - -       14 Mixed Dialkyl  Thiourea - -       15 Paraben Mix [B] - -       16 Methyldibromo Glutaronitrile - -       17 Fragrance Mix - +       18 2-Bromo-2-Nitropropane-1,3-Diol (Bronopol) CT - -       19 Lyral - -       20 Tixocortol-21- Pivalate CT - -       21 Diazolidiyl Urea (Germall II) - -       22 Methyl Methacrylate - -       23 Cobalt (II) Chloride Hexahydrate - -       24 Fragrance Mix II  - -       25 Compositae Mix - (+)       26 Benzoyl Peroxide - -       27 Bacitracin - +++       28 Formaldehyde - -       29 Methylchloroisothiazolinone / Methylisothiazolinone - -       30 Corticosteroid Mix CT (+) -       31 Sodium Lauryl Sulfate - -       32 Lanolin Alcohol - -       33 Turpentine - -       34 Cetylstearylalcohol - -       35 Chlorhexidine Dicluconate - -       36 Budenoside - -       37 Imidazolidinyl Urea  - -       38 Ethyl-2 Cyanoacrylate - -       39 Quaternium 15 (Dowicil 200) - -       40 Decyl Glucoside - -       PRESERVATIVES & ANTIMICROBIALS        # Substance 2 days 4 days remarks   41 1  1,2-Benzisothiazoline-3-One, Sodium Salt - -     2  1,3,5-Anival (2-Hydroxyethyl) - Hexahydrotriazine (Grotan BK) - -     3 6-Lbxyhuslklbik-2-Nitro-1, 3-Propanediol NA NA     4  3, 4, 4' - Triclocarban - -    45 5 4 - Chloro - 3 - Cresol - -     6 4 - Chloro - 4 - Xylenol (PCMX) - -     7 7-Ethylbicyclooxazolidine (Bioban [id number]) - -     8 Benzalkonium Chloride CT - -     9 Benzyl Alcohol - -    50 10 Cetalkonium Chloride - -     11 Cetylpyrimidine Chloride  - -     12 Chloroacetamide - -     13 DMDM Hydantoin - -     14 Glutaraldehyde - -    55 15 Triclosan - -     16 Glyoxal Trimeric Dihydrate - -     17 Iodopropynyl Butylcarbamate - -     18 Octylisothiazoline - -     19 Bithionol CT - -    60 20 Bioban P 1487 (Nitrobutyl) Morpholine/(Ethylnitro-Trimethylene) Dimorpholine - -     21 Phenoxyethanol - -     22 Phenyl Salicylate - -     23 Povidone Iodine - -     24 Sodium Benzoate - -    65 25 Sodium Disulfite - -     26 Sorbic  Acid - -     27 Thimerosal - -     28 Melamine Formaldehyde Resin - -     29 Ethylenediamine Dihydrochloride - -      Parabens      70 30 Butyl-P-Hydroxybenzoate - -     31 Ethyl-P-Hydroxybenzoate NA NA     32 Methyl-P-Hydroxybenzoate - -    73 33 Propyl-P-Hydroxybenzoate - -      EMULSIFIERS & ADDITIVES       # Substance 2 days 4 days remarks   74 1 Polyethylene Glycol-400 - -    75 2 Cocamidopropyl Betaine - -     3 Amerchol L101 - -     4 Propylene Glycol - -     5 Triethanolamine - -     6 Sorbitane Sesquiolate CT - -    80 7 Isopropylmyristate - -     8 Polysorbate 80 CT - -     9 Amidoamine   (Stearamidopropyl Dimethylamine) NA NA     10 Oleamidopropyl Dimethylamine - -     11 Lauryl Glucoside - -    85 12 Coconut Diethanolamide  - -     13 2-Hydroxy-4-Methoxy Benzophenone (Oxybenzone) - -     14 Benzophenone-4 (Sulisobenzon) NA NA     15 Propolis - +/++     16 Dexpanthenol - -    90 17 Abitol - -     18 Tert-Butylhydroquinone - -     19 Benzyl Salicylate - -     20 Dimethylaminopropylamin (DMPA) CT? D053       21 Zinc Pyrithione (Zinc Omadine) CT? Z006      95 22 Anival(Hydroxymethyl) Nitromethane CT        Antioxidant - -     23 Dodecyl Gallate - -     24 Butylhydroxyanisole (BHA) - -     25 Butylhydroxytoluene (BHT) NA NA     26 Di-Alpha-Tocopherol (Vit E) - -    100 27 Propyl Gallate - -      PERFUMES, FLAVORS & PLANTS        # Substance 2 days 4 days remarks   101 1 Benzyl Cinnamate (+) -     2 Di-Limonene (Dipentene) - -     3 Cananga Odorata (Ylang Ylang) (I) (+) -     4 Lichen Acid Mix - -    105 5 Mentha Piperita Oil (Peppermint Oil) + -     6 Sesquiterpenelactone mix - -     7 Tea Tree Oil, Oxidized - -     8 Wood Tar Mix - +/++     9 Abietic Acid - -    110 10 Lavendula Angustifolia Oil (Lavender Oil) - -     11 Fragrance mix II CT * - -      Fragrance Mix I       12 [place]moss Absolute - -     13 Eugenol - -     14 Geraniol NA NA    115 15 Hydroxycitronellal - -     16 Isoeugenol - -     17 Cinnamic  Aldehyde - -     18 Cinnamic Alcohol  NA NA      Fragrance mix II       19 Citronellol - -    120 20 Alpha-Hexylcinnamic Aldehyde    - -     21 Citral - -     22 Farnesol - -    123 23 Coumarin - -    Hexylcinnamic aldehyde, Coumarin, Farnesol, Hydroxyisohexy3-cyclohexene carboxaldehyde, citral, citrolellol  PLASTICS        # Substance 2 days 4 days remarks     Acrylates - -    124 1 2-Hydroxyethyl Methacrylate (HEMA) - -    125 2 1,4-Butandioldimethacrylate (BUDMA) - -     3  2-Ethylhexyl Acrylate - -     4 Bisphenol-A-Dimethacrylate  - -     5 Diurethane-Dimethacrylate - -     6 Ethyleneglycoldimethacrylate (EGDMA) - -    130 7 Pentaerythritoltriacrylate (CARLOS) - -     8 Triethylene Glycol Dimethacrylate (TEGDMA) - -      Synthetic material/additives        9 C-Emjw-Xohxdcbagje - -     10 Tricresyl Phosphate - -     11 4-Cvqd-Fmvhqohnwecbc - -    135 12 Bis (2-Ethylhexyl) Phthalate - -     13 Dibutylphthalate - -     14 Dimethylphthalate - -     15 Toluene-2,4-Diisocyanate - -     16 Diphenylmethane-4,4''-Diisocyanate - -      EPOXY RESIN SYSTEMS        Reactive Solvents - -    140 17 Cresyl Glycidyl Ether - -     18 Butyl Glycidyl Ether - -     19 Phenyl Glycidyl Ether - -     20 1,4-Butanediol Diglycidyl Ether - -     21 1,6-Hexanediole Diglycidyl Ether - -      Hardener / Accelerator - -    145 22 Triethylenetetramine - -     23 Diethylenetriamine - -     24 Isophorone Diamine (IPD) - -    148 25 N,N-Dimethyl-P-Toluidine - -      OTHER PRODUCTS  tough strips band aids and vaseline and Triamcinolone cream and ointment       # Substance Conc  % solv 2 days 4 days remarks   149 1 Triamcinolone cream 0.1% as is  - -    150 2 Curad petroleum jelly as is  - -    151 3 Cortizone 10 cream with aloe as is  - -    152 4 Triamcinolone oint 0.1% as is  - -        Results of patch tests:                         Interpretation:  - Negative                    A    = Allergic      (+) Erythema    TI   = Toxic/irritant   + E +  Infiltration    RaP = Relevance at Present     ++ E/I + Papulovesicle   Rpr  = Relevance Previously     +++ E/I/P + Blister     nR   = No Relevance         Atopy Screen (Placed 01/13/20 )  No Substance Readings (15 min) Evaluation   POS Histamine 1mg/ml ++    NEG NaCl 0.9% - Slight dermographism     No Substance Readings (15 min) Evaluation   1 Alternaria alternata (tenuis)  -    2 Cladosporium herbarum -    3 Aspergillus fumigatus -    4 Penicillium notatum -    5 Dermatophagoides pteronyssinus ++    6 Dermatophagoides farinae ++    Conclusion: immediate type reaction to house dust mites, but no delayed reaction to moulds or HDM --> The DP and DF reactions remained into the next day.       DRUGS/SUBSTANCES 1/19/2021  No Substance Readings (15min) Evaluation   POS Histamine 1mg/ml ++    NEG NaCl 0.9% - dermographism      Prick Tests        Substance/ Allergen Concentration Result (15min) Remarks   Meloxicam 30mg/ml as is neg     In vaseline neg Not bigger than neg ctr     Intradermal Tests   immed immed delayed delayed     Substance Conc 1st dil 2nd dil   3 days  3 days remarks   Histamine Hydrochloride (ALK) 0.1 mg/ml        NaCl 0.9%        Meloxicam 1:200/ 1:20 neg neg neg  dermographism     Patch Tests   as is  as is 1:2 paraffin 1:2 paraffin     Substance Conc  3 days  days  3 days  days remarks   Meloxicam  - - - -        [x] Allergic reaction diagnosed against:     Antibiotics and antimycotics:   Framycetine Sulphate +++  Gentamicin Sulphate +  Neomycine Sulphate ++/+++  Polymyxin B Sulphate ++  Bacitracin ++/+++ and repeated 11/30/2022 +++    Following additional allergens in repeat patch tests from November 2022 found:     Reactions to various fragrances/flavors such as   +/++ wood tar mix,  +/++ Propolis, + fragrance mix and + Compositae mix      Interpretation/ remarks:   Patient has a severe delayed type contact sensitization to various antibiotics that are in many OTC antiseptics and antibiotics. No  signs for allergies to rubber chemicals or adhesives, but reactions to organic flavors and fragrances and this was probably the problem for the allergic contact dermatitis to the Vaseline gel with fragrances in there. Not sure if Propolis (honey), wood tar (flavor from pine sap in candies) and Compositae plays a role with the GI symptoms    [x] Patient information given   [x] [facility] information's (# [id number], and [id number]) to following  compounds:  Neomycine Sulphate, Polymyxin B Sulphate, Bacitracin, Propolis, fragrance mix, Compositae mix, wood tar mix    [] General information's to following compounds: ......    Interpretation/ remarks:   See later    Assessment & Plan:    ==> Final Diagnosis:     #  Localized Eczematous and delayed-type reaction after 2x initial doses of immunotherapies  = reaction to itch-X cream  * chronic illness with exacerbation, progression, side effects from treatment  No signs for allergies to preservative in these allergy extracts   No signs in prick test or patch test for delayed type reaction to some of these allergens (dust mites, molds)  Severe contact sensitizations to topical antibiotics  ==> in open application test clearly positive to the itch-x cream that patient used to reduce the effects of the IT    ==> Comment: This was NOT a reaction to the immunotherapy, but rather to the topical product used afterwards to treat the topical side effects of the IT. Not sure to what ingredient in itch-x was really reacting, but not to Parabens and Propylene Glycol.    #  New flare up of allergic contact dermatitis on ankle to fragrances in the Vaseline cocoa butter healing jelly  >> additional contact sensitizations to fragrances and flavors  * chronic illness with exacerbation, progression, side effects from treatment    #  Chronic recurrent Rhinosinusitis, Postnasal drip and fatigue  Most likely environmental allergy to dust mites (maybe also molds)  * chronic illness with  exacerbation, progression, side effects from treatment    #  Recurrent urticarial rash with pruritus (after using Doxycycline)  * chronic illness with exacerbation, progression, side effects from treatment  Treatment: try Claritine 10mg morning and Noon and Hydroxyzine 25mg at bedtime prn    # Unlikely angioedema reaction to Meloxicam  * stable chronic illness  In skin tests no signs for specific immediate type or delayed reaction to Meloxicam. Patient desires to restart Meloxicam for back pain.   No evidence for COX1 intolerance (Ibuprofen is well-tolerated)  Suspect that her prior reaction was unrelated to the Meloxicam  Can restart meloxicam 1/4 tablet, then 1-2 days later, 1/2 tablet, then 1-2 days later she can try a full tablet.   Has emergency set and given handout emergency treatment (has Epipen)    It is imperative that the patient avoids all antibiotics, topically and systemically, that we have tested and that she was positive. Because gentamycin and neomycin were included I would avoid, for safety reasons, cross reacting aminoglycosides including vancomycin.     # localized lipatrophy and pigmentary changes on upper arm  No signs for specific reaction to preservatives. However, it could be that at that time some topical antibiotic has been used that she reacted to and/or lipatrophy after Kenalog injections.     ==> Comment: It is difficult to directly correlate the atrophy on the upper arm to the contact sensitization even though the outline of the reaction looks like the outline of a band-aid. It might be that this atrophy has nothing to do with an allergic reaction. It might be important to really verify if it is a muscle or lipatrophy. Clinically it looks more like lipatrophy which can sometimes be idiopathic. Moreover, a local injection of corticosteroids in this area might as well explain the atrophy (and there was such an injection months ago; not sure if exactly same location?)      These  conclusions are made at the best of one's knowledge and belief based on the provided evidence such as patient's history and allergy test results and they can change over time or can be incomplete because of missing information's.       ==> Treatment prescribed/Plan:    Follow the recommendations of the [facility] and use only products from the [facility]  See for informations on wood tar mix, Compositae and Propolis also concerning food.  In reserve Triamcinolone oint 0.025%    >> patient should go to ENT in [place] to evaluate for the chronic sinus injections = ENT re-evaluation  >> go back to allergist in [place] and perform skin tests with molds and environmental allergens that have been found in the school  >> then discuss possibility of oral immunotherapy to dust mites (patient had problems with the Immunotherapy with dose increase and no change found)  --> try Nasonex nasal spray 2xdaily  --> try Allegra 180mg at bedtime  --> Symbicort 160 inhalations 2xdaily    These conclusions are made at the best of one's knowledge and belief based on the provided evidence such as patient's history and allergy test results and they can change over time or can be incomplete because of missing information.    Staff: : provider    Follow-up in Derm-Allergy clinic if necessary  ___________________________    I spent a total of 24 minutes with Gisela Richards during today s  visit. This time was spent discussing all the individual test results, correlating them to the clinical relevance, counseling the patient and/or coordinating care s.

## 2024-02-28 NOTE — NURSING NOTE
Chief Complaint   Patient presents with    RECHECK     Follow up via video visit, may need additional testing     Maryjane Gerber RN

## 2024-02-28 NOTE — LETTER
2/28/2024         RE: Gisela Richards  [address]  [place] [address]        Dear Colleague,    Thank you for referring your patient, Gisela Richards, to the [facility] [place]. Please see a copy of my visit note below.    [facility] Dermato-allergology Note  Virtual visit: store and forward video (inSparqt connected), start time: 7:16am, end time: 7:40am, date of images: during video  Encounter Date: Feb 28, 2024  ____________________________________________    CC: No chief complaint on file.      HPI:  (Feb 28, 2024)  Ms. Gisela Richards is a(n) 43 year old female who presents today as a return patient for allergy consultation  - Follow-up in Derm-Allergy clinic if necessary  - patient had eyelid swelling after putting on a sun screen and a topical cosmetic names shine on. And in there are many essential oils and this was probably the reason for the flare up  --> most likely allergic reaction to organic fragrances  - otherwise feeling well in usual state of health    Physical exam:  General: In no acute distress, well-developed, well-nourished  Eyes: no conjunctivitis  ENT: no signs of rhinitis   Pulmonary: no wheezing or coughing  Skin: no active skin lesions, but in photo from 1 week ago periorbital swelling with eczematous lesions    Earlier History and Allergy exams:  (Jun 16, 2023)  - Follow-up in Derm-Allergy clinic in about 4 months  >> was working in a local [employer] in Feb/March. Started with red, itchy eyes. Later, when changed to full time was tired and patient forgot one time to take the antihistamines --> then developed recurrent Sinus infections, constant PND, sinus pressure and puffy eyes, fatigue and again wheezing and coughing. Feels better on Weekends.  >> will have with employer a air quality test  >> on Cetirizine once daily and Benadryl and nasal rinses    (Dec 2, 2022)  - Follow-up in Derm-Allergy clinic for 2nd  readings and final conclusions after 4 days (virtual) = from [place]     (Nov 30, 2022)  - Follow-up in Derm-Allergy clinic for 1st readings of patch tests after 2 days (virtual) = from [place]     (Nov 28, 2022)  - Follow-up in Derm-Allergy clinic for additional patch tests (see series in red) and earlier if hives not under control    (Jul 27, 2022)  - Follow-up: in Derm-Allergy clinic as needed  - on July 5th did a photoshoot in the field and then observed on the ankle a small spot, that became bigger (brown, not really infiltrated) and the morning of the 7th July a Dermatologist looked at it and in histology it was a necrosis unknown cause.  - 1 week later itching on that area with vesicular reaction = eczematous reaction  - on the 15th did bacterial culture (no bacterial growth)  - as soon as the patient stopped the vaseline with same adhesive band aid the lesions improved  - patient observe anyway recently reactions to various vaseline type ointment (incl Triamcinolone acetonide) = with cream no problem  --> took at the end orally prednisone and Doxycycline = stopped the Prednisone Monday the 18th of July and the Doxycycline on the 18th. ==> itching and urticarial rash started on the 22nd of July after being in the sun  --> NP prescribed Claritine and Prilosec and Benadryl   Skin:in the moment no signs for skin lesions, but patient had the eczematous lesions on the ankle   Some erythema over the decolletage    Earlier History and Allergy exams:  01/22/2021  Says she hasn't noticed any reaction to the patch testing or where we injected. Would like to try the meloxicam for her back pain. .    Earlier History and Allergy exams:   Ibuprofen does not induce any reaction.    > Patient had a reaction to Meloxicam in November. Took one Tabl for the first time and about 3-6h later with swelling and redness around the lips with dryness and then a sun burn type of rash over upper chest, stomach and back. No  itching. No sun exposure involved. Redness disappeared after 1-2 days. Never tongue swelling or breathing problems. Out of chin skin wheeping. Took almost one week to fully recover.  ==> delayed type reaction    Earlier History and Allergy exams:    Patient is continuing now the IT without any problems, since they are not using ItchEx anymore. Just using ice.  IT injections is done by an primary care PA in [facility]    > Patient had a reaction to Meloxicam about 3 h later with angioedema on face and then sun burn type of rash over face --> happened in November  > Ibuprofen does not induce any reaction (last taken last Saturday)    With tramadol tremors and swellings of face and with Percacet (Oxycodon and Acetaminophen) migraines  Vicodin induces erythema  Today she reports that all skin manifestation has resolved. Patient has come from [place], [place]. Patient's spouse is present during evaluation. She reports her first allergy shots took place on November 9th, 2 injections on posterior aspect of right arm and 1 to the posterior aspect of left arm.   - Per chart review immunotherapy: trees/grass/weeds, mites/molds and animals  Within the day of the injection patient noted a diffuse pruritic rash on the posterior aspect of her right arm. She denies using any topicals or oral medications beside Zyrtec to aid with on going rash. She additionally iced the area and skin manifestation had resolved.     The following week she completed her 2nd round of immunotherapy at the same dosage. Again the rash occurred on the posterior aspect of her right arm along with outstanding edema of her right arm and shoulder. This rash was also pruritic, but again denied any pain. Her provider was concerned about reaction to the preservative. This rash took 1.5 week to self resolve. The edema persisted for 2-3 weeks and when resolved, a notable dent was appreciated on the right distal aspect of the deltoid. Imaging  "completed showed atrophy of the deltoid. She denied any recent injections to that area including routine vaccines or steroid injections. Also now having to areas of hypopigmentation overlying the deltoid.  Reports dry skin after resolution of rashes. Denies history of asthma. Extensive history of food sensitives and autoimmune workup that has been unremarkable.     Hx since 19:  The patient comes in today for patch testing day 1. She reports that there is a bruise on the R shoulder that has worsened in the interim. She is unsure if it may be necrosis or sudden muscle atrophy. She reports it appeared on , the day after allergy shot. Also states that in the same area she developed a new \"bump\" that appeared about 2 days ago.   Reports that the IT on the R upper extremity was against trees, weeds, molds, mites, and dog. On the L upper extremity, she had IT injections for cats and dogs.   She is otherwise feeling well overall today. No other skin or allergy concerns at this time.     Medications:  - takes daily Zyrtec and is on IT  - Levothyroxin      Past Medical History:   Patient Active Problem List   Diagnosis     Allergic rhinitis due to dogs     Hypothyroid     Lumbar facet joint pain     Past Medical History:   Diagnosis Date     Constipation     Since young.     Other specified postprocedural states     2014,Slight recurrence within scar - removed in 9th grade and came back right away     Personal history of other medical treatment (CODE)      2, para 2.     Past Surgical History:   Procedure Laterality Date     COLONOSCOPY      ,\"swelling of the lining\" and flattening of villa.     OTHER SURGICAL HISTORY      ~,[id number],OTHER,Left,recurring within scar     OTHER SURGICAL HISTORY      2016,[id number],KNEE ARTHROSCOPY W/ MENISCAL REPAIR     OTHER SURGICAL HISTORY      2004,[id number],DILATION AND CURETTAGE,retained placenta       Social History:  Patient reports that she has quit " smoking. Her smoking use included cigarettes. She has been exposed to tobacco smoke. She has never used smokeless tobacco. She reports current alcohol use. She reports that she does not use drugs.    Family History:  Family History   Problem Relation Age of Onset     Family History Negative Father         Good Health,does not see doctor regularly     Thyroid Disease Mother         Thyroid Disease,Hypothyroid     Other - See Comments Mother         Smoker, prediabetes     Thyroid Disease Sister         Thyroid Disease,Hypothyroid     Arthritis Sister         Arthritis     Scoliosis Maternal Half-Sister         Scoliosis     Other - See Comments Maternal Half-Sister         No Known Problems       Medications:  Current Outpatient Medications   Medication Sig Dispense Refill     amphetamine-dextroamphetamine (ADDERALL XR) 25 MG 24 hr capsule Take 25 mg by mouth daily (Patient not taking: Reported on 6/20/2023)       amphetamine-dextroamphetamine (ADDERALL) 10 MG tablet Take 10 mg by mouth daily as needed       amphetamine-dextroamphetamine (ADDERALL) 5 MG tablet  (Patient not taking: Reported on 6/20/2023)       budesonide-formoterol (SYMBICORT) 160-4.5 MCG/ACT Inhaler Inhale 2 puffs into the lungs 2 times daily (Patient not taking: Reported on 6/20/2023) 10.2 g 3     cetirizine (ZYRTEC) 10 MG tablet Take 10 mg by mouth       cholecalciferol (VITAMIN D3) 25 mcg (1000 units) capsule Take 1 capsule by mouth       doxycycline hyclate (VIBRAMYCIN) 100 MG capsule Take 1 capsule by mouth 2 times daily (Patient not taking: Reported on 7/20/2022)       famotidine (PEPCID) 40 MG tablet Take 40 mg by mouth At Bedtime (Patient not taking: Reported on 8/25/2022)       ferrous sulfate (FEROSUL) 325 (65 Fe) MG tablet Take 1 tablet by mouth daily       fexofenadine (ALLEGRA) 180 MG tablet Take 1 tablet (180 mg) by mouth At Bedtime (Patient not taking: Reported on 6/20/2023) 30 tablet 3     fluorometholone (FML FORTE) 0.25 %  ophthalmic suspension  (Patient not taking: Reported on 8/25/2022)       folic acid (FOLVITE) 1 MG tablet Take 1 mg by mouth daily       hydrOXYzine (ATARAX) 25 MG tablet Take 1 tablet (25 mg) by mouth At Bedtime 30 tablet 1     levothyroxine (SYNTHROID/LEVOTHROID) 75 MCG tablet Take 1 tablet by mouth daily       levothyroxine (SYNTHROID/LEVOTHROID) 75 MCG tablet Take 75 mcg by mouth daily       levothyroxine (SYNTHROID/LEVOTHROID) 75 MCG tablet Take 1 tablet (75 mcg) by mouth every morning (before breakfast) 90 tablet 4     loratadine (CLARITIN) 10 MG tablet Take 10 mg by mouth (Patient not taking: Reported on 8/25/2022)       loratadine (CLARITIN) 10 MG tablet Morning and evening 1 Tabl of 10mg for about 1 week and if no itching anymore, then reduce to 1 Tabl in the morning. (Patient not taking: Reported on 4/6/2023) 30 tablet 3     mometasone (NASONEX) 50 MCG/ACT nasal spray Spray 2 sprays into both nostrils At Bedtime 17 g 3     mometasone (NASONEX) 50 MCG/ACT spray 1 spray 2 times daily       polyethylene glycol-electrolytes (NULYTELY) 420 g solution TAKE PREP AS SPLIT DOSE PRIOR TO PROCEDURE PER GASTRO INSTRUCTION LETTER (Patient not taking: Reported on 6/20/2023)       predniSONE (DELTASONE) 50 MG tablet Emergency set: if severe allergic reaction take immediately 100mg Prednisone (2x50mg) and 2 Tabl Cetirizine 10mg and then write precise 12h retrospective diary.If less severe reaction take only the 2 Tabl Cetirizine 2 tablet 1     triamcinolone (KENALOG) 0.1 % external cream  (Patient not taking: Reported on 6/20/2023)       triamcinolone (KENALOG) 0.1 % external cream  (Patient not taking: Reported on 8/25/2022)       triamcinolone (KENALOG) 0.1 % external ointment Apply 1 Dose topically 2 times daily (Patient not taking: Reported on 7/20/2022)       vitamin B-12 (CYANOCOBALAMIN) 500 MCG tablet Take 1 tablet by mouth daily       VYVANSE 40 MG capsule TAKE 1 CAPSULE BY MOUTH IN THE MORNING WITH FOOD          Allergies   Allergen Reactions     Cats Shortness Of Breath     Dogs Fatigue, Palpitations, Shortness Of Breath and Tinnitus     Gentamicin Dermatitis     In patch tests severe reactions against Neomycine, Framycetine, Gentamicin, Bacitracine and Polymyxin.   For reasons of crossreactions I would as well avoid other aminoglycosides such as Vancomycine     Petrolatum Rash     Gluten Meal      Other reaction(s): Joint Pain     Pramoxine-Benzyl Alcohol Rash     Triamcinolone Rash     Fat atrophy after IM injection      Tramadol Other (See Comments)     Other reaction(s): Other - Describe In Comment Field  High doses caused yellow eyes, tremors in her mouth, couldn't feel some body parts.    No issues with low doses  High doses caused yellow eyes, tremors in her mouth, couldn't feel some body parts.    No issues with low doses     Food GI Disturbance     Night shades, stevia, pork     Gel Base Other (See Comments)     Allergy to Itch X. Gets blister/pustular rash in area the itch X is placed.     Hydrocodone-Acetaminophen Rash     Order for PATCH TESTS    [x] Outpatient  [] Inpatient: Head..../ Bed ....      Skin Atopy (atopic dermatitis) [x] Yes   [] No  Rhinitis/Sinusitis:   [x] Yes   [] No  Allergic Asthma:   [] Yes   [x] No  Food Allergy:   [x] Yes   [] No  Leg ulcers:   [] Yes   [x] No  Hand eczema:   [] Yes   [x] No   Leading hand:   [x] R   [] L       [] Ambidextrous                      Reason for tests (suspected allergy): Assessment for potential reaction to disinfections before IT (less likely preservatives in IT, because the reaction not directly over injection site)  Known previous allergies: Trees/grass, mites, dogs/cats  Standardized panels (repeat patch tests in red)  [x] Standard panel (40 tests)  [x] Preservatives & Antimicrobials (31 tests)  [x] Emulsifiers & Additives (25 tests) particularly propylene glycol  [x] Perfumes/Flavours & Plants (25 tests)  [] Hairdresser panel (12 tests)  [x] Rubber  Chemicals (22 tests)  [x] Plastics (26 tests)  [] Colorants/Dyes/Food additives (20 tests)  [] Metals (implants/dental) (24 tests)  [] Local anaesthetics/NSAIDs (13 tests)  [x] Antibiotics & Antimycotics (14 tests)   [] Corticosteroids (15 tests)   [] Photopatch test (62 tests)   [] others: ...      [x] Patient's own products: tough strips band aids and vaseline and Triamcinolone cream and ointment  DO NOT test if chemical or biological identity is unknown!   always ask from patient the product information and safety sheets (MSDS)   [] Atopy screen prick test (Atopic predisposition): ...      RESULTS & EVALUATION of PATCH TESTS    Patch test readings after     [x] 2 days, [] 3 days [x] 4 days, [] 5 days,    Applied patch tests with results (import here the list of patch tests):  Order documented by: IVAN HANCOCK LPN  Order reviewed by: Preeti Copeland LPN   Physician:   Date/time of application:1-  Localization of application: back >> Clear    STANDARD Series         No Substance 2 days 4 days remarks   1 Francesco Mix [C] - -    2 Colophony - -    3  2-Mercaptobenzothiazole  NA NA     4 Methylisothiazolinone - -    5 Carba Mix - -    6 Thiuram Mix [A] NA NA    7 Bisphenol A Epoxy Resin - -    8 W-Ibhh-Ebtuxtudwvo-Formaldehyde Resin - -    9 Mercapto Mix [A] - -    10 Black Rubber Mix- PPD [B] NA NA    11 Potassium Dichromate  -  -    12 Balsam of [place] (Myroxylon Pereirae Resin) - -    13 Nickel Sulphate Hexahydrate - -    14 Mixed Dialkyl Thiourea - -    15 Paraben Mix [B] - -    16 Methyldibromo Glutaronitrile NA NA    17 Fragrance Mix - -    18 2-Bromo-2-Nitropropane-1,3-Diol (Bronopol) - -    19 Lyral - -    20 Tixocortol-21- Pivalate NA NA    21 Diazolidiyl Urea (Germall II) - -    22 Methyl Methacrylate NA NA    23 Cobalt (II) Chloride Hexahydrate - -    24 Fragrance Mix II  - -    25 Compositae Mix - -    26 Benzoyl Peroxide NA NA    27 Bacitracin - ++/+++    28 Formaldehyde - -    29  Methylchloroisothiazolinone / Methylisothiazolinone - -    30 Corticosteroid Mix - -    31 Sodium Lauryl Sulfate - -    32 Lanolin Alcohol - -    33 Turpentine - -    34 Cetylstearylalcohol - -    35 Chlorhexidine Dicluconate - -    36 Budenoside - -    37 Imidazolidinyl Urea  - -    38 Ethyl-2 Cyanoacrylate NA NA    39 Quaternium 15 (Dowicil 200) - -    40 Decyl Glucoside - -      EMULSIFIERS & ADDITIVES        No Substance 2 days 4 days remarks   41 Polyethylene Glycol-400 NA NA    42 Cocamidopropyl Betaine - -    43 Amerchol L101 NA NA    44 Propylene Glycol - -    45 Triethanolamine - -    46 Sorbitane Sesquiolate - -    47 Isopropylmyristate - -    48 Polysorbate 80  - -    49 Amidoamine   (Stearamidopropyl Dimethylamine) - -    50 Oleamidopropyl Dimethylamine - -    51 Lauryl Glucoside NA NA    52 Coconut Diethanolamide  - -    53 2-Hydroxy-4-Methoxy Benzophenone (Oxybenzone) - -    54 Benzophenone-4 (Sulisobenzon) - -    55 Propolis - -    56 Dexpanthenol - -    57 Carboxymethyl Cellulose Sodium - -    58 Abitol - -    59 Tert-Butylhydroquinone - -    60 Benzyl Salicylate - -     Antioxidant      61 Dodecyl Gallate - -    62 Butylhydroxyanisole (BHA) - -    63 Butylhydroxytoluene (BHT) - -    64 Di-Alpha-Tocopherol (Vit E) - -    65 Propyl Gallate - -      RUBBER CHEMICALS         No Substance 2 days 4 days remarks    Carbamate      66 Zinc Bis ( Diethyldithio carbamate ) (ZDEC) - -    67 Zinc Bis (Dibutyldithiocarbamate) - -    68 1,3-Diphenylguanidine (DPG) - -     Thiourea      69 Dibutylthiourea - -    70 Diphenyltiourea - -    71 Thiourea - -     Mercapto chemicals      72 Morpholinyl Mercaptobenzothiazole - -    73 L-Jvfdytmoba-2-Benzothiazyl-Sulfenamide - -    74 Dibenzothiazyl Disulfide - -     Thiuram chemicals      75 Dipentamethylenethiuram Disulfide - -    76 Tetraethylthiuram Disulfide (Disulfiram) - -    77 Tetramethylthiuram Disulfide - -    78 Tetramethyl Thiuram Monosulfide (TMTM) - -      4-Phenylenediamine derivatives      79 N-Isopropyl-N'-Phenyl-P-Phenylenediamine (IPPD) - -    80 Knqxhjbi-Y-Krvabozavbhlwmjd (DPPD) - -     Various Rubber Additives      81 Hydroquinone Monobenzylether  - -    82 Hexamethylenetetramine - -    83 4,4'-Dihydroxybiphenyl - -    84 Cyclohexylthiophtalimide - -    85 Ethylenediamine Dihydrochloride - -    86 N-Phenyl-B-Naphthylamine - -    87 Dodecyl Mercaptan - -        PLASTICS         No Substance 2 days 4 days remarks    Acrylates - -    88 2-Hydroxyethyl Methacrylate (HEMA) - -    89 1,4-Butandioldimethacrylate (BUDMA) - -    90  2-Ethylhexyl Acrylate - -    91 Bisphenol-A-Dimethacrylate  - -    92 Diurethane-Dimethacrylate - -    93 Ethyleneglycoldimethacrylate (EGDMA) - -    94 Pentaerythritoltriacrylate (CARLOS) - -    95 Triethylene Glycol Dimethacrylate (TEGDMA) - -     Synthetic material/additives       96 O-Lkof-Kxvaneowdgp - -    97 Tricresyl Phosphate - -    98 6-Pulr-Kfyyvnyjogxvx - -    99 Bis (2-Ethylhexyl) Phthalate - -    100 Dibutylphthalate - -    101 Dimethylphthalate - -    102 Toluene-2,4-Diisocyanate - -    103 Diphenylmethane-4,4''-Diisocyanate - -     EPOXY RESIN SYSTEMS       Reactive Solvents - -    104 Cresyl Glycidyl Ether - -    105 Butyl Glycidyl Ether - -    106 Phenyl Glycidyl Ether - -    107 1,4-Butanediol Diglycidyl Ether - -    108 1,6-Hexanediole Diglycidyl Ether - -     Hardener / Accelerator - -    109 Ethylenediamine Dihydrochloride - -    110 Triethylenetetramine - -    111 Diethylenetriamine - -    112 Isophorone Diamine (IPD) - -    113 N,N-Dimethyl-P-Toluidine - -        PRESERVATIVES & ANTIMICROBIALS         No Substance 2 days 4 days remarks   114  1,2-Benzisothiazoline-3-One, Sodium Salt - -    115  1,3,5-Anival (2-Hydroxyethyl) - Hexahydrotriazine (Grotan BK) NA NA    116 8-Odrqoiqrzjydb-8-Nitro-1, 3-Propanediol - -    117  3, 4, 4' - Triclocarban - -    118 4 - Chloro - 3 - Cresol - -    119 4 - Chloro - 4 - Xylenol (PCMX)  NA NA    120 7-Ethylbicyclooxazolidine (Bioban [id number]) - -    121 Benzalkonium Chloride - -    122 Benzyl Alcohol - -    123 Cetalkonium Chloride NA NA    124 Cetylpyrimidine Chloride  - -    125 Chloroacetamide - -    126 DMDM Hydantoin NA NA    127 Glutaraldehyde NA NA    128 Triclosan - -    129 Glyoxal Trimeric Dihydrate - -    130 Iodopropynyl Butylcarbamate - -    131 Octylisothiazoline NA NA    132 Iodoform - -    133 (Nitrobutyl) Morpholine/(Ethylnitro-Trimethylene) Dimorpholine (Bioban P 1487) - -    134 Phenoxyethanol NA NA    135 Phenyl Salicylate - -    136 Povidone Iodine - -    137 Sodium Benzoate - -    138 Sodium Disulfite NA NA    139 Sorbic Acid - -    140 Thimerosal - -     Parabens      141 Butyl-P-Hydroxybenzoate - -    142 Ethyl-P-Hydroxybenzoate - -    143 Methyl-P-Hydroxybenzoate NA NA    144 Propyl-P-Hydroxybenzoate - -         ANTIBIOTICS & ANTIMYCOTICS         No Substance 2 days 4 days remarks   145 Erythromycine - -    146 Framycetine Sulphate (+) +++    147 Fusidic Acid Sodium Salt - -    148 Gentamicin Sulphate - +    149 Neomycine Sulphate (+) ++/+++    150 Oxytetracycline  - -    151 Polymyxin B Sulphate (+) ++    152 Tetracycline-HCL - -    153 Sulfanilamide - -    154 Metronidazole - -    155 Oxyquinoline Mix - -    156 Nitrofurazone - -    157 Nystatin - -    158 Clotrimazole - -      PATIENTS OWN PRODUCTS         No Substance Conc  % solv 2 days 4 days remarks   159 Aspergillus   - -    160 Penicillium   - -    161 D. Farinae   - -    162 D. pteronyssinus   - -    163 Dog   - -    164 Cat   - -        Nov 28, 2022 repeat application of patch tests:  Patch test readings after     [x] 2 days, [] 3 days [x] 4 days, [] 5 days,  Other duration: ...    STANDARD Series                                          # Substance 2 days 4 days remarks     1 Francesco Mix [C] - -       2 Colophony - -       3  2-Mercaptobenzothiazole  - -       4 Methylisothiazolinone - -       5 Carba Mix - -        6 Thiuram Mix [A] - -       7 Bisphenol A Epoxy Resin - -       8 I-Kkwg-Vesvbftsfkv-Formaldehyde Resin - -       9 Mercapto Mix [A] - -       10 Black Rubber Mix- PPD [B] - -       11 Potassium Dichromate  -  -       12 Balsam of [place] (Myroxylon Pereirae Resin) - -       13 Nickel Sulphate Hexahydrate - -       14 Mixed Dialkyl Thiourea - -       15 Paraben Mix [B] - -       16 Methyldibromo Glutaronitrile - -       17 Fragrance Mix - +       18 2-Bromo-2-Nitropropane-1,3-Diol (Bronopol) CT - -       19 Lyral - -       20 Tixocortol-21- Pivalate CT - -       21 Diazolidiyl Urea (Germall II) - -       22 Methyl Methacrylate - -       23 Cobalt (II) Chloride Hexahydrate - -       24 Fragrance Mix II  - -       25 Compositae Mix - (+)       26 Benzoyl Peroxide - -       27 Bacitracin - +++       28 Formaldehyde - -       29 Methylchloroisothiazolinone / Methylisothiazolinone - -       30 Corticosteroid Mix CT (+) -       31 Sodium Lauryl Sulfate - -       32 Lanolin Alcohol - -       33 Turpentine - -       34 Cetylstearylalcohol - -       35 Chlorhexidine Dicluconate - -       36 Budenoside - -       37 Imidazolidinyl Urea  - -       38 Ethyl-2 Cyanoacrylate - -       39 Quaternium 15 (Dowicil 200) - -       40 Decyl Glucoside - -       PRESERVATIVES & ANTIMICROBIALS        # Substance 2 days 4 days remarks   41 1  1,2-Benzisothiazoline-3-One, Sodium Salt - -     2  1,3,5-Anival (2-Hydroxyethyl) - Hexahydrotriazine (Grotan BK) - -     3 5-Jhhulqqswlhiw-3-Nitro-1, 3-Propanediol NA NA     4  3, 4, 4' - Triclocarban - -    45 5 4 - Chloro - 3 - Cresol - -     6 4 - Chloro - 4 - Xylenol (PCMX) - -     7 7-Ethylbicyclooxazolidine (Bioban [id number]) - -     8 Benzalkonium Chloride CT - -     9 Benzyl Alcohol - -    50 10 Cetalkonium Chloride - -     11 Cetylpyrimidine Chloride  - -     12 Chloroacetamide - -     13 DMDM Hydantoin - -     14 Glutaraldehyde - -    55 15 Triclosan - -     16 Glyoxal Trimeric Dihydrate - -     17  Iodopropynyl Butylcarbamate - -     18 Octylisothiazoline - -     19 Bithionol CT - -    60 20 Bioban P 1487 (Nitrobutyl) Morpholine/(Ethylnitro-Trimethylene) Dimorpholine - -     21 Phenoxyethanol - -     22 Phenyl Salicylate - -     23 Povidone Iodine - -     24 Sodium Benzoate - -    65 25 Sodium Disulfite - -     26 Sorbic Acid - -     27 Thimerosal - -     28 Melamine Formaldehyde Resin - -     29 Ethylenediamine Dihydrochloride - -      Parabens      70 30 Butyl-P-Hydroxybenzoate - -     31 Ethyl-P-Hydroxybenzoate NA NA     32 Methyl-P-Hydroxybenzoate - -    73 33 Propyl-P-Hydroxybenzoate - -      EMULSIFIERS & ADDITIVES       # Substance 2 days 4 days remarks   74 1 Polyethylene Glycol-400 - -    75 2 Cocamidopropyl Betaine - -     3 Amerchol L101 - -     4 Propylene Glycol - -     5 Triethanolamine - -     6 Sorbitane Sesquiolate CT - -    80 7 Isopropylmyristate - -     8 Polysorbate 80 CT - -     9 Amidoamine   (Stearamidopropyl Dimethylamine) NA NA     10 Oleamidopropyl Dimethylamine - -     11 Lauryl Glucoside - -    85 12 Coconut Diethanolamide  - -     13 2-Hydroxy-4-Methoxy Benzophenone (Oxybenzone) - -     14 Benzophenone-4 (Sulisobenzon) NA NA     15 Propolis - +/++     16 Dexpanthenol - -    90 17 Abitol - -     18 Tert-Butylhydroquinone - -     19 Benzyl Salicylate - -     20 Dimethylaminopropylamin (DMPA) CT? D053       21 Zinc Pyrithione (Zinc Omadine) CT? Z006      95 22 Anival(Hydroxymethyl) Nitromethane CT        Antioxidant - -     23 Dodecyl Gallate - -     24 Butylhydroxyanisole (BHA) - -     25 Butylhydroxytoluene (BHT) NA NA     26 Di-Alpha-Tocopherol (Vit E) - -    100 27 Propyl Gallate - -      PERFUMES, FLAVORS & PLANTS        # Substance 2 days 4 days remarks   101 1 Benzyl Cinnamate (+) -     2 Di-Limonene (Dipentene) - -     3 Cananga Odorata (Ylang Ylang) (I) (+) -     4 Lichen Acid Mix - -    105 5 Mentha Piperita Oil (Peppermint Oil) + -     6 Sesquiterpenelactone mix - -     7  Tea Tree Oil, Oxidized - -     8 Wood Tar Mix - +/++     9 Abietic Acid - -    110 10 Lavendula Angustifolia Oil (Lavender Oil) - -     11 Fragrance mix II CT * - -      Fragrance Mix I       12 Oakmoss Absolute - -     13 Eugenol - -     14 Geraniol NA NA    115 15 Hydroxycitronellal - -     16 Isoeugenol - -     17 Cinnamic Aldehyde - -     18 Cinnamic Alcohol  NA NA      Fragrance mix II       19 Citronellol - -    120 20 Alpha-Hexylcinnamic Aldehyde    - -     21 Citral - -     22 Farnesol - -    123 23 Coumarin - -    Hexylcinnamic aldehyde, Coumarin, Farnesol, Hydroxyisohexy3-cyclohexene carboxaldehyde, citral, citrolellol  PLASTICS        # Substance 2 days 4 days remarks     Acrylates - -    124 1 2-Hydroxyethyl Methacrylate (HEMA) - -    125 2 1,4-Butandioldimethacrylate (BUDMA) - -     3  2-Ethylhexyl Acrylate - -     4 Bisphenol-A-Dimethacrylate  - -     5 Diurethane-Dimethacrylate - -     6 Ethyleneglycoldimethacrylate (EGDMA) - -    130 7 Pentaerythritoltriacrylate (CARLOS) - -     8 Triethylene Glycol Dimethacrylate (TEGDMA) - -      Synthetic material/additives        9 L-Rhgx-Mynhwocxcku - -     10 Tricresyl Phosphate - -     11 2-Vdck-Pguqxxcoliuai - -    135 12 Bis (2-Ethylhexyl) Phthalate - -     13 Dibutylphthalate - -     14 Dimethylphthalate - -     15 Toluene-2,4-Diisocyanate - -     16 Diphenylmethane-4,4''-Diisocyanate - -      EPOXY RESIN SYSTEMS        Reactive Solvents - -    140 17 Cresyl Glycidyl Ether - -     18 Butyl Glycidyl Ether - -     19 Phenyl Glycidyl Ether - -     20 1,4-Butanediol Diglycidyl Ether - -     21 1,6-Hexanediole Diglycidyl Ether - -      Hardener / Accelerator - -    145 22 Triethylenetetramine - -     23 Diethylenetriamine - -     24 Isophorone Diamine (IPD) - -    148 25 N,N-Dimethyl-P-Toluidine - -      OTHER PRODUCTS  tough strips band aids and vaseline and Triamcinolone cream and ointment       # Substance Conc  % solv 2 days 4 days remarks   149 1  Triamcinolone cream 0.1% as is  - -    150 2 Curad petroleum jelly as is  - -    151 3 Cortizone 10 cream with aloe as is  - -    152 4 Triamcinolone oint 0.1% as is  - -        Results of patch tests:                         Interpretation:  - Negative                    A    = Allergic      (+) Erythema    TI   = Toxic/irritant   + E + Infiltration    RaP = Relevance at Present     ++ E/I + Papulovesicle   Rpr  = Relevance Previously     +++ E/I/P + Blister     nR   = No Relevance         Atopy Screen (Placed 01/13/20 )  No Substance Readings (15 min) Evaluation   POS Histamine 1mg/ml ++    NEG NaCl 0.9% - Slight dermographism     No Substance Readings (15 min) Evaluation   1 Alternaria alternata (tenuis)  -    2 Cladosporium herbarum -    3 Aspergillus fumigatus -    4 Penicillium notatum -    5 Dermatophagoides pteronyssinus ++    6 Dermatophagoides farinae ++    Conclusion: immediate type reaction to house dust mites, but no delayed reaction to moulds or HDM --> The DP and DF reactions remained into the next day.       DRUGS/SUBSTANCES 1/19/2021  No Substance Readings (15min) Evaluation   POS Histamine 1mg/ml ++    NEG NaCl 0.9% - dermographism      Prick Tests        Substance/ Allergen Concentration Result (15min) Remarks   Meloxicam 30mg/ml as is neg     In vaseline neg Not bigger than neg ctr     Intradermal Tests   immed immed delayed delayed     Substance Conc 1st dil 2nd dil   3 days  3 days remarks   Histamine Hydrochloride (ALK) 0.1 mg/ml        NaCl 0.9%        Meloxicam 1:200/ 1:20 neg neg neg  dermographism     Patch Tests   as is  as is 1:2 paraffin 1:2 paraffin     Substance Conc  3 days  days  3 days  days remarks   Meloxicam  - - - -        [x] Allergic reaction diagnosed against:     Antibiotics and antimycotics:   Framycetine Sulphate +++  Gentamicin Sulphate +  Neomycine Sulphate ++/+++  Polymyxin B Sulphate ++  Bacitracin ++/+++ and repeated 11/30/2022 +++    Following additional allergens  in repeat patch tests from November 2022 found:     Reactions to various fragrances/flavors such as   +/++ wood tar mix,  +/++ Propolis, + fragrance mix and + Compositae mix      Interpretation/ remarks:   Patient has a severe delayed type contact sensitization to various antibiotics that are in many OTC antiseptics and antibiotics. No signs for allergies to rubber chemicals or adhesives, but reactions to organic flavors and fragrances and this was probably the problem for the allergic contact dermatitis to the Vaseline gel with fragrances in there. Not sure if Propolis (honey), wood tar (flavor from pine sap in candies) and Compositae plays a role with the GI symptoms    [x] Patient information given   [x] [facility] information's (# [id number], and [id number]) to following  compounds:  Neomycine Sulphate, Polymyxin B Sulphate, Bacitracin, Propolis, fragrance mix, Compositae mix, wood tar mix    [] General information's to following compounds: ......    Interpretation/ remarks:   See later    Assessment & Plan:    ==> Final Diagnosis:     #  Localized Eczematous and delayed-type reaction after 2x initial doses of immunotherapies  = reaction to itch-X cream  * chronic illness with exacerbation, progression, side effects from treatment  No signs for allergies to preservative in these allergy extracts   No signs in prick test or patch test for delayed type reaction to some of these allergens (dust mites, molds)  Severe contact sensitizations to topical antibiotics  ==> in open application test clearly positive to the itch-x cream that patient used to reduce the effects of the IT    ==> Comment: This was NOT a reaction to the immunotherapy, but rather to the topical product used afterwards to treat the topical side effects of the IT. Not sure to what ingredient in itch-x was really reacting, but not to Parabens and Propylene Glycol.    #  New flare up of allergic contact dermatitis on ankle to fragrances in the  Vaseline cocoa butter healing jelly  >> additional contact sensitizations to fragrances and flavors  * chronic illness with exacerbation, progression, side effects from treatment    #  Chronic recurrent Rhinosinusitis, Postnasal drip and fatigue  Most likely environmental allergy to dust mites (maybe also molds)  * chronic illness with exacerbation, progression, side effects from treatment    #  Recurrent urticarial rash with pruritus (after using Doxycycline)  * chronic illness with exacerbation, progression, side effects from treatment  Treatment: try Claritine 10mg morning and Noon and Hydroxyzine 25mg at bedtime prn    # Unlikely angioedema reaction to Meloxicam  * stable chronic illness  In skin tests no signs for specific immediate type or delayed reaction to Meloxicam. Patient desires to restart Meloxicam for back pain.   No evidence for COX1 intolerance (Ibuprofen is well-tolerated)  Suspect that her prior reaction was unrelated to the Meloxicam  Can restart meloxicam 1/4 tablet, then 1-2 days later, 1/2 tablet, then 1-2 days later she can try a full tablet.   Has emergency set and given handout emergency treatment (has Epipen)    It is imperative that the patient avoids all antibiotics, topically and systemically, that we have tested and that she was positive. Because gentamycin and neomycin were included I would avoid, for safety reasons, cross reacting aminoglycosides including vancomycin.     # localized lipatrophy and pigmentary changes on upper arm  No signs for specific reaction to preservatives. However, it could be that at that time some topical antibiotic has been used that she reacted to and/or lipatrophy after Kenalog injections.     ==> Comment: It is difficult to directly correlate the atrophy on the upper arm to the contact sensitization even though the outline of the reaction looks like the outline of a band-aid. It might be that this atrophy has nothing to do with an allergic reaction. It  might be important to really verify if it is a muscle or lipatrophy. Clinically it looks more like lipatrophy which can sometimes be idiopathic. Moreover, a local injection of corticosteroids in this area might as well explain the atrophy (and there was such an injection months ago; not sure if exactly same location?)      These conclusions are made at the best of one's knowledge and belief based on the provided evidence such as patient's history and allergy test results and they can change over time or can be incomplete because of missing information's.       ==> Treatment prescribed/Plan:    Follow the recommendations of the [facility] and use only products from the [facility]  See for informations on wood tar mix, Compositae and Propolis also concerning food.  In reserve Triamcinolone oint 0.025%    >> patient should go to ENT in [place] to evaluate for the chronic sinus injections = ENT re-evaluation  >> go back to allergist in [place] and perform skin tests with molds and environmental allergens that have been found in the school  >> then discuss possibility of oral immunotherapy to dust mites (patient had problems with the Immunotherapy with dose increase and no change found)  --> try Nasonex nasal spray 2xdaily  --> try Allegra 180mg at bedtime  --> Symbicort 160 inhalations 2xdaily    These conclusions are made at the best of one's knowledge and belief based on the provided evidence such as patient's history and allergy test results and they can change over time or can be incomplete because of missing information.    Staff: : provider    Follow-up in Derm-Allergy clinic if necessary  ___________________________    I spent a total of 24 minutes with Gisela Richards during today s  visit. This time was spent discussing all the individual test results, correlating them to the clinical relevance, counseling the patient and/or coordinating care s.          Again, thank you for allowing me to participate in the care of  your patient.        Sincerely,        Reginaldo Horne MD

## 2024-05-16 ENCOUNTER — OFFICE VISIT (OUTPATIENT)
Dept: FAMILY MEDICINE | Facility: OTHER | Age: 44
End: 2024-05-16
Payer: COMMERCIAL

## 2024-05-16 VITALS
SYSTOLIC BLOOD PRESSURE: 120 MMHG | HEIGHT: 68 IN | DIASTOLIC BLOOD PRESSURE: 82 MMHG | HEART RATE: 66 BPM | WEIGHT: 162.8 LBS | OXYGEN SATURATION: 99 % | BODY MASS INDEX: 24.67 KG/M2 | RESPIRATION RATE: 14 BRPM | TEMPERATURE: 97 F

## 2024-05-16 DIAGNOSIS — J01.00 ACUTE NON-RECURRENT MAXILLARY SINUSITIS: Primary | ICD-10-CM

## 2024-05-16 PROCEDURE — 99213 OFFICE O/P EST LOW 20 MIN: CPT

## 2024-05-16 ASSESSMENT — PAIN SCALES - GENERAL: PAINLEVEL: MODERATE PAIN (5)

## 2024-05-16 NOTE — NURSING NOTE
"Chief Complaint   Patient presents with    Headache    Nasal Congestion     With post nasal drainage. Present a couple days, takes a daily zyrtec and does nasal rinses with little relief    Dental Pain     Patient states that she got 3 sinus infections in a row last year and this feels very similar. Patient states that she took ibuprofen and sudafed at about 2:30 today. Patient states that her eyes are itchy.    Initial /82 (BP Location: Right arm, Patient Position: Sitting, Cuff Size: Adult Regular)   Pulse 66   Temp 97  F (36.1  C) (Temporal)   Resp 14   Ht 1.727 m (5' 8\")   Wt 73.8 kg (162 lb 12.8 oz)   LMP 05/10/2024 (Exact Date)   SpO2 99%   Breastfeeding No   BMI 24.75 kg/m   Estimated body mass index is 24.75 kg/m  as calculated from the following:    Height as of this encounter: 1.727 m (5' 8\").    Weight as of this encounter: 73.8 kg (162 lb 12.8 oz).       FOOD SECURITY SCREENING QUESTIONS:    The next two questions are to help us understand your food security.  If you are feeling you need any assistance in this area, we have resources available to support you today.    Hunger Vital Signs:  Within the past 12 months we worried whether our food would run out before we got money to buy more. Never  Within the past 12 months the food we bought just didn't last and we didn't have money to get more. Never  Tika Enriquez LPN on 5/16/2024 at 3:16 PM     Tika Enriquez   "

## 2024-05-16 NOTE — PROGRESS NOTES
ASSESSMENT/PLAN:    (J01.00) Acute non-recurrent maxillary sinusitis  (primary encounter diagnosis)  Comment: Patient with several weeks of nasal congestion and postnasal drip due to seasonal allergies.  She notes that over the past few days she has developed sinus pain and pressure as well as dental pain for the past few days.  She does endorse headaches as well.  She has history of bacterial sinusitis.  On exam vital signs are stable, she is afebrile, bilateral maxillary and frontal sinus tenderness is appreciated.  With ongoing congestion and sinus tenderness we will opt to treat for bacterial sinus infection at this time with Augmentin.  She has tolerated this antibiotic well in the past.  Plan: amoxicillin-clavulanate (AUGMENTIN) 875-125 MG         tablet  Twice daily nasal saline.  Continue allergy medication.  Take antibiotic as ordered.  You may take with food.  Daily probiotic recommended.  Afrin- nasal decongestant for before your flight tomorrow.     Discussed warning signs/symptoms indicative of need to f/u    Follow up if symptoms persist or worsen or concerns    I have reviewed the nursing notes.  I have reviewed the findings, diagnosis, plan and need for follow up with the patient.    I explained my diagnostic considerations and recommendations to the patient, who voiced understanding and agreement with the treatment plan. All questions were answered. We discussed potential side effects of any prescribed or recommended therapies, as well as expectations for response to treatments.    BETH HILLMAN, NINI CNP  5/16/2024  3:21 PM    HPI:    Gisela Richards is a 43 year old female  who presents to [facility] today for concerns of sinusitis.    Patient endorses nasal congestion with postnasal drip ongoing for several weeks due to seasonal allergies. She notes that she has had sinus pain and pressure as well as dental pain for the past few days.  She also endorses headaches.    Allergies as  "listed.    PCP: [facility]    Past Medical History:   Diagnosis Date    Constipation     Since young.    Other specified postprocedural states     2014,Slight recurrence within scar - removed in 9th grade and came back right away    Personal history of other medical treatment (CODE)      2, para 2.     Past Surgical History:   Procedure Laterality Date    COLONOSCOPY      ,\"swelling of the lining\" and flattening of villa.    OTHER SURGICAL HISTORY      ~,[id number],OTHER,Left,recurring within scar    OTHER SURGICAL HISTORY      2016,[id number],KNEE ARTHROSCOPY W/ MENISCAL REPAIR    OTHER SURGICAL HISTORY      2004,[id number],DILATION AND CURETTAGE,retained placenta     Social History     Tobacco Use    Smoking status: Former     Types: Cigarettes     Passive exposure: Past    Smokeless tobacco: Never    Tobacco comments:     \"10 cigarettes in my life before I was 19\"   Substance Use Topics    Alcohol use: Yes     Alcohol/week: 0.0 standard drinks of alcohol     Comment: Alcoholic Drinks/day: once monthly     Current Outpatient Medications   Medication Sig Dispense Refill    amphetamine-dextroamphetamine (ADDERALL) 10 MG tablet Take 10 mg by mouth daily as needed      amphetamine-dextroamphetamine (ADDERALL) 5 MG tablet       budesonide-formoterol (SYMBICORT) 160-4.5 MCG/ACT Inhaler Inhale 2 puffs into the lungs 2 times daily 10.2 g 3    cetirizine (ZYRTEC) 10 MG tablet Take 10 mg by mouth      cholecalciferol (VITAMIN D3) 25 mcg (1000 units) capsule Take 1 capsule by mouth      famotidine (PEPCID) 40 MG tablet Take 40 mg by mouth at bedtime      ferrous sulfate (FEROSUL) 325 (65 Fe) MG tablet Take 1 tablet by mouth daily      fexofenadine (ALLEGRA) 180 MG tablet Take 1 tablet (180 mg) by mouth At Bedtime 30 tablet 3    fluorometholone (FML FORTE) 0.25 % ophthalmic suspension       folic acid (FOLVITE) 1 MG tablet Take 1 mg by mouth daily      hydrOXYzine (ATARAX) 25 MG tablet Take 1 tablet (25 mg) by " mouth At Bedtime 30 tablet 1    levothyroxine (SYNTHROID/LEVOTHROID) 75 MCG tablet Take 1 tablet by mouth daily      levothyroxine (SYNTHROID/LEVOTHROID) 75 MCG tablet Take 75 mcg by mouth daily      loratadine (CLARITIN) 10 MG tablet Take 10 mg by mouth      loratadine (CLARITIN) 10 MG tablet Morning and evening 1 Tabl of 10mg for about 1 week and if no itching anymore, then reduce to 1 Tabl in the morning. 30 tablet 3    mometasone (NASONEX) 50 MCG/ACT nasal spray Spray 2 sprays into both nostrils At Bedtime 17 g 3    polyethylene glycol-electrolytes (NULYTELY) 420 g solution       predniSONE (DELTASONE) 50 MG tablet Emergency set: if severe allergic reaction take immediately 100mg Prednisone (2x50mg) and 2 Tabl Cetirizine 10mg and then write precise 12h retrospective diary.If less severe reaction take only the 2 Tabl Cetirizine 2 tablet 1    triamcinolone (KENALOG) 0.025 % external ointment Apply topically 2 times daily as needed for irritation (on flare ups us it for 4 days) 15 g 2    triamcinolone (KENALOG) 0.1 % external cream       triamcinolone (KENALOG) 0.1 % external cream       vitamin B-12 (CYANOCOBALAMIN) 500 MCG tablet Take 1 tablet by mouth daily      VYVANSE 40 MG capsule TAKE 1 CAPSULE BY MOUTH IN THE MORNING WITH FOOD      amphetamine-dextroamphetamine (ADDERALL XR) 25 MG 24 hr capsule Take 25 mg by mouth daily (Patient not taking: Reported on 5/16/2024)      doxycycline hyclate (VIBRAMYCIN) 100 MG capsule Take 1 capsule by mouth 2 times daily (Patient not taking: Reported on 7/20/2022)      levothyroxine (SYNTHROID/LEVOTHROID) 75 MCG tablet Take 1 tablet (75 mcg) by mouth every morning (before breakfast) 90 tablet 4    mometasone (NASONEX) 50 MCG/ACT spray 1 spray 2 times daily      triamcinolone (KENALOG) 0.1 % external ointment Apply 1 Dose topically 2 times daily (Patient not taking: Reported on 7/20/2022)       Allergies   Allergen Reactions    Cats Shortness Of Breath    Dogs Fatigue,  "Palpitations, Shortness Of Breath and Tinnitus    Gentamicin Dermatitis     In patch tests severe reactions against Neomycine, Framycetine, Gentamicin, Bacitracine and Polymyxin.   For reasons of crossreactions I would as well avoid other aminoglycosides such as Vancomycine    Gluten Meal      Other reaction(s): Joint Pain    Pramoxine-Benzyl Alcohol Rash    Triamcinolone Rash     Fat atrophy after IM injection     Tramadol Other (See Comments)     Other reaction(s): Other - Describe In Comment Field  High doses caused yellow eyes, tremors in her mouth, couldn't feel some body parts.    No issues with low doses  High doses caused yellow eyes, tremors in her mouth, couldn't feel some body parts.    No issues with low doses    Food GI Disturbance     Night shades, stevia, pork    Gel Base Other (See Comments)     Allergy to Itch X. Gets blister/pustular rash in area the itch X is placed.    Hydrocodone-Acetaminophen Rash     Past medical history, past surgical history, current medications and allergies reviewed and accurate to the best of my knowledge.      ROS:  Refer to HPI    /82 (BP Location: Right arm, Patient Position: Sitting, Cuff Size: Adult Regular)   Pulse 66   Temp 97  F (36.1  C) (Temporal)   Resp 14   Ht 1.727 m (5' 8\")   Wt 73.8 kg (162 lb 12.8 oz)   LMP 05/10/2024 (Exact Date)   SpO2 99%   Breastfeeding No   BMI 24.75 kg/m      EXAM:  General Appearance: Well appearing 43 year old female, appropriate appearance for age. No acute distress   Ears: Left TM intact, translucent with bony landmarks appreciated, no erythema, no effusion, no bulging, no purulence.  Right TM intact, translucent with bony landmarks appreciated, no erythema, no effusion, no bulging, no purulence.  Left auditory canal clear.  Right auditory canal clear.  Normal external ears, non tender.  Eyes: conjunctivae normal without erythema or irritation, corneas clear, no drainage or crusting, no eyelid swelling, pupils " equal   Oropharynx: moist mucous membranes, posterior pharynx without erythema, no exudates or petechiae, no post nasal drip seen, no trismus, voice clear.    Sinuses: Bilateral maxillary and frontal sinus tenderness.  Nose:  Bilateral nares: no erythema, no edema, no drainage or congestion   Neck: supple with right-sided anterior cervical lymphadenopathy.  Respiratory: normal chest wall and respirations.  Normal effort.  Clear to auscultation bilaterally, no wheezing, crackles or rhonchi.  No increased work of breathing.  No cough appreciated.  Cardiac: RRR with no murmurs  Musculoskeletal:  Equal movement of bilateral upper extremities.  Equal movement of bilateral lower extremities.  Normal gait.    Dermatological: no rashes noted of exposed skin  Neuro: Alert and oriented to person, place, and time.  Cranial nerves II-XII grossly intact with no focal or lateralizing deficits.  Muscle tone normal.  Gait normal. No tremor.   Psychological: normal affect, alert, oriented, and pleasant.

## 2025-01-12 ENCOUNTER — HEALTH MAINTENANCE LETTER (OUTPATIENT)
Age: 45
End: 2025-01-12

## 2025-08-11 ENCOUNTER — OFFICE VISIT (OUTPATIENT)
Dept: OBGYN | Facility: OTHER | Age: 45
End: 2025-08-11
Attending: OBSTETRICS & GYNECOLOGY
Payer: COMMERCIAL

## 2025-08-11 VITALS
DIASTOLIC BLOOD PRESSURE: 88 MMHG | BODY MASS INDEX: 24.15 KG/M2 | WEIGHT: 158.8 LBS | SYSTOLIC BLOOD PRESSURE: 118 MMHG | HEART RATE: 98 BPM

## 2025-08-11 DIAGNOSIS — N95.1 PERIMENOPAUSE: Primary | ICD-10-CM

## 2025-08-11 PROCEDURE — 99204 OFFICE O/P NEW MOD 45 MIN: CPT | Performed by: OBSTETRICS & GYNECOLOGY

## 2025-08-11 PROCEDURE — 1126F AMNT PAIN NOTED NONE PRSNT: CPT | Performed by: OBSTETRICS & GYNECOLOGY

## 2025-08-11 PROCEDURE — 3079F DIAST BP 80-89 MM HG: CPT | Performed by: OBSTETRICS & GYNECOLOGY

## 2025-08-11 PROCEDURE — 3074F SYST BP LT 130 MM HG: CPT | Performed by: OBSTETRICS & GYNECOLOGY

## 2025-08-11 RX ORDER — AZELASTINE 1 MG/ML
1 SPRAY, METERED NASAL
COMMUNITY
Start: 2024-09-09

## 2025-08-11 ASSESSMENT — PAIN SCALES - GENERAL: PAINLEVEL_OUTOF10: NO PAIN (0)

## (undated) RX ORDER — LIDOCAINE HYDROCHLORIDE 10 MG/ML
INJECTION, SOLUTION EPIDURAL; INFILTRATION; INTRACAUDAL; PERINEURAL
Status: DISPENSED
Start: 2022-08-25